# Patient Record
Sex: FEMALE | Race: WHITE | NOT HISPANIC OR LATINO | Employment: OTHER | URBAN - METROPOLITAN AREA
[De-identification: names, ages, dates, MRNs, and addresses within clinical notes are randomized per-mention and may not be internally consistent; named-entity substitution may affect disease eponyms.]

---

## 2017-08-10 ENCOUNTER — APPOINTMENT (OUTPATIENT)
Dept: OTHER | Facility: IMAGING CENTER | Age: 57
End: 2017-08-10

## 2017-08-12 ENCOUNTER — HOSPITAL ENCOUNTER (OUTPATIENT)
Dept: LAB | Facility: MEDICAL CENTER | Age: 57
End: 2017-08-12
Attending: FAMILY MEDICINE
Payer: COMMERCIAL

## 2017-08-12 LAB
ALBUMIN SERPL BCP-MCNC: 3.9 G/DL (ref 3.2–4.9)
ALBUMIN/GLOB SERPL: 1 G/DL
ALP SERPL-CCNC: 78 U/L (ref 30–99)
ALT SERPL-CCNC: 21 U/L (ref 2–50)
ANION GAP SERPL CALC-SCNC: 7 MMOL/L (ref 0–11.9)
APPEARANCE UR: CLEAR
AST SERPL-CCNC: 17 U/L (ref 12–45)
BACTERIA #/AREA URNS HPF: NEGATIVE /HPF
BASOPHILS # BLD AUTO: 1.6 % (ref 0–1.8)
BASOPHILS # BLD: 0.09 K/UL (ref 0–0.12)
BILIRUB SERPL-MCNC: 0.5 MG/DL (ref 0.1–1.5)
BILIRUB UR QL STRIP.AUTO: NEGATIVE
BUN SERPL-MCNC: 16 MG/DL (ref 8–22)
CALCIUM SERPL-MCNC: 9.5 MG/DL (ref 8.5–10.5)
CHLORIDE SERPL-SCNC: 102 MMOL/L (ref 96–112)
CHOLEST SERPL-MCNC: 170 MG/DL (ref 100–199)
CO2 SERPL-SCNC: 27 MMOL/L (ref 20–33)
COLOR UR: YELLOW
CREAT SERPL-MCNC: 0.67 MG/DL (ref 0.5–1.4)
EOSINOPHIL # BLD AUTO: 0.21 K/UL (ref 0–0.51)
EOSINOPHIL NFR BLD: 3.8 % (ref 0–6.9)
EPI CELLS #/AREA URNS HPF: ABNORMAL /HPF
ERYTHROCYTE [DISTWIDTH] IN BLOOD BY AUTOMATED COUNT: 45.6 FL (ref 35.9–50)
EST. AVERAGE GLUCOSE BLD GHB EST-MCNC: 117 MG/DL
GFR SERPL CREATININE-BSD FRML MDRD: >60 ML/MIN/1.73 M 2
GLOBULIN SER CALC-MCNC: 3.8 G/DL (ref 1.9–3.5)
GLUCOSE SERPL-MCNC: 88 MG/DL (ref 65–99)
GLUCOSE UR STRIP.AUTO-MCNC: NEGATIVE MG/DL
HBA1C MFR BLD: 5.7 % (ref 0–5.6)
HCT VFR BLD AUTO: 44.9 % (ref 37–47)
HDLC SERPL-MCNC: 49 MG/DL
HGB BLD-MCNC: 14.4 G/DL (ref 12–16)
HYALINE CASTS #/AREA URNS LPF: ABNORMAL /LPF
IMM GRANULOCYTES # BLD AUTO: 0.01 K/UL (ref 0–0.11)
IMM GRANULOCYTES NFR BLD AUTO: 0.2 % (ref 0–0.9)
KETONES UR STRIP.AUTO-MCNC: NEGATIVE MG/DL
LDLC SERPL CALC-MCNC: 103 MG/DL
LEUKOCYTE ESTERASE UR QL STRIP.AUTO: ABNORMAL
LYMPHOCYTES # BLD AUTO: 1.53 K/UL (ref 1–4.8)
LYMPHOCYTES NFR BLD: 27.9 % (ref 22–41)
MCH RBC QN AUTO: 28.7 PG (ref 27–33)
MCHC RBC AUTO-ENTMCNC: 32.1 G/DL (ref 33.6–35)
MCV RBC AUTO: 89.6 FL (ref 81.4–97.8)
MICRO URNS: ABNORMAL
MONOCYTES # BLD AUTO: 0.41 K/UL (ref 0–0.85)
MONOCYTES NFR BLD AUTO: 7.5 % (ref 0–13.4)
NEUTROPHILS # BLD AUTO: 3.24 K/UL (ref 2–7.15)
NEUTROPHILS NFR BLD: 59 % (ref 44–72)
NITRITE UR QL STRIP.AUTO: NEGATIVE
NRBC # BLD AUTO: 0 K/UL
NRBC BLD AUTO-RTO: 0 /100 WBC
PH UR STRIP.AUTO: 6 [PH]
PLATELET # BLD AUTO: 268 K/UL (ref 164–446)
PMV BLD AUTO: 9.4 FL (ref 9–12.9)
POTASSIUM SERPL-SCNC: 3.9 MMOL/L (ref 3.6–5.5)
PROT SERPL-MCNC: 7.7 G/DL (ref 6–8.2)
PROT UR QL STRIP: NEGATIVE MG/DL
RBC # BLD AUTO: 5.01 M/UL (ref 4.2–5.4)
RBC # URNS HPF: ABNORMAL /HPF
RBC UR QL AUTO: NEGATIVE
RENAL EPI CELLS #/AREA URNS HPF: ABNORMAL /HPF
SODIUM SERPL-SCNC: 136 MMOL/L (ref 135–145)
SP GR UR STRIP.AUTO: 1.03
TRANS CELLS #/AREA URNS HPF: ABNORMAL /HPF
TRIGL SERPL-MCNC: 89 MG/DL (ref 0–149)
TSH SERPL DL<=0.005 MIU/L-ACNC: 4.78 UIU/ML (ref 0.3–3.7)
UROBILINOGEN UR STRIP.AUTO-MCNC: 0.2 MG/DL
WBC # BLD AUTO: 5.5 K/UL (ref 4.8–10.8)
WBC #/AREA URNS HPF: ABNORMAL /HPF

## 2017-08-12 PROCEDURE — 80061 LIPID PANEL: CPT

## 2017-08-12 PROCEDURE — 84443 ASSAY THYROID STIM HORMONE: CPT

## 2017-08-12 PROCEDURE — 85025 COMPLETE CBC W/AUTO DIFF WBC: CPT

## 2017-08-12 PROCEDURE — 36415 COLL VENOUS BLD VENIPUNCTURE: CPT

## 2017-08-12 PROCEDURE — 83036 HEMOGLOBIN GLYCOSYLATED A1C: CPT

## 2017-08-12 PROCEDURE — 80053 COMPREHEN METABOLIC PANEL: CPT

## 2017-08-12 PROCEDURE — 81001 URINALYSIS AUTO W/SCOPE: CPT

## 2017-08-18 ENCOUNTER — HOSPITAL ENCOUNTER (OUTPATIENT)
Dept: RADIOLOGY | Facility: MEDICAL CENTER | Age: 57
End: 2017-08-18
Attending: FAMILY MEDICINE
Payer: COMMERCIAL

## 2017-08-18 DIAGNOSIS — R05.9 COUGH: ICD-10-CM

## 2017-08-18 PROCEDURE — 71020 DX-CHEST-2 VIEWS: CPT

## 2017-08-29 DIAGNOSIS — Z01.812 PRE-OPERATIVE LABORATORY EXAMINATION: ICD-10-CM

## 2017-08-29 LAB
SCCMEC + MECA PNL NOSE NAA+PROBE: NEGATIVE
SCCMEC + MECA PNL NOSE NAA+PROBE: POSITIVE

## 2017-08-29 PROCEDURE — 87641 MR-STAPH DNA AMP PROBE: CPT

## 2017-08-29 PROCEDURE — 87640 STAPH A DNA AMP PROBE: CPT

## 2017-08-29 NOTE — DISCHARGE PLANNING
DISCHARGE PLANNING NOTE - TOTAL JOINT     Procedure: Procedure(s):  KNEE ARTHROPLASTY TOTAL  Procedure Date: 9/12/2017  Insurance:  Payor: [facility] / Plan: [facility] HMO / Product Type: *No Product type* /   Equipment currently available at home? bedside commode, four-wheel walker, raised toilet seat and shower chair  Steps into the home? 3  Steps within the home? 0  Toilet height? Standard  Type of shower? walk-in shower  Who will be with you during your recovery? father  Is Outpatient Physical Therapy set up after surgery? No   Did you take the Total Joint Class and where? Yes, at [facility]     Plan:

## 2017-09-12 ENCOUNTER — HOSPITAL ENCOUNTER (INPATIENT)
Facility: MEDICAL CENTER | Age: 57
LOS: 1 days | DRG: 470 | End: 2017-09-13
Attending: ORTHOPAEDIC SURGERY | Admitting: ORTHOPAEDIC SURGERY
Payer: COMMERCIAL

## 2017-09-12 ENCOUNTER — APPOINTMENT (OUTPATIENT)
Dept: RADIOLOGY | Facility: MEDICAL CENTER | Age: 57
DRG: 470 | End: 2017-09-12
Attending: ORTHOPAEDIC SURGERY
Payer: COMMERCIAL

## 2017-09-12 DIAGNOSIS — M17.10 KNEE ARTHROPATHY: ICD-10-CM

## 2017-09-12 PROBLEM — M17.9: Status: ACTIVE | Noted: 2017-09-12

## 2017-09-12 PROCEDURE — 160029 HCHG SURGERY MINUTES - 1ST 30 MINS LEVEL 4: Performed by: ORTHOPAEDIC SURGERY

## 2017-09-12 PROCEDURE — A9270 NON-COVERED ITEM OR SERVICE: HCPCS | Performed by: ORTHOPAEDIC SURGERY

## 2017-09-12 PROCEDURE — 501487 HCHG STRYKER TIP: Performed by: ORTHOPAEDIC SURGERY

## 2017-09-12 PROCEDURE — 700101 HCHG RX REV CODE 250

## 2017-09-12 PROCEDURE — 700111 HCHG RX REV CODE 636 W/ 250 OVERRIDE (IP)

## 2017-09-12 PROCEDURE — 160036 HCHG PACU - EA ADDL 30 MINS PHASE I: Performed by: ORTHOPAEDIC SURGERY

## 2017-09-12 PROCEDURE — 501486 HCHG STRYKER IRRIG SET HC W/TUBING: Performed by: ORTHOPAEDIC SURGERY

## 2017-09-12 PROCEDURE — 700105 HCHG RX REV CODE 258: Performed by: ORTHOPAEDIC SURGERY

## 2017-09-12 PROCEDURE — 500088 HCHG BLADE, SAGITTAL: Performed by: ORTHOPAEDIC SURGERY

## 2017-09-12 PROCEDURE — A6223 GAUZE >16<=48 NO W/SAL W/O B: HCPCS | Performed by: ORTHOPAEDIC SURGERY

## 2017-09-12 PROCEDURE — 160022 HCHG BLOCK: Performed by: ORTHOPAEDIC SURGERY

## 2017-09-12 PROCEDURE — 501838 HCHG SUTURE GENERAL: Performed by: ORTHOPAEDIC SURGERY

## 2017-09-12 PROCEDURE — 500423 HCHG DRESSING, ABD COMBINE: Performed by: ORTHOPAEDIC SURGERY

## 2017-09-12 PROCEDURE — 500811 HCHG LENS/HOOD FOR SPACESUIT: Performed by: ORTHOPAEDIC SURGERY

## 2017-09-12 PROCEDURE — 500093 HCHG BONE CEMENT MIXER: Performed by: ORTHOPAEDIC SURGERY

## 2017-09-12 PROCEDURE — 502579 HCHG PACK, TOTAL KNEE: Performed by: ORTHOPAEDIC SURGERY

## 2017-09-12 PROCEDURE — 700112 HCHG RX REV CODE 229: Performed by: ORTHOPAEDIC SURGERY

## 2017-09-12 PROCEDURE — 160035 HCHG PACU - 1ST 60 MINS PHASE I: Performed by: ORTHOPAEDIC SURGERY

## 2017-09-12 PROCEDURE — 0SRD0J9 REPLACEMENT OF LEFT KNEE JOINT WITH SYNTHETIC SUBSTITUTE, CEMENTED, OPEN APPROACH: ICD-10-PCS | Performed by: ORTHOPAEDIC SURGERY

## 2017-09-12 PROCEDURE — A9270 NON-COVERED ITEM OR SERVICE: HCPCS

## 2017-09-12 PROCEDURE — 770006 HCHG ROOM/CARE - MED/SURG/GYN SEMI*

## 2017-09-12 PROCEDURE — 160002 HCHG RECOVERY MINUTES (STAT): Performed by: ORTHOPAEDIC SURGERY

## 2017-09-12 PROCEDURE — 700102 HCHG RX REV CODE 250 W/ 637 OVERRIDE(OP): Performed by: ORTHOPAEDIC SURGERY

## 2017-09-12 PROCEDURE — 502000 HCHG MISC OR IMPLANTS RC 0278: Performed by: ORTHOPAEDIC SURGERY

## 2017-09-12 PROCEDURE — 160041 HCHG SURGERY MINUTES - EA ADDL 1 MIN LEVEL 4: Performed by: ORTHOPAEDIC SURGERY

## 2017-09-12 PROCEDURE — 160048 HCHG OR STATISTICAL LEVEL 1-5: Performed by: ORTHOPAEDIC SURGERY

## 2017-09-12 PROCEDURE — 73560 X-RAY EXAM OF KNEE 1 OR 2: CPT | Mod: LT

## 2017-09-12 PROCEDURE — 160009 HCHG ANES TIME/MIN: Performed by: ORTHOPAEDIC SURGERY

## 2017-09-12 PROCEDURE — 94760 N-INVAS EAR/PLS OXIMETRY 1: CPT

## 2017-09-12 PROCEDURE — 700102 HCHG RX REV CODE 250 W/ 637 OVERRIDE(OP)

## 2017-09-12 PROCEDURE — 97535 SELF CARE MNGMENT TRAINING: CPT

## 2017-09-12 PROCEDURE — 500097 HCHG BONE CEMENT, SIMPLEX FULL DOSE: Performed by: ORTHOPAEDIC SURGERY

## 2017-09-12 PROCEDURE — 700111 HCHG RX REV CODE 636 W/ 250 OVERRIDE (IP): Performed by: ORTHOPAEDIC SURGERY

## 2017-09-12 PROCEDURE — 501480 HCHG STOCKINETTE: Performed by: ORTHOPAEDIC SURGERY

## 2017-09-12 DEVICE — CEMENT BONE SMARTSET HV40G - (20EA/CA): Type: IMPLANTABLE DEVICE | Status: FUNCTIONAL

## 2017-09-12 DEVICE — IMPLANTABLE DEVICE: Type: IMPLANTABLE DEVICE | Status: FUNCTIONAL

## 2017-09-12 RX ORDER — EPINEPHRINE 1 MG/ML(1)
AMPUL (ML) INJECTION
Status: DISCONTINUED | OUTPATIENT
Start: 2017-09-12 | End: 2017-09-12 | Stop reason: HOSPADM

## 2017-09-12 RX ORDER — AMOXICILLIN 250 MG
1 CAPSULE ORAL NIGHTLY
Status: DISCONTINUED | OUTPATIENT
Start: 2017-09-12 | End: 2017-09-13 | Stop reason: HOSPADM

## 2017-09-12 RX ORDER — DEXAMETHASONE SODIUM PHOSPHATE 4 MG/ML
4 INJECTION, SOLUTION INTRA-ARTICULAR; INTRALESIONAL; INTRAMUSCULAR; INTRAVENOUS; SOFT TISSUE
Status: COMPLETED | OUTPATIENT
Start: 2017-09-12 | End: 2017-09-12

## 2017-09-12 RX ORDER — OXYCODONE HYDROCHLORIDE 10 MG/1
10 TABLET ORAL
Status: DISCONTINUED | OUTPATIENT
Start: 2017-09-12 | End: 2017-09-13 | Stop reason: HOSPADM

## 2017-09-12 RX ORDER — SCOLOPAMINE TRANSDERMAL SYSTEM 1 MG/1
1 PATCH, EXTENDED RELEASE TRANSDERMAL
Status: DISCONTINUED | OUTPATIENT
Start: 2017-09-12 | End: 2017-09-13 | Stop reason: HOSPADM

## 2017-09-12 RX ORDER — MORPHINE SULFATE 0.5 MG/ML
INJECTION, SOLUTION EPIDURAL; INTRATHECAL; INTRAVENOUS
Status: DISCONTINUED | OUTPATIENT
Start: 2017-09-12 | End: 2017-09-12 | Stop reason: HOSPADM

## 2017-09-12 RX ORDER — OXYCODONE HYDROCHLORIDE 5 MG/1
5 TABLET ORAL
Status: DISCONTINUED | OUTPATIENT
Start: 2017-09-12 | End: 2017-09-13 | Stop reason: HOSPADM

## 2017-09-12 RX ORDER — ACETAMINOPHEN 500 MG
TABLET ORAL
Status: COMPLETED
Start: 2017-09-12 | End: 2017-09-12

## 2017-09-12 RX ORDER — LEVOTHYROXINE SODIUM 0.1 MG/1
300 TABLET ORAL
Status: DISCONTINUED | OUTPATIENT
Start: 2017-09-13 | End: 2017-09-13 | Stop reason: HOSPADM

## 2017-09-12 RX ORDER — SODIUM CHLORIDE, SODIUM LACTATE, POTASSIUM CHLORIDE, CALCIUM CHLORIDE 600; 310; 30; 20 MG/100ML; MG/100ML; MG/100ML; MG/100ML
INJECTION, SOLUTION INTRAVENOUS CONTINUOUS
Status: DISCONTINUED | OUTPATIENT
Start: 2017-09-12 | End: 2017-09-13 | Stop reason: HOSPADM

## 2017-09-12 RX ORDER — MIDAZOLAM HYDROCHLORIDE 1 MG/ML
INJECTION INTRAMUSCULAR; INTRAVENOUS
Status: COMPLETED
Start: 2017-09-12 | End: 2017-09-12

## 2017-09-12 RX ORDER — DOCUSATE SODIUM 100 MG/1
100 CAPSULE, LIQUID FILLED ORAL 2 TIMES DAILY
Status: DISCONTINUED | OUTPATIENT
Start: 2017-09-12 | End: 2017-09-13 | Stop reason: HOSPADM

## 2017-09-12 RX ORDER — POLYETHYLENE GLYCOL 3350 17 G/17G
1 POWDER, FOR SOLUTION ORAL 2 TIMES DAILY PRN
Status: DISCONTINUED | OUTPATIENT
Start: 2017-09-12 | End: 2017-09-13 | Stop reason: HOSPADM

## 2017-09-12 RX ORDER — TRAMADOL HYDROCHLORIDE 50 MG/1
50 TABLET ORAL EVERY 4 HOURS PRN
Status: DISCONTINUED | OUTPATIENT
Start: 2017-09-12 | End: 2017-09-13 | Stop reason: HOSPADM

## 2017-09-12 RX ORDER — ROPIVACAINE HYDROCHLORIDE 5 MG/ML
INJECTION, SOLUTION EPIDURAL; INFILTRATION; PERINEURAL
Status: COMPLETED
Start: 2017-09-12 | End: 2017-09-12

## 2017-09-12 RX ORDER — KETOROLAC TROMETHAMINE 30 MG/ML
INJECTION, SOLUTION INTRAMUSCULAR; INTRAVENOUS
Status: DISCONTINUED | OUTPATIENT
Start: 2017-09-12 | End: 2017-09-12 | Stop reason: HOSPADM

## 2017-09-12 RX ORDER — ENEMA 19; 7 G/133ML; G/133ML
1 ENEMA RECTAL
Status: DISCONTINUED | OUTPATIENT
Start: 2017-09-12 | End: 2017-09-13 | Stop reason: HOSPADM

## 2017-09-12 RX ORDER — DIPHENHYDRAMINE HCL 25 MG
25 TABLET ORAL NIGHTLY PRN
Status: DISCONTINUED | OUTPATIENT
Start: 2017-09-13 | End: 2017-09-13 | Stop reason: HOSPADM

## 2017-09-12 RX ORDER — HALOPERIDOL 5 MG/ML
1 INJECTION INTRAMUSCULAR EVERY 6 HOURS PRN
Status: DISCONTINUED | OUTPATIENT
Start: 2017-09-12 | End: 2017-09-13 | Stop reason: HOSPADM

## 2017-09-12 RX ORDER — HALOPERIDOL 5 MG/ML
INJECTION INTRAMUSCULAR
Status: COMPLETED
Start: 2017-09-12 | End: 2017-09-12

## 2017-09-12 RX ORDER — ACETAMINOPHEN 500 MG
1000 TABLET ORAL EVERY 6 HOURS
Status: DISCONTINUED | OUTPATIENT
Start: 2017-09-12 | End: 2017-09-13 | Stop reason: HOSPADM

## 2017-09-12 RX ORDER — TRANEXAMIC ACID 100 MG/ML
INJECTION, SOLUTION INTRAVENOUS
Status: DISPENSED
Start: 2017-09-12 | End: 2017-09-12

## 2017-09-12 RX ORDER — MORPHINE SULFATE 4 MG/ML
4 INJECTION, SOLUTION INTRAMUSCULAR; INTRAVENOUS
Status: DISCONTINUED | OUTPATIENT
Start: 2017-09-12 | End: 2017-09-13 | Stop reason: HOSPADM

## 2017-09-12 RX ORDER — LIDOCAINE HYDROCHLORIDE 10 MG/ML
INJECTION, SOLUTION INFILTRATION; PERINEURAL
Status: COMPLETED
Start: 2017-09-12 | End: 2017-09-12

## 2017-09-12 RX ORDER — ONDANSETRON 2 MG/ML
4 INJECTION INTRAMUSCULAR; INTRAVENOUS EVERY 4 HOURS PRN
Status: DISCONTINUED | OUTPATIENT
Start: 2017-09-12 | End: 2017-09-13 | Stop reason: HOSPADM

## 2017-09-12 RX ORDER — ROPIVACAINE HYDROCHLORIDE 5 MG/ML
INJECTION, SOLUTION EPIDURAL; INFILTRATION; PERINEURAL
Status: DISCONTINUED | OUTPATIENT
Start: 2017-09-12 | End: 2017-09-12 | Stop reason: HOSPADM

## 2017-09-12 RX ORDER — AMOXICILLIN 250 MG
1 CAPSULE ORAL
Status: DISCONTINUED | OUTPATIENT
Start: 2017-09-12 | End: 2017-09-13 | Stop reason: HOSPADM

## 2017-09-12 RX ORDER — DIPHENHYDRAMINE HYDROCHLORIDE 50 MG/ML
25 INJECTION INTRAMUSCULAR; INTRAVENOUS EVERY 6 HOURS PRN
Status: DISCONTINUED | OUTPATIENT
Start: 2017-09-12 | End: 2017-09-13 | Stop reason: HOSPADM

## 2017-09-12 RX ORDER — CELECOXIB 200 MG/1
200 CAPSULE ORAL 2 TIMES DAILY WITH MEALS
Status: DISCONTINUED | OUTPATIENT
Start: 2017-09-12 | End: 2017-09-13 | Stop reason: HOSPADM

## 2017-09-12 RX ORDER — GABAPENTIN 300 MG/1
CAPSULE ORAL
Status: COMPLETED
Start: 2017-09-12 | End: 2017-09-12

## 2017-09-12 RX ORDER — OXYCODONE HCL 10 MG/1
TABLET, FILM COATED, EXTENDED RELEASE ORAL
Status: COMPLETED
Start: 2017-09-12 | End: 2017-09-12

## 2017-09-12 RX ORDER — BISACODYL 10 MG
10 SUPPOSITORY, RECTAL RECTAL
Status: DISCONTINUED | OUTPATIENT
Start: 2017-09-12 | End: 2017-09-13 | Stop reason: HOSPADM

## 2017-09-12 RX ADMIN — SODIUM CHLORIDE, POTASSIUM CHLORIDE, SODIUM LACTATE AND CALCIUM CHLORIDE: 600; 310; 30; 20 INJECTION, SOLUTION INTRAVENOUS at 13:31

## 2017-09-12 RX ADMIN — HALOPERIDOL LACTATE 1 MG: 5 INJECTION, SOLUTION INTRAMUSCULAR at 09:59

## 2017-09-12 RX ADMIN — TRAMADOL HYDROCHLORIDE 50 MG: 50 TABLET, FILM COATED ORAL at 16:46

## 2017-09-12 RX ADMIN — ACETAMINOPHEN 1000 MG: 500 TABLET, COATED ORAL at 07:45

## 2017-09-12 RX ADMIN — ACETAMINOPHEN 1000 MG: 500 TABLET ORAL at 12:00

## 2017-09-12 RX ADMIN — OXYCODONE HYDROCHLORIDE 10 MG: 10 TABLET, FILM COATED, EXTENDED RELEASE ORAL at 07:45

## 2017-09-12 RX ADMIN — ONDANSETRON 4 MG: 2 INJECTION INTRAMUSCULAR; INTRAVENOUS at 19:37

## 2017-09-12 RX ADMIN — ACETAMINOPHEN 1000 MG: 500 TABLET ORAL at 23:54

## 2017-09-12 RX ADMIN — SODIUM CHLORIDE 2 G: 9 INJECTION, SOLUTION INTRAVENOUS at 23:54

## 2017-09-12 RX ADMIN — GABAPENTIN 300 MG: 300 CAPSULE ORAL at 07:45

## 2017-09-12 RX ADMIN — DOCUSATE SODIUM 100 MG: 100 CAPSULE, LIQUID FILLED ORAL at 12:00

## 2017-09-12 RX ADMIN — SODIUM CHLORIDE, POTASSIUM CHLORIDE, SODIUM LACTATE AND CALCIUM CHLORIDE 1000 ML: 600; 310; 30; 20 INJECTION, SOLUTION INTRAVENOUS at 07:45

## 2017-09-12 RX ADMIN — SODIUM CHLORIDE 2 G: 9 INJECTION, SOLUTION INTRAVENOUS at 16:43

## 2017-09-12 RX ADMIN — ACETAMINOPHEN 1000 MG: 500 TABLET ORAL at 16:46

## 2017-09-12 RX ADMIN — DEXAMETHASONE SODIUM PHOSPHATE 4 MG: 4 INJECTION, SOLUTION INTRAMUSCULAR; INTRAVENOUS at 17:47

## 2017-09-12 RX ADMIN — LIDOCAINE HYDROCHLORIDE 0.2 ML: 10 INJECTION, SOLUTION INFILTRATION; PERINEURAL at 07:45

## 2017-09-12 RX ADMIN — SODIUM CHLORIDE, POTASSIUM CHLORIDE, SODIUM LACTATE AND CALCIUM CHLORIDE: 600; 310; 30; 20 INJECTION, SOLUTION INTRAVENOUS at 22:36

## 2017-09-12 RX ADMIN — CELECOXIB 200 MG: 200 CAPSULE ORAL at 16:46

## 2017-09-12 ASSESSMENT — PAIN SCALES - GENERAL
PAINLEVEL_OUTOF10: ASSUMED PAIN PRESENT
PAINLEVEL_OUTOF10: 0
PAINLEVEL_OUTOF10: 0
PAINLEVEL_OUTOF10: ASSUMED PAIN PRESENT
PAINLEVEL_OUTOF10: 5
PAINLEVEL_OUTOF10: 0
PAINLEVEL_OUTOF10: 0
PAINLEVEL_OUTOF10: 5
PAINLEVEL_OUTOF10: 0
PAINLEVEL_OUTOF10: 7
PAINLEVEL_OUTOF10: 0
PAINLEVEL_OUTOF10: 7
PAINLEVEL_OUTOF10: 5

## 2017-09-12 ASSESSMENT — PATIENT HEALTH QUESTIONNAIRE - PHQ9
1. LITTLE INTEREST OR PLEASURE IN DOING THINGS: NOT AT ALL
2. FEELING DOWN, DEPRESSED, IRRITABLE, OR HOPELESS: NOT AT ALL
SUM OF ALL RESPONSES TO PHQ QUESTIONS 1-9: 0
SUM OF ALL RESPONSES TO PHQ9 QUESTIONS 1 AND 2: 0

## 2017-09-12 ASSESSMENT — LIFESTYLE VARIABLES
ALCOHOL_USE: NO
EVER_SMOKED: NEVER

## 2017-09-12 NOTE — DIETARY
NUTRITION SERVICES: BMI - Pt with BMI >40 (40.17). Weight loss counseling not appropriate in acute care setting. RECOMMEND - Referral to outpatient nutrition services for weight management after D/C.

## 2017-09-12 NOTE — OR NURSING
"0926: Patient to PACU from OR.  No airway in place, mask on at 6 lpm saturating at 95%.  Respirations even and unlabored.  Breath sounds clear bilaterally.  Patient does not respond to voice.  Left knee has dressing to it, clean, dry, and intact.  Pedal pulses strong, toes pink and warm.  SCDs placed.  Ice to affected knee, bed locked to prevent knee from bending.  0940: Patient arouses to voice, confused to place/situation at the moment.  Denies pain.  Reports she can feel \"tightening\" to her right arm and leg- these are the SCD machine and the blood pressure cuff.  0955: Patient reports she feels nausea, given an emisis bag.  Patient vomited 75 ml clear emesis.  Haldol given.    1010: Patient resting in bed, reports she is thirsty.  RN notified patient that we don't want to give her anything to drink until she is not feeling nauseous any more. Patient reports the nausea has improved but agrees to wait a little longer for the medicine to kick in before trying sips of water.  1025: Patient resting with eyes closed, aroused by voice.    1040:  Called and verbally confirmed that Dr. Lucero wants an x-ray on the knee, ordered x-ray.  1055: Patient is no longer complaining of nausea, tolerating PO, no complaints of pain, meets criteria for transfer to the floor.  Called report to GALI Gray.  1100: Report to GALI Monte  "

## 2017-09-12 NOTE — PROGRESS NOTES
Assisted up to bathroom voided adequate amounts,ambulates with walker tolerates activity well.Up in recliner chair after.

## 2017-09-12 NOTE — THERAPY
Physical Therapy evaluation attempted however pt already up to chair w/ nursing assist and requesting pain meds and to rest. PT completed education in post op safety, WB status, initial HEP to begin including ROM and PT POC. Good initial ROM in L knee observed as 0-90 degrees in sitting. PT will proceed with full evaluation tomorrow and patient will continue to mobilize with FWW and nursing assist this evening.

## 2017-09-12 NOTE — OP REPORT
DATE OF SERVICE:  09/12/2017    PREOPERATIVE DIAGNOSIS:  Left knee degenerative joint disease.    POSTOPERATIVE DIAGNOSIS:  Left knee degenerative joint disease.    PROCEDURE:  Left total knee arthroplasty.    SURGEON:  Bull James MD    ASSISTANT:  Jerrell Tang MD    ANESTHESIA:  LMA anesthesia with adductor canal block.    ANESTHESIOLOGIST:  Emily Dowling MD    ESTIMATED BLOOD LOSS:  250 mL    COMPLICATIONS:  None.    TOURNIQUET TIME:  39 minutes.    INDICATIONS FOR PROCEDURE:  This is a 57-year-old white female with severe   degenerative joint disease of her left knee.  She has failed conservative   management including home exercise program, over-the-counter   anti-inflammatories, multiple injections.  She presents for operative   treatment.  For further details of H and P, please see chart.    Risks, benefits, and alternatives of the procedure were discussed with patient   include but not limited to bleeding, infection, neurovascular injury, need   for further surgery, PE, DVT, blood transfusion with associated risks,   anesthesia with associated risks, and death.  All questions were answered.  No   warranties or guarantees were given or implied.  Consent is signed and is on   chart.    DESCRIPTION OF PROCEDURE:  Patient was taken to the operating room, placed   supine on the operating table.  Prior to this, an adductor canal block had   been placed in preoperative holding for postoperative pain control.  Patient's   left lower extremity was prepped and draped in normal sterile fashion.  The   leg was exsanguinated and tourniquet inflated to 250 mmHg.  A 20-cm incision   was made over the anterior aspect of the knee.  This was taken down sharply   through skin and subcutaneous tissue.  Bovie cautery was used to obtain   hemostasis.  Dissection was carried down to the level of retinaculum.  Flaps   were elevated in medial and lateral aspect of the patella.  A medial   parapatellar arthrotomy was  performed.  Medial release was performed.  Fat pad   was excised.  Patella was everted and knee flexed to 90 degrees.    Intramedullary canal was opened.  Distal femoral resection guide was placed in   5 degrees of valgus with a 9-mm resection.  This was pinned.  Distal   resection was performed.  The femur was then sized to a size 6 and a size 6   cutting block was placed.  Anterior condyle, posterior condyle, anterior   chamfer and posterior chamfer were cut.  These were removed.  A notch guide   was placed and the trochlear notch was cut.  Attention was placed on the   patella.  An extramedullary guide was placed.  A 10-mm resection was taken off   the lateral tibia.  ___ removed.  Meniscus was removed.  There were large   posterior osteophytes, which were removed at this point.  Spacer block was   placed, which showed good flexion and extension gaps.  The tibia was sized to   a size 6.  This was reamed and broached appropriately.  Trials were placed,   which showed excellent range of motion with a 7-mm poly.  It had excellent   stability.  Attention was placed on the patella.  A 9.5-mm resection was   performed.  This was trialed with a size 41, which was drilled.  A 41 trial   was placed, which tracked quite well.  At this point, all trials were removed.    The knee was thoroughly lavaged.  The posterior capsule was injected with a   combination of Marcaine, Toradol and Duramorph.  A DePuy size 6 tibia, size 6   posterior stabilized femur and a 41-mm 3-pegged medial dome patella were   impacted into position.  All cement was removed.  A 7-mm poly was placed.    This showed excellent range of motion and good stability.  The knee was   lavaged with Betadine, then it was lavaged with saline.  Tourniquet was   dropped, adequate hemostasis was achieved.  Parapatellar arthrotomy was closed   using #1 Vicryl, subcutaneous tissue closed using 2-0 Vicryl and skin closed   using staples.  Patient was placed in a soft  sterile dressing.  She was   awakened from anesthesia and returned to the recovery room in stable   condition.  There were no sara or intraoperative complications noted.       ____________________________________     MD CHASE Lainez / RICHIE    DD:  09/12/2017 09:26:04  DT:  09/12/2017 10:00:10    D#:  [id number]  Job#:  [id number]

## 2017-09-12 NOTE — CARE PLAN
Problem: Safety  Goal: Will remain free from injury    Intervention: Provide assistance with mobility  Pt mobility assessed at beginning of shift, pt requires 2 assist and front wheel walker with ambulation.  Fall precautions in place, non-slip socks on, bed in lowest, locked position and call light is within reach.  Pt educated to call for assistance and verbalizes understanding.       Problem: Infection  Goal: Will remain free from infection    Intervention: Assess signs and symptoms of infection  Pt monitored for signs and symptoms of infection. Vitals and labs assessed and monitored for signs of infection. Proper hand hygiene performed prior to entering and exiting pt's room.  Pt educated on proper hand hygiene.

## 2017-09-12 NOTE — OR SURGEON
Operative Report    PreOp Diagnosis: Lt knee djd    PostOp Diagnosis: lt knee djd    Procedure(s):  KNEE ARTHROPLASTY TOTAL - Wound Class: Clean    Surgeon(s):  ROM Covington M.D.    Anesthesiologist/Type of Anesthesia:  Anesthesiologist: Emily Dowling M.D.  Anesthesia Technician: Gustabo Olson/General    Surgical Staff:  Circulator: Wilman Christian R.N.  Limb Yarbrough: Vinnie Wagner  Scrub Person: Gurinder Baird    Specimens:  * No specimens in log *    Estimated Blood Loss: 250cc    Findings: severe varus djd lt knee    Complications: none        9/12/2017 9:22 AM Bull James

## 2017-09-12 NOTE — PROGRESS NOTES
Pt admitted to room 224-1 via transport in bed from PACU at 1130.  Pt A&Ox4, pain reported at 5 on a scale of 0-10, minor discomfort. Oriented to room call light and smoking policy.  Reviewed plan of care (equipment, incentive spirometer, sequential compression devices, medications, activity, diet, fall precautions, skin care, and pain) with patient.

## 2017-09-13 VITALS
TEMPERATURE: 98.1 F | OXYGEN SATURATION: 96 % | HEIGHT: 70 IN | BODY MASS INDEX: 40.08 KG/M2 | DIASTOLIC BLOOD PRESSURE: 66 MMHG | RESPIRATION RATE: 16 BRPM | HEART RATE: 58 BPM | SYSTOLIC BLOOD PRESSURE: 111 MMHG | WEIGHT: 279.98 LBS

## 2017-09-13 LAB
HCT VFR BLD AUTO: 35.7 % (ref 37–47)
HGB BLD-MCNC: 11.9 G/DL (ref 12–16)

## 2017-09-13 PROCEDURE — 97110 THERAPEUTIC EXERCISES: CPT

## 2017-09-13 PROCEDURE — 85018 HEMOGLOBIN: CPT

## 2017-09-13 PROCEDURE — 36415 COLL VENOUS BLD VENIPUNCTURE: CPT

## 2017-09-13 PROCEDURE — 97530 THERAPEUTIC ACTIVITIES: CPT

## 2017-09-13 PROCEDURE — G8979 MOBILITY GOAL STATUS: HCPCS | Mod: CI

## 2017-09-13 PROCEDURE — 700112 HCHG RX REV CODE 229: Performed by: ORTHOPAEDIC SURGERY

## 2017-09-13 PROCEDURE — G8987 SELF CARE CURRENT STATUS: HCPCS | Mod: CJ

## 2017-09-13 PROCEDURE — A9270 NON-COVERED ITEM OR SERVICE: HCPCS | Performed by: ORTHOPAEDIC SURGERY

## 2017-09-13 PROCEDURE — 85014 HEMATOCRIT: CPT

## 2017-09-13 PROCEDURE — G8978 MOBILITY CURRENT STATUS: HCPCS | Mod: CJ

## 2017-09-13 PROCEDURE — 97116 GAIT TRAINING THERAPY: CPT

## 2017-09-13 PROCEDURE — G8989 SELF CARE D/C STATUS: HCPCS | Mod: CJ

## 2017-09-13 PROCEDURE — 97165 OT EVAL LOW COMPLEX 30 MIN: CPT

## 2017-09-13 PROCEDURE — 97161 PT EVAL LOW COMPLEX 20 MIN: CPT

## 2017-09-13 PROCEDURE — 97535 SELF CARE MNGMENT TRAINING: CPT

## 2017-09-13 PROCEDURE — G8988 SELF CARE GOAL STATUS: HCPCS | Mod: CJ

## 2017-09-13 PROCEDURE — 700102 HCHG RX REV CODE 250 W/ 637 OVERRIDE(OP): Performed by: ORTHOPAEDIC SURGERY

## 2017-09-13 RX ORDER — ACETAMINOPHEN 500 MG
1000 TABLET ORAL EVERY 6 HOURS
Qty: 30 TAB | Refills: 0
Start: 2017-09-13 | End: 2023-01-01

## 2017-09-13 RX ORDER — OXYCODONE HYDROCHLORIDE 5 MG/1
5 TABLET ORAL
Qty: 30 TAB | Refills: 0
Start: 2017-09-13 | End: 2023-01-01

## 2017-09-13 RX ORDER — ASPIRIN 325 MG
325 TABLET, DELAYED RELEASE (ENTERIC COATED) ORAL DAILY
Qty: 30 TAB | Refills: 0
Start: 2017-09-13 | End: 2023-01-01

## 2017-09-13 RX ORDER — TRAMADOL HYDROCHLORIDE 50 MG/1
50 TABLET ORAL EVERY 4 HOURS PRN
Qty: 30 TAB | Refills: 0
Start: 2017-09-13 | End: 2023-01-01

## 2017-09-13 RX ORDER — ONDANSETRON 4 MG/1
4 TABLET, ORALLY DISINTEGRATING ORAL EVERY 8 HOURS PRN
Qty: 25 TAB | Refills: 0 | Status: SHIPPED | OUTPATIENT
Start: 2017-09-13 | End: 2023-01-01

## 2017-09-13 RX ADMIN — CELECOXIB 200 MG: 200 CAPSULE ORAL at 07:52

## 2017-09-13 RX ADMIN — TRAMADOL HYDROCHLORIDE 50 MG: 50 TABLET, FILM COATED ORAL at 09:26

## 2017-09-13 RX ADMIN — ASPIRIN 325 MG: 325 TABLET, DELAYED RELEASE ORAL at 06:50

## 2017-09-13 RX ADMIN — TRAMADOL HYDROCHLORIDE 50 MG: 50 TABLET, FILM COATED ORAL at 13:33

## 2017-09-13 RX ADMIN — LEVOTHYROXINE SODIUM 300 MCG: 100 TABLET ORAL at 06:50

## 2017-09-13 RX ADMIN — ACETAMINOPHEN 1000 MG: 500 TABLET ORAL at 05:06

## 2017-09-13 RX ADMIN — TRAMADOL HYDROCHLORIDE 50 MG: 50 TABLET, FILM COATED ORAL at 05:08

## 2017-09-13 RX ADMIN — ACETAMINOPHEN 1000 MG: 500 TABLET ORAL at 11:49

## 2017-09-13 RX ADMIN — DOCUSATE SODIUM 100 MG: 100 CAPSULE, LIQUID FILLED ORAL at 07:52

## 2017-09-13 ASSESSMENT — GAIT ASSESSMENTS
DISTANCE (FEET): 180
ASSISTIVE DEVICE: FRONT WHEEL WALKER
DEVIATION: INCREASED BASE OF SUPPORT
DISTANCE (FEET): 200
DEVIATION: INCREASED BASE OF SUPPORT
GAIT LEVEL OF ASSIST: SUPERVISED
GAIT LEVEL OF ASSIST: STAND BY ASSIST
ASSISTIVE DEVICE: FRONT WHEEL WALKER

## 2017-09-13 ASSESSMENT — ACTIVITIES OF DAILY LIVING (ADL): TOILETING: INDEPENDENT

## 2017-09-13 ASSESSMENT — PAIN SCALES - GENERAL
PAINLEVEL_OUTOF10: 2
PAINLEVEL_OUTOF10: 7
PAINLEVEL_OUTOF10: 8
PAINLEVEL_OUTOF10: 6
PAINLEVEL_OUTOF10: ASSUMED PAIN PRESENT

## 2017-09-13 NOTE — PROGRESS NOTES
Pt medicated for nausea. Sitting in chair at bedside eating dinner.  Safety precautions in place: chair locked, call light within reach, no-slip socks on, pt educated to call for assistance and agrees to do so before attempting to ambulate on on.

## 2017-09-13 NOTE — THERAPY
"Physical Therapy Evaluation completed.   Bed Mobility:  Supine to Sit: Supervised  Transfers: Sit to Stand: Stand by Assist  Gait: Level Of Assist: Stand by Assist with Front-Wheel Walker       Plan of Care: Plan to complete next treatment by 4 PM today.  Discharge Recommendations: Equipment: No Equipment Needed. Post-acute therapy Discharge to home with outpatient or home health for additional skilled therapy services.    58 yo female s/p L TKA (WBAT). Pt reports high levels of pain but does not exhibit pain behaviors. She is anxious to take oxycodone or narcotics due to fear of addiction. Pt appears most limited by anxiety at this time regarding mobility and ability to manage at home, despite appropriate current post op functional status. PT will proceed with 1 more session this afternoon to promote increased independence and mobility prior to return to home. OP PT to follow up beginning on Friday. RN updated and aware.    See \"Rehab Therapy-Acute\" Patient Summary Report for complete documentation.     "

## 2017-09-13 NOTE — PROGRESS NOTES
No significant pain  AVSS  Toes up and down, pink and warm  Sensation intact sp/dp/pt  dp 2+    S/p LT TKA  Doing well  D/c home after PT

## 2017-09-13 NOTE — THERAPY
"Occupational Therapy Evaluation completed.   Functional Status:  Pt observed walking Supervised in holly with FWW with PT.  Pt had already dressed herself yesterday, so she was educated on technique for future. Pt up Supervised with FWW to bathroom, toileting Dio with raised toilet seat, and supervised to stand at sink for hand hygiene.  Educated pt on role of OT and Total Knee Replacement Protocol.  Reviewed application of precautions and use of Adaptive Equipment for dressing, bathing, toileting, grooming, kitchen activities and general functional mobility in the home. Reviewed the use of reacher, sockaide, shower chair vs tub bench and raised toilet seat to assist with ADL's.  Reviewed tub/shower transfers.  Pt educated that the safest option for her at this time would be to use the tub transfer bench that she has for the first couple weeks until she is more stable to step over the side of the tub.  Provided pt with handout and suggestions on where to obtain needed items. Educated on covering incision/dressing with extra plastic wrap and waterproof tape to ensure that incision does not get wet.  Educated on around the clock pain management strategies so that pt does not wake up in a pain crisis.  Pt verbalized understanding of all education provided.    Plan of Care: Patient with no further skilled OT needs in the acute care setting at this time  Discharge Recommendations:  Equipment: reacher. Post-acute therapy Discharge to home with outpatient or home health for additional skilled therapy services.    See \"Rehab Therapy-Acute\" Patient Summary Report for complete documentation.    "

## 2017-09-13 NOTE — PROGRESS NOTES
Pt resting in chair at this time, even visible chest rise, no apparent signs of distress, call light in place, chair is in locked position.

## 2017-09-13 NOTE — CARE PLAN
Problem: Safety  Goal: Will remain free from injury    Intervention: Provide assistance with mobility  Pt mobility assessed at beginning of shift, pt is standby assist with ambulation.  Fall precautions in place, non-slip socks on, bed in lowest, locked position and call light is within reach.  Pt educated to call for assistance and verbalizes understanding.       Problem: Pain Management  Goal: Pain level will decrease to patient's comfort goal    Intervention: Follow pain managment plan developed in collaboration with patient and Interdisciplinary Team  Medicated per MAR, educated on pain scale, implemented non-pharmacological methods: distraction, one-on-one discussion, repositioned.

## 2017-09-13 NOTE — DISCHARGE INSTRUCTIONS
Keep Ace Wrap on for 2 more days and leave dressing on until next appointment.     Discharge Instructions    Discharged to home by car with relative. Discharged via wheelchair, hospital escort: Yes.  Special equipment needed: Not Applicable    Be sure to schedule a follow-up appointment with your primary care doctor or any specialists as instructed.     Discharge Plan:   Diet Plan: Discussed  Activity Level: Discussed  Confirmed Follow up Appointment: Patient to Call and Schedule Appointment  Confirmed Symptoms Management: Discussed  Medication Reconciliation Updated: Yes  Influenza Vaccine Indication: Patient Refuses    I understand that a diet low in cholesterol, fat, and sodium is recommended for good health. Unless I have been given specific instructions below for another diet, I accept this instruction as my diet prescription.   Other diet: Regular    Special Instructions: Discharge instructions for the Orthopedic Patient    Follow up with Primary Care Physician within 2 weeks of discharge to home, regarding:  Review of medications and diagnostic testing.  Surveillance for medical complications.  Workup and treatment of osteoporosis, if appropriate.     -Is this a Joint Replacement patient? Yes Total Joint Knee Replacement Discharge Instructions    Pain  - The goal is to slowly wean off the prescription pain medicine.  - Ice can be used for pain control.  20 minutes at a time is recommended, and never directly against your skin or incision.  - Most patients are off the pain pills by 3 weeks; others may require a low level of pain medications for many months.  If your pain continues to be severe, follow up with your physician.  Infection    Knee joint infections; occur in fewer than 2% of patients. The most common causes of infection following total knee replacement surgery are from bacteria that enter the bloodstream during dental procedures, urinary tract infections, or skin infections. These bacteria can  lodge around your knee replacement and cause an infection.  - Keep the incision as clean and dry as possible.  - Always wash your hands before touching your incision.  - Skin infections tend to develop around 7-10 days after surgery; most can be treated with oral antibiotics.  - Dental Care should be delayed for 3 months after surgery, your surgeon recommends taking a dose of antibiotics 1 hour prior to any dental procedure. After 2 years, most surgeons recommend antibiotics only before an extensive procedure.  Ask your surgeon what he recommends.  - Signs and symptoms of infection can include:  low grade fever, redness, pain, swelling and drainage from your incision.  Notify your surgeon immediately if you develop any of these symptoms.  Other instructions  - Bowel habits - constipation is extremely common and is caused by a combination of anesthesia, lack of mobility and pain medicine.  Use stool softeners or laxatives if necessary. It is important not to ignore this problem, as bowel obstructions can be a serious complication after joint replacement surgery.  - Mood/Energy Level - Many patients experience a lack of energy and endurance for up to 2-3 months after surgery.  Some may also feel down and can even become depressed.  This is likely due to the postoperative anemia, change in activity level, lack of sleep, pain medicine and just the emotional reaction to the surgery itself that is a big disruption in a person’s life.  This usually passes.  If symptoms persist, follow up with your primary physician.  - Returning to work - Your surgeon will give you more specific instructions. Depending on the type of activities you perform, it may be 6 to 8 weeks before you return to work.   Generally, if you work a sedentary job requiring little standing or walking, most patients may return within 2-6 weeks.  Manual labor jobs involving walking, lifting and standing may take longer. Your surgeon’s office can provide a  release to part-time or light duty work early on in your recovery and progress you to full duty as able.    - Driving - If your left knee was replaced and you have an automatic transmission, you may be able to begin driving in a week or so, provided you are no longer taking narcotic pain medication. If your right knee was replaced, avoid driving for 6 to 8 weeks. Remember that your reflexes may not be as sharp as before your surgery. Ask your surgeon for specific instructions.   - Avoiding falls - A fall during the first few weeks after surgery can damage your new knee and may result in a need for further surgery.   throw rugs and tack down loose carpeting.  Be aware of floor hazards such as pets, small objects or uneven surfaces.    - Airport Metal Detectors - The sensitivity of metal detectors varies and it is likely that your prosthesis will cause an alarm.  Inform the  of your artificial joint.  Diet  - Resume your normal diet as tolerated.  - It is important to achieve a healthy nutritional status by eating a well balanced diet on a regular basis.  - Your physician may recommend that you take iron and vitamin supplements.   - Continue to drink plenty of fluids.  Shower/Bathing  - You may shower as soon as you get home from the hospital unless otherwise instructed.  - Keep your incision out of water.  To keep the incision dry when showering, cover it with a plastic bag or plastic wrap.  - Pat incision dry if it gets wet.  Don’t rub.  - Do not submerge in a bath until staples are out and the incision is completely healed. (Approximately 6-8 weeks)  Dressing Change:  Procedure (if recommended by your physician)  - Wash hands.  - Open all new dressing change materials.  - Remove old dressing and discard.  - Inspect incision for redness, increase in clear drainage, yellow/green drainage, odor and surrounding skin hot to touch.  -  ABD (large gauze) pad or “island dressing” by one corner  and lay over the incision.  Be careful not to touch the inside of the dressing that will lay over the incision.  - Secure in place as instructed (Ace wrap or tape).    Swelling/Bruising    - Swelling can last from 3-6 months.  - Elevate your leg higher than your heart while reclining.   The first week you are home you should elevate your leg an equal amount of time, as you are active.    - Anti-inflammatory pills can be taken once you have stopped the blood thinners.  - The swelling is usually worse after you go home since you are upright for longer periods of time.  - Bruising is common and can involve the entire leg including the thigh, calf and even foot. Bruising often does not appear until after you arrive home and it can be quite dramatic- purple, black, and green.  The bruising you can see is not usually concerning and will subside without any treatment.      Blood Clot Prevention  Blood clots in the legs and the less common, but frightening, clots that travel to the lungs are a real focus of our preventative. Most patients are at standard risk for them, but those patients who are at higher risk include people who have had previous clots, a family history of clotting, smoking, diabetes, obesity, advanced age, use of estrogen and a sedentary lifestyle.    - Signs of blood clots in legs - Swelling in thigh, calf or ankle that does not go down with elevation.  Pain, heat and tenderness in calf, back of calf or groin area.  NOTE: blood clots can occur in either leg.  - You have been receiving anticoagulant therapy (blood thinners) in the hospital and you may be instructed to continue at home depending on your risk factors.  - Your risk for developing a clot continues for up to 2-3 months after surgery.  You should avoid prolonged sitting and dehydration during that time (long air trips and car trips).  If you do take a trip during this time, please get up and move around every 1- 1.5 hours.  - If you are  prescribed blood-thinning medication for home, follow instructions as directed. (Handouts provided if applicable).      Activity  Once home, you should continue to stay active. The key is to remember not to overdo it! While you can expect some good days and some bad days, you should notice a gradual improvement and a gradual increase in your endurance over the next 6 to 12 months. Exercise is a critical component of home care, particularly during the first few weeks after surgery.     - Normal activities of daily living You should be able to resume most within 3 to 6 weeks following surgery. Some pain with activity and at night is common for several weeks after surgery  -  Physical Therapy Exercises - Continue to do the exercises prescribed for at least two months after surgery. Riding a stationary bicycle can help maintain muscle tone and keep your knee flexible. Try to achieve the maximum degree of bending and extension possible. (handout provided by Therapist).  - Sexual Activity -. Your surgeon can tell you when it safe to resume sexual activity.    - Sleeping Positions - You can safely sleep on your back, on either side, or on your stomach.   - Other Activities - Walk as much as you like, but remember that walking is no substitute for the exercises your doctor and physical therapist will prescribe. Lower impact activities are preferred.  If you have specific questions, consult your Surgeon.    When to Call the Doctor   Call the physician if:   - Fever over 100.5? F  - Increased pain, drainage, redness, odor or heat around the incision area  - Shaking chills  - Increased knee pain with activity and rest  - Increased pain in calf, tenderness or redness above or below the knee  - Increased swelling of calf, ankle, foot  - Sudden increased shortness of breath, sudden onset of chest pain, localized chest pain with coughing  - Incision opening  Or, if there are any questions or concerns about medications or care.        -Is this patient being discharged with medication to prevent blood clots?  Yes, Aspirin Aspirin, ASA oral tablets  What is this medicine?  ASPIRIN (AS pir in) is a pain reliever. It is used to treat mild pain and fever. This medicine is also used as directed by a doctor to prevent and to treat heart attacks, to prevent strokes, and to treat arthritis or inflammation.  This medicine may be used for other purposes; ask your health care provider or pharmacist if you have questions.  COMMON BRAND NAME(S): Aspir-Low, Aspir-Opal , Aspirtab , Sarah Advanced Aspirin, Panopto Aspirin Extra Strength, Panopto Aspirin Plus, Sarah Aspirin, Panopto Genuine Aspirin, Sarah Womens Aspirin , Bufferin Extra Strength, Bufferin Low Dose, Bufferin  What should I tell my health care provider before I take this medicine?  They need to know if you have any of these conditions:  -anemia  -asthma  -bleeding problems  -child with chickenpox, the flu, or other viral infection  -diabetes  -gout  -if you frequently drink alcohol containing drinks  -kidney disease  -liver disease  -low level of vitamin K  -lupus  -smoke tobacco  -stomach ulcers or other problems  -an unusual or allergic reaction to aspirin, tartrazine dye, other medicines, dyes, or preservatives  -pregnant or trying to get pregnant  -breast-feeding  How should I use this medicine?  Take this medicine by mouth with a glass of water. Follow the directions on the package or prescription label. You can take this medicine with or without food. If it upsets your stomach, take it with food. Do not take your medicine more often than directed.  Talk to your pediatrician regarding the use of this medicine in children. While this drug may be prescribed for children as young as 12 years of age for selected conditions, precautions do apply. Children and teenagers should not use this medicine to treat chicken pox or flu symptoms unless directed by a doctor.  Patients over 65 years old may have  a stronger reaction and need a smaller dose.  Overdosage: If you think you have taken too much of this medicine contact a poison control center or emergency room at once.  NOTE: This medicine is only for you. Do not share this medicine with others.  What if I miss a dose?  If you are taking this medicine on a regular schedule and miss a dose, take it as soon as you can. If it is almost time for your next dose, take only that dose. Do not take double or extra doses.  What may interact with this medicine?  Do not take this medicine with any of the following medications:  -cidofovir  -ketorolac  -probenecid  This medicine may also interact with the following medications:  -alcohol  -alendronate  -bismuth subsalicylate  -flavocoxid  -herbal supplements like feverfew, garlic, jorge, ginkgo biloba, horse chestnut  -medicines for diabetes or glaucoma like acetazolamide, methazolamide  -medicines for gout  -medicines that treat or prevent blood clots like enoxaparin, heparin, ticlopidine, warfarin  -other aspirin and aspirin-like medicines  -NSAIDs, medicines for pain and inflammation, like ibuprofen or naproxen  -pemetrexed  -sulfinpyrazone  -varicella live vaccine  This list may not describe all possible interactions. Give your health care provider a list of all the medicines, herbs, non-prescription drugs, or dietary supplements you use. Also tell them if you smoke, drink alcohol, or use illegal drugs. Some items may interact with your medicine.  What should I watch for while using this medicine?  If you are treating yourself for pain, tell your doctor or health care professional if the pain lasts more than 10 days, if it gets worse, or if there is a new or different kind of pain. Tell your doctor if you see redness or swelling. Also, check with your doctor if you have a fever that lasts for more than 3 days. Only take this medicine to prevent heart attacks or blood clotting if prescribed by your doctor or health care  professional.  Do not take aspirin or aspirin-like medicines with this medicine. Too much aspirin can be dangerous. Always read the labels carefully.  This medicine can irritate your stomach or cause bleeding problems. Do not smoke cigarettes or drink alcohol while taking this medicine. Do not lie down for 30 minutes after taking this medicine to prevent irritation to your throat.  If you are scheduled for any medical or dental procedure, tell your healthcare provider that you are taking this medicine. You may need to stop taking this medicine before the procedure.  What side effects may I notice from receiving this medicine?  Side effects that you should report to your doctor or health care professional as soon as possible:  -allergic reactions like skin rash, itching or hives, swelling of the face, lips, or tongue  -black, tarry stools  -bloody, coffee ground-like vomit  -breathing problems  -changes in hearing, ringing in the ears  -confusion  -general ill feeling or flu-like symptoms  -pain on swallowing  -redness, blistering, peeling or loosening of the skin, including inside the mouth or nose  -trouble passing urine or change in the amount of urine  -unusual bleeding or bruising  -unusually weak or tired  -yellowing of the eyes or skin  Side effects that usually do not require medical attention (report to your doctor or health care professional if they continue or are bothersome):  -diarrhea or constipation  -nausea, vomiting  -stomach gas, heartburn  This list may not describe all possible side effects. Call your doctor for medical advice about side effects. You may report side effects to FDA at 8-330-FDA-0247.  Where should I keep my medicine?  Keep out of the reach of children.  Store at room temperature between 15 and 30 degrees C (59 and 86 degrees F). Protect from heat and moisture. Do not use this medicine if it has a strong vinegar smell. Throw away any unused medicine after the expiration date.  NOTE:  This sheet is a summary. It may not cover all possible information. If you have questions about this medicine, talk to your doctor, pharmacist, or health care provider.  © 2014, [employer]. (3/10/2009 10:44:17 AM)      · Is patient discharged on Warfarin / Coumadin?   No     · Is patient Post Blood Transfusion?  No    Depression / Suicide Risk    As you are discharged from this [facility] facility, it is important to learn how to keep safe from harming yourself.    Recognize the warning signs:  · Abrupt changes in personality, positive or negative- including increase in energy   · Giving away possessions  · Change in eating patterns- significant weight changes-  positive or negative  · Change in sleeping patterns- unable to sleep or sleeping all the time   · Unwillingness or inability to communicate  · Depression  · Unusual sadness, discouragement and loneliness  · Talk of wanting to die  · Neglect of personal appearance   · Rebelliousness- reckless behavior  · Withdrawal from people/activities they love  · Confusion- inability to concentrate     If you or a loved one observes any of these behaviors or has concerns about self-harm, here's what you can do:  · Talk about it- your feelings and reasons for harming yourself  · Remove any means that you might use to hurt yourself (examples: pills, rope, extension cords, firearm)  · Get professional help from the community (Mental Health, Substance Abuse, psychological counseling)  · Do not be alone:Call your Safe Contact- someone whom you trust who will be there for you.  · Call your local CRISIS HOTLINE 121-9636 or 735-217-1063  · Call your local Children's Mobile Crisis Response Team [place] (106) 111-6261 or www.Biz360  · Call the toll free National Suicide Prevention Hotlines   · National Suicide Prevention Lifeline 165-635-PJRN (0353)  · National Hope Line Network 800-SUICIDE (930-0957)      Ondansetron tablets  What is this  medicine?  ONDANSETRON (on DAN se galina) is used to treat nausea and vomiting caused by chemotherapy. It is also used to prevent or treat nausea and vomiting after surgery.  This medicine may be used for other purposes; ask your health care provider or pharmacist if you have questions.  COMMON BRAND NAME(S): Zofran  What should I tell my health care provider before I take this medicine?  They need to know if you have any of these conditions:  -heart disease  -history of irregular heartbeat  -liver disease  -low levels of magnesium or potassium in the blood  -an unusual or allergic reaction to ondansetron, granisetron, other medicines, foods, dyes, or preservatives  -pregnant or trying to get pregnant  -breast-feeding  How should I use this medicine?  Take this medicine by mouth with a glass of water. Follow the directions on your prescription label. Take your doses at regular intervals. Do not take your medicine more often than directed.  Talk to your pediatrician regarding the use of this medicine in children. Special care may be needed.  Overdosage: If you think you have taken too much of this medicine contact a poison control center or emergency room at once.  NOTE: This medicine is only for you. Do not share this medicine with others.  What if I miss a dose?  If you miss a dose, take it as soon as you can. If it is almost time for your next dose, take only that dose. Do not take double or extra doses.  What may interact with this medicine?  Do not take this medicine with any of the following medications:  -apomorphine  -cisapride  -dofetilide  -dronedarone  -pimozide  -thioridazine  -ziprasidone   This medicine may also interact with the following medications:  -carbamazepine  -phenytoin  -rifampicin  -tramadol  -other medicines that prolong the QT interval (cause an abnormal heart rhythm)  This list may not describe all possible interactions. Give your health care provider a list of all the medicines, herbs,  non-prescription drugs, or dietary supplements you use. Also tell them if you smoke, drink alcohol, or use illegal drugs. Some items may interact with your medicine.  What should I watch for while using this medicine?  Check with your doctor or health care professional right away if you have any sign of an allergic reaction.  What side effects may I notice from receiving this medicine?  Side effects that you should report to your doctor or health care professional as soon as possible:  -allergic reactions like skin rash, itching or hives, swelling of the face, lips or tongue  -breathing problems  -dizziness  -fast or irregular heartbeat  -feeling faint or lightheaded, falls  -fever and chills  -swelling of the hands or feet  -tightness in the chest  Side effects that usually do not require medical attention (report to your doctor or health care professional if they continue or are bothersome):  -constipation or diarrhea  -headache  This list may not describe all possible side effects. Call your doctor for medical advice about side effects. You may report side effects to FDA at 1-722-FDA-1374.  Where should I keep my medicine?  Keep out of the reach of children.  Store between 2 and 30 degrees C (36 and 86 degrees F). Throw away any unused medicine after the expiration date.  NOTE: This sheet is a summary. It may not cover all possible information. If you have questions about this medicine, talk to your doctor, pharmacist, or health care provider.  © 2014, [employer] Standard. (12/19/2013 4:05:54 PM)      Tramadol tablets  What is this medicine?  TRAMADOL (TRA ma dole) is a pain reliever. It is used to treat moderate to severe pain in adults.  This medicine may be used for other purposes; ask your health care provider or pharmacist if you have questions.  COMMON BRAND NAME(S): Ultram  What should I tell my health care provider before I take this medicine?  They need to know if you have any of these  conditions:  -brain tumor  -depression  -drug abuse or addiction  -head injury  -if you frequently drink alcohol containing drinks  -kidney disease or trouble passing urine  -liver disease  -lung disease, asthma, or breathing problems  -seizures or epilepsy  -suicidal thoughts, plans, or attempt; a previous suicide attempt by you or a family member  -an unusual or allergic reaction to tramadol, codeine, other medicines, foods, dyes, or preservatives  -pregnant or trying to get pregnant  -breast-feeding  How should I use this medicine?  Take this medicine by mouth with a full glass of water. Follow the directions on the prescription label. If the medicine upsets your stomach, take it with food or milk. Do not take more medicine than you are told to take.  Talk to your pediatrician regarding the use of this medicine in children. Special care may be needed.  Overdosage: If you think you have taken too much of this medicine contact a poison control center or emergency room at once.  NOTE: This medicine is only for you. Do not share this medicine with others.  What if I miss a dose?  If you miss a dose, take it as soon as you can. If it is almost time for your next dose, take only that dose. Do not take double or extra doses.  What may interact with this medicine?  Do not take this medicine with any of the following medications:  -MAOIs like Carbex, Eldepryl, Marplan, Nardil, and Parnate  This medicine may also interact with the following medications:  -alcohol or medicines that contain alcohol  -antihistamines  -benzodiazepines  -bupropion  -carbamazepine or oxcarbazepine  -clozapine  -cyclobenzaprine  -digoxin  -furazolidone  -linezolid  -medicines for depression, anxiety, or psychotic disturbances  -medicines for migraine headache like almotriptan, eletriptan, frovatriptan, naratriptan, rizatriptan, sumatriptan, zolmitriptan  -medicines for pain like pentazocine, buprenorphine, butorphanol, meperidine, nalbuphine, and  propoxyphene  -medicines for sleep  -muscle relaxants  -naltrexone  -phenobarbital  -phenothiazines like perphenazine, thioridazine, chlorpromazine, mesoridazine, fluphenazine, prochlorperazine, promazine, and trifluoperazine  -procarbazine  -warfarin  This list may not describe all possible interactions. Give your health care provider a list of all the medicines, herbs, non-prescription drugs, or dietary supplements you use. Also tell them if you smoke, drink alcohol, or use illegal drugs. Some items may interact with your medicine.  What should I watch for while using this medicine?  Tell your doctor or health care professional if your pain does not go away, if it gets worse, or if you have new or a different type of pain. You may develop tolerance to the medicine. Tolerance means that you will need a higher dose of the medicine for pain relief. Tolerance is normal and is expected if you take this medicine for a long time.  Do not suddenly stop taking your medicine because you may develop a severe reaction. Your body becomes used to the medicine. This does NOT mean you are addicted. Addiction is a behavior related to getting and using a drug for a non-medical reason. If you have pain, you have a medical reason to take pain medicine. Your doctor will tell you how much medicine to take. If your doctor wants you to stop the medicine, the dose will be slowly lowered over time to avoid any side effects.  You may get drowsy or dizzy. Do not drive, use machinery, or do anything that needs mental alertness until you know how this medicine affects you. Do not stand or sit up quickly, especially if you are an older patient. This reduces the risk of dizzy or fainting spells. Alcohol can increase or decrease the effects of this medicine. Avoid alcoholic drinks.  You may have constipation. Try to have a bowel movement at least every 2 to 3 days. If you do not have a bowel movement for 3 days, call your doctor or health care  professional.  Your mouth may get dry. Chewing sugarless gum or sucking hard candy, and drinking plenty of water may help. Contact your doctor if the problem does not go away or is severe.  What side effects may I notice from receiving this medicine?  Side effects that you should report to your doctor or health care professional as soon as possible:  -allergic reactions like skin rash, itching or hives, swelling of the face, lips, or tongue  -breathing difficulties, wheezing  -confusion  -itching  -light headedness or fainting spells  -redness, blistering, peeling or loosening of the skin, including inside the mouth  -seizures  Side effects that usually do not require medical attention (report to your doctor or health care professional if they continue or are bothersome):  -constipation  -dizziness  -drowsiness  -headache  -nausea, vomiting  This list may not describe all possible side effects. Call your doctor for medical advice about side effects. You may report side effects to FDA at 8-712-FDA-0883.  Where should I keep my medicine?  Keep out of the reach of children.  Store at room temperature between 15 and 30 degrees C (59 and 86 degrees F). Keep container tightly closed. Throw away any unused medicine after the expiration date.  NOTE: This sheet is a summary. It may not cover all possible information. If you have questions about this medicine, talk to your doctor, pharmacist, or health care provider.  © 2014, [employer]. (8/31/2011 11:55:44 AM)      Oxycodone tablets or capsules  What is this medicine?  OXYCODONE (ox i KOE done) is a pain reliever. It is used to treat moderate to severe pain.  This medicine may be used for other purposes; ask your health care provider or pharmacist if you have questions.  COMMON BRAND NAME(S): Dazidox , Endocodone , OXECTA, OxyIR, Percolone, Roxicodone  What should I tell my health care provider before I take this medicine?  They need to know if you have any of  these conditions:  -Holland's disease  -brain tumor  -drug abuse or addiction  -head injury  -heart disease  -if you frequently drink alcohol containing drinks  -kidney disease or problems going to the bathroom  -liver disease  -lung disease, asthma, or breathing problems  -mental problems  -an unusual or allergic reaction to oxycodone, codeine, hydrocodone, morphine, other medicines, foods, dyes, or preservatives  -pregnant or trying to get pregnant  -breast-feeding  How should I use this medicine?  Take this medicine by mouth with a glass of water. Follow the directions on the prescription label. You can take it with or without food. If it upsets your stomach, take it with food. Take your medicine at regular intervals. Do not take it more often than directed. Do not stop taking except on your doctor's advice.  Some brands of this medicine, like Oxecta, have special instructions. Ask your doctor or pharmacist if these directions are for you: Do not cut, crush or chew this medicine. Swallow only one tablet at a time. Do not wet, soak, or lick the tablet before you take it.  Talk to your pediatrician regarding the use of this medicine in children. Special care may be needed.  Overdosage: If you think you have taken too much of this medicine contact a poison control center or emergency room at once.  NOTE: This medicine is only for you. Do not share this medicine with others.  What if I miss a dose?  If you miss a dose, take it as soon as you can. If it is almost time for your next dose, take only that dose. Do not take double or extra doses.  What may interact with this medicine?  -alcohol  -antihistamines  -certain medicines used for nausea like chlorpromazine, droperidol  -erythromycin  -ketoconazole  -medicines for depression, anxiety, or psychotic disturbances  -medicines for sleep  -muscle relaxants  -naloxone  -naltrexone  -narcotic medicines (opiates) for  pain  -nilotinib  -phenobarbital  -phenytoin  -rifampin  -ritonavir  -voriconazole  This list may not describe all possible interactions. Give your health care provider a list of all the medicines, herbs, non-prescription drugs, or dietary supplements you use. Also tell them if you smoke, drink alcohol, or use illegal drugs. Some items may interact with your medicine.  What should I watch for while using this medicine?  Tell your doctor or health care professional if your pain does not go away, if it gets worse, or if you have new or a different type of pain. You may develop tolerance to the medicine. Tolerance means that you will need a higher dose of the medicine for pain relief. Tolerance is normal and is expected if you take this medicine for a long time.  Do not suddenly stop taking your medicine because you may develop a severe reaction. Your body becomes used to the medicine. This does NOT mean you are addicted. Addiction is a behavior related to getting and using a drug for a non-medical reason. If you have pain, you have a medical reason to take pain medicine. Your doctor will tell you how much medicine to take. If your doctor wants you to stop the medicine, the dose will be slowly lowered over time to avoid any side effects.  You may get drowsy or dizzy when you first start taking this medicine or change doses. Do not drive, use machinery, or do anything that may be dangerous until you know how the medicine affects you. Stand or sit up slowly.  There are different types of narcotic medicines (opiates) for pain. If you take more than one type at the same time, you may have more side effects. Give your health care provider a list of all medicines you use. Your doctor will tell you how much medicine to take. Do not take more medicine than directed. Call emergency for help if you have problems breathing.  This medicine will cause constipation. Try to have a bowel movement at least every 2 to 3 days. If you do  not have a bowel movement for 3 days, call your doctor or health care professional.  Your mouth may get dry. Drinking water, chewing sugarless gum, or sucking on hard candy may help. See your dentist every 6 months.  What side effects may I notice from receiving this medicine?  Side effects that you should report to your doctor or health care professional as soon as possible:  -allergic reactions like skin rash, itching or hives, swelling of the face, lips, or tongue  -breathing problems  -confusion  -feeling faint or lightheaded, falls  -trouble passing urine or change in the amount of urine  -unusually weak or tired  Side effects that usually do not require medical attention (report to your doctor or health care professional if they continue or are bothersome):  -constipation  -dry mouth  -itching  -nausea, vomiting  -upset stomach  This list may not describe all possible side effects. Call your doctor for medical advice about side effects. You may report side effects to FDA at 4-177-FDA-6787.  Where should I keep my medicine?  Keep out of the reach of children. This medicine can be abused. Keep your medicine in a safe place to protect it from theft. Do not share this medicine with anyone. Selling or giving away this medicine is dangerous and against the law.  Store at room temperature between 15 and 30 degrees C (59 and 86 degrees F). Protect from light. Keep container tightly closed.   Discard unused medicine and used packaging carefully. Pets and children can be harmed if they find used or lost packages. Flush any unused medicines down the toilet. Do not use the medicine after the expiration date.  NOTE: This sheet is a summary. It may not cover all possible information. If you have questions about this medicine, talk to your doctor, pharmacist, or health care provider.  © 2014, [employer]. (11/15/2012 8:33:37 AM)

## 2017-09-13 NOTE — PROGRESS NOTES
Bedside report completed. Assumed pt care. Pt A&O 4, resting in bed comfortably with no signs of labored breathing on 3L O2.  Pt call light within reach, bed in low position, upper bed rails up, non skid socks in place. Pt denies any pain or other distress at this time. Patient was updated on the plan of care for the day. All fall precautions in place. Will continue to monitor.

## 2017-09-13 NOTE — CARE PLAN
Problem: Safety  Goal: Will remain free from injury  Outcome: PROGRESSING AS EXPECTED  PT WILL BE FREE FROM INJURY, INSTRUCTION FOR USE OF CALL/BR CALL LIGHT GIVEN.    Problem: Knowledge Deficit  Goal: Knowledge of disease process/condition, treatment plan, diagnostic tests, and medications will improve  Outcome: PROGRESSING AS EXPECTED  PT AND FAMILY MEMBER  UPDATED ON PTS PLAN OF CARE,NURSES COMMUNICATIONS WITH DOCTORS.

## 2017-09-13 NOTE — THERAPY
"Physical Therapy Treatment completed.   Bed Mobility:  Supine to Sit: Modified Independent  Transfers: Sit to Stand: Supervised  Gait: Level Of Assist: Supervised with Front-Wheel Walker 180 ft. Stair training completed w/ FWW as per home set up.        Plan of Care: Patient with no further skilled PT needs in the acute care setting at this time  Discharge Recommendations: Equipment: No Equipment Needed. Post-acute therapy Discharge to home with outpatient or home health for additional skilled therapy services.    PT completed all post op education, HEP/ROM program training, and mobility training with patient. Pt is currently meeting PT goals and plans to DC to home w/ assist from neice and a friend. Her neice will be staying with her overnight. No home DME needs from PT. Pt to follow up with OT, then anticipate DC later today. Nursing updated and aware. Pt in agreement w/ POC.      See \"Rehab Therapy-Acute\" Patient Summary Report for complete documentation.       "

## 2017-09-13 NOTE — DISCHARGE PLANNING
Care Transition Team Assessment    Patient's discharge plan is to return home with family support.     Information Source  Orientation : Oriented x 4  Information Given By: Patient  Informant's Name: Ashley  Who is responsible for making decisions for patient? : Patient    Readmission Evaluation  Is this a readmission?: No    Elopement Risk  Legal Hold: No  Ambulatory or Self Mobile in Wheelchair: Yes  Disoriented: No  Psychiatric Symptoms: None  History of Wandering: No  Elopement this Admit: No  Vocalizing Wanting to Leave: No  Displays Behaviors, Body Language Wanting to Leave: No-Not at Risk for Elopement  Elopement Risk: Not at Risk for Elopement    Interdisciplinary Discharge Planning  Does Admitting Nurse Feel This Could be a Complex Discharge?: No  Primary Care Physician: Tracey  Lives with - Patient's Self Care Capacity: Alone and Able to Care For Self  Patient or legal guardian wants to designate a caregiver (see row info): No  Support Systems: Family Member(s)  Housing / Facility: [address]  Do You Take your Prescribed Medications Regularly: Yes  Able to Return to Previous ADL's: Yes  Mobility Issues: Yes  Prior Services: None, Home-Independent (did OP worker's comp for fall at work w/ L knee pain)  Patient Expects to be Discharged to:: home  Assistance Needed: Unknown at this Time  Durable Medical Equipment: Walker    Discharge Preparedness  What is your plan after discharge?: Home with help  What are your discharge supports?: Parent    Finances  Financial Barriers to Discharge: No  Prescription Coverage: Yes    Vision / Hearing Impairment  Vision Impairment : Yes  Right Eye Vision: Wears Glasses, Impaired  Left Eye Vision: Wears Glasses, Impaired  Hearing Impairment : No    Values / Beliefs / Concerns  Values / Beliefs Concerns : No  Spiritual Requests During Hospitalization: No  Special Hospitalization Concerns: none    Advance Directive  Advance Directive?: None    Domestic Abuse  Have you ever been  the victim of abuse or violence?: No  Physical Abuse or Sexual Abuse: No  Verbal Abuse or Emotional Abuse: No  Possible Abuse Reported to:: Not Applicable    Psychological Assessment  History of Substance Abuse: None    Discharge Risks or Barriers  Discharge risks or barriers?: No    Anticipated Discharge Information  Anticipated discharge disposition: Home

## 2017-09-13 NOTE — PROGRESS NOTES
2 RN skin check:     Skin intact.  Pt has surgical incision to her left knee, dressing in place CDI. No other evidence of skin breakdown or lesions.

## 2017-09-13 NOTE — DISCHARGE SUMMARY
DATE OF ADMISSION:  09/12/2017.    DATE OF DISCHARGE:  09/13/2017.    PROCEDURE:  Left total knee arthroplasty.    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  She has failed conservative   management and presents for operative treatment.  For further details of H and   P, please see chart.    HOSPITAL COURSE:  The patient was admitted and taken to the operating room for   the above-named procedure.  She tolerated the procedure well and was returned   to the floor.  She was advanced from a clear liquid diet to a general diet   without difficulty.  She was advanced from IV pain medications to oral pain   medications with good pain control.  On the day of discharge, she was   ambulating independently.  Her pain was well controlled.  She was felt to be   stable to be discharged to home.    DISCHARGE INSTRUCTIONS:  The patient will be discharged to home.    DISCHARGE DIAGNOSIS:  Status post left total knee arthroplasty.    DISCHARGE MEDICATIONS:  1.  Oxycodone 5-10 mg p.o. q. 6 hours p.r.n.  2.  Tramadol 50 mg p.o. q. 6 hours.  3.  Tylenol 1000 mg p.o. q. 6 hours.  4.  Aspirin 325 mg p.o. every day.  5.  All prehospital medications as discussed.    The patient will start outpatient physical therapy.  She has been instructed   to call my office for fever, redness, chills, drainage or any other problems.    She will follow up in my office in 10-14 days time.       ____________________________________     MD CHASE Lainez / RICHIE    DD:  09/13/2017 09:48:34  DT:  09/13/2017 10:12:12    D#:  [id number]  Job#:  [id number]

## 2021-04-07 ENCOUNTER — IMMUNIZATION (OUTPATIENT)
Dept: FAMILY PLANNING/WOMEN'S HEALTH CLINIC | Facility: IMMUNIZATION CENTER | Age: 61
End: 2021-04-07
Payer: COMMERCIAL

## 2021-04-07 DIAGNOSIS — Z23 ENCOUNTER FOR VACCINATION: Primary | ICD-10-CM

## 2021-04-09 PROCEDURE — 91300 PFIZER SARS-COV-2 VACCINE: CPT

## 2021-04-09 PROCEDURE — 0001A PFIZER SARS-COV-2 VACCINE: CPT

## 2021-04-30 ENCOUNTER — IMMUNIZATION (OUTPATIENT)
Dept: FAMILY PLANNING/WOMEN'S HEALTH CLINIC | Facility: IMMUNIZATION CENTER | Age: 61
End: 2021-04-30
Attending: INTERNAL MEDICINE
Payer: COMMERCIAL

## 2021-04-30 DIAGNOSIS — Z23 ENCOUNTER FOR VACCINATION: Primary | ICD-10-CM

## 2021-04-30 PROCEDURE — 91300 PFIZER SARS-COV-2 VACCINE: CPT | Performed by: INTERNAL MEDICINE

## 2021-04-30 PROCEDURE — 0002A PFIZER SARS-COV-2 VACCINE: CPT | Performed by: INTERNAL MEDICINE

## 2023-01-01 ENCOUNTER — APPOINTMENT (OUTPATIENT)
Dept: RADIOLOGY | Facility: MEDICAL CENTER | Age: 63
DRG: 853 | End: 2023-01-01
Attending: INTERNAL MEDICINE
Payer: OTHER MISCELLANEOUS

## 2023-01-01 ENCOUNTER — ANESTHESIA EVENT (OUTPATIENT)
Dept: SURGERY | Facility: MEDICAL CENTER | Age: 63
DRG: 853 | End: 2023-01-01
Payer: OTHER MISCELLANEOUS

## 2023-01-01 ENCOUNTER — APPOINTMENT (OUTPATIENT)
Dept: CARDIOLOGY | Facility: MEDICAL CENTER | Age: 63
DRG: 853 | End: 2023-01-01
Attending: ANESTHESIOLOGY
Payer: OTHER MISCELLANEOUS

## 2023-01-01 ENCOUNTER — APPOINTMENT (OUTPATIENT)
Dept: RADIOLOGY | Facility: MEDICAL CENTER | Age: 63
DRG: 853 | End: 2023-01-01
Attending: EMERGENCY MEDICINE
Payer: OTHER MISCELLANEOUS

## 2023-01-01 ENCOUNTER — APPOINTMENT (OUTPATIENT)
Dept: RADIOLOGY | Facility: MEDICAL CENTER | Age: 63
DRG: 853 | End: 2023-01-01
Payer: OTHER MISCELLANEOUS

## 2023-01-01 ENCOUNTER — HOSPITAL ENCOUNTER (OUTPATIENT)
Dept: RADIOLOGY | Facility: MEDICAL CENTER | Age: 63
End: 2023-07-12
Attending: FAMILY MEDICINE
Payer: OTHER MISCELLANEOUS

## 2023-01-01 ENCOUNTER — HOSPITAL ENCOUNTER (OUTPATIENT)
Dept: RADIOLOGY | Facility: MEDICAL CENTER | Age: 63
End: 2023-06-20
Attending: FAMILY MEDICINE

## 2023-01-01 ENCOUNTER — APPOINTMENT (OUTPATIENT)
Dept: RADIOLOGY | Facility: MEDICAL CENTER | Age: 63
DRG: 853 | End: 2023-01-01
Attending: SURGERY
Payer: OTHER MISCELLANEOUS

## 2023-01-01 ENCOUNTER — HOSPITAL ENCOUNTER (INPATIENT)
Facility: MEDICAL CENTER | Age: 63
LOS: 21 days | DRG: 853 | End: 2023-08-03
Attending: EMERGENCY MEDICINE | Admitting: INTERNAL MEDICINE
Payer: OTHER MISCELLANEOUS

## 2023-01-01 ENCOUNTER — OFFICE VISIT (OUTPATIENT)
Dept: URGENT CARE | Facility: CLINIC | Age: 63
End: 2023-01-01
Payer: OTHER MISCELLANEOUS

## 2023-01-01 ENCOUNTER — APPOINTMENT (OUTPATIENT)
Dept: CARDIOLOGY | Facility: MEDICAL CENTER | Age: 63
DRG: 853 | End: 2023-01-01
Attending: INTERNAL MEDICINE
Payer: OTHER MISCELLANEOUS

## 2023-01-01 ENCOUNTER — APPOINTMENT (OUTPATIENT)
Dept: CARDIOLOGY | Facility: MEDICAL CENTER | Age: 63
DRG: 853 | End: 2023-01-01
Attending: EMERGENCY MEDICINE
Payer: OTHER MISCELLANEOUS

## 2023-01-01 VITALS
WEIGHT: 283.29 LBS | DIASTOLIC BLOOD PRESSURE: 55 MMHG | RESPIRATION RATE: 27 BRPM | TEMPERATURE: 97.9 F | SYSTOLIC BLOOD PRESSURE: 105 MMHG | OXYGEN SATURATION: 93 % | BODY MASS INDEX: 40.56 KG/M2 | HEIGHT: 70 IN | HEART RATE: 101 BPM

## 2023-01-01 VITALS
BODY MASS INDEX: 41.95 KG/M2 | HEIGHT: 70 IN | SYSTOLIC BLOOD PRESSURE: 126 MMHG | WEIGHT: 293 LBS | RESPIRATION RATE: 16 BRPM | TEMPERATURE: 97.6 F | HEART RATE: 111 BPM | OXYGEN SATURATION: 94 % | DIASTOLIC BLOOD PRESSURE: 82 MMHG

## 2023-01-01 DIAGNOSIS — R57.8 HEMORRHAGIC SHOCK (HCC): ICD-10-CM

## 2023-01-01 DIAGNOSIS — E87.6 HYPOKALEMIA: ICD-10-CM

## 2023-01-01 DIAGNOSIS — R10.9 ABDOMINAL PAIN, UNSPECIFIED ABDOMINAL LOCATION: ICD-10-CM

## 2023-01-01 DIAGNOSIS — I82.409 ACUTE DEEP VENOUS THROMBOSIS (DVT) DETERMINED BY ULTRASOUND (HCC): ICD-10-CM

## 2023-01-01 DIAGNOSIS — I26.92 ACUTE SADDLE PULMONARY EMBOLISM, UNSPECIFIED WHETHER ACUTE COR PULMONALE PRESENT (HCC): ICD-10-CM

## 2023-01-01 DIAGNOSIS — I82.401 ACUTE DEEP VEIN THROMBOSIS (DVT) OF RIGHT LOWER EXTREMITY, UNSPECIFIED VEIN (HCC): ICD-10-CM

## 2023-01-01 DIAGNOSIS — J96.01 ACUTE HYPOXEMIC RESPIRATORY FAILURE (HCC): ICD-10-CM

## 2023-01-01 DIAGNOSIS — K66.8 PNEUMOPERITONEUM: ICD-10-CM

## 2023-01-01 DIAGNOSIS — N17.9 ACUTE KIDNEY INJURY (HCC): ICD-10-CM

## 2023-01-01 DIAGNOSIS — R19.8 PERFORATED VISCUS: ICD-10-CM

## 2023-01-01 DIAGNOSIS — R58 BLEEDING: ICD-10-CM

## 2023-01-01 DIAGNOSIS — M79.89 LEG SWELLING: ICD-10-CM

## 2023-01-01 DIAGNOSIS — R65.21 SEPTIC SHOCK (HCC): ICD-10-CM

## 2023-01-01 DIAGNOSIS — I26.02 ACUTE SADDLE PULMONARY EMBOLISM WITH ACUTE COR PULMONALE (HCC): ICD-10-CM

## 2023-01-01 DIAGNOSIS — E66.01 MORBID OBESITY WITH BMI OF 50.0-59.9, ADULT (HCC): ICD-10-CM

## 2023-01-01 DIAGNOSIS — I95.9 HYPOTENSION, UNSPECIFIED HYPOTENSION TYPE: ICD-10-CM

## 2023-01-01 DIAGNOSIS — R79.89 ELEVATED TROPONIN: ICD-10-CM

## 2023-01-01 DIAGNOSIS — D62 ACUTE BLOOD LOSS ANEMIA: ICD-10-CM

## 2023-01-01 DIAGNOSIS — A41.9 SEPTIC SHOCK (HCC): ICD-10-CM

## 2023-01-01 DIAGNOSIS — E87.1 HYPONATREMIA: ICD-10-CM

## 2023-01-01 DIAGNOSIS — E87.20 METABOLIC ACIDOSIS: ICD-10-CM

## 2023-01-01 DIAGNOSIS — K66.8 PNEUMOPERITONEUM OF UNKNOWN ETIOLOGY: ICD-10-CM

## 2023-01-01 LAB
ABO + RH BLD: NORMAL
ABO GROUP BLD: NORMAL
ACANTHOCYTES BLD QL SMEAR: NORMAL
ACANTHOCYTES BLD QL SMEAR: NORMAL
ACT BLD: 131 SEC (ref 74–137)
ACT BLD: 173 SEC (ref 74–137)
ALBUMIN SERPL BCP-MCNC: 1.1 G/DL (ref 3.2–4.9)
ALBUMIN SERPL BCP-MCNC: 1.3 G/DL (ref 3.2–4.9)
ALBUMIN SERPL BCP-MCNC: 1.5 G/DL (ref 3.2–4.9)
ALBUMIN SERPL BCP-MCNC: 1.6 G/DL (ref 3.2–4.9)
ALBUMIN SERPL BCP-MCNC: 1.7 G/DL (ref 3.2–4.9)
ALBUMIN SERPL BCP-MCNC: 1.8 G/DL (ref 3.2–4.9)
ALBUMIN SERPL BCP-MCNC: 1.9 G/DL (ref 3.2–4.9)
ALBUMIN SERPL BCP-MCNC: 1.9 G/DL (ref 3.2–4.9)
ALBUMIN SERPL BCP-MCNC: 2 G/DL (ref 3.2–4.9)
ALBUMIN SERPL BCP-MCNC: 2.1 G/DL (ref 3.2–4.9)
ALBUMIN SERPL BCP-MCNC: 2.3 G/DL (ref 3.2–4.9)
ALBUMIN SERPL BCP-MCNC: 2.4 G/DL (ref 3.2–4.9)
ALBUMIN SERPL BCP-MCNC: 3.2 G/DL (ref 3.2–4.9)
ALBUMIN/GLOB SERPL: 0.4 G/DL
ALBUMIN/GLOB SERPL: 0.5 G/DL
ALBUMIN/GLOB SERPL: 0.6 G/DL
ALBUMIN/GLOB SERPL: 0.7 G/DL
ALBUMIN/GLOB SERPL: 0.7 G/DL
ALBUMIN/GLOB SERPL: 0.8 G/DL
ALBUMIN/GLOB SERPL: 0.9 G/DL
ALBUMIN/GLOB SERPL: 1 G/DL
ALBUMIN/GLOB SERPL: ABNORMAL G/DL
ALBUMIN/GLOB SERPL: ABNORMAL G/DL
ALP SERPL-CCNC: 100 U/L (ref 30–99)
ALP SERPL-CCNC: 131 U/L (ref 30–99)
ALP SERPL-CCNC: 142 U/L (ref 30–99)
ALP SERPL-CCNC: 149 U/L (ref 30–99)
ALP SERPL-CCNC: 163 U/L (ref 30–99)
ALP SERPL-CCNC: 176 U/L (ref 30–99)
ALP SERPL-CCNC: 176 U/L (ref 30–99)
ALP SERPL-CCNC: 183 U/L (ref 30–99)
ALP SERPL-CCNC: 187 U/L (ref 30–99)
ALP SERPL-CCNC: 188 U/L (ref 30–99)
ALP SERPL-CCNC: 202 U/L (ref 30–99)
ALP SERPL-CCNC: 212 U/L (ref 30–99)
ALP SERPL-CCNC: 213 U/L (ref 30–99)
ALP SERPL-CCNC: 215 U/L (ref 30–99)
ALP SERPL-CCNC: 220 U/L (ref 30–99)
ALP SERPL-CCNC: 220 U/L (ref 30–99)
ALP SERPL-CCNC: 225 U/L (ref 30–99)
ALP SERPL-CCNC: 227 U/L (ref 30–99)
ALP SERPL-CCNC: 230 U/L (ref 30–99)
ALP SERPL-CCNC: 230 U/L (ref 30–99)
ALP SERPL-CCNC: 232 U/L (ref 30–99)
ALP SERPL-CCNC: 241 U/L (ref 30–99)
ALP SERPL-CCNC: 241 U/L (ref 30–99)
ALP SERPL-CCNC: 265 U/L (ref 30–99)
ALP SERPL-CCNC: 268 U/L (ref 30–99)
ALP SERPL-CCNC: 64 U/L (ref 30–99)
ALP SERPL-CCNC: 67 U/L (ref 30–99)
ALP SERPL-CCNC: 83 U/L (ref 30–99)
ALP SERPL-CCNC: 87 U/L (ref 30–99)
ALT SERPL-CCNC: 102 U/L (ref 2–50)
ALT SERPL-CCNC: 104 U/L (ref 2–50)
ALT SERPL-CCNC: 107 U/L (ref 2–50)
ALT SERPL-CCNC: 114 U/L (ref 2–50)
ALT SERPL-CCNC: 127 U/L (ref 2–50)
ALT SERPL-CCNC: 1384 U/L (ref 2–50)
ALT SERPL-CCNC: 156 U/L (ref 2–50)
ALT SERPL-CCNC: 1624 U/L (ref 2–50)
ALT SERPL-CCNC: 188 U/L (ref 2–50)
ALT SERPL-CCNC: 2158 U/L (ref 2–50)
ALT SERPL-CCNC: 23 U/L (ref 2–50)
ALT SERPL-CCNC: 25 U/L (ref 2–50)
ALT SERPL-CCNC: 25 U/L (ref 2–50)
ALT SERPL-CCNC: 251 U/L (ref 2–50)
ALT SERPL-CCNC: 2613 U/L (ref 2–50)
ALT SERPL-CCNC: 3109 U/L (ref 2–50)
ALT SERPL-CCNC: 3175 U/L (ref 2–50)
ALT SERPL-CCNC: 3389 U/L (ref 2–50)
ALT SERPL-CCNC: 387 U/L (ref 2–50)
ALT SERPL-CCNC: 413 U/L (ref 2–50)
ALT SERPL-CCNC: 46 U/L (ref 2–50)
ALT SERPL-CCNC: 51 U/L (ref 2–50)
ALT SERPL-CCNC: 51 U/L (ref 2–50)
ALT SERPL-CCNC: 53 U/L (ref 2–50)
ALT SERPL-CCNC: 55 U/L (ref 2–50)
ALT SERPL-CCNC: 583 U/L (ref 2–50)
ALT SERPL-CCNC: 69 U/L (ref 2–50)
ALT SERPL-CCNC: 808 U/L (ref 2–50)
ALT SERPL-CCNC: 97 U/L (ref 2–50)
AMMONIA PLAS-SCNC: 23 UMOL/L (ref 11–45)
AMMONIA PLAS-SCNC: 25 UMOL/L (ref 11–45)
AMMONIA PLAS-SCNC: 27 UMOL/L (ref 11–45)
AMMONIA PLAS-SCNC: 33 UMOL/L (ref 11–45)
AMMONIA PLAS-SCNC: 66 UMOL/L (ref 11–45)
AMMONIA PLAS-SCNC: 71 UMOL/L (ref 11–45)
ANION GAP SERPL CALC-SCNC: 10 MMOL/L (ref 7–16)
ANION GAP SERPL CALC-SCNC: 11 MMOL/L (ref 7–16)
ANION GAP SERPL CALC-SCNC: 12 MMOL/L (ref 7–16)
ANION GAP SERPL CALC-SCNC: 13 MMOL/L (ref 7–16)
ANION GAP SERPL CALC-SCNC: 14 MMOL/L (ref 7–16)
ANION GAP SERPL CALC-SCNC: 16 MMOL/L (ref 7–16)
ANION GAP SERPL CALC-SCNC: 17 MMOL/L (ref 7–16)
ANION GAP SERPL CALC-SCNC: 18 MMOL/L (ref 7–16)
ANION GAP SERPL CALC-SCNC: 18 MMOL/L (ref 7–16)
ANION GAP SERPL CALC-SCNC: 22 MMOL/L (ref 7–16)
ANION GAP SERPL CALC-SCNC: 24 MMOL/L (ref 7–16)
ANION GAP SERPL CALC-SCNC: 29 MMOL/L (ref 7–16)
ANION GAP SERPL CALC-SCNC: 33 MMOL/L (ref 7–16)
ANION GAP SERPL CALC-SCNC: 36 MMOL/L (ref 7–16)
ANION GAP SERPL CALC-SCNC: 7 MMOL/L (ref 7–16)
ANION GAP SERPL CALC-SCNC: 7 MMOL/L (ref 7–16)
ANION GAP SERPL CALC-SCNC: 9 MMOL/L (ref 7–16)
ANISOCYTOSIS BLD QL SMEAR: ABNORMAL
APTT PPP: 112.1 SEC (ref 24.7–36)
APTT PPP: 28 SEC (ref 24.7–36)
APTT PPP: 59.6 SEC (ref 24.7–36)
APTT PPP: 95.6 SEC (ref 24.7–36)
ARTERIAL PATENCY WRIST A: ABNORMAL
AST SERPL-CCNC: 104 U/L (ref 12–45)
AST SERPL-CCNC: 104 U/L (ref 12–45)
AST SERPL-CCNC: 105 U/L (ref 12–45)
AST SERPL-CCNC: 109 U/L (ref 12–45)
AST SERPL-CCNC: 1209 U/L (ref 12–45)
AST SERPL-CCNC: 125 U/L (ref 12–45)
AST SERPL-CCNC: 136 U/L (ref 12–45)
AST SERPL-CCNC: 151 U/L (ref 12–45)
AST SERPL-CCNC: 165 U/L (ref 12–45)
AST SERPL-CCNC: 200 U/L (ref 12–45)
AST SERPL-CCNC: 2196 U/L (ref 12–45)
AST SERPL-CCNC: 23 U/L (ref 12–45)
AST SERPL-CCNC: 336 U/L (ref 12–45)
AST SERPL-CCNC: 35 U/L (ref 12–45)
AST SERPL-CCNC: 38 U/L (ref 12–45)
AST SERPL-CCNC: 40 U/L (ref 12–45)
AST SERPL-CCNC: 4030 U/L (ref 12–45)
AST SERPL-CCNC: 4617 U/L (ref 12–45)
AST SERPL-CCNC: 49 U/L (ref 12–45)
AST SERPL-CCNC: 53 U/L (ref 12–45)
AST SERPL-CCNC: 62 U/L (ref 12–45)
AST SERPL-CCNC: 63 U/L (ref 12–45)
AST SERPL-CCNC: 656 U/L (ref 12–45)
AST SERPL-CCNC: 80 U/L (ref 12–45)
AST SERPL-CCNC: 96 U/L (ref 12–45)
AST SERPL-CCNC: 97 U/L (ref 12–45)
AST SERPL-CCNC: >7000 U/L (ref 12–45)
BACTERIA BLD CULT: NORMAL
BARCODED ABORH UBTYP: 2800
BARCODED ABORH UBTYP: 5100
BARCODED ABORH UBTYP: 600
BARCODED ABORH UBTYP: 6200
BARCODED ABORH UBTYP: 7300
BARCODED ABORH UBTYP: 9500
BARCODED PRD CODE UBPRD: NORMAL
BARCODED UNIT NUM UBUNT: NORMAL
BASE EXCESS BLDA CALC-SCNC: -12 MMOL/L (ref -4–3)
BASE EXCESS BLDA CALC-SCNC: -2 MMOL/L (ref -4–3)
BASE EXCESS BLDA CALC-SCNC: -21 MMOL/L (ref -4–3)
BASE EXCESS BLDA CALC-SCNC: -22 MMOL/L (ref -4–3)
BASE EXCESS BLDA CALC-SCNC: -23 MMOL/L (ref -4–3)
BASE EXCESS BLDA CALC-SCNC: -23 MMOL/L (ref -4–3)
BASE EXCESS BLDA CALC-SCNC: -25 MMOL/L (ref -4–3)
BASE EXCESS BLDA CALC-SCNC: -3 MMOL/L (ref -4–3)
BASE EXCESS BLDA CALC-SCNC: -5 MMOL/L (ref -4–3)
BASE EXCESS BLDA CALC-SCNC: -5 MMOL/L (ref -4–3)
BASE EXCESS BLDA CALC-SCNC: -6 MMOL/L (ref -4–3)
BASE EXCESS BLDA CALC-SCNC: 3 MMOL/L (ref -4–3)
BASOPHILS # BLD AUTO: 0 % (ref 0–1.8)
BASOPHILS # BLD AUTO: 0.8 % (ref 0–1.8)
BASOPHILS # BLD AUTO: 0.9 % (ref 0–1.8)
BASOPHILS # BLD: 0 K/UL (ref 0–0.12)
BASOPHILS # BLD: 0.08 K/UL (ref 0–0.12)
BASOPHILS # BLD: 0.08 K/UL (ref 0–0.12)
BASOPHILS # BLD: 0.1 K/UL (ref 0–0.12)
BASOPHILS # BLD: 0.16 K/UL (ref 0–0.12)
BASOPHILS # BLD: 0.21 K/UL (ref 0–0.12)
BILIRUB CONJ SERPL-MCNC: 10 MG/DL (ref 0.1–0.5)
BILIRUB CONJ SERPL-MCNC: 2 MG/DL (ref 0.1–0.5)
BILIRUB INDIRECT SERPL-MCNC: 0.5 MG/DL (ref 0–1)
BILIRUB INDIRECT SERPL-MCNC: 1.5 MG/DL (ref 0–1)
BILIRUB SERPL-MCNC: 0.5 MG/DL (ref 0.1–1.5)
BILIRUB SERPL-MCNC: 0.6 MG/DL (ref 0.1–1.5)
BILIRUB SERPL-MCNC: 0.7 MG/DL (ref 0.1–1.5)
BILIRUB SERPL-MCNC: 0.9 MG/DL (ref 0.1–1.5)
BILIRUB SERPL-MCNC: 10.1 MG/DL (ref 0.1–1.5)
BILIRUB SERPL-MCNC: 10.9 MG/DL (ref 0.1–1.5)
BILIRUB SERPL-MCNC: 11 MG/DL (ref 0.1–1.5)
BILIRUB SERPL-MCNC: 11.1 MG/DL (ref 0.1–1.5)
BILIRUB SERPL-MCNC: 11.5 MG/DL (ref 0.1–1.5)
BILIRUB SERPL-MCNC: 2.5 MG/DL (ref 0.1–1.5)
BILIRUB SERPL-MCNC: 4.5 MG/DL (ref 0.1–1.5)
BILIRUB SERPL-MCNC: 5 MG/DL (ref 0.1–1.5)
BILIRUB SERPL-MCNC: 5 MG/DL (ref 0.1–1.5)
BILIRUB SERPL-MCNC: 5.2 MG/DL (ref 0.1–1.5)
BILIRUB SERPL-MCNC: 5.8 MG/DL (ref 0.1–1.5)
BILIRUB SERPL-MCNC: 7.1 MG/DL (ref 0.1–1.5)
BILIRUB SERPL-MCNC: 7.7 MG/DL (ref 0.1–1.5)
BILIRUB SERPL-MCNC: 7.8 MG/DL (ref 0.1–1.5)
BILIRUB SERPL-MCNC: 8.9 MG/DL (ref 0.1–1.5)
BILIRUB SERPL-MCNC: 8.9 MG/DL (ref 0.1–1.5)
BLD GP AB SCN SERPL QL: NORMAL
BODY TEMPERATURE: 35 CENTIGRADE
BODY TEMPERATURE: 36.1 CENTIGRADE
BODY TEMPERATURE: ABNORMAL DEGREES
BREATHS SETTING VENT: 20
BREATHS SETTING VENT: 20
BREATHS SETTING VENT: 22
BREATHS SETTING VENT: 28
BUN SERPL-MCNC: 14 MG/DL (ref 8–22)
BUN SERPL-MCNC: 17 MG/DL (ref 8–22)
BUN SERPL-MCNC: 18 MG/DL (ref 8–22)
BUN SERPL-MCNC: 19 MG/DL (ref 8–22)
BUN SERPL-MCNC: 19 MG/DL (ref 8–22)
BUN SERPL-MCNC: 20 MG/DL (ref 8–22)
BUN SERPL-MCNC: 22 MG/DL (ref 8–22)
BUN SERPL-MCNC: 22 MG/DL (ref 8–22)
BUN SERPL-MCNC: 23 MG/DL (ref 8–22)
BUN SERPL-MCNC: 24 MG/DL (ref 8–22)
BUN SERPL-MCNC: 25 MG/DL (ref 8–22)
BUN SERPL-MCNC: 26 MG/DL (ref 8–22)
BUN SERPL-MCNC: 28 MG/DL (ref 8–22)
BUN SERPL-MCNC: 30 MG/DL (ref 8–22)
BUN SERPL-MCNC: 33 MG/DL (ref 8–22)
BUN SERPL-MCNC: 37 MG/DL (ref 8–22)
BUN SERPL-MCNC: 39 MG/DL (ref 8–22)
BUN SERPL-MCNC: 40 MG/DL (ref 8–22)
BUN SERPL-MCNC: 44 MG/DL (ref 8–22)
BUN SERPL-MCNC: 45 MG/DL (ref 8–22)
BUN SERPL-MCNC: 46 MG/DL (ref 8–22)
BUN SERPL-MCNC: 54 MG/DL (ref 8–22)
BUN SERPL-MCNC: 59 MG/DL (ref 8–22)
BUN SERPL-MCNC: 60 MG/DL (ref 8–22)
BUN SERPL-MCNC: 60 MG/DL (ref 8–22)
BUN SERPL-MCNC: 69 MG/DL (ref 8–22)
BURR CELLS BLD QL SMEAR: NORMAL
CA-I BLD ISE-SCNC: 0.71 MMOL/L (ref 1.1–1.3)
CA-I BLD ISE-SCNC: 1 MMOL/L (ref 1.1–1.3)
CA-I BLD ISE-SCNC: 1 MMOL/L (ref 1.1–1.3)
CA-I BLD ISE-SCNC: 1.01 MMOL/L (ref 1.1–1.3)
CA-I BLD ISE-SCNC: 1.04 MMOL/L (ref 1.1–1.3)
CA-I BLD ISE-SCNC: 1.65 MMOL/L (ref 1.1–1.3)
CA-I SERPL-SCNC: 0.8 MMOL/L (ref 1.1–1.3)
CA-I SERPL-SCNC: 0.9 MMOL/L (ref 1.1–1.3)
CA-I SERPL-SCNC: 1 MMOL/L (ref 1.1–1.3)
CA-I SERPL-SCNC: 1 MMOL/L (ref 1.1–1.3)
CA-I SERPL-SCNC: 1.1 MMOL/L (ref 1.1–1.3)
CALCIUM ALBUM COR SERPL-MCNC: 8.3 MG/DL (ref 8.5–10.5)
CALCIUM ALBUM COR SERPL-MCNC: 8.3 MG/DL (ref 8.5–10.5)
CALCIUM ALBUM COR SERPL-MCNC: 8.5 MG/DL (ref 8.5–10.5)
CALCIUM ALBUM COR SERPL-MCNC: 8.7 MG/DL (ref 8.5–10.5)
CALCIUM ALBUM COR SERPL-MCNC: 8.7 MG/DL (ref 8.5–10.5)
CALCIUM ALBUM COR SERPL-MCNC: 8.8 MG/DL (ref 8.5–10.5)
CALCIUM ALBUM COR SERPL-MCNC: 9.1 MG/DL (ref 8.5–10.5)
CALCIUM ALBUM COR SERPL-MCNC: 9.1 MG/DL (ref 8.5–10.5)
CALCIUM ALBUM COR SERPL-MCNC: 9.2 MG/DL (ref 8.5–10.5)
CALCIUM ALBUM COR SERPL-MCNC: 9.2 MG/DL (ref 8.5–10.5)
CALCIUM ALBUM COR SERPL-MCNC: 9.3 MG/DL (ref 8.5–10.5)
CALCIUM ALBUM COR SERPL-MCNC: 9.4 MG/DL (ref 8.5–10.5)
CALCIUM ALBUM COR SERPL-MCNC: 9.5 MG/DL (ref 8.5–10.5)
CALCIUM ALBUM COR SERPL-MCNC: 9.6 MG/DL (ref 8.5–10.5)
CALCIUM ALBUM COR SERPL-MCNC: 9.6 MG/DL (ref 8.5–10.5)
CALCIUM ALBUM COR SERPL-MCNC: 9.8 MG/DL (ref 8.5–10.5)
CALCIUM SERPL-MCNC: 6.4 MG/DL (ref 8.5–10.5)
CALCIUM SERPL-MCNC: 6.6 MG/DL (ref 8.5–10.5)
CALCIUM SERPL-MCNC: 6.7 MG/DL (ref 8.5–10.5)
CALCIUM SERPL-MCNC: 6.7 MG/DL (ref 8.5–10.5)
CALCIUM SERPL-MCNC: 6.8 MG/DL (ref 8.5–10.5)
CALCIUM SERPL-MCNC: 6.8 MG/DL (ref 8.5–10.5)
CALCIUM SERPL-MCNC: 6.9 MG/DL (ref 8.5–10.5)
CALCIUM SERPL-MCNC: 7 MG/DL (ref 8.5–10.5)
CALCIUM SERPL-MCNC: 7.1 MG/DL (ref 8.5–10.5)
CALCIUM SERPL-MCNC: 7.2 MG/DL (ref 8.5–10.5)
CALCIUM SERPL-MCNC: 7.3 MG/DL (ref 8.5–10.5)
CALCIUM SERPL-MCNC: 7.4 MG/DL (ref 8.5–10.5)
CALCIUM SERPL-MCNC: 7.5 MG/DL (ref 8.5–10.5)
CALCIUM SERPL-MCNC: 7.5 MG/DL (ref 8.5–10.5)
CALCIUM SERPL-MCNC: 7.6 MG/DL (ref 8.5–10.5)
CALCIUM SERPL-MCNC: 7.6 MG/DL (ref 8.5–10.5)
CALCIUM SERPL-MCNC: 7.7 MG/DL (ref 8.5–10.5)
CALCIUM SERPL-MCNC: 8 MG/DL (ref 8.5–10.5)
CALCIUM SERPL-MCNC: 8 MG/DL (ref 8.5–10.5)
CALCIUM SERPL-MCNC: 8.1 MG/DL (ref 8.5–10.5)
CALCIUM SERPL-MCNC: 8.1 MG/DL (ref 8.5–10.5)
CALCIUM SERPL-MCNC: 8.2 MG/DL (ref 8.5–10.5)
CFT BLD TEG: 10.2 MIN (ref 4.6–9.1)
CFT BLD TEG: 10.7 MIN (ref 4.6–9.1)
CFT BLD TEG: 10.9 MIN (ref 4.6–9.1)
CFT BLD TEG: 13.1 MIN (ref 4.6–9.1)
CFT BLD TEG: 8.6 MIN (ref 4.6–9.1)
CFT BLD TEG: 9.2 MIN (ref 4.6–9.1)
CFT P HPASE BLD TEG: 10.4 MIN (ref 4.3–8.3)
CFT P HPASE BLD TEG: 5.7 MIN (ref 4.3–8.3)
CFT P HPASE BLD TEG: 6.3 MIN (ref 4.3–8.3)
CFT P HPASE BLD TEG: 7 MIN (ref 4.3–8.3)
CFT P HPASE BLD TEG: 9.2 MIN (ref 4.3–8.3)
CFT P HPASE BLD TEG: 9.7 MIN (ref 4.3–8.3)
CHLORIDE SERPL-SCNC: 100 MMOL/L (ref 96–112)
CHLORIDE SERPL-SCNC: 101 MMOL/L (ref 96–112)
CHLORIDE SERPL-SCNC: 102 MMOL/L (ref 96–112)
CHLORIDE SERPL-SCNC: 103 MMOL/L (ref 96–112)
CHLORIDE SERPL-SCNC: 104 MMOL/L (ref 96–112)
CHLORIDE SERPL-SCNC: 104 MMOL/L (ref 96–112)
CHLORIDE SERPL-SCNC: 105 MMOL/L (ref 96–112)
CHLORIDE SERPL-SCNC: 90 MMOL/L (ref 96–112)
CHLORIDE SERPL-SCNC: 96 MMOL/L (ref 96–112)
CHLORIDE SERPL-SCNC: 97 MMOL/L (ref 96–112)
CHLORIDE SERPL-SCNC: 97 MMOL/L (ref 96–112)
CHLORIDE SERPL-SCNC: 99 MMOL/L (ref 96–112)
CLOT ANGLE BLD TEG: 57.2 DEGREES (ref 63–78)
CLOT ANGLE BLD TEG: 62.2 DEGREES (ref 63–78)
CLOT ANGLE BLD TEG: 69.3 DEGREES (ref 63–78)
CLOT ANGLE BLD TEG: 69.9 DEGREES (ref 63–78)
CLOT ANGLE BLD TEG: 70.3 DEGREES (ref 63–78)
CLOT ANGLE BLD TEG: 77.4 DEGREES (ref 63–78)
CLOT LYSIS 30M P MA LENFR BLD TEG: 0 % (ref 0–2.6)
CO2 BLDA-SCNC: 10 MMOL/L (ref 20–33)
CO2 BLDA-SCNC: 11 MMOL/L (ref 20–33)
CO2 BLDA-SCNC: 11 MMOL/L (ref 20–33)
CO2 BLDA-SCNC: 17 MMOL/L (ref 20–33)
CO2 BLDA-SCNC: 19 MMOL/L (ref 20–33)
CO2 BLDA-SCNC: 21 MMOL/L (ref 20–33)
CO2 BLDA-SCNC: 22 MMOL/L (ref 20–33)
CO2 BLDA-SCNC: 22 MMOL/L (ref 20–33)
CO2 BLDA-SCNC: 23 MMOL/L (ref 20–33)
CO2 BLDA-SCNC: 23 MMOL/L (ref 20–33)
CO2 BLDA-SCNC: 24 MMOL/L (ref 20–33)
CO2 BLDA-SCNC: 27 MMOL/L (ref 20–33)
CO2 BLDA-SCNC: 27 MMOL/L (ref 20–33)
CO2 BLDA-SCNC: <10 MMOL/L (ref 20–33)
CO2 SERPL-SCNC: 11 MMOL/L (ref 20–33)
CO2 SERPL-SCNC: 12 MMOL/L (ref 20–33)
CO2 SERPL-SCNC: 18 MMOL/L (ref 20–33)
CO2 SERPL-SCNC: 19 MMOL/L (ref 20–33)
CO2 SERPL-SCNC: 20 MMOL/L (ref 20–33)
CO2 SERPL-SCNC: 21 MMOL/L (ref 20–33)
CO2 SERPL-SCNC: 22 MMOL/L (ref 20–33)
CO2 SERPL-SCNC: 23 MMOL/L (ref 20–33)
CO2 SERPL-SCNC: 24 MMOL/L (ref 20–33)
CO2 SERPL-SCNC: 25 MMOL/L (ref 20–33)
CO2 SERPL-SCNC: 25 MMOL/L (ref 20–33)
CO2 SERPL-SCNC: 7 MMOL/L (ref 20–33)
CO2 SERPL-SCNC: 7 MMOL/L (ref 20–33)
COMMENT NL1176: NORMAL
COMPONENT F 8504F: NORMAL
COMPONENT P 8504P: NORMAL
COMPONENT R 8504R: NORMAL
CORTIS SERPL-MCNC: 30.9 UG/DL (ref 0–23)
CORTIS SERPL-MCNC: 48.5 UG/DL (ref 0–23)
CORTIS SERPL-MCNC: 82.7 UG/DL (ref 0–23)
CREAT SERPL-MCNC: 0.73 MG/DL (ref 0.5–1.4)
CREAT SERPL-MCNC: 0.79 MG/DL (ref 0.5–1.4)
CREAT SERPL-MCNC: 0.99 MG/DL (ref 0.5–1.4)
CREAT SERPL-MCNC: 1 MG/DL (ref 0.5–1.4)
CREAT SERPL-MCNC: 1.08 MG/DL (ref 0.5–1.4)
CREAT SERPL-MCNC: 1.18 MG/DL (ref 0.5–1.4)
CREAT SERPL-MCNC: 1.21 MG/DL (ref 0.5–1.4)
CREAT SERPL-MCNC: 1.21 MG/DL (ref 0.5–1.4)
CREAT SERPL-MCNC: 1.24 MG/DL (ref 0.5–1.4)
CREAT SERPL-MCNC: 1.45 MG/DL (ref 0.5–1.4)
CREAT SERPL-MCNC: 1.48 MG/DL (ref 0.5–1.4)
CREAT SERPL-MCNC: 1.56 MG/DL (ref 0.5–1.4)
CREAT SERPL-MCNC: 1.57 MG/DL (ref 0.5–1.4)
CREAT SERPL-MCNC: 1.61 MG/DL (ref 0.5–1.4)
CREAT SERPL-MCNC: 1.67 MG/DL (ref 0.5–1.4)
CREAT SERPL-MCNC: 1.7 MG/DL (ref 0.5–1.4)
CREAT SERPL-MCNC: 1.71 MG/DL (ref 0.5–1.4)
CREAT SERPL-MCNC: 1.76 MG/DL (ref 0.5–1.4)
CREAT SERPL-MCNC: 1.8 MG/DL (ref 0.5–1.4)
CREAT SERPL-MCNC: 1.9 MG/DL (ref 0.5–1.4)
CREAT SERPL-MCNC: 2.26 MG/DL (ref 0.5–1.4)
CREAT SERPL-MCNC: 2.34 MG/DL (ref 0.5–1.4)
CREAT SERPL-MCNC: 2.58 MG/DL (ref 0.5–1.4)
CREAT SERPL-MCNC: 2.74 MG/DL (ref 0.5–1.4)
CREAT SERPL-MCNC: 2.75 MG/DL (ref 0.5–1.4)
CREAT SERPL-MCNC: 2.8 MG/DL (ref 0.5–1.4)
CREAT SERPL-MCNC: 2.95 MG/DL (ref 0.5–1.4)
CREAT SERPL-MCNC: 3.25 MG/DL (ref 0.5–1.4)
CREAT SERPL-MCNC: 3.35 MG/DL (ref 0.5–1.4)
CREAT SERPL-MCNC: 3.43 MG/DL (ref 0.5–1.4)
CREAT SERPL-MCNC: 3.51 MG/DL (ref 0.5–1.4)
CREAT SERPL-MCNC: 3.82 MG/DL (ref 0.5–1.4)
CREAT SERPL-MCNC: 3.96 MG/DL (ref 0.5–1.4)
CREAT SERPL-MCNC: 4.89 MG/DL (ref 0.5–1.4)
CRP SERPL HS-MCNC: 12.29 MG/DL (ref 0–0.75)
CRP SERPL HS-MCNC: 12.98 MG/DL (ref 0–0.75)
CRP SERPL HS-MCNC: 13.59 MG/DL (ref 0–0.75)
CT.EXTRINSIC BLD ROTEM: 0.9 MIN (ref 0.8–2.1)
CT.EXTRINSIC BLD ROTEM: 1.5 MIN (ref 0.8–2.1)
CT.EXTRINSIC BLD ROTEM: 1.7 MIN (ref 0.8–2.1)
CT.EXTRINSIC BLD ROTEM: 2 MIN (ref 0.8–2.1)
CT.EXTRINSIC BLD ROTEM: 2.3 MIN (ref 0.8–2.1)
CT.EXTRINSIC BLD ROTEM: 3.1 MIN (ref 0.8–2.1)
DELSYS IDSYS: ABNORMAL
DOHLE BOD BLD QL SMEAR: NORMAL
EKG IMPRESSION: NORMAL
END TIDAL CARBON DIOXIDE IECO2: 27 MMHG
END TIDAL CARBON DIOXIDE IECO2: 29 MMHG
END TIDAL CARBON DIOXIDE IECO2: 30 MMHG
END TIDAL CARBON DIOXIDE IECO2: 30 MMHG
END TIDAL CARBON DIOXIDE IECO2: 31 MMHG
END TIDAL CARBON DIOXIDE IECO2: 32 MMHG
END TIDAL CARBON DIOXIDE IECO2: 34 MMHG
END TIDAL CARBON DIOXIDE IECO2: 35 MMHG
END TIDAL CARBON DIOXIDE IECO2: 35 MMHG
END TIDAL CARBON DIOXIDE IECO2: 41 MMHG
EOSINOPHIL # BLD AUTO: 0 K/UL (ref 0–0.51)
EOSINOPHIL # BLD AUTO: 0.03 K/UL (ref 0–0.51)
EOSINOPHIL # BLD AUTO: 0.08 K/UL (ref 0–0.51)
EOSINOPHIL # BLD AUTO: 0.08 K/UL (ref 0–0.51)
EOSINOPHIL # BLD AUTO: 0.09 K/UL (ref 0–0.51)
EOSINOPHIL # BLD AUTO: 0.09 K/UL (ref 0–0.51)
EOSINOPHIL # BLD AUTO: 0.19 K/UL (ref 0–0.51)
EOSINOPHIL # BLD AUTO: 0.21 K/UL (ref 0–0.51)
EOSINOPHIL # BLD AUTO: 0.29 K/UL (ref 0–0.51)
EOSINOPHIL # BLD AUTO: 0.29 K/UL (ref 0–0.51)
EOSINOPHIL # BLD AUTO: 0.33 K/UL (ref 0–0.51)
EOSINOPHIL NFR BLD: 0 % (ref 0–6.9)
EOSINOPHIL NFR BLD: 0.4 % (ref 0–6.9)
EOSINOPHIL NFR BLD: 0.8 % (ref 0–6.9)
EOSINOPHIL NFR BLD: 0.9 % (ref 0–6.9)
EOSINOPHIL NFR BLD: 0.9 % (ref 0–6.9)
EOSINOPHIL NFR BLD: 1 % (ref 0–6.9)
EOSINOPHIL NFR BLD: 1.7 % (ref 0–6.9)
EOSINOPHIL NFR BLD: 2.6 % (ref 0–6.9)
EOSINOPHIL NFR BLD: 2.6 % (ref 0–6.9)
ERYTHROCYTE [DISTWIDTH] IN BLOOD BY AUTOMATED COUNT: 44.8 FL (ref 35.9–50)
ERYTHROCYTE [DISTWIDTH] IN BLOOD BY AUTOMATED COUNT: 45.8 FL (ref 35.9–50)
ERYTHROCYTE [DISTWIDTH] IN BLOOD BY AUTOMATED COUNT: 47.7 FL (ref 35.9–50)
ERYTHROCYTE [DISTWIDTH] IN BLOOD BY AUTOMATED COUNT: 48.6 FL (ref 35.9–50)
ERYTHROCYTE [DISTWIDTH] IN BLOOD BY AUTOMATED COUNT: 48.8 FL (ref 35.9–50)
ERYTHROCYTE [DISTWIDTH] IN BLOOD BY AUTOMATED COUNT: 49 FL (ref 35.9–50)
ERYTHROCYTE [DISTWIDTH] IN BLOOD BY AUTOMATED COUNT: 49.1 FL (ref 35.9–50)
ERYTHROCYTE [DISTWIDTH] IN BLOOD BY AUTOMATED COUNT: 49.1 FL (ref 35.9–50)
ERYTHROCYTE [DISTWIDTH] IN BLOOD BY AUTOMATED COUNT: 49.2 FL (ref 35.9–50)
ERYTHROCYTE [DISTWIDTH] IN BLOOD BY AUTOMATED COUNT: 49.8 FL (ref 35.9–50)
ERYTHROCYTE [DISTWIDTH] IN BLOOD BY AUTOMATED COUNT: 50.2 FL (ref 35.9–50)
ERYTHROCYTE [DISTWIDTH] IN BLOOD BY AUTOMATED COUNT: 51.4 FL (ref 35.9–50)
ERYTHROCYTE [DISTWIDTH] IN BLOOD BY AUTOMATED COUNT: 51.5 FL (ref 35.9–50)
ERYTHROCYTE [DISTWIDTH] IN BLOOD BY AUTOMATED COUNT: 51.7 FL (ref 35.9–50)
ERYTHROCYTE [DISTWIDTH] IN BLOOD BY AUTOMATED COUNT: 51.8 FL (ref 35.9–50)
ERYTHROCYTE [DISTWIDTH] IN BLOOD BY AUTOMATED COUNT: 51.9 FL (ref 35.9–50)
ERYTHROCYTE [DISTWIDTH] IN BLOOD BY AUTOMATED COUNT: 53.9 FL (ref 35.9–50)
ERYTHROCYTE [DISTWIDTH] IN BLOOD BY AUTOMATED COUNT: 54.4 FL (ref 35.9–50)
ERYTHROCYTE [DISTWIDTH] IN BLOOD BY AUTOMATED COUNT: 54.5 FL (ref 35.9–50)
ERYTHROCYTE [DISTWIDTH] IN BLOOD BY AUTOMATED COUNT: 55 FL (ref 35.9–50)
ERYTHROCYTE [DISTWIDTH] IN BLOOD BY AUTOMATED COUNT: 56.6 FL (ref 35.9–50)
ERYTHROCYTE [DISTWIDTH] IN BLOOD BY AUTOMATED COUNT: 58.9 FL (ref 35.9–50)
ERYTHROCYTE [DISTWIDTH] IN BLOOD BY AUTOMATED COUNT: 59 FL (ref 35.9–50)
ERYTHROCYTE [DISTWIDTH] IN BLOOD BY AUTOMATED COUNT: 59.1 FL (ref 35.9–50)
ERYTHROCYTE [DISTWIDTH] IN BLOOD BY AUTOMATED COUNT: 59.9 FL (ref 35.9–50)
ERYTHROCYTE [DISTWIDTH] IN BLOOD BY AUTOMATED COUNT: 62.1 FL (ref 35.9–50)
ERYTHROCYTE [DISTWIDTH] IN BLOOD BY AUTOMATED COUNT: 73.4 FL (ref 35.9–50)
ERYTHROCYTE [DISTWIDTH] IN BLOOD BY AUTOMATED COUNT: 82 FL (ref 35.9–50)
ERYTHROCYTE [DISTWIDTH] IN BLOOD BY AUTOMATED COUNT: 83.6 FL (ref 35.9–50)
ERYTHROCYTE [DISTWIDTH] IN BLOOD BY AUTOMATED COUNT: 87.6 FL (ref 35.9–50)
ERYTHROCYTE [DISTWIDTH] IN BLOOD BY AUTOMATED COUNT: 91.3 FL (ref 35.9–50)
FIBRINOGEN PPP-MCNC: 218 MG/DL (ref 215–460)
FIBRINOGEN PPP-MCNC: 271 MG/DL (ref 215–460)
GFR SERPLBLD CREATININE-BSD FMLA CKD-EPI: 12 ML/MIN/1.73 M 2
GFR SERPLBLD CREATININE-BSD FMLA CKD-EPI: 13 ML/MIN/1.73 M 2
GFR SERPLBLD CREATININE-BSD FMLA CKD-EPI: 14 ML/MIN/1.73 M 2
GFR SERPLBLD CREATININE-BSD FMLA CKD-EPI: 14 ML/MIN/1.73 M 2
GFR SERPLBLD CREATININE-BSD FMLA CKD-EPI: 15 ML/MIN/1.73 M 2
GFR SERPLBLD CREATININE-BSD FMLA CKD-EPI: 15 ML/MIN/1.73 M 2
GFR SERPLBLD CREATININE-BSD FMLA CKD-EPI: 17 ML/MIN/1.73 M 2
GFR SERPLBLD CREATININE-BSD FMLA CKD-EPI: 18 ML/MIN/1.73 M 2
GFR SERPLBLD CREATININE-BSD FMLA CKD-EPI: 19 ML/MIN/1.73 M 2
GFR SERPLBLD CREATININE-BSD FMLA CKD-EPI: 19 ML/MIN/1.73 M 2
GFR SERPLBLD CREATININE-BSD FMLA CKD-EPI: 20 ML/MIN/1.73 M 2
GFR SERPLBLD CREATININE-BSD FMLA CKD-EPI: 23 ML/MIN/1.73 M 2
GFR SERPLBLD CREATININE-BSD FMLA CKD-EPI: 24 ML/MIN/1.73 M 2
GFR SERPLBLD CREATININE-BSD FMLA CKD-EPI: 29 ML/MIN/1.73 M 2
GFR SERPLBLD CREATININE-BSD FMLA CKD-EPI: 31 ML/MIN/1.73 M 2
GFR SERPLBLD CREATININE-BSD FMLA CKD-EPI: 32 ML/MIN/1.73 M 2
GFR SERPLBLD CREATININE-BSD FMLA CKD-EPI: 33 ML/MIN/1.73 M 2
GFR SERPLBLD CREATININE-BSD FMLA CKD-EPI: 34 ML/MIN/1.73 M 2
GFR SERPLBLD CREATININE-BSD FMLA CKD-EPI: 34 ML/MIN/1.73 M 2
GFR SERPLBLD CREATININE-BSD FMLA CKD-EPI: 36 ML/MIN/1.73 M 2
GFR SERPLBLD CREATININE-BSD FMLA CKD-EPI: 37 ML/MIN/1.73 M 2
GFR SERPLBLD CREATININE-BSD FMLA CKD-EPI: 37 ML/MIN/1.73 M 2
GFR SERPLBLD CREATININE-BSD FMLA CKD-EPI: 40 ML/MIN/1.73 M 2
GFR SERPLBLD CREATININE-BSD FMLA CKD-EPI: 41 ML/MIN/1.73 M 2
GFR SERPLBLD CREATININE-BSD FMLA CKD-EPI: 49 ML/MIN/1.73 M 2
GFR SERPLBLD CREATININE-BSD FMLA CKD-EPI: 51 ML/MIN/1.73 M 2
GFR SERPLBLD CREATININE-BSD FMLA CKD-EPI: 51 ML/MIN/1.73 M 2
GFR SERPLBLD CREATININE-BSD FMLA CKD-EPI: 52 ML/MIN/1.73 M 2
GFR SERPLBLD CREATININE-BSD FMLA CKD-EPI: 58 ML/MIN/1.73 M 2
GFR SERPLBLD CREATININE-BSD FMLA CKD-EPI: 64 ML/MIN/1.73 M 2
GFR SERPLBLD CREATININE-BSD FMLA CKD-EPI: 64 ML/MIN/1.73 M 2
GFR SERPLBLD CREATININE-BSD FMLA CKD-EPI: 84 ML/MIN/1.73 M 2
GFR SERPLBLD CREATININE-BSD FMLA CKD-EPI: 9 ML/MIN/1.73 M 2
GFR SERPLBLD CREATININE-BSD FMLA CKD-EPI: 93 ML/MIN/1.73 M 2
GLOBULIN SER CALC-MCNC: 1.9 G/DL (ref 1.9–3.5)
GLOBULIN SER CALC-MCNC: 2.1 G/DL (ref 1.9–3.5)
GLOBULIN SER CALC-MCNC: 2.1 G/DL (ref 1.9–3.5)
GLOBULIN SER CALC-MCNC: 2.2 G/DL (ref 1.9–3.5)
GLOBULIN SER CALC-MCNC: 2.2 G/DL (ref 1.9–3.5)
GLOBULIN SER CALC-MCNC: 2.3 G/DL (ref 1.9–3.5)
GLOBULIN SER CALC-MCNC: 2.3 G/DL (ref 1.9–3.5)
GLOBULIN SER CALC-MCNC: 2.4 G/DL (ref 1.9–3.5)
GLOBULIN SER CALC-MCNC: 2.5 G/DL (ref 1.9–3.5)
GLOBULIN SER CALC-MCNC: 2.8 G/DL (ref 1.9–3.5)
GLOBULIN SER CALC-MCNC: 2.9 G/DL (ref 1.9–3.5)
GLOBULIN SER CALC-MCNC: 3.1 G/DL (ref 1.9–3.5)
GLOBULIN SER CALC-MCNC: 3.1 G/DL (ref 1.9–3.5)
GLOBULIN SER CALC-MCNC: 3.3 G/DL (ref 1.9–3.5)
GLOBULIN SER CALC-MCNC: 3.3 G/DL (ref 1.9–3.5)
GLOBULIN SER CALC-MCNC: 3.4 G/DL (ref 1.9–3.5)
GLOBULIN SER CALC-MCNC: 3.8 G/DL (ref 1.9–3.5)
GLOBULIN SER CALC-MCNC: 3.9 G/DL (ref 1.9–3.5)
GLOBULIN SER CALC-MCNC: 4.1 G/DL (ref 1.9–3.5)
GLOBULIN SER CALC-MCNC: 4.4 G/DL (ref 1.9–3.5)
GLOBULIN SER CALC-MCNC: 4.5 G/DL (ref 1.9–3.5)
GLOBULIN SER CALC-MCNC: ABNORMAL G/DL (ref 1.9–3.5)
GLOBULIN SER CALC-MCNC: ABNORMAL G/DL (ref 1.9–3.5)
GLUCOSE BLD STRIP.AUTO-MCNC: 101 MG/DL (ref 65–99)
GLUCOSE BLD STRIP.AUTO-MCNC: 102 MG/DL (ref 65–99)
GLUCOSE BLD STRIP.AUTO-MCNC: 103 MG/DL (ref 65–99)
GLUCOSE BLD STRIP.AUTO-MCNC: 105 MG/DL (ref 65–99)
GLUCOSE BLD STRIP.AUTO-MCNC: 105 MG/DL (ref 65–99)
GLUCOSE BLD STRIP.AUTO-MCNC: 106 MG/DL (ref 65–99)
GLUCOSE BLD STRIP.AUTO-MCNC: 107 MG/DL (ref 65–99)
GLUCOSE BLD STRIP.AUTO-MCNC: 110 MG/DL (ref 65–99)
GLUCOSE BLD STRIP.AUTO-MCNC: 111 MG/DL (ref 65–99)
GLUCOSE BLD STRIP.AUTO-MCNC: 112 MG/DL (ref 65–99)
GLUCOSE BLD STRIP.AUTO-MCNC: 116 MG/DL (ref 65–99)
GLUCOSE BLD STRIP.AUTO-MCNC: 117 MG/DL (ref 65–99)
GLUCOSE BLD STRIP.AUTO-MCNC: 118 MG/DL (ref 65–99)
GLUCOSE BLD STRIP.AUTO-MCNC: 119 MG/DL (ref 65–99)
GLUCOSE BLD STRIP.AUTO-MCNC: 120 MG/DL (ref 65–99)
GLUCOSE BLD STRIP.AUTO-MCNC: 121 MG/DL (ref 65–99)
GLUCOSE BLD STRIP.AUTO-MCNC: 121 MG/DL (ref 65–99)
GLUCOSE BLD STRIP.AUTO-MCNC: 123 MG/DL (ref 65–99)
GLUCOSE BLD STRIP.AUTO-MCNC: 124 MG/DL (ref 65–99)
GLUCOSE BLD STRIP.AUTO-MCNC: 125 MG/DL (ref 65–99)
GLUCOSE BLD STRIP.AUTO-MCNC: 127 MG/DL (ref 65–99)
GLUCOSE BLD STRIP.AUTO-MCNC: 130 MG/DL (ref 65–99)
GLUCOSE BLD STRIP.AUTO-MCNC: 130 MG/DL (ref 65–99)
GLUCOSE BLD STRIP.AUTO-MCNC: 132 MG/DL (ref 65–99)
GLUCOSE BLD STRIP.AUTO-MCNC: 132 MG/DL (ref 65–99)
GLUCOSE BLD STRIP.AUTO-MCNC: 133 MG/DL (ref 65–99)
GLUCOSE BLD STRIP.AUTO-MCNC: 135 MG/DL (ref 65–99)
GLUCOSE BLD STRIP.AUTO-MCNC: 141 MG/DL (ref 65–99)
GLUCOSE BLD STRIP.AUTO-MCNC: 142 MG/DL (ref 65–99)
GLUCOSE BLD STRIP.AUTO-MCNC: 144 MG/DL (ref 65–99)
GLUCOSE BLD STRIP.AUTO-MCNC: 145 MG/DL (ref 65–99)
GLUCOSE BLD STRIP.AUTO-MCNC: 145 MG/DL (ref 65–99)
GLUCOSE BLD STRIP.AUTO-MCNC: 147 MG/DL (ref 65–99)
GLUCOSE BLD STRIP.AUTO-MCNC: 148 MG/DL (ref 65–99)
GLUCOSE BLD STRIP.AUTO-MCNC: 149 MG/DL (ref 65–99)
GLUCOSE BLD STRIP.AUTO-MCNC: 150 MG/DL (ref 65–99)
GLUCOSE BLD STRIP.AUTO-MCNC: 150 MG/DL (ref 65–99)
GLUCOSE BLD STRIP.AUTO-MCNC: 152 MG/DL (ref 65–99)
GLUCOSE BLD STRIP.AUTO-MCNC: 152 MG/DL (ref 65–99)
GLUCOSE BLD STRIP.AUTO-MCNC: 153 MG/DL (ref 65–99)
GLUCOSE BLD STRIP.AUTO-MCNC: 155 MG/DL (ref 65–99)
GLUCOSE BLD STRIP.AUTO-MCNC: 156 MG/DL (ref 65–99)
GLUCOSE BLD STRIP.AUTO-MCNC: 157 MG/DL (ref 65–99)
GLUCOSE BLD STRIP.AUTO-MCNC: 157 MG/DL (ref 65–99)
GLUCOSE BLD STRIP.AUTO-MCNC: 158 MG/DL (ref 65–99)
GLUCOSE BLD STRIP.AUTO-MCNC: 159 MG/DL (ref 65–99)
GLUCOSE BLD STRIP.AUTO-MCNC: 160 MG/DL (ref 65–99)
GLUCOSE BLD STRIP.AUTO-MCNC: 162 MG/DL (ref 65–99)
GLUCOSE BLD STRIP.AUTO-MCNC: 173 MG/DL (ref 65–99)
GLUCOSE BLD STRIP.AUTO-MCNC: 183 MG/DL (ref 65–99)
GLUCOSE BLD STRIP.AUTO-MCNC: 194 MG/DL (ref 65–99)
GLUCOSE BLD STRIP.AUTO-MCNC: 49 MG/DL (ref 65–99)
GLUCOSE BLD STRIP.AUTO-MCNC: 55 MG/DL (ref 65–99)
GLUCOSE BLD STRIP.AUTO-MCNC: 66 MG/DL (ref 65–99)
GLUCOSE BLD STRIP.AUTO-MCNC: 73 MG/DL (ref 65–99)
GLUCOSE BLD STRIP.AUTO-MCNC: 74 MG/DL (ref 65–99)
GLUCOSE BLD STRIP.AUTO-MCNC: 78 MG/DL (ref 65–99)
GLUCOSE BLD STRIP.AUTO-MCNC: 79 MG/DL (ref 65–99)
GLUCOSE BLD STRIP.AUTO-MCNC: 85 MG/DL (ref 65–99)
GLUCOSE BLD STRIP.AUTO-MCNC: 88 MG/DL (ref 65–99)
GLUCOSE BLD STRIP.AUTO-MCNC: 93 MG/DL (ref 65–99)
GLUCOSE BLD STRIP.AUTO-MCNC: 95 MG/DL (ref 65–99)
GLUCOSE BLD STRIP.AUTO-MCNC: 96 MG/DL (ref 65–99)
GLUCOSE BLD STRIP.AUTO-MCNC: 99 MG/DL (ref 65–99)
GLUCOSE BLD STRIP.AUTO-MCNC: 99 MG/DL (ref 65–99)
GLUCOSE SERPL-MCNC: 106 MG/DL (ref 65–99)
GLUCOSE SERPL-MCNC: 107 MG/DL (ref 65–99)
GLUCOSE SERPL-MCNC: 115 MG/DL (ref 65–99)
GLUCOSE SERPL-MCNC: 117 MG/DL (ref 65–99)
GLUCOSE SERPL-MCNC: 118 MG/DL (ref 65–99)
GLUCOSE SERPL-MCNC: 119 MG/DL (ref 65–99)
GLUCOSE SERPL-MCNC: 120 MG/DL (ref 65–99)
GLUCOSE SERPL-MCNC: 122 MG/DL (ref 65–99)
GLUCOSE SERPL-MCNC: 123 MG/DL (ref 65–99)
GLUCOSE SERPL-MCNC: 127 MG/DL (ref 65–99)
GLUCOSE SERPL-MCNC: 130 MG/DL (ref 65–99)
GLUCOSE SERPL-MCNC: 132 MG/DL (ref 65–99)
GLUCOSE SERPL-MCNC: 134 MG/DL (ref 65–99)
GLUCOSE SERPL-MCNC: 135 MG/DL (ref 65–99)
GLUCOSE SERPL-MCNC: 141 MG/DL (ref 65–99)
GLUCOSE SERPL-MCNC: 144 MG/DL (ref 65–99)
GLUCOSE SERPL-MCNC: 150 MG/DL (ref 65–99)
GLUCOSE SERPL-MCNC: 155 MG/DL (ref 65–99)
GLUCOSE SERPL-MCNC: 157 MG/DL (ref 65–99)
GLUCOSE SERPL-MCNC: 158 MG/DL (ref 65–99)
GLUCOSE SERPL-MCNC: 158 MG/DL (ref 65–99)
GLUCOSE SERPL-MCNC: 160 MG/DL (ref 65–99)
GLUCOSE SERPL-MCNC: 169 MG/DL (ref 65–99)
GLUCOSE SERPL-MCNC: 178 MG/DL (ref 65–99)
GLUCOSE SERPL-MCNC: 186 MG/DL (ref 65–99)
GLUCOSE SERPL-MCNC: 189 MG/DL (ref 65–99)
GLUCOSE SERPL-MCNC: 193 MG/DL (ref 65–99)
GLUCOSE SERPL-MCNC: 72 MG/DL (ref 65–99)
GLUCOSE SERPL-MCNC: 79 MG/DL (ref 65–99)
GLUCOSE SERPL-MCNC: 83 MG/DL (ref 65–99)
GLUCOSE SERPL-MCNC: 87 MG/DL (ref 65–99)
GLUCOSE SERPL-MCNC: 89 MG/DL (ref 65–99)
GLUCOSE SERPL-MCNC: 93 MG/DL (ref 65–99)
GLUCOSE SERPL-MCNC: 98 MG/DL (ref 65–99)
HAV IGM SERPL QL IA: NORMAL
HBV CORE IGM SER QL: NORMAL
HBV SURFACE AB SERPL IA-ACNC: 24.4 MIU/ML (ref 0–10)
HBV SURFACE AG SER QL: NORMAL
HCO3 BLDA-SCNC: 10.1 MMOL/L (ref 17–25)
HCO3 BLDA-SCNC: 15.5 MMOL/L (ref 17–25)
HCO3 BLDA-SCNC: 18 MMOL/L (ref 17–25)
HCO3 BLDA-SCNC: 18.5 MMOL/L (ref 17–25)
HCO3 BLDA-SCNC: 20.3 MMOL/L (ref 17–25)
HCO3 BLDA-SCNC: 20.9 MMOL/L (ref 17–25)
HCO3 BLDA-SCNC: 21.1 MMOL/L (ref 17–25)
HCO3 BLDA-SCNC: 21.7 MMOL/L (ref 17–25)
HCO3 BLDA-SCNC: 22.3 MMOL/L (ref 17–25)
HCO3 BLDA-SCNC: 23 MMOL/L (ref 17–25)
HCO3 BLDA-SCNC: 25 MMOL/L (ref 17–25)
HCO3 BLDA-SCNC: 25.9 MMOL/L (ref 17–25)
HCO3 BLDA-SCNC: 8 MMOL/L (ref 17–25)
HCO3 BLDA-SCNC: 8.2 MMOL/L (ref 17–25)
HCO3 BLDA-SCNC: 8.4 MMOL/L (ref 17–25)
HCO3 BLDA-SCNC: 8.6 MMOL/L (ref 17–25)
HCO3 BLDA-SCNC: 9.1 MMOL/L (ref 17–25)
HCO3 BLDA-SCNC: 9.3 MMOL/L (ref 17–25)
HCT VFR BLD AUTO: 18.7 % (ref 37–47)
HCT VFR BLD AUTO: 24.1 % (ref 37–47)
HCT VFR BLD AUTO: 24.2 % (ref 37–47)
HCT VFR BLD AUTO: 24.4 % (ref 37–47)
HCT VFR BLD AUTO: 25.2 % (ref 37–47)
HCT VFR BLD AUTO: 25.5 % (ref 37–47)
HCT VFR BLD AUTO: 25.6 % (ref 37–47)
HCT VFR BLD AUTO: 25.6 % (ref 37–47)
HCT VFR BLD AUTO: 26.5 % (ref 37–47)
HCT VFR BLD AUTO: 26.6 % (ref 37–47)
HCT VFR BLD AUTO: 26.6 % (ref 37–47)
HCT VFR BLD AUTO: 26.8 % (ref 37–47)
HCT VFR BLD AUTO: 26.8 % (ref 37–47)
HCT VFR BLD AUTO: 26.9 % (ref 37–47)
HCT VFR BLD AUTO: 27 % (ref 37–47)
HCT VFR BLD AUTO: 27.4 % (ref 37–47)
HCT VFR BLD AUTO: 27.5 % (ref 37–47)
HCT VFR BLD AUTO: 28 % (ref 37–47)
HCT VFR BLD AUTO: 28 % (ref 37–47)
HCT VFR BLD AUTO: 28.6 % (ref 37–47)
HCT VFR BLD AUTO: 28.7 % (ref 37–47)
HCT VFR BLD AUTO: 29 % (ref 37–47)
HCT VFR BLD AUTO: 29.2 % (ref 37–47)
HCT VFR BLD AUTO: 29.3 % (ref 37–47)
HCT VFR BLD AUTO: 29.5 % (ref 37–47)
HCT VFR BLD AUTO: 29.9 % (ref 37–47)
HCT VFR BLD AUTO: 30.8 % (ref 37–47)
HCT VFR BLD AUTO: 31.1 % (ref 37–47)
HCT VFR BLD AUTO: 31.3 % (ref 37–47)
HCT VFR BLD AUTO: 31.4 % (ref 37–47)
HCT VFR BLD AUTO: 34.4 % (ref 37–47)
HCT VFR BLD AUTO: 35.1 % (ref 37–47)
HCT VFR BLD AUTO: 36.1 % (ref 37–47)
HCT VFR BLD AUTO: 37.2 % (ref 37–47)
HCT VFR BLD AUTO: 42.1 % (ref 37–47)
HCT VFR BLD AUTO: 43.4 % (ref 37–47)
HCT VFR BLD AUTO: 43.6 % (ref 37–47)
HCT VFR BLD AUTO: 43.6 % (ref 37–47)
HCT VFR BLD AUTO: 8.5 % (ref 37–47)
HCT VFR BLD AUTO: 9.7 % (ref 37–47)
HCT VFR BLD CALC: 25 % (ref 37–47)
HCT VFR BLD CALC: 39 % (ref 37–47)
HCV AB SER QL: NORMAL
HGB BLD-MCNC: 10.1 G/DL (ref 12–16)
HGB BLD-MCNC: 10.5 G/DL (ref 12–16)
HGB BLD-MCNC: 10.8 G/DL (ref 12–16)
HGB BLD-MCNC: 11 G/DL (ref 12–16)
HGB BLD-MCNC: 11.1 G/DL (ref 12–16)
HGB BLD-MCNC: 11.3 G/DL (ref 12–16)
HGB BLD-MCNC: 11.4 G/DL (ref 12–16)
HGB BLD-MCNC: 12 G/DL (ref 12–16)
HGB BLD-MCNC: 12.1 G/DL (ref 12–16)
HGB BLD-MCNC: 12.8 G/DL (ref 12–16)
HGB BLD-MCNC: 13.3 G/DL (ref 12–16)
HGB BLD-MCNC: 13.7 G/DL (ref 12–16)
HGB BLD-MCNC: 14.2 G/DL (ref 12–16)
HGB BLD-MCNC: 14.4 G/DL (ref 12–16)
HGB BLD-MCNC: 14.5 G/DL (ref 12–16)
HGB BLD-MCNC: 2.7 G/DL (ref 12–16)
HGB BLD-MCNC: 3.6 G/DL (ref 12–16)
HGB BLD-MCNC: 6.3 G/DL (ref 12–16)
HGB BLD-MCNC: 8 G/DL (ref 12–16)
HGB BLD-MCNC: 8 G/DL (ref 12–16)
HGB BLD-MCNC: 8.5 G/DL (ref 12–16)
HGB BLD-MCNC: 8.6 G/DL (ref 12–16)
HGB BLD-MCNC: 8.8 G/DL (ref 12–16)
HGB BLD-MCNC: 8.8 G/DL (ref 12–16)
HGB BLD-MCNC: 8.9 G/DL (ref 12–16)
HGB BLD-MCNC: 9.1 G/DL (ref 12–16)
HGB BLD-MCNC: 9.1 G/DL (ref 12–16)
HGB BLD-MCNC: 9.2 G/DL (ref 12–16)
HGB BLD-MCNC: 9.2 G/DL (ref 12–16)
HGB BLD-MCNC: 9.3 G/DL (ref 12–16)
HGB BLD-MCNC: 9.3 G/DL (ref 12–16)
HGB BLD-MCNC: 9.4 G/DL (ref 12–16)
HGB BLD-MCNC: 9.5 G/DL (ref 12–16)
HGB BLD-MCNC: 9.6 G/DL (ref 12–16)
HGB BLD-MCNC: 9.6 G/DL (ref 12–16)
HGB BLD-MCNC: 9.7 G/DL (ref 12–16)
HGB BLD-MCNC: 9.8 G/DL (ref 12–16)
HOROWITZ INDEX BLDA+IHG-RTO: 107 MM[HG]
HOROWITZ INDEX BLDA+IHG-RTO: 111 MM[HG]
HOROWITZ INDEX BLDA+IHG-RTO: 119 MM[HG]
HOROWITZ INDEX BLDA+IHG-RTO: 140 MM[HG]
HOROWITZ INDEX BLDA+IHG-RTO: 146 MM[HG]
HOROWITZ INDEX BLDA+IHG-RTO: 160 MM[HG]
HOROWITZ INDEX BLDA+IHG-RTO: 168 MM[HG]
HOROWITZ INDEX BLDA+IHG-RTO: 182 MM[HG]
HOROWITZ INDEX BLDA+IHG-RTO: 205 MM[HG]
HOROWITZ INDEX BLDA+IHG-RTO: 210 MM[HG]
HOROWITZ INDEX BLDA+IHG-RTO: 84 MM[HG]
HYPOCHROMIA BLD QL SMEAR: ABNORMAL
INHALED O2 FLOW RATE: 100 L/MIN
INHALED O2 FLOW RATE: 5 L/MIN
INR PPP: 1.57 (ref 0.87–1.13)
INR PPP: 2.03 (ref 0.87–1.13)
INR PPP: 2.47 (ref 0.87–1.13)
INR PPP: 2.5 (ref 0.87–1.13)
INR PPP: 3.03 (ref 0.87–1.13)
INR PPP: 3.11 (ref 0.87–1.13)
INR PPP: 3.8 (ref 0.87–1.13)
LACTATE BLD-SCNC: 1.4 MMOL/L (ref 0.5–2)
LACTATE BLD-SCNC: 15.5 MMOL/L (ref 0.5–2)
LACTATE BLD-SCNC: 16.9 MMOL/L (ref 0.5–2)
LACTATE BLD-SCNC: 2.2 MMOL/L (ref 0.5–2)
LACTATE BLD-SCNC: 2.6 MMOL/L (ref 0.5–2)
LACTATE BLD-SCNC: 6.2 MMOL/L (ref 0.5–2)
LACTATE BLD-SCNC: 7.2 MMOL/L (ref 0.5–2)
LACTATE BLD-SCNC: 7.8 MMOL/L (ref 0.5–2)
LACTATE SERPL-SCNC: 1.4 MMOL/L (ref 0.5–2)
LACTATE SERPL-SCNC: 1.6 MMOL/L (ref 0.5–2)
LACTATE SERPL-SCNC: 1.7 MMOL/L (ref 0.5–2)
LACTATE SERPL-SCNC: 11.5 MMOL/L (ref 0.5–2)
LACTATE SERPL-SCNC: 13.7 MMOL/L (ref 0.5–2)
LACTATE SERPL-SCNC: 14.2 MMOL/L (ref 0.5–2)
LACTATE SERPL-SCNC: 18 MMOL/L (ref 0.5–2)
LACTATE SERPL-SCNC: 2 MMOL/L (ref 0.5–2)
LACTATE SERPL-SCNC: 2.2 MMOL/L (ref 0.5–2)
LACTATE SERPL-SCNC: 2.3 MMOL/L (ref 0.5–2)
LACTATE SERPL-SCNC: 2.3 MMOL/L (ref 0.5–2)
LACTATE SERPL-SCNC: 2.5 MMOL/L (ref 0.5–2)
LACTATE SERPL-SCNC: 2.7 MMOL/L (ref 0.5–2)
LACTATE SERPL-SCNC: 2.8 MMOL/L (ref 0.5–2)
LACTATE SERPL-SCNC: 2.9 MMOL/L (ref 0.5–2)
LACTATE SERPL-SCNC: 22.8 MMOL/L (ref 0.5–2)
LACTATE SERPL-SCNC: 3.1 MMOL/L (ref 0.5–2)
LACTATE SERPL-SCNC: 3.1 MMOL/L (ref 0.5–2)
LACTATE SERPL-SCNC: 3.3 MMOL/L (ref 0.5–2)
LACTATE SERPL-SCNC: 3.4 MMOL/L (ref 0.5–2)
LACTATE SERPL-SCNC: 3.5 MMOL/L (ref 0.5–2)
LACTATE SERPL-SCNC: 4.4 MMOL/L (ref 0.5–2)
LACTATE SERPL-SCNC: 4.5 MMOL/L (ref 0.5–2)
LACTATE SERPL-SCNC: 5.5 MMOL/L (ref 0.5–2)
LACTATE SERPL-SCNC: 5.7 MMOL/L (ref 0.5–2)
LACTATE SERPL-SCNC: 5.8 MMOL/L (ref 0.5–2)
LACTATE SERPL-SCNC: 6.3 MMOL/L (ref 0.5–2)
LACTATE SERPL-SCNC: 6.5 MMOL/L (ref 0.5–2)
LACTATE SERPL-SCNC: 8.2 MMOL/L (ref 0.5–2)
LACTATE SERPL-SCNC: 9.9 MMOL/L (ref 0.5–2)
LDH SERPL L TO P-CCNC: 363 U/L (ref 107–266)
LG PLATELETS BLD QL SMEAR: NORMAL
LIPASE SERPL-CCNC: 11 U/L (ref 11–82)
LIPASE SERPL-CCNC: 33 U/L (ref 11–82)
LPM ILPM: 5 LPM
LV EJECT FRACT  99904: 55
LV EJECT FRACT  99904: 55
LV EJECT FRACT  99904: 65
LV EJECT FRACT MOD 2C 99903: 49.77
LV EJECT FRACT MOD 4C 99902: 55.4
LV EJECT FRACT MOD BP 99901: 54.71
LYMPHOCYTES # BLD AUTO: 0 K/UL (ref 1–4.8)
LYMPHOCYTES # BLD AUTO: 0.21 K/UL (ref 1–4.8)
LYMPHOCYTES # BLD AUTO: 0.28 K/UL (ref 1–4.8)
LYMPHOCYTES # BLD AUTO: 0.43 K/UL (ref 1–4.8)
LYMPHOCYTES # BLD AUTO: 0.43 K/UL (ref 1–4.8)
LYMPHOCYTES # BLD AUTO: 0.46 K/UL (ref 1–4.8)
LYMPHOCYTES # BLD AUTO: 0.59 K/UL (ref 1–4.8)
LYMPHOCYTES # BLD AUTO: 0.67 K/UL (ref 1–4.8)
LYMPHOCYTES # BLD AUTO: 0.84 K/UL (ref 1–4.8)
LYMPHOCYTES # BLD AUTO: 0.87 K/UL (ref 1–4.8)
LYMPHOCYTES # BLD AUTO: 0.87 K/UL (ref 1–4.8)
LYMPHOCYTES # BLD AUTO: 0.97 K/UL (ref 1–4.8)
LYMPHOCYTES # BLD AUTO: 0.98 K/UL (ref 1–4.8)
LYMPHOCYTES # BLD AUTO: 0.99 K/UL (ref 1–4.8)
LYMPHOCYTES # BLD AUTO: 1.14 K/UL (ref 1–4.8)
LYMPHOCYTES # BLD AUTO: 1.33 K/UL (ref 1–4.8)
LYMPHOCYTES # BLD AUTO: 1.4 K/UL (ref 1–4.8)
LYMPHOCYTES # BLD AUTO: 1.53 K/UL (ref 1–4.8)
LYMPHOCYTES # BLD AUTO: 1.57 K/UL (ref 1–4.8)
LYMPHOCYTES # BLD AUTO: 1.93 K/UL (ref 1–4.8)
LYMPHOCYTES # BLD AUTO: 1.99 K/UL (ref 1–4.8)
LYMPHOCYTES # BLD AUTO: 3.6 K/UL (ref 1–4.8)
LYMPHOCYTES # BLD AUTO: 3.61 K/UL (ref 1–4.8)
LYMPHOCYTES # BLD AUTO: 3.61 K/UL (ref 1–4.8)
LYMPHOCYTES NFR BLD: 0 % (ref 22–41)
LYMPHOCYTES NFR BLD: 1.7 % (ref 22–41)
LYMPHOCYTES NFR BLD: 11.1 % (ref 22–41)
LYMPHOCYTES NFR BLD: 11.4 % (ref 22–41)
LYMPHOCYTES NFR BLD: 11.5 % (ref 22–41)
LYMPHOCYTES NFR BLD: 14.3 % (ref 22–41)
LYMPHOCYTES NFR BLD: 16.8 % (ref 22–41)
LYMPHOCYTES NFR BLD: 18.3 % (ref 22–41)
LYMPHOCYTES NFR BLD: 2 % (ref 22–41)
LYMPHOCYTES NFR BLD: 2.4 % (ref 22–41)
LYMPHOCYTES NFR BLD: 4.1 % (ref 22–41)
LYMPHOCYTES NFR BLD: 4.2 % (ref 22–41)
LYMPHOCYTES NFR BLD: 4.4 % (ref 22–41)
LYMPHOCYTES NFR BLD: 4.7 % (ref 22–41)
LYMPHOCYTES NFR BLD: 5.3 % (ref 22–41)
LYMPHOCYTES NFR BLD: 6.8 % (ref 22–41)
LYMPHOCYTES NFR BLD: 7.7 % (ref 22–41)
LYMPHOCYTES NFR BLD: 7.7 % (ref 22–41)
LYMPHOCYTES NFR BLD: 8.7 % (ref 22–41)
LYMPHOCYTES NFR BLD: 8.7 % (ref 22–41)
LYMPHOCYTES NFR BLD: 8.8 % (ref 22–41)
LYMPHOCYTES NFR BLD: 8.8 % (ref 22–41)
LYMPHOCYTES NFR BLD: 9.9 % (ref 22–41)
LYMPHOCYTES NFR BLD: 9.9 % (ref 22–41)
MACROCYTES BLD QL SMEAR: ABNORMAL
MAGNESIUM SERPL-MCNC: 1.5 MG/DL (ref 1.5–2.5)
MAGNESIUM SERPL-MCNC: 1.6 MG/DL (ref 1.5–2.5)
MAGNESIUM SERPL-MCNC: 1.8 MG/DL (ref 1.5–2.5)
MAGNESIUM SERPL-MCNC: 1.9 MG/DL (ref 1.5–2.5)
MAGNESIUM SERPL-MCNC: 2.1 MG/DL (ref 1.5–2.5)
MAGNESIUM SERPL-MCNC: 2.2 MG/DL (ref 1.5–2.5)
MAGNESIUM SERPL-MCNC: 2.4 MG/DL (ref 1.5–2.5)
MAGNESIUM SERPL-MCNC: 2.5 MG/DL (ref 1.5–2.5)
MANUAL DIFF BLD: ABNORMAL
MANUAL DIFF BLD: NORMAL
MCF BLD TEG: 51.7 MM (ref 52–69)
MCF BLD TEG: 56.9 MM (ref 52–69)
MCF BLD TEG: 57 MM (ref 52–69)
MCF BLD TEG: 57.6 MM (ref 52–69)
MCF BLD TEG: 64.7 MM (ref 52–69)
MCF BLD TEG: 65.1 MM (ref 52–69)
MCF.PLATELET INHIB BLD ROTEM: 17.1 MM (ref 15–32)
MCF.PLATELET INHIB BLD ROTEM: 19.4 MM (ref 15–32)
MCF.PLATELET INHIB BLD ROTEM: 20.6 MM (ref 15–32)
MCF.PLATELET INHIB BLD ROTEM: 21.5 MM (ref 15–32)
MCF.PLATELET INHIB BLD ROTEM: 37.4 MM (ref 15–32)
MCF.PLATELET INHIB BLD ROTEM: 44.2 MM (ref 15–32)
MCH RBC QN AUTO: 29.1 PG (ref 27–33)
MCH RBC QN AUTO: 29.2 PG (ref 27–33)
MCH RBC QN AUTO: 29.3 PG (ref 27–33)
MCH RBC QN AUTO: 29.7 PG (ref 27–33)
MCH RBC QN AUTO: 30 PG (ref 27–33)
MCH RBC QN AUTO: 30 PG (ref 27–33)
MCH RBC QN AUTO: 30.2 PG (ref 27–33)
MCH RBC QN AUTO: 30.4 PG (ref 27–33)
MCH RBC QN AUTO: 30.5 PG (ref 27–33)
MCH RBC QN AUTO: 30.8 PG (ref 27–33)
MCH RBC QN AUTO: 30.8 PG (ref 27–33)
MCH RBC QN AUTO: 30.9 PG (ref 27–33)
MCH RBC QN AUTO: 31.1 PG (ref 27–33)
MCH RBC QN AUTO: 31.2 PG (ref 27–33)
MCH RBC QN AUTO: 31.3 PG (ref 27–33)
MCH RBC QN AUTO: 31.4 PG (ref 27–33)
MCH RBC QN AUTO: 31.4 PG (ref 27–33)
MCH RBC QN AUTO: 31.5 PG (ref 27–33)
MCH RBC QN AUTO: 31.5 PG (ref 27–33)
MCH RBC QN AUTO: 31.7 PG (ref 27–33)
MCH RBC QN AUTO: 31.7 PG (ref 27–33)
MCH RBC QN AUTO: 32.2 PG (ref 27–33)
MCH RBC QN AUTO: 32.5 PG (ref 27–33)
MCH RBC QN AUTO: 33 PG (ref 27–33)
MCH RBC QN AUTO: 33.4 PG (ref 27–33)
MCH RBC QN AUTO: 34.2 PG (ref 27–33)
MCH RBC QN AUTO: 35.8 PG (ref 27–33)
MCH RBC QN AUTO: 35.8 PG (ref 27–33)
MCH RBC QN AUTO: 41.9 PG (ref 27–33)
MCHC RBC AUTO-ENTMCNC: 30.8 G/DL (ref 32.2–35.5)
MCHC RBC AUTO-ENTMCNC: 31.4 G/DL (ref 32.2–35.5)
MCHC RBC AUTO-ENTMCNC: 31.7 G/DL (ref 32.2–35.5)
MCHC RBC AUTO-ENTMCNC: 31.8 G/DL (ref 32.2–35.5)
MCHC RBC AUTO-ENTMCNC: 32.1 G/DL (ref 32.2–35.5)
MCHC RBC AUTO-ENTMCNC: 32.2 G/DL (ref 32.2–35.5)
MCHC RBC AUTO-ENTMCNC: 32.2 G/DL (ref 32.2–35.5)
MCHC RBC AUTO-ENTMCNC: 32.5 G/DL (ref 32.2–35.5)
MCHC RBC AUTO-ENTMCNC: 32.5 G/DL (ref 32.2–35.5)
MCHC RBC AUTO-ENTMCNC: 32.6 G/DL (ref 32.2–35.5)
MCHC RBC AUTO-ENTMCNC: 32.8 G/DL (ref 32.2–35.5)
MCHC RBC AUTO-ENTMCNC: 32.8 G/DL (ref 32.2–35.5)
MCHC RBC AUTO-ENTMCNC: 32.9 G/DL (ref 32.2–35.5)
MCHC RBC AUTO-ENTMCNC: 33 G/DL (ref 32.2–35.5)
MCHC RBC AUTO-ENTMCNC: 33.1 G/DL (ref 32.2–35.5)
MCHC RBC AUTO-ENTMCNC: 33.1 G/DL (ref 32.2–35.5)
MCHC RBC AUTO-ENTMCNC: 33.4 G/DL (ref 32.2–35.5)
MCHC RBC AUTO-ENTMCNC: 33.5 G/DL (ref 32.2–35.5)
MCHC RBC AUTO-ENTMCNC: 33.7 G/DL (ref 32.2–35.5)
MCHC RBC AUTO-ENTMCNC: 33.8 G/DL (ref 32.2–35.5)
MCHC RBC AUTO-ENTMCNC: 34.1 G/DL (ref 32.2–35.5)
MCHC RBC AUTO-ENTMCNC: 34.4 G/DL (ref 32.2–35.5)
MCHC RBC AUTO-ENTMCNC: 34.8 G/DL (ref 32.2–35.5)
MCHC RBC AUTO-ENTMCNC: 34.9 G/DL (ref 32.2–35.5)
MCHC RBC AUTO-ENTMCNC: 35.1 G/DL (ref 32.2–35.5)
MCHC RBC AUTO-ENTMCNC: 35.5 G/DL (ref 32.2–35.5)
MCHC RBC AUTO-ENTMCNC: 36 G/DL (ref 32.2–35.5)
MCHC RBC AUTO-ENTMCNC: 36.1 G/DL (ref 32.2–35.5)
MCHC RBC AUTO-ENTMCNC: 36.1 G/DL (ref 32.2–35.5)
MCHC RBC AUTO-ENTMCNC: 37.1 G/DL (ref 32.2–35.5)
MCHC RBC AUTO-ENTMCNC: 37.3 G/DL (ref 32.2–35.5)
MCV RBC AUTO: 100 FL (ref 81.4–97.8)
MCV RBC AUTO: 100 FL (ref 81.4–97.8)
MCV RBC AUTO: 100.6 FL (ref 81.4–97.8)
MCV RBC AUTO: 106.3 FL (ref 81.4–97.8)
MCV RBC AUTO: 112.8 FL (ref 81.4–97.8)
MCV RBC AUTO: 84.6 FL (ref 81.4–97.8)
MCV RBC AUTO: 86.9 FL (ref 81.4–97.8)
MCV RBC AUTO: 87.3 FL (ref 81.4–97.8)
MCV RBC AUTO: 87.5 FL (ref 81.4–97.8)
MCV RBC AUTO: 88.2 FL (ref 81.4–97.8)
MCV RBC AUTO: 89.4 FL (ref 81.4–97.8)
MCV RBC AUTO: 89.9 FL (ref 81.4–97.8)
MCV RBC AUTO: 90.3 FL (ref 81.4–97.8)
MCV RBC AUTO: 90.7 FL (ref 81.4–97.8)
MCV RBC AUTO: 91.3 FL (ref 81.4–97.8)
MCV RBC AUTO: 92.7 FL (ref 81.4–97.8)
MCV RBC AUTO: 93.1 FL (ref 81.4–97.8)
MCV RBC AUTO: 93.4 FL (ref 81.4–97.8)
MCV RBC AUTO: 93.9 FL (ref 81.4–97.8)
MCV RBC AUTO: 94.5 FL (ref 81.4–97.8)
MCV RBC AUTO: 94.7 FL (ref 81.4–97.8)
MCV RBC AUTO: 94.8 FL (ref 81.4–97.8)
MCV RBC AUTO: 95 FL (ref 81.4–97.8)
MCV RBC AUTO: 95.1 FL (ref 81.4–97.8)
MCV RBC AUTO: 95.3 FL (ref 81.4–97.8)
MCV RBC AUTO: 95.8 FL (ref 81.4–97.8)
MCV RBC AUTO: 96 FL (ref 81.4–97.8)
MCV RBC AUTO: 96.1 FL (ref 81.4–97.8)
MCV RBC AUTO: 97.6 FL (ref 81.4–97.8)
METAMYELOCYTES NFR BLD MANUAL: 0.8 %
METAMYELOCYTES NFR BLD MANUAL: 0.9 %
METAMYELOCYTES NFR BLD MANUAL: 1.8 %
METAMYELOCYTES NFR BLD MANUAL: 10.7 %
METAMYELOCYTES NFR BLD MANUAL: 11.5 %
METAMYELOCYTES NFR BLD MANUAL: 2.4 %
METAMYELOCYTES NFR BLD MANUAL: 4.7 %
METAMYELOCYTES NFR BLD MANUAL: 7 %
METAMYELOCYTES NFR BLD MANUAL: 9.1 %
METAMYELOCYTES NFR BLD MANUAL: 9.8 %
MICROCYTES BLD QL SMEAR: ABNORMAL
MODE IMODE: ABNORMAL
MONOCYTES # BLD AUTO: 0 K/UL (ref 0–0.85)
MONOCYTES # BLD AUTO: 0.05 K/UL (ref 0–0.85)
MONOCYTES # BLD AUTO: 0.08 K/UL (ref 0–0.85)
MONOCYTES # BLD AUTO: 0.1 K/UL (ref 0–0.85)
MONOCYTES # BLD AUTO: 0.1 K/UL (ref 0–0.85)
MONOCYTES # BLD AUTO: 0.18 K/UL (ref 0–0.85)
MONOCYTES # BLD AUTO: 0.21 K/UL (ref 0–0.85)
MONOCYTES # BLD AUTO: 0.25 K/UL (ref 0–0.85)
MONOCYTES # BLD AUTO: 0.26 K/UL (ref 0–0.85)
MONOCYTES # BLD AUTO: 0.27 K/UL (ref 0–0.85)
MONOCYTES # BLD AUTO: 0.29 K/UL (ref 0–0.85)
MONOCYTES # BLD AUTO: 0.29 K/UL (ref 0–0.85)
MONOCYTES # BLD AUTO: 0.31 K/UL (ref 0–0.85)
MONOCYTES # BLD AUTO: 0.33 K/UL (ref 0–0.85)
MONOCYTES # BLD AUTO: 0.56 K/UL (ref 0–0.85)
MONOCYTES # BLD AUTO: 0.58 K/UL (ref 0–0.85)
MONOCYTES # BLD AUTO: 0.61 K/UL (ref 0–0.85)
MONOCYTES # BLD AUTO: 0.65 K/UL (ref 0–0.85)
MONOCYTES # BLD AUTO: 0.73 K/UL (ref 0–0.85)
MONOCYTES # BLD AUTO: 0.73 K/UL (ref 0–0.85)
MONOCYTES # BLD AUTO: 0.97 K/UL (ref 0–0.85)
MONOCYTES # BLD AUTO: 1.22 K/UL (ref 0–0.85)
MONOCYTES NFR BLD AUTO: 0 % (ref 0–13.4)
MONOCYTES NFR BLD AUTO: 0.8 % (ref 0–13.4)
MONOCYTES NFR BLD AUTO: 0.9 % (ref 0–13.4)
MONOCYTES NFR BLD AUTO: 1 % (ref 0–13.4)
MONOCYTES NFR BLD AUTO: 2.3 % (ref 0–13.4)
MONOCYTES NFR BLD AUTO: 2.5 % (ref 0–13.4)
MONOCYTES NFR BLD AUTO: 2.6 % (ref 0–13.4)
MONOCYTES NFR BLD AUTO: 3.2 % (ref 0–13.4)
MONOCYTES NFR BLD AUTO: 3.5 % (ref 0–13.4)
MONOCYTES NFR BLD AUTO: 3.9 % (ref 0–13.4)
MONOCYTES NFR BLD AUTO: 4.2 % (ref 0–13.4)
MONOCYTES NFR BLD AUTO: 5.1 % (ref 0–13.4)
MONOCYTES NFR BLD AUTO: 6.6 % (ref 0–13.4)
MONOCYTES NFR BLD AUTO: 8.2 % (ref 0–13.4)
MORPHOLOGY BLD-IMP: NORMAL
MYELOCYTES NFR BLD MANUAL: 0.8 %
MYELOCYTES NFR BLD MANUAL: 0.9 %
MYELOCYTES NFR BLD MANUAL: 1.5 %
MYELOCYTES NFR BLD MANUAL: 1.7 %
MYELOCYTES NFR BLD MANUAL: 10.7 %
MYELOCYTES NFR BLD MANUAL: 10.8 %
MYELOCYTES NFR BLD MANUAL: 3 %
MYELOCYTES NFR BLD MANUAL: 6.1 %
MYELOCYTES NFR BLD MANUAL: 7 %
NEUTROPHILS # BLD AUTO: 11.05 K/UL (ref 1.82–7.42)
NEUTROPHILS # BLD AUTO: 11.86 K/UL (ref 1.82–7.42)
NEUTROPHILS # BLD AUTO: 14.14 K/UL (ref 1.82–7.42)
NEUTROPHILS # BLD AUTO: 15.26 K/UL (ref 1.82–7.42)
NEUTROPHILS # BLD AUTO: 16.33 K/UL (ref 1.82–7.42)
NEUTROPHILS # BLD AUTO: 16.56 K/UL (ref 1.82–7.42)
NEUTROPHILS # BLD AUTO: 17.47 K/UL (ref 1.82–7.42)
NEUTROPHILS # BLD AUTO: 18.32 K/UL (ref 1.82–7.42)
NEUTROPHILS # BLD AUTO: 19.31 K/UL (ref 1.82–7.42)
NEUTROPHILS # BLD AUTO: 20.35 K/UL (ref 1.82–7.42)
NEUTROPHILS # BLD AUTO: 23.45 K/UL (ref 1.82–7.42)
NEUTROPHILS # BLD AUTO: 24.82 K/UL (ref 1.82–7.42)
NEUTROPHILS # BLD AUTO: 5.3 K/UL (ref 1.82–7.42)
NEUTROPHILS # BLD AUTO: 5.89 K/UL (ref 1.82–7.42)
NEUTROPHILS # BLD AUTO: 8.25 K/UL (ref 1.82–7.42)
NEUTROPHILS # BLD AUTO: 8.32 K/UL (ref 1.82–7.42)
NEUTROPHILS # BLD AUTO: 8.46 K/UL (ref 1.82–7.42)
NEUTROPHILS # BLD AUTO: 8.52 K/UL (ref 1.82–7.42)
NEUTROPHILS # BLD AUTO: 8.71 K/UL (ref 1.82–7.42)
NEUTROPHILS # BLD AUTO: 8.97 K/UL (ref 1.82–7.42)
NEUTROPHILS # BLD AUTO: 9.73 K/UL (ref 1.82–7.42)
NEUTROPHILS # BLD AUTO: 9.83 K/UL (ref 1.82–7.42)
NEUTROPHILS # BLD AUTO: 9.91 K/UL (ref 1.82–7.42)
NEUTROPHILS # BLD AUTO: 9.92 K/UL (ref 1.82–7.42)
NEUTROPHILS NFR BLD: 57.7 % (ref 44–72)
NEUTROPHILS NFR BLD: 58.2 % (ref 44–72)
NEUTROPHILS NFR BLD: 65.9 % (ref 44–72)
NEUTROPHILS NFR BLD: 69.5 % (ref 44–72)
NEUTROPHILS NFR BLD: 74.6 % (ref 44–72)
NEUTROPHILS NFR BLD: 75.6 % (ref 44–72)
NEUTROPHILS NFR BLD: 76 % (ref 44–72)
NEUTROPHILS NFR BLD: 78 % (ref 44–72)
NEUTROPHILS NFR BLD: 78.2 % (ref 44–72)
NEUTROPHILS NFR BLD: 78.9 % (ref 44–72)
NEUTROPHILS NFR BLD: 81.2 % (ref 44–72)
NEUTROPHILS NFR BLD: 82.9 % (ref 44–72)
NEUTROPHILS NFR BLD: 83.3 % (ref 44–72)
NEUTROPHILS NFR BLD: 84.2 % (ref 44–72)
NEUTROPHILS NFR BLD: 86 % (ref 44–72)
NEUTROPHILS NFR BLD: 86.9 % (ref 44–72)
NEUTROPHILS NFR BLD: 88 % (ref 44–72)
NEUTROPHILS NFR BLD: 88.1 % (ref 44–72)
NEUTROPHILS NFR BLD: 88.1 % (ref 44–72)
NEUTROPHILS NFR BLD: 89.3 % (ref 44–72)
NEUTROPHILS NFR BLD: 89.7 % (ref 44–72)
NEUTROPHILS NFR BLD: 93 % (ref 44–72)
NEUTROPHILS NFR BLD: 93.2 % (ref 44–72)
NEUTROPHILS NFR BLD: 98.3 % (ref 44–72)
NEUTS BAND NFR BLD MANUAL: 0.9 % (ref 0–10)
NEUTS BAND NFR BLD MANUAL: 0.9 % (ref 0–10)
NEUTS BAND NFR BLD MANUAL: 1.6 % (ref 0–10)
NEUTS BAND NFR BLD MANUAL: 1.7 % (ref 0–10)
NEUTS BAND NFR BLD MANUAL: 1.7 % (ref 0–10)
NEUTS BAND NFR BLD MANUAL: 16 % (ref 0–10)
NEUTS BAND NFR BLD MANUAL: 2.6 % (ref 0–10)
NEUTS BAND NFR BLD MANUAL: 3 % (ref 0–10)
NEUTS BAND NFR BLD MANUAL: 3.4 % (ref 0–10)
NEUTS BAND NFR BLD MANUAL: 3.5 % (ref 0–10)
NEUTS BAND NFR BLD MANUAL: 3.8 % (ref 0–10)
NEUTS BAND NFR BLD MANUAL: 4 % (ref 0–10)
NEUTS BAND NFR BLD MANUAL: 8.3 % (ref 0–10)
NEUTS BAND NFR BLD MANUAL: 9.4 % (ref 0–10)
NEUTS BAND NFR BLD MANUAL: 9.8 % (ref 0–10)
NRBC # BLD AUTO: 0 K/UL
NRBC # BLD AUTO: 0.02 K/UL
NRBC # BLD AUTO: 0.03 K/UL
NRBC # BLD AUTO: 0.04 K/UL
NRBC # BLD AUTO: 0.06 K/UL
NRBC # BLD AUTO: 0.07 K/UL
NRBC # BLD AUTO: 0.08 K/UL
NRBC # BLD AUTO: 0.16 K/UL
NRBC # BLD AUTO: 0.18 K/UL
NRBC # BLD AUTO: 0.24 K/UL
NRBC # BLD AUTO: 0.25 K/UL
NRBC # BLD AUTO: 0.31 K/UL
NRBC # BLD AUTO: 0.42 K/UL
NRBC # BLD AUTO: 0.48 K/UL
NRBC # BLD AUTO: 0.53 K/UL
NRBC # BLD AUTO: 0.57 K/UL
NRBC # BLD AUTO: 0.76 K/UL
NRBC BLD-RTO: 0 /100 WBC (ref 0–0.2)
NRBC BLD-RTO: 0.1 /100 WBC (ref 0–0.2)
NRBC BLD-RTO: 0.3 /100 WBC (ref 0–0.2)
NRBC BLD-RTO: 0.4 /100 WBC (ref 0–0.2)
NRBC BLD-RTO: 0.6 /100 WBC (ref 0–0.2)
NRBC BLD-RTO: 0.6 /100 WBC (ref 0–0.2)
NRBC BLD-RTO: 0.7 /100 WBC (ref 0–0.2)
NRBC BLD-RTO: 0.8 /100 WBC (ref 0–0.2)
NRBC BLD-RTO: 1.3 /100 WBC (ref 0–0.2)
NRBC BLD-RTO: 1.3 /100 WBC (ref 0–0.2)
NRBC BLD-RTO: 1.5 /100 WBC (ref 0–0.2)
NRBC BLD-RTO: 1.8 /100 WBC (ref 0–0.2)
NRBC BLD-RTO: 2 /100 WBC (ref 0–0.2)
NRBC BLD-RTO: 2.2 /100 WBC (ref 0–0.2)
NRBC BLD-RTO: 2.5 /100 WBC (ref 0–0.2)
NRBC BLD-RTO: 3.1 /100 WBC (ref 0–0.2)
NRBC BLD-RTO: 3.3 /100 WBC (ref 0–0.2)
NT-PROBNP SERPL IA-MCNC: 1285 PG/ML (ref 0–125)
NT-PROBNP SERPL IA-MCNC: 7391 PG/ML (ref 0–125)
O2/TOTAL GAS SETTING VFR VENT: 100 %
O2/TOTAL GAS SETTING VFR VENT: 40 %
O2/TOTAL GAS SETTING VFR VENT: 40 %
O2/TOTAL GAS SETTING VFR VENT: 50 %
O2/TOTAL GAS SETTING VFR VENT: 60 %
O2/TOTAL GAS SETTING VFR VENT: 70 %
PA AA BLD-ACNC: 30.1 % (ref 0–11)
PA AA BLD-ACNC: 34.3 % (ref 0–11)
PA AA BLD-ACNC: 44 % (ref 0–11)
PA AA BLD-ACNC: 71.4 % (ref 0–11)
PA AA BLD-ACNC: ABNORMAL % (ref 0–11)
PA AA BLD-ACNC: ABNORMAL % (ref 0–11)
PA ADP BLD-ACNC: 54.7 % (ref 0–17)
PA ADP BLD-ACNC: 58.6 % (ref 0–17)
PA ADP BLD-ACNC: 91.7 % (ref 0–17)
PA ADP BLD-ACNC: 99.7 % (ref 0–17)
PA ADP BLD-ACNC: ABNORMAL % (ref 0–17)
PA ADP BLD-ACNC: ABNORMAL % (ref 0–17)
PATH REV: NORMAL
PATH REV: NORMAL
PATHOLOGY CONSULT NOTE: NORMAL
PCO2 BLDA: 27.7 MMHG (ref 26–37)
PCO2 BLDA: 28 MMHG (ref 26–37)
PCO2 BLDA: 32.4 MMHG (ref 26–37)
PCO2 BLDA: 32.8 MMHG (ref 26–37)
PCO2 BLDA: 32.9 MMHG (ref 26–37)
PCO2 BLDA: 33.2 MMHG (ref 26–37)
PCO2 BLDA: 35 MMHG (ref 26–37)
PCO2 BLDA: 37.4 MMHG (ref 26–37)
PCO2 BLDA: 38 MMHG (ref 26–37)
PCO2 BLDA: 38.3 MMHG (ref 26–37)
PCO2 BLDA: 42.6 MMHG (ref 26–37)
PCO2 BLDA: 43.2 MMHG (ref 26–37)
PCO2 BLDA: 43.2 MMHG (ref 26–37)
PCO2 BLDA: 44.6 MMHG (ref 26–37)
PCO2 BLDA: 46.7 MMHG (ref 26–37)
PCO2 BLDA: 46.7 MMHG (ref 26–37)
PCO2 BLDA: 53.5 MMHG (ref 26–37)
PCO2 BLDA: 53.7 MMHG (ref 26–37)
PCO2 TEMP ADJ BLDA: 26.6 MMHG (ref 26–37)
PCO2 TEMP ADJ BLDA: 31.5 MMHG (ref 26–37)
PCO2 TEMP ADJ BLDA: 33.5 MMHG (ref 26–37)
PCO2 TEMP ADJ BLDA: 34 MMHG (ref 26–37)
PCO2 TEMP ADJ BLDA: 34.9 MMHG (ref 26–37)
PCO2 TEMP ADJ BLDA: 36.4 MMHG (ref 26–37)
PCO2 TEMP ADJ BLDA: 37.9 MMHG (ref 26–37)
PCO2 TEMP ADJ BLDA: 38.8 MMHG (ref 26–37)
PCO2 TEMP ADJ BLDA: 39 MMHG (ref 26–37)
PCO2 TEMP ADJ BLDA: 40.9 MMHG (ref 26–37)
PCO2 TEMP ADJ BLDA: 41.5 MMHG (ref 26–37)
PCO2 TEMP ADJ BLDA: 41.9 MMHG (ref 26–37)
PCO2 TEMP ADJ BLDA: 42.4 MMHG (ref 26–37)
PCO2 TEMP ADJ BLDA: 43.2 MMHG (ref 26–37)
PCO2 TEMP ADJ BLDA: 45.7 MMHG (ref 26–37)
PCO2 TEMP ADJ BLDA: 51.2 MMHG (ref 26–37)
PCO2 TEMP ADJ BLDA: 53.5 MMHG (ref 26–37)
PEEP END EXPIRATORY PRESSURE IPEEP: 10 CMH20
PEEP END EXPIRATORY PRESSURE IPEEP: 10 CMH20
PEEP END EXPIRATORY PRESSURE IPEEP: 12 CMH20
PEEP END EXPIRATORY PRESSURE IPEEP: 14 CMH20
PEEP END EXPIRATORY PRESSURE IPEEP: 8 CMH20
PH BLDA: 6.84 [PH] (ref 7.4–7.5)
PH BLDA: 6.87 [PH] (ref 7.4–7.5)
PH BLDA: 6.88 [PH] (ref 7.4–7.5)
PH BLDA: 6.89 [PH] (ref 7.4–7.5)
PH BLDA: 6.92 [PH] (ref 7.4–7.5)
PH BLDA: 6.94 [PH] (ref 7.4–7.5)
PH BLDA: 7 [PH] (ref 7.4–7.5)
PH BLDA: 7.21 [PH] (ref 7.4–7.5)
PH BLDA: 7.28 [PH] (ref 7.4–7.5)
PH BLDA: 7.33 [PH] (ref 7.4–7.5)
PH BLDA: 7.33 [PH] (ref 7.4–7.5)
PH BLDA: 7.38 [PH] (ref 7.4–7.5)
PH BLDA: 7.4 [PH] (ref 7.4–7.5)
PH BLDA: 7.41 [PH] (ref 7.4–7.5)
PH BLDA: 7.42 [PH] (ref 7.4–7.5)
PH BLDA: 7.42 [PH] (ref 7.4–7.5)
PH BLDA: 7.43 [PH] (ref 7.4–7.5)
PH BLDA: 7.5 [PH] (ref 7.4–7.5)
PH TEMP ADJ BLDA: 6.86 [PH] (ref 7.4–7.5)
PH TEMP ADJ BLDA: 6.88 [PH] (ref 7.4–7.5)
PH TEMP ADJ BLDA: 6.89 [PH] (ref 7.4–7.5)
PH TEMP ADJ BLDA: 6.9 [PH] (ref 7.4–7.5)
PH TEMP ADJ BLDA: 6.94 [PH] (ref 7.4–7.5)
PH TEMP ADJ BLDA: 6.97 [PH] (ref 7.4–7.5)
PH TEMP ADJ BLDA: 7 [PH] (ref 7.4–7.5)
PH TEMP ADJ BLDA: 7.22 [PH] (ref 7.4–7.5)
PH TEMP ADJ BLDA: 7.28 [PH] (ref 7.4–7.5)
PH TEMP ADJ BLDA: 7.31 [PH] (ref 7.4–7.5)
PH TEMP ADJ BLDA: 7.34 [PH] (ref 7.4–7.5)
PH TEMP ADJ BLDA: 7.37 [PH] (ref 7.4–7.5)
PH TEMP ADJ BLDA: 7.39 [PH] (ref 7.4–7.5)
PH TEMP ADJ BLDA: 7.39 [PH] (ref 7.4–7.5)
PH TEMP ADJ BLDA: 7.4 [PH] (ref 7.4–7.5)
PH TEMP ADJ BLDA: 7.44 [PH] (ref 7.4–7.5)
PH TEMP ADJ BLDA: 7.52 [PH] (ref 7.4–7.5)
PHOSPHATE SERPL-MCNC: 1.6 MG/DL (ref 2.5–4.5)
PHOSPHATE SERPL-MCNC: 1.7 MG/DL (ref 2.5–4.5)
PHOSPHATE SERPL-MCNC: 1.9 MG/DL (ref 2.5–4.5)
PHOSPHATE SERPL-MCNC: 10.5 MG/DL (ref 2.5–4.5)
PHOSPHATE SERPL-MCNC: 2.2 MG/DL (ref 2.5–4.5)
PHOSPHATE SERPL-MCNC: 2.2 MG/DL (ref 2.5–4.5)
PHOSPHATE SERPL-MCNC: 2.4 MG/DL (ref 2.5–4.5)
PHOSPHATE SERPL-MCNC: 2.8 MG/DL (ref 2.5–4.5)
PHOSPHATE SERPL-MCNC: 3 MG/DL (ref 2.5–4.5)
PHOSPHATE SERPL-MCNC: 3.1 MG/DL (ref 2.5–4.5)
PHOSPHATE SERPL-MCNC: 3.2 MG/DL (ref 2.5–4.5)
PHOSPHATE SERPL-MCNC: 3.2 MG/DL (ref 2.5–4.5)
PHOSPHATE SERPL-MCNC: 3.4 MG/DL (ref 2.5–4.5)
PHOSPHATE SERPL-MCNC: 3.6 MG/DL (ref 2.5–4.5)
PHOSPHATE SERPL-MCNC: 3.7 MG/DL (ref 2.5–4.5)
PHOSPHATE SERPL-MCNC: 3.8 MG/DL (ref 2.5–4.5)
PHOSPHATE SERPL-MCNC: 3.9 MG/DL (ref 2.5–4.5)
PHOSPHATE SERPL-MCNC: 4 MG/DL (ref 2.5–4.5)
PHOSPHATE SERPL-MCNC: 4.7 MG/DL (ref 2.5–4.5)
PHOSPHATE SERPL-MCNC: 5.3 MG/DL (ref 2.5–4.5)
PHOSPHATE SERPL-MCNC: 5.7 MG/DL (ref 2.5–4.5)
PHOSPHATE SERPL-MCNC: 6 MG/DL (ref 2.5–4.5)
PHOSPHATE SERPL-MCNC: 6.4 MG/DL (ref 2.5–4.5)
PLATELET # BLD AUTO: 113 K/UL (ref 164–446)
PLATELET # BLD AUTO: 119 K/UL (ref 164–446)
PLATELET # BLD AUTO: 125 K/UL (ref 164–446)
PLATELET # BLD AUTO: 128 K/UL (ref 164–446)
PLATELET # BLD AUTO: 134 K/UL (ref 164–446)
PLATELET # BLD AUTO: 137 K/UL (ref 164–446)
PLATELET # BLD AUTO: 150 K/UL (ref 164–446)
PLATELET # BLD AUTO: 186 K/UL (ref 164–446)
PLATELET # BLD AUTO: 221 K/UL (ref 164–446)
PLATELET # BLD AUTO: 227 K/UL (ref 164–446)
PLATELET # BLD AUTO: 245 K/UL (ref 164–446)
PLATELET # BLD AUTO: 38 K/UL (ref 164–446)
PLATELET # BLD AUTO: 40 K/UL (ref 164–446)
PLATELET # BLD AUTO: 40 K/UL (ref 164–446)
PLATELET # BLD AUTO: 41 K/UL (ref 164–446)
PLATELET # BLD AUTO: 47 K/UL (ref 164–446)
PLATELET # BLD AUTO: 49 K/UL (ref 164–446)
PLATELET # BLD AUTO: 51 K/UL (ref 164–446)
PLATELET # BLD AUTO: 51 K/UL (ref 164–446)
PLATELET # BLD AUTO: 59 K/UL (ref 164–446)
PLATELET # BLD AUTO: 60 K/UL (ref 164–446)
PLATELET # BLD AUTO: 60 K/UL (ref 164–446)
PLATELET # BLD AUTO: 64 K/UL (ref 164–446)
PLATELET # BLD AUTO: 65 K/UL (ref 164–446)
PLATELET # BLD AUTO: 68 K/UL (ref 164–446)
PLATELET # BLD AUTO: 68 K/UL (ref 164–446)
PLATELET # BLD AUTO: 70 K/UL (ref 164–446)
PLATELET # BLD AUTO: 77 K/UL (ref 164–446)
PLATELET # BLD AUTO: 84 K/UL (ref 164–446)
PLATELET # BLD AUTO: 86 K/UL (ref 164–446)
PLATELET # BLD AUTO: 93 K/UL (ref 164–446)
PLATELET BLD QL SMEAR: NORMAL
PLATELETS.RETICULATED NFR BLD AUTO: 10.9 % (ref 0.6–13.1)
PLATELETS.RETICULATED NFR BLD AUTO: 12 % (ref 0.6–13.1)
PLATELETS.RETICULATED NFR BLD AUTO: 12.8 % (ref 0.6–13.1)
PLATELETS.RETICULATED NFR BLD AUTO: 15.1 % (ref 0.6–13.1)
PLATELETS.RETICULATED NFR BLD AUTO: 16.2 % (ref 0.6–13.1)
PLATELETS.RETICULATED NFR BLD AUTO: 16.5 % (ref 0.6–13.1)
PLATELETS.RETICULATED NFR BLD AUTO: 17.1 % (ref 0.6–13.1)
PLATELETS.RETICULATED NFR BLD AUTO: 17.8 % (ref 0.6–13.1)
PLATELETS.RETICULATED NFR BLD AUTO: 19.5 % (ref 0.6–13.1)
PLATELETS.RETICULATED NFR BLD AUTO: 19.8 % (ref 0.6–13.1)
PLATELETS.RETICULATED NFR BLD AUTO: 19.8 % (ref 0.6–13.1)
PLATELETS.RETICULATED NFR BLD AUTO: 24.9 % (ref 0.6–13.1)
PLATELETS.RETICULATED NFR BLD AUTO: 6 % (ref 0.6–13.1)
PLATELETS.RETICULATED NFR BLD AUTO: 7.9 % (ref 0.6–13.1)
PLATELETS.RETICULATED NFR BLD AUTO: 8 % (ref 0.6–13.1)
PLATELETS.RETICULATED NFR BLD AUTO: 8.6 % (ref 0.6–13.1)
PLATELETS.RETICULATED NFR BLD AUTO: 9.4 % (ref 0.6–13.1)
PMV BLD AUTO: 10.7 FL (ref 9–12.9)
PMV BLD AUTO: 10.7 FL (ref 9–12.9)
PMV BLD AUTO: 10.9 FL (ref 9–12.9)
PMV BLD AUTO: 11 FL (ref 9–12.9)
PMV BLD AUTO: 11.1 FL (ref 9–12.9)
PMV BLD AUTO: 11.5 FL (ref 9–12.9)
PMV BLD AUTO: 11.5 FL (ref 9–12.9)
PMV BLD AUTO: 11.6 FL (ref 9–12.9)
PMV BLD AUTO: 11.6 FL (ref 9–12.9)
PMV BLD AUTO: 11.7 FL (ref 9–12.9)
PMV BLD AUTO: 11.8 FL (ref 9–12.9)
PMV BLD AUTO: 11.8 FL (ref 9–12.9)
PMV BLD AUTO: 12.2 FL (ref 9–12.9)
PMV BLD AUTO: 12.3 FL (ref 9–12.9)
PMV BLD AUTO: 12.5 FL (ref 9–12.9)
PMV BLD AUTO: 12.6 FL (ref 9–12.9)
PMV BLD AUTO: 12.9 FL (ref 9–12.9)
PMV BLD AUTO: 12.9 FL (ref 9–12.9)
PMV BLD AUTO: 13.1 FL (ref 9–12.9)
PMV BLD AUTO: 13.3 FL (ref 9–12.9)
PMV BLD AUTO: 13.4 FL (ref 9–12.9)
PMV BLD AUTO: 8.6 FL (ref 9–12.9)
PMV BLD AUTO: 9 FL (ref 9–12.9)
PMV BLD AUTO: 9.2 FL (ref 9–12.9)
PMV BLD AUTO: 9.2 FL (ref 9–12.9)
PMV BLD AUTO: 9.3 FL (ref 9–12.9)
PMV BLD AUTO: 9.4 FL (ref 9–12.9)
PMV BLD AUTO: 9.6 FL (ref 9–12.9)
PO2 BLDA: 101 MMHG (ref 64–87)
PO2 BLDA: 102 MMHG (ref 64–87)
PO2 BLDA: 105 MMHG (ref 64–87)
PO2 BLDA: 107 MMHG (ref 64–87)
PO2 BLDA: 107 MMHG (ref 64–87)
PO2 BLDA: 107.7 MMHG (ref 64–87)
PO2 BLDA: 110 MMHG (ref 64–87)
PO2 BLDA: 111 MMHG (ref 64–87)
PO2 BLDA: 113 MMHG (ref 64–87)
PO2 BLDA: 119 MMHG (ref 64–87)
PO2 BLDA: 67 MMHG (ref 64–87)
PO2 BLDA: 70 MMHG (ref 64–87)
PO2 BLDA: 80 MMHG (ref 64–87)
PO2 BLDA: 82 MMHG (ref 64–87)
PO2 BLDA: 84 MMHG (ref 64–87)
PO2 BLDA: 84 MMHG (ref 64–87)
PO2 BLDA: 88.9 MMHG (ref 64–87)
PO2 BLDA: 91 MMHG (ref 64–87)
PO2 TEMP ADJ BLDA: 100 MMHG (ref 64–87)
PO2 TEMP ADJ BLDA: 102.3 MMHG (ref 64–87)
PO2 TEMP ADJ BLDA: 103 MMHG (ref 64–87)
PO2 TEMP ADJ BLDA: 104 MMHG (ref 64–87)
PO2 TEMP ADJ BLDA: 105 MMHG (ref 64–87)
PO2 TEMP ADJ BLDA: 108 MMHG (ref 64–87)
PO2 TEMP ADJ BLDA: 110 MMHG (ref 64–87)
PO2 TEMP ADJ BLDA: 116 MMHG (ref 64–87)
PO2 TEMP ADJ BLDA: 119 MMHG (ref 64–87)
PO2 TEMP ADJ BLDA: 64 MMHG (ref 64–87)
PO2 TEMP ADJ BLDA: 73 MMHG (ref 64–87)
PO2 TEMP ADJ BLDA: 74 MMHG (ref 64–87)
PO2 TEMP ADJ BLDA: 78.3 MMHG (ref 64–87)
PO2 TEMP ADJ BLDA: 80 MMHG (ref 64–87)
PO2 TEMP ADJ BLDA: 84 MMHG (ref 64–87)
PO2 TEMP ADJ BLDA: 88 MMHG (ref 64–87)
PO2 TEMP ADJ BLDA: 93 MMHG (ref 64–87)
POIKILOCYTOSIS BLD QL SMEAR: NORMAL
POLYCHROMASIA BLD QL SMEAR: NORMAL
POTASSIUM BLD-SCNC: 3.6 MMOL/L (ref 3.6–5.5)
POTASSIUM BLD-SCNC: 4.2 MMOL/L (ref 3.6–5.5)
POTASSIUM BLD-SCNC: 4.3 MMOL/L (ref 3.6–5.5)
POTASSIUM BLD-SCNC: 4.8 MMOL/L (ref 3.6–5.5)
POTASSIUM BLD-SCNC: 5.3 MMOL/L (ref 3.6–5.5)
POTASSIUM BLD-SCNC: 5.6 MMOL/L (ref 3.6–5.5)
POTASSIUM SERPL-SCNC: 3.3 MMOL/L (ref 3.6–5.5)
POTASSIUM SERPL-SCNC: 3.5 MMOL/L (ref 3.6–5.5)
POTASSIUM SERPL-SCNC: 3.5 MMOL/L (ref 3.6–5.5)
POTASSIUM SERPL-SCNC: 3.6 MMOL/L (ref 3.6–5.5)
POTASSIUM SERPL-SCNC: 3.7 MMOL/L (ref 3.6–5.5)
POTASSIUM SERPL-SCNC: 3.8 MMOL/L (ref 3.6–5.5)
POTASSIUM SERPL-SCNC: 3.8 MMOL/L (ref 3.6–5.5)
POTASSIUM SERPL-SCNC: 3.9 MMOL/L (ref 3.6–5.5)
POTASSIUM SERPL-SCNC: 4 MMOL/L (ref 3.6–5.5)
POTASSIUM SERPL-SCNC: 4.1 MMOL/L (ref 3.6–5.5)
POTASSIUM SERPL-SCNC: 4.2 MMOL/L (ref 3.6–5.5)
POTASSIUM SERPL-SCNC: 4.3 MMOL/L (ref 3.6–5.5)
POTASSIUM SERPL-SCNC: 4.3 MMOL/L (ref 3.6–5.5)
POTASSIUM SERPL-SCNC: 4.4 MMOL/L (ref 3.6–5.5)
POTASSIUM SERPL-SCNC: 4.7 MMOL/L (ref 3.6–5.5)
POTASSIUM SERPL-SCNC: 4.7 MMOL/L (ref 3.6–5.5)
POTASSIUM SERPL-SCNC: 4.9 MMOL/L (ref 3.6–5.5)
POTASSIUM SERPL-SCNC: 5.1 MMOL/L (ref 3.6–5.5)
POTASSIUM SERPL-SCNC: 5.9 MMOL/L (ref 3.6–5.5)
PRESSURE SUPPORT SETTING VENT: 8 CM[H2O]
PROCALCITONIN SERPL-MCNC: 7.13 NG/ML
PROCALCITONIN SERPL-MCNC: 7.77 NG/ML
PRODUCT TYPE UPROD: NORMAL
PROMYELOCYTES NFR BLD MANUAL: 0.8 %
PROMYELOCYTES NFR BLD MANUAL: 0.8 %
PROT SERPL-MCNC: 3.1 G/DL (ref 6–8.2)
PROT SERPL-MCNC: 3.4 G/DL (ref 6–8.2)
PROT SERPL-MCNC: 3.7 G/DL (ref 6–8.2)
PROT SERPL-MCNC: 3.8 G/DL (ref 6–8.2)
PROT SERPL-MCNC: 3.9 G/DL (ref 6–8.2)
PROT SERPL-MCNC: 4 G/DL (ref 6–8.2)
PROT SERPL-MCNC: 4.1 G/DL (ref 6–8.2)
PROT SERPL-MCNC: 4.1 G/DL (ref 6–8.2)
PROT SERPL-MCNC: 4.2 G/DL (ref 6–8.2)
PROT SERPL-MCNC: 4.2 G/DL (ref 6–8.2)
PROT SERPL-MCNC: 4.4 G/DL (ref 6–8.2)
PROT SERPL-MCNC: 4.4 G/DL (ref 6–8.2)
PROT SERPL-MCNC: 4.5 G/DL (ref 6–8.2)
PROT SERPL-MCNC: 4.6 G/DL (ref 6–8.2)
PROT SERPL-MCNC: 4.7 G/DL (ref 6–8.2)
PROT SERPL-MCNC: 4.8 G/DL (ref 6–8.2)
PROT SERPL-MCNC: 4.9 G/DL (ref 6–8.2)
PROT SERPL-MCNC: 5 G/DL (ref 6–8.2)
PROT SERPL-MCNC: 5.1 G/DL (ref 6–8.2)
PROT SERPL-MCNC: 5.2 G/DL (ref 6–8.2)
PROT SERPL-MCNC: 5.2 G/DL (ref 6–8.2)
PROT SERPL-MCNC: 5.5 G/DL (ref 6–8.2)
PROT SERPL-MCNC: 5.9 G/DL (ref 6–8.2)
PROT SERPL-MCNC: 6.3 G/DL (ref 6–8.2)
PROT SERPL-MCNC: 6.6 G/DL (ref 6–8.2)
PROT SERPL-MCNC: 7.1 G/DL (ref 6–8.2)
PROTHROMBIN TIME: 18.4 SEC (ref 12–14.6)
PROTHROMBIN TIME: 22.4 SEC (ref 12–14.6)
PROTHROMBIN TIME: 26 SEC (ref 12–14.6)
PROTHROMBIN TIME: 26.2 SEC (ref 12–14.6)
PROTHROMBIN TIME: 30.3 SEC (ref 12–14.6)
PROTHROMBIN TIME: 31 SEC (ref 12–14.6)
PROTHROMBIN TIME: 36 SEC (ref 12–14.6)
RBC # BLD AUTO: 0.86 M/UL (ref 4.2–5.4)
RBC # BLD AUTO: 0.91 M/UL (ref 4.2–5.4)
RBC # BLD AUTO: 1.76 M/UL (ref 4.2–5.4)
RBC # BLD AUTO: 2.54 M/UL (ref 4.2–5.4)
RBC # BLD AUTO: 2.55 M/UL (ref 4.2–5.4)
RBC # BLD AUTO: 2.65 M/UL (ref 4.2–5.4)
RBC # BLD AUTO: 2.65 M/UL (ref 4.2–5.4)
RBC # BLD AUTO: 2.7 M/UL (ref 4.2–5.4)
RBC # BLD AUTO: 2.79 M/UL (ref 4.2–5.4)
RBC # BLD AUTO: 2.81 M/UL (ref 4.2–5.4)
RBC # BLD AUTO: 2.81 M/UL (ref 4.2–5.4)
RBC # BLD AUTO: 2.82 M/UL (ref 4.2–5.4)
RBC # BLD AUTO: 2.86 M/UL (ref 4.2–5.4)
RBC # BLD AUTO: 2.86 M/UL (ref 4.2–5.4)
RBC # BLD AUTO: 2.89 M/UL (ref 4.2–5.4)
RBC # BLD AUTO: 2.97 M/UL (ref 4.2–5.4)
RBC # BLD AUTO: 2.99 M/UL (ref 4.2–5.4)
RBC # BLD AUTO: 3.02 M/UL (ref 4.2–5.4)
RBC # BLD AUTO: 3.04 M/UL (ref 4.2–5.4)
RBC # BLD AUTO: 3.1 M/UL (ref 4.2–5.4)
RBC # BLD AUTO: 3.14 M/UL (ref 4.2–5.4)
RBC # BLD AUTO: 3.31 M/UL (ref 4.2–5.4)
RBC # BLD AUTO: 3.49 M/UL (ref 4.2–5.4)
RBC # BLD AUTO: 3.6 M/UL (ref 4.2–5.4)
RBC # BLD AUTO: 3.71 M/UL (ref 4.2–5.4)
RBC # BLD AUTO: 3.9 M/UL (ref 4.2–5.4)
RBC # BLD AUTO: 3.98 M/UL (ref 4.2–5.4)
RBC # BLD AUTO: 4.26 M/UL (ref 4.2–5.4)
RBC # BLD AUTO: 4.71 M/UL (ref 4.2–5.4)
RBC # BLD AUTO: 4.85 M/UL (ref 4.2–5.4)
RBC # BLD AUTO: 4.96 M/UL (ref 4.2–5.4)
RBC BLD AUTO: PRESENT
RH BLD: NORMAL
SAO2 % BLDA: 87 % (ref 93–99)
SAO2 % BLDA: 92 % (ref 93–99)
SAO2 % BLDA: 93.5 % (ref 93–99)
SAO2 % BLDA: 94 % (ref 93–99)
SAO2 % BLDA: 94 % (ref 93–99)
SAO2 % BLDA: 95 % (ref 93–99)
SAO2 % BLDA: 96 % (ref 93–99)
SAO2 % BLDA: 97 % (ref 93–99)
SAO2 % BLDA: 97 % (ref 93–99)
SAO2 % BLDA: 97.2 % (ref 93–99)
SAO2 % BLDA: 98 % (ref 93–99)
SAO2 % BLDA: 98 % (ref 93–99)
SCCMEC + MECA PNL NOSE NAA+PROBE: NEGATIVE
SIGNIFICANT IND 70042: NORMAL
SITE SITE: NORMAL
SMUDGE CELLS BLD QL SMEAR: NORMAL
SODIUM BLD-SCNC: 133 MMOL/L (ref 135–145)
SODIUM BLD-SCNC: 134 MMOL/L (ref 135–145)
SODIUM BLD-SCNC: 136 MMOL/L (ref 135–145)
SODIUM BLD-SCNC: 137 MMOL/L (ref 135–145)
SODIUM SERPL-SCNC: 126 MMOL/L (ref 135–145)
SODIUM SERPL-SCNC: 131 MMOL/L (ref 135–145)
SODIUM SERPL-SCNC: 132 MMOL/L (ref 135–145)
SODIUM SERPL-SCNC: 132 MMOL/L (ref 135–145)
SODIUM SERPL-SCNC: 133 MMOL/L (ref 135–145)
SODIUM SERPL-SCNC: 134 MMOL/L (ref 135–145)
SODIUM SERPL-SCNC: 134 MMOL/L (ref 135–145)
SODIUM SERPL-SCNC: 135 MMOL/L (ref 135–145)
SODIUM SERPL-SCNC: 136 MMOL/L (ref 135–145)
SODIUM SERPL-SCNC: 137 MMOL/L (ref 135–145)
SODIUM SERPL-SCNC: 138 MMOL/L (ref 135–145)
SODIUM SERPL-SCNC: 139 MMOL/L (ref 135–145)
SODIUM SERPL-SCNC: 140 MMOL/L (ref 135–145)
SODIUM SERPL-SCNC: 141 MMOL/L (ref 135–145)
SOURCE SOURCE: NORMAL
SPECIMEN DRAWN FROM PATIENT: ABNORMAL
T4 FREE SERPL-MCNC: 0.45 NG/DL (ref 0.93–1.7)
TARGETS BLD QL SMEAR: NORMAL
TEG ALGORITHM TGALG: ABNORMAL
TIDAL VOLUME IVT: 410 ML
TIDAL VOLUME IVT: 420 ML
TOXIC GRANULES BLD QL SMEAR: NORMAL
TROPONIN T SERPL-MCNC: 39 NG/L (ref 6–19)
TROPONIN T SERPL-MCNC: 39 NG/L (ref 6–19)
TROPONIN T SERPL-MCNC: 62 NG/L (ref 6–19)
TROPONIN T SERPL-MCNC: 77 NG/L (ref 6–19)
TSH SERPL DL<=0.005 MIU/L-ACNC: 3.33 UIU/ML (ref 0.38–5.33)
UFH PPP CHRO-ACNC: 0.14 IU/ML
UFH PPP CHRO-ACNC: 0.17 IU/ML
UFH PPP CHRO-ACNC: 0.2 IU/ML
UFH PPP CHRO-ACNC: 0.3 IU/ML
UFH PPP CHRO-ACNC: 0.32 IU/ML
UFH PPP CHRO-ACNC: 0.34 IU/ML
UFH PPP CHRO-ACNC: 0.36 IU/ML
UFH PPP CHRO-ACNC: 0.36 IU/ML
UFH PPP CHRO-ACNC: 0.4 IU/ML
UFH PPP CHRO-ACNC: 0.43 IU/ML
UFH PPP CHRO-ACNC: <0.1 IU/ML
UNIT STATUS USTAT: NORMAL
WBC # BLD AUTO: 10 K/UL (ref 4.8–10.8)
WBC # BLD AUTO: 10 K/UL (ref 4.8–10.8)
WBC # BLD AUTO: 10.3 K/UL (ref 4.8–10.8)
WBC # BLD AUTO: 10.9 K/UL (ref 4.8–10.8)
WBC # BLD AUTO: 11 K/UL (ref 4.8–10.8)
WBC # BLD AUTO: 11.1 K/UL (ref 4.8–10.8)
WBC # BLD AUTO: 11.1 K/UL (ref 4.8–10.8)
WBC # BLD AUTO: 11.3 K/UL (ref 4.8–10.8)
WBC # BLD AUTO: 11.5 K/UL (ref 4.8–10.8)
WBC # BLD AUTO: 12.5 K/UL (ref 4.8–10.8)
WBC # BLD AUTO: 12.8 K/UL (ref 4.8–10.8)
WBC # BLD AUTO: 17.4 K/UL (ref 4.8–10.8)
WBC # BLD AUTO: 18 K/UL (ref 4.8–10.8)
WBC # BLD AUTO: 18.2 K/UL (ref 4.8–10.8)
WBC # BLD AUTO: 18.5 K/UL (ref 4.8–10.8)
WBC # BLD AUTO: 19.5 K/UL (ref 4.8–10.8)
WBC # BLD AUTO: 19.7 K/UL (ref 4.8–10.8)
WBC # BLD AUTO: 20.6 K/UL (ref 4.8–10.8)
WBC # BLD AUTO: 21.3 K/UL (ref 4.8–10.8)
WBC # BLD AUTO: 22.4 K/UL (ref 4.8–10.8)
WBC # BLD AUTO: 23.1 K/UL (ref 4.8–10.8)
WBC # BLD AUTO: 23.6 K/UL (ref 4.8–10.8)
WBC # BLD AUTO: 28.8 K/UL (ref 4.8–10.8)
WBC # BLD AUTO: 30.3 K/UL (ref 4.8–10.8)
WBC # BLD AUTO: 31.4 K/UL (ref 4.8–10.8)
WBC # BLD AUTO: 31.6 K/UL (ref 4.8–10.8)
WBC # BLD AUTO: 36.5 K/UL (ref 4.8–10.8)
WBC # BLD AUTO: 6 K/UL (ref 4.8–10.8)
WBC # BLD AUTO: 7.9 K/UL (ref 4.8–10.8)
WBC # BLD AUTO: 9.6 K/UL (ref 4.8–10.8)
WBC # BLD AUTO: 9.9 K/UL (ref 4.8–10.8)
WBC TOXIC VACUOLES BLD QL SMEAR: NORMAL

## 2023-01-01 PROCEDURE — 84484 ASSAY OF TROPONIN QUANT: CPT

## 2023-01-01 PROCEDURE — 36620 INSERTION CATHETER ARTERY: CPT | Performed by: ANESTHESIOLOGY

## 2023-01-01 PROCEDURE — 85055 RETICULATED PLATELET ASSAY: CPT

## 2023-01-01 PROCEDURE — 80053 COMPREHEN METABOLIC PANEL: CPT

## 2023-01-01 PROCEDURE — 700102 HCHG RX REV CODE 250 W/ 637 OVERRIDE(OP): Performed by: EMERGENCY MEDICINE

## 2023-01-01 PROCEDURE — 700111 HCHG RX REV CODE 636 W/ 250 OVERRIDE (IP): Mod: JZ

## 2023-01-01 PROCEDURE — 83690 ASSAY OF LIPASE: CPT

## 2023-01-01 PROCEDURE — 37799 UNLISTED PX VASCULAR SURGERY: CPT

## 2023-01-01 PROCEDURE — 99231 SBSQ HOSP IP/OBS SF/LOW 25: CPT | Mod: 25 | Performed by: NURSE PRACTITIONER

## 2023-01-01 PROCEDURE — 99024 POSTOP FOLLOW-UP VISIT: CPT | Performed by: SURGERY

## 2023-01-01 PROCEDURE — 88307 TISSUE EXAM BY PATHOLOGIST: CPT

## 2023-01-01 PROCEDURE — 99292 CRITICAL CARE ADDL 30 MIN: CPT | Mod: 25 | Performed by: INTERNAL MEDICINE

## 2023-01-01 PROCEDURE — 700105 HCHG RX REV CODE 258: Mod: JZ | Performed by: INTERNAL MEDICINE

## 2023-01-01 PROCEDURE — 94150 VITAL CAPACITY TEST: CPT

## 2023-01-01 PROCEDURE — 700111 HCHG RX REV CODE 636 W/ 250 OVERRIDE (IP): Mod: JZ | Performed by: INTERNAL MEDICINE

## 2023-01-01 PROCEDURE — 94799 UNLISTED PULMONARY SVC/PX: CPT

## 2023-01-01 PROCEDURE — A9270 NON-COVERED ITEM OR SERVICE: HCPCS | Performed by: INTERNAL MEDICINE

## 2023-01-01 PROCEDURE — 85007 BL SMEAR W/DIFF WBC COUNT: CPT

## 2023-01-01 PROCEDURE — 84100 ASSAY OF PHOSPHORUS: CPT

## 2023-01-01 PROCEDURE — 90935 HEMODIALYSIS ONE EVALUATION: CPT | Performed by: INTERNAL MEDICINE

## 2023-01-01 PROCEDURE — 302098 PASTE RING (FLAT): Performed by: INTERNAL MEDICINE

## 2023-01-01 PROCEDURE — 99291 CRITICAL CARE FIRST HOUR: CPT | Performed by: INTERNAL MEDICINE

## 2023-01-01 PROCEDURE — 700102 HCHG RX REV CODE 250 W/ 637 OVERRIDE(OP)

## 2023-01-01 PROCEDURE — 700101 HCHG RX REV CODE 250: Performed by: ANESTHESIOLOGY

## 2023-01-01 PROCEDURE — 82962 GLUCOSE BLOOD TEST: CPT

## 2023-01-01 PROCEDURE — 90935 HEMODIALYSIS ONE EVALUATION: CPT

## 2023-01-01 PROCEDURE — 302098 PASTE RING (FLAT): Performed by: EMERGENCY MEDICINE

## 2023-01-01 PROCEDURE — 700105 HCHG RX REV CODE 258: Performed by: INTERNAL MEDICINE

## 2023-01-01 PROCEDURE — 5A1945Z RESPIRATORY VENTILATION, 24-96 CONSECUTIVE HOURS: ICD-10-PCS | Performed by: ANESTHESIOLOGY

## 2023-01-01 PROCEDURE — A9270 NON-COVERED ITEM OR SERVICE: HCPCS

## 2023-01-01 PROCEDURE — 700111 HCHG RX REV CODE 636 W/ 250 OVERRIDE (IP): Performed by: INTERNAL MEDICINE

## 2023-01-01 PROCEDURE — 85027 COMPLETE CBC AUTOMATED: CPT

## 2023-01-01 PROCEDURE — 94640 AIRWAY INHALATION TREATMENT: CPT

## 2023-01-01 PROCEDURE — 83735 ASSAY OF MAGNESIUM: CPT

## 2023-01-01 PROCEDURE — 93306 TTE W/DOPPLER COMPLETE: CPT | Mod: 26 | Performed by: INTERNAL MEDICINE

## 2023-01-01 PROCEDURE — 770022 HCHG ROOM/CARE - ICU (200)

## 2023-01-01 PROCEDURE — 700105 HCHG RX REV CODE 258

## 2023-01-01 PROCEDURE — 700101 HCHG RX REV CODE 250: Performed by: INTERNAL MEDICINE

## 2023-01-01 PROCEDURE — A9270 NON-COVERED ITEM OR SERVICE: HCPCS | Performed by: NURSE PRACTITIONER

## 2023-01-01 PROCEDURE — 99153 MOD SED SAME PHYS/QHP EA: CPT

## 2023-01-01 PROCEDURE — P9047 ALBUMIN (HUMAN), 25%, 50ML: HCPCS | Mod: JZ | Performed by: EMERGENCY MEDICINE

## 2023-01-01 PROCEDURE — 700117 HCHG RX CONTRAST REV CODE 255

## 2023-01-01 PROCEDURE — 700111 HCHG RX REV CODE 636 W/ 250 OVERRIDE (IP): Performed by: NURSE PRACTITIONER

## 2023-01-01 PROCEDURE — 700102 HCHG RX REV CODE 250 W/ 637 OVERRIDE(OP): Performed by: INTERNAL MEDICINE

## 2023-01-01 PROCEDURE — A9270 NON-COVERED ITEM OR SERVICE: HCPCS | Performed by: EMERGENCY MEDICINE

## 2023-01-01 PROCEDURE — 94003 VENT MGMT INPAT SUBQ DAY: CPT

## 2023-01-01 PROCEDURE — 90945 DIALYSIS ONE EVALUATION: CPT

## 2023-01-01 PROCEDURE — 85025 COMPLETE CBC W/AUTO DIFF WBC: CPT

## 2023-01-01 PROCEDURE — 80074 ACUTE HEPATITIS PANEL: CPT

## 2023-01-01 PROCEDURE — 82962 GLUCOSE BLOOD TEST: CPT | Mod: 91

## 2023-01-01 PROCEDURE — 700105 HCHG RX REV CODE 258: Performed by: NURSE PRACTITIONER

## 2023-01-01 PROCEDURE — 36556 INSERT NON-TUNNEL CV CATH: CPT | Mod: RT | Performed by: INTERNAL MEDICINE

## 2023-01-01 PROCEDURE — 700102 HCHG RX REV CODE 250 W/ 637 OVERRIDE(OP): Performed by: NURSE PRACTITIONER

## 2023-01-01 PROCEDURE — 47362 REPAIR LIVER WOUND: CPT | Mod: 58 | Performed by: SURGERY

## 2023-01-01 PROCEDURE — 700111 HCHG RX REV CODE 636 W/ 250 OVERRIDE (IP): Performed by: ANESTHESIOLOGY

## 2023-01-01 PROCEDURE — 82803 BLOOD GASES ANY COMBINATION: CPT

## 2023-01-01 PROCEDURE — C1880 VENA CAVA FILTER: HCPCS

## 2023-01-01 PROCEDURE — 700102 HCHG RX REV CODE 250 W/ 637 OVERRIDE(OP): Mod: JZ | Performed by: EMERGENCY MEDICINE

## 2023-01-01 PROCEDURE — 85018 HEMOGLOBIN: CPT | Mod: 91

## 2023-01-01 PROCEDURE — 71045 X-RAY EXAM CHEST 1 VIEW: CPT

## 2023-01-01 PROCEDURE — 700111 HCHG RX REV CODE 636 W/ 250 OVERRIDE (IP): Performed by: EMERGENCY MEDICINE

## 2023-01-01 PROCEDURE — 85520 HEPARIN ASSAY: CPT

## 2023-01-01 PROCEDURE — 85007 BL SMEAR W/DIFF WBC COUNT: CPT | Mod: 91

## 2023-01-01 PROCEDURE — 44139 MOBILIZATION OF COLON: CPT | Performed by: SURGERY

## 2023-01-01 PROCEDURE — 85014 HEMATOCRIT: CPT

## 2023-01-01 PROCEDURE — 87040 BLOOD CULTURE FOR BACTERIA: CPT | Mod: 91

## 2023-01-01 PROCEDURE — 99291 CRITICAL CARE FIRST HOUR: CPT | Performed by: EMERGENCY MEDICINE

## 2023-01-01 PROCEDURE — 87040 BLOOD CULTURE FOR BACTERIA: CPT

## 2023-01-01 PROCEDURE — 3074F SYST BP LT 130 MM HG: CPT | Performed by: FAMILY MEDICINE

## 2023-01-01 PROCEDURE — 97606 NEG PRS WND THER DME>50 SQCM: CPT | Performed by: SURGERY

## 2023-01-01 PROCEDURE — 93005 ELECTROCARDIOGRAM TRACING: CPT | Performed by: EMERGENCY MEDICINE

## 2023-01-01 PROCEDURE — 86923 COMPATIBILITY TEST ELECTRIC: CPT | Mod: 91

## 2023-01-01 PROCEDURE — 110371 HCHG SHELL REV 272: Performed by: SURGERY

## 2023-01-01 PROCEDURE — 44345 REVISION OF COLOSTOMY: CPT | Mod: 80,58 | Performed by: SURGERY

## 2023-01-01 PROCEDURE — 86850 RBC ANTIBODY SCREEN: CPT

## 2023-01-01 PROCEDURE — 99233 SBSQ HOSP IP/OBS HIGH 50: CPT | Performed by: INTERNAL MEDICINE

## 2023-01-01 PROCEDURE — 84439 ASSAY OF FREE THYROXINE: CPT

## 2023-01-01 PROCEDURE — 49002 REOPENING OF ABDOMEN: CPT | Mod: 58,59 | Performed by: SURGERY

## 2023-01-01 PROCEDURE — 36620 INSERTION CATHETER ARTERY: CPT

## 2023-01-01 PROCEDURE — 80048 BASIC METABOLIC PNL TOTAL CA: CPT

## 2023-01-01 PROCEDURE — 160042 HCHG SURGERY MINUTES - EA ADDL 1 MIN LEVEL 5: Performed by: SURGERY

## 2023-01-01 PROCEDURE — 99223 1ST HOSP IP/OBS HIGH 75: CPT | Mod: 25 | Performed by: SURGERY

## 2023-01-01 PROCEDURE — 0D1E0Z4 BYPASS LARGE INTESTINE TO CUTANEOUS, OPEN APPROACH: ICD-10-PCS | Performed by: SURGERY

## 2023-01-01 PROCEDURE — 83605 ASSAY OF LACTIC ACID: CPT

## 2023-01-01 PROCEDURE — 85018 HEMOGLOBIN: CPT

## 2023-01-01 PROCEDURE — 51798 US URINE CAPACITY MEASURE: CPT

## 2023-01-01 PROCEDURE — 99292 CRITICAL CARE ADDL 30 MIN: CPT | Mod: 25 | Performed by: EMERGENCY MEDICINE

## 2023-01-01 PROCEDURE — C1751 CATH, INF, PER/CENT/MIDLINE: HCPCS

## 2023-01-01 PROCEDURE — 84132 ASSAY OF SERUM POTASSIUM: CPT

## 2023-01-01 PROCEDURE — 700117 HCHG RX CONTRAST REV CODE 255: Performed by: INTERNAL MEDICINE

## 2023-01-01 PROCEDURE — 00790 ANES IPER UPR ABD NOS: CPT | Performed by: ANESTHESIOLOGY

## 2023-01-01 PROCEDURE — 700101 HCHG RX REV CODE 250: Performed by: STUDENT IN AN ORGANIZED HEALTH CARE EDUCATION/TRAINING PROGRAM

## 2023-01-01 PROCEDURE — 700111 HCHG RX REV CODE 636 W/ 250 OVERRIDE (IP): Mod: JZ | Performed by: EMERGENCY MEDICINE

## 2023-01-01 PROCEDURE — 97530 THERAPEUTIC ACTIVITIES: CPT

## 2023-01-01 PROCEDURE — 99497 ADVNCD CARE PLAN 30 MIN: CPT | Performed by: NURSE PRACTITIONER

## 2023-01-01 PROCEDURE — 83880 ASSAY OF NATRIURETIC PEPTIDE: CPT

## 2023-01-01 PROCEDURE — 47350 REPAIR LIVER WOUND: CPT | Mod: AS,58 | Performed by: NURSE PRACTITIONER

## 2023-01-01 PROCEDURE — 06H03DZ INSERTION OF INTRALUMINAL DEVICE INTO INFERIOR VENA CAVA, PERCUTANEOUS APPROACH: ICD-10-PCS | Performed by: RADIOLOGY

## 2023-01-01 PROCEDURE — 97112 NEUROMUSCULAR REEDUCATION: CPT

## 2023-01-01 PROCEDURE — 160031 HCHG SURGERY MINUTES - 1ST 30 MINS LEVEL 5: Performed by: SURGERY

## 2023-01-01 PROCEDURE — 700117 HCHG RX CONTRAST REV CODE 255: Performed by: EMERGENCY MEDICINE

## 2023-01-01 PROCEDURE — 47362 REPAIR LIVER WOUND: CPT | Mod: 80,58 | Performed by: SURGERY

## 2023-01-01 PROCEDURE — 0DTG0ZZ RESECTION OF LEFT LARGE INTESTINE, OPEN APPROACH: ICD-10-PCS | Performed by: SURGERY

## 2023-01-01 PROCEDURE — 700105 HCHG RX REV CODE 258: Performed by: ANESTHESIOLOGY

## 2023-01-01 PROCEDURE — 47600 CHOLECYSTECTOMY: CPT | Mod: 80 | Performed by: SURGERY

## 2023-01-01 PROCEDURE — 700102 HCHG RX REV CODE 250 W/ 637 OVERRIDE(OP): Mod: JZ | Performed by: INTERNAL MEDICINE

## 2023-01-01 PROCEDURE — 4410108 IR-THROMBO MECHANICAL ARTERY,INIT

## 2023-01-01 PROCEDURE — 36556 INSERT NON-TUNNEL CV CATH: CPT

## 2023-01-01 PROCEDURE — 97163 PT EVAL HIGH COMPLEX 45 MIN: CPT

## 2023-01-01 PROCEDURE — 82330 ASSAY OF CALCIUM: CPT | Mod: 91

## 2023-01-01 PROCEDURE — 83735 ASSAY OF MAGNESIUM: CPT | Mod: 91

## 2023-01-01 PROCEDURE — 83605 ASSAY OF LACTIC ACID: CPT | Mod: 91

## 2023-01-01 PROCEDURE — 85347 COAGULATION TIME ACTIVATED: CPT | Mod: 91

## 2023-01-01 PROCEDURE — 94669 MECHANICAL CHEST WALL OSCILL: CPT

## 2023-01-01 PROCEDURE — 302132 K THERMIA MOTOR: Performed by: NURSE PRACTITIONER

## 2023-01-01 PROCEDURE — 86140 C-REACTIVE PROTEIN: CPT

## 2023-01-01 PROCEDURE — 85610 PROTHROMBIN TIME: CPT

## 2023-01-01 PROCEDURE — 93010 ELECTROCARDIOGRAM REPORT: CPT | Performed by: INTERNAL MEDICINE

## 2023-01-01 PROCEDURE — 700105 HCHG RX REV CODE 258: Performed by: STUDENT IN AN ORGANIZED HEALTH CARE EDUCATION/TRAINING PROGRAM

## 2023-01-01 PROCEDURE — 160048 HCHG OR STATISTICAL LEVEL 1-5: Performed by: SURGERY

## 2023-01-01 PROCEDURE — 84100 ASSAY OF PHOSPHORUS: CPT | Mod: 91

## 2023-01-01 PROCEDURE — 99152 MOD SED SAME PHYS/QHP 5/>YRS: CPT

## 2023-01-01 PROCEDURE — 700111 HCHG RX REV CODE 636 W/ 250 OVERRIDE (IP)

## 2023-01-01 PROCEDURE — 47600 CHOLECYSTECTOMY: CPT | Performed by: SURGERY

## 2023-01-01 PROCEDURE — 0FT40ZZ RESECTION OF GALLBLADDER, OPEN APPROACH: ICD-10-PCS | Performed by: SURGERY

## 2023-01-01 PROCEDURE — 0BH17EZ INSERTION OF ENDOTRACHEAL AIRWAY INTO TRACHEA, VIA NATURAL OR ARTIFICIAL OPENING: ICD-10-PCS | Performed by: ANESTHESIOLOGY

## 2023-01-01 PROCEDURE — P9034 PLATELETS, PHERESIS: HCPCS | Mod: 91

## 2023-01-01 PROCEDURE — 44143 PARTIAL REMOVAL OF COLON: CPT | Performed by: SURGERY

## 2023-01-01 PROCEDURE — C9113 INJ PANTOPRAZOLE SODIUM, VIA: HCPCS | Performed by: SURGERY

## 2023-01-01 PROCEDURE — 82533 TOTAL CORTISOL: CPT | Mod: 91

## 2023-01-01 PROCEDURE — 99292 CRITICAL CARE ADDL 30 MIN: CPT | Performed by: INTERNAL MEDICINE

## 2023-01-01 PROCEDURE — 99254 IP/OBS CNSLTJ NEW/EST MOD 60: CPT | Performed by: STUDENT IN AN ORGANIZED HEALTH CARE EDUCATION/TRAINING PROGRAM

## 2023-01-01 PROCEDURE — 31500 INSERT EMERGENCY AIRWAY: CPT | Performed by: INTERNAL MEDICINE

## 2023-01-01 PROCEDURE — 85520 HEPARIN ASSAY: CPT | Mod: 91

## 2023-01-01 PROCEDURE — 80503 PATH CLIN CONSLTJ SF 5-20: CPT

## 2023-01-01 PROCEDURE — 82330 ASSAY OF CALCIUM: CPT

## 2023-01-01 PROCEDURE — 82140 ASSAY OF AMMONIA: CPT

## 2023-01-01 PROCEDURE — 94002 VENT MGMT INPAT INIT DAY: CPT

## 2023-01-01 PROCEDURE — 85055 RETICULATED PLATELET ASSAY: CPT | Mod: 91

## 2023-01-01 PROCEDURE — 0FQ00ZZ REPAIR LIVER, OPEN APPROACH: ICD-10-PCS | Performed by: SURGERY

## 2023-01-01 PROCEDURE — 92950 HEART/LUNG RESUSCITATION CPR: CPT

## 2023-01-01 PROCEDURE — A9270 NON-COVERED ITEM OR SERVICE: HCPCS | Mod: JZ | Performed by: EMERGENCY MEDICINE

## 2023-01-01 PROCEDURE — C1752 CATH,HEMODIALYSIS,SHORT-TERM: HCPCS

## 2023-01-01 PROCEDURE — 97167 OT EVAL HIGH COMPLEX 60 MIN: CPT

## 2023-01-01 PROCEDURE — 84443 ASSAY THYROID STIM HORMONE: CPT

## 2023-01-01 PROCEDURE — 99140 ANES COMP EMERGENCY COND: CPT | Performed by: ANESTHESIOLOGY

## 2023-01-01 PROCEDURE — 99292 CRITICAL CARE ADDL 30 MIN: CPT | Performed by: EMERGENCY MEDICINE

## 2023-01-01 PROCEDURE — 99291 CRITICAL CARE FIRST HOUR: CPT | Mod: 25 | Performed by: EMERGENCY MEDICINE

## 2023-01-01 PROCEDURE — 82533 TOTAL CORTISOL: CPT

## 2023-01-01 PROCEDURE — 85384 FIBRINOGEN ACTIVITY: CPT

## 2023-01-01 PROCEDURE — 93010 ELECTROCARDIOGRAM REPORT: CPT | Performed by: STUDENT IN AN ORGANIZED HEALTH CARE EDUCATION/TRAINING PROGRAM

## 2023-01-01 PROCEDURE — 02CQ3ZZ EXTIRPATION OF MATTER FROM RIGHT PULMONARY ARTERY, PERCUTANEOUS APPROACH: ICD-10-PCS | Performed by: RADIOLOGY

## 2023-01-01 PROCEDURE — 02HV33Z INSERTION OF INFUSION DEVICE INTO SUPERIOR VENA CAVA, PERCUTANEOUS APPROACH: ICD-10-PCS | Performed by: INTERNAL MEDICINE

## 2023-01-01 PROCEDURE — 82248 BILIRUBIN DIRECT: CPT

## 2023-01-01 PROCEDURE — 74177 CT ABD & PELVIS W/CONTRAST: CPT

## 2023-01-01 PROCEDURE — 93306 TTE W/DOPPLER COMPLETE: CPT

## 2023-01-01 PROCEDURE — 700111 HCHG RX REV CODE 636 W/ 250 OVERRIDE (IP): Mod: JZ | Performed by: ANESTHESIOLOGY

## 2023-01-01 PROCEDURE — 93005 ELECTROCARDIOGRAM TRACING: CPT

## 2023-01-01 PROCEDURE — 700111 HCHG RX REV CODE 636 W/ 250 OVERRIDE (IP): Performed by: SURGERY

## 2023-01-01 PROCEDURE — 770020 HCHG ROOM/CARE - TELE (206)

## 2023-01-01 PROCEDURE — 97606 NEG PRS WND THER DME>50 SQCM: CPT

## 2023-01-01 PROCEDURE — 47350 REPAIR LIVER WOUND: CPT | Mod: 58 | Performed by: SURGERY

## 2023-01-01 PROCEDURE — 5A1D90Z PERFORMANCE OF URINARY FILTRATION, CONTINUOUS, GREATER THAN 18 HOURS PER DAY: ICD-10-PCS | Performed by: INTERNAL MEDICINE

## 2023-01-01 PROCEDURE — 99254 IP/OBS CNSLTJ NEW/EST MOD 60: CPT | Mod: 25 | Performed by: NURSE PRACTITIONER

## 2023-01-01 PROCEDURE — 85730 THROMBOPLASTIN TIME PARTIAL: CPT

## 2023-01-01 PROCEDURE — 99233 SBSQ HOSP IP/OBS HIGH 50: CPT | Mod: 25 | Performed by: NURSE PRACTITIONER

## 2023-01-01 PROCEDURE — 76700 US EXAM ABDOM COMPLETE: CPT

## 2023-01-01 PROCEDURE — 97602 WOUND(S) CARE NON-SELECTIVE: CPT

## 2023-01-01 PROCEDURE — 86900 BLOOD TYPING SEROLOGIC ABO: CPT

## 2023-01-01 PROCEDURE — 83615 LACTATE (LD) (LDH) ENZYME: CPT

## 2023-01-01 PROCEDURE — 84145 PROCALCITONIN (PCT): CPT

## 2023-01-01 PROCEDURE — 31500 INSERT EMERGENCY AIRWAY: CPT

## 2023-01-01 PROCEDURE — 85014 HEMATOCRIT: CPT | Mod: 91

## 2023-01-01 PROCEDURE — 03HY32Z INSERTION OF MONITORING DEVICE INTO UPPER ARTERY, PERCUTANEOUS APPROACH: ICD-10-PCS | Performed by: SURGERY

## 2023-01-01 PROCEDURE — 88304 TISSUE EXAM BY PATHOLOGIST: CPT

## 2023-01-01 PROCEDURE — 36415 COLL VENOUS BLD VENIPUNCTURE: CPT

## 2023-01-01 PROCEDURE — 700117 HCHG RX CONTRAST REV CODE 255: Performed by: RADIOLOGY

## 2023-01-01 PROCEDURE — 74018 RADEX ABDOMEN 1 VIEW: CPT

## 2023-01-01 PROCEDURE — 05HY33Z INSERTION OF INFUSION DEVICE INTO UPPER VEIN, PERCUTANEOUS APPROACH: ICD-10-PCS | Performed by: EMERGENCY MEDICINE

## 2023-01-01 PROCEDURE — 160009 HCHG ANES TIME/MIN: Performed by: SURGERY

## 2023-01-01 PROCEDURE — 99255 IP/OBS CONSLTJ NEW/EST HI 80: CPT | Performed by: INTERNAL MEDICINE

## 2023-01-01 PROCEDURE — 85347 COAGULATION TIME ACTIVATED: CPT

## 2023-01-01 PROCEDURE — 700101 HCHG RX REV CODE 250: Performed by: NURSE PRACTITIONER

## 2023-01-01 PROCEDURE — 02CR3ZZ EXTIRPATION OF MATTER FROM LEFT PULMONARY ARTERY, PERCUTANEOUS APPROACH: ICD-10-PCS | Performed by: RADIOLOGY

## 2023-01-01 PROCEDURE — 99291 CRITICAL CARE FIRST HOUR: CPT

## 2023-01-01 PROCEDURE — 44143 PARTIAL REMOVAL OF COLON: CPT | Mod: 80 | Performed by: SURGERY

## 2023-01-01 PROCEDURE — 96368 THER/DIAG CONCURRENT INF: CPT

## 2023-01-01 PROCEDURE — 84295 ASSAY OF SERUM SODIUM: CPT

## 2023-01-01 PROCEDURE — 93312 ECHO TRANSESOPHAGEAL: CPT | Mod: 26,59 | Performed by: ANESTHESIOLOGY

## 2023-01-01 PROCEDURE — 84295 ASSAY OF SERUM SODIUM: CPT | Mod: 91

## 2023-01-01 PROCEDURE — 76705 ECHO EXAM OF ABDOMEN: CPT

## 2023-01-01 PROCEDURE — 96375 TX/PRO/DX INJ NEW DRUG ADDON: CPT

## 2023-01-01 PROCEDURE — 5A1955Z RESPIRATORY VENTILATION, GREATER THAN 96 CONSECUTIVE HOURS: ICD-10-PCS | Performed by: INTERNAL MEDICINE

## 2023-01-01 PROCEDURE — 86901 BLOOD TYPING SEROLOGIC RH(D): CPT

## 2023-01-01 PROCEDURE — 36620 INSERTION CATHETER ARTERY: CPT | Performed by: EMERGENCY MEDICINE

## 2023-01-01 PROCEDURE — 30233R1 TRANSFUSION OF NONAUTOLOGOUS PLATELETS INTO PERIPHERAL VEIN, PERCUTANEOUS APPROACH: ICD-10-PCS | Performed by: EMERGENCY MEDICINE

## 2023-01-01 PROCEDURE — 700111 HCHG RX REV CODE 636 W/ 250 OVERRIDE (IP): Performed by: RADIOLOGY

## 2023-01-01 PROCEDURE — 87641 MR-STAPH DNA AMP PROBE: CPT

## 2023-01-01 PROCEDURE — P9017 PLASMA 1 DONOR FRZ W/IN 8 HR: HCPCS | Mod: 91

## 2023-01-01 PROCEDURE — C1751 CATH, INF, PER/CENT/MIDLINE: HCPCS | Performed by: SURGERY

## 2023-01-01 PROCEDURE — 92610 EVALUATE SWALLOWING FUNCTION: CPT

## 2023-01-01 PROCEDURE — 96365 THER/PROPH/DIAG IV INF INIT: CPT

## 2023-01-01 PROCEDURE — 84132 ASSAY OF SERUM POTASSIUM: CPT | Mod: 91

## 2023-01-01 PROCEDURE — 85384 FIBRINOGEN ACTIVITY: CPT | Mod: 91

## 2023-01-01 PROCEDURE — 93005 ELECTROCARDIOGRAM TRACING: CPT | Performed by: INTERNAL MEDICINE

## 2023-01-01 PROCEDURE — 97605 NEG PRS WND THER DME<=50SQCM: CPT

## 2023-01-01 PROCEDURE — 85576 BLOOD PLATELET AGGREGATION: CPT | Mod: 91

## 2023-01-01 PROCEDURE — 36430 TRANSFUSION BLD/BLD COMPNT: CPT

## 2023-01-01 PROCEDURE — 99204 OFFICE O/P NEW MOD 45 MIN: CPT | Performed by: FAMILY MEDICINE

## 2023-01-01 PROCEDURE — 03HY32Z INSERTION OF MONITORING DEVICE INTO UPPER ARTERY, PERCUTANEOUS APPROACH: ICD-10-PCS | Performed by: EMERGENCY MEDICINE

## 2023-01-01 PROCEDURE — 302136 NUTRITION PUMP: Performed by: EMERGENCY MEDICINE

## 2023-01-01 PROCEDURE — A9270 NON-COVERED ITEM OR SERVICE: HCPCS | Mod: JZ | Performed by: INTERNAL MEDICINE

## 2023-01-01 PROCEDURE — 76937 US GUIDE VASCULAR ACCESS: CPT

## 2023-01-01 PROCEDURE — 82803 BLOOD GASES ANY COMBINATION: CPT | Mod: 91

## 2023-01-01 PROCEDURE — 3079F DIAST BP 80-89 MM HG: CPT | Performed by: FAMILY MEDICINE

## 2023-01-01 PROCEDURE — 30233N1 TRANSFUSION OF NONAUTOLOGOUS RED BLOOD CELLS INTO PERIPHERAL VEIN, PERCUTANEOUS APPROACH: ICD-10-PCS | Performed by: EMERGENCY MEDICINE

## 2023-01-01 PROCEDURE — 93325 DOPPLER ECHO COLOR FLOW MAPG: CPT

## 2023-01-01 PROCEDURE — P9016 RBC LEUKOCYTES REDUCED: HCPCS | Mod: 91

## 2023-01-01 PROCEDURE — 93325 DOPPLER ECHO COLOR FLOW MAPG: CPT | Mod: 26 | Performed by: ANESTHESIOLOGY

## 2023-01-01 PROCEDURE — 36556 INSERT NON-TUNNEL CV CATH: CPT | Mod: LT | Performed by: EMERGENCY MEDICINE

## 2023-01-01 PROCEDURE — 99232 SBSQ HOSP IP/OBS MODERATE 35: CPT | Performed by: HOSPITALIST

## 2023-01-01 PROCEDURE — 97608 NEG PRS WND THER NDME>50SQCM: CPT

## 2023-01-01 PROCEDURE — 96366 THER/PROPH/DIAG IV INF ADDON: CPT

## 2023-01-01 PROCEDURE — 97606 NEG PRS WND THER DME>50 SQCM: CPT | Mod: AS | Performed by: NURSE PRACTITIONER

## 2023-01-01 PROCEDURE — 71275 CT ANGIOGRAPHY CHEST: CPT

## 2023-01-01 PROCEDURE — 93971 EXTREMITY STUDY: CPT | Mod: RT

## 2023-01-01 PROCEDURE — 700101 HCHG RX REV CODE 250

## 2023-01-01 PROCEDURE — 36556 INSERT NON-TUNNEL CV CATH: CPT | Performed by: ANESTHESIOLOGY

## 2023-01-01 PROCEDURE — 49002 REOPENING OF ABDOMEN: CPT | Mod: AS,59,58 | Performed by: NURSE PRACTITIONER

## 2023-01-01 PROCEDURE — 86706 HEP B SURFACE ANTIBODY: CPT

## 2023-01-01 PROCEDURE — 0BH17EZ INSERTION OF ENDOTRACHEAL AIRWAY INTO TRACHEA, VIA NATURAL OR ARTIFICIAL OPENING: ICD-10-PCS | Performed by: INTERNAL MEDICINE

## 2023-01-01 PROCEDURE — 700105 HCHG RX REV CODE 258: Performed by: EMERGENCY MEDICINE

## 2023-01-01 PROCEDURE — 306591 TRAY SUTURE REMOVAL DISP: Performed by: EMERGENCY MEDICINE

## 2023-01-01 PROCEDURE — 96367 TX/PROPH/DG ADDL SEQ IV INF: CPT

## 2023-01-01 PROCEDURE — 80076 HEPATIC FUNCTION PANEL: CPT

## 2023-01-01 PROCEDURE — 99232 SBSQ HOSP IP/OBS MODERATE 35: CPT | Performed by: INTERNAL MEDICINE

## 2023-01-01 PROCEDURE — 36620 INSERTION CATHETER ARTERY: CPT | Performed by: SURGERY

## 2023-01-01 PROCEDURE — 44345 REVISION OF COLOSTOMY: CPT | Mod: 58 | Performed by: SURGERY

## 2023-01-01 PROCEDURE — 99291 CRITICAL CARE FIRST HOUR: CPT | Mod: 25 | Performed by: INTERNAL MEDICINE

## 2023-01-01 RX ORDER — PHENYLEPHRINE HCL IN 0.9% NACL 1 MG/10 ML
SYRINGE (ML) INTRAVENOUS
Status: COMPLETED
Start: 2023-01-01 | End: 2023-01-01

## 2023-01-01 RX ORDER — FUROSEMIDE 10 MG/ML
80 INJECTION INTRAMUSCULAR; INTRAVENOUS ONCE
Status: COMPLETED | OUTPATIENT
Start: 2023-01-01 | End: 2023-01-01

## 2023-01-01 RX ORDER — ROCURONIUM BROMIDE 10 MG/ML
150 INJECTION, SOLUTION INTRAVENOUS ONCE
Status: COMPLETED | OUTPATIENT
Start: 2023-01-01 | End: 2023-01-01

## 2023-01-01 RX ORDER — AMOXICILLIN 250 MG
2 CAPSULE ORAL 2 TIMES DAILY
Status: DISCONTINUED | OUTPATIENT
Start: 2023-01-01 | End: 2023-01-01

## 2023-01-01 RX ORDER — ATROPINE SULFATE 10 MG/ML
2 SOLUTION/ DROPS OPHTHALMIC EVERY 4 HOURS PRN
Status: DISCONTINUED | OUTPATIENT
Start: 2023-01-01 | End: 2023-01-01 | Stop reason: HOSPADM

## 2023-01-01 RX ORDER — SODIUM BICARBONATE IN D5W 150/1000ML
PLASTIC BAG, INJECTION (ML) INTRAVENOUS CONTINUOUS
Status: DISCONTINUED | OUTPATIENT
Start: 2023-01-01 | End: 2023-01-01

## 2023-01-01 RX ORDER — CALCIUM CHLORIDE 100 MG/ML
1 INJECTION INTRAVENOUS; INTRAVENTRICULAR ONCE
Status: COMPLETED | OUTPATIENT
Start: 2023-01-01 | End: 2023-01-01

## 2023-01-01 RX ORDER — PROMETHAZINE HYDROCHLORIDE 25 MG/1
12.5-25 TABLET ORAL EVERY 4 HOURS PRN
Status: DISCONTINUED | OUTPATIENT
Start: 2023-01-01 | End: 2023-01-01 | Stop reason: HOSPADM

## 2023-01-01 RX ORDER — PHENYLEPHRINE HCL IN 0.9% NACL 1 MG/10 ML
200 SYRINGE (ML) INTRAVENOUS
Status: ACTIVE | OUTPATIENT
Start: 2023-01-01 | End: 2023-01-01

## 2023-01-01 RX ORDER — SODIUM CHLORIDE, SODIUM LACTATE, POTASSIUM CHLORIDE, AND CALCIUM CHLORIDE .6; .31; .03; .02 G/100ML; G/100ML; G/100ML; G/100ML
1000 INJECTION, SOLUTION INTRAVENOUS ONCE
Status: COMPLETED | OUTPATIENT
Start: 2023-01-01 | End: 2023-01-01

## 2023-01-01 RX ORDER — PHENYLEPHRINE HYDROCHLORIDE 10 MG/ML
INJECTION, SOLUTION INTRAMUSCULAR; INTRAVENOUS; SUBCUTANEOUS
Status: ACTIVE
Start: 2023-01-01 | End: 2023-01-01

## 2023-01-01 RX ORDER — PHYTONADIONE 5 MG/1
10 TABLET ORAL DAILY
Status: COMPLETED | OUTPATIENT
Start: 2023-01-01 | End: 2023-01-01

## 2023-01-01 RX ORDER — ACETAMINOPHEN 325 MG/1
650 TABLET ORAL EVERY 6 HOURS PRN
Status: DISCONTINUED | OUTPATIENT
Start: 2023-01-01 | End: 2023-01-01

## 2023-01-01 RX ORDER — MIDODRINE HYDROCHLORIDE 5 MG/1
10 TABLET ORAL EVERY 8 HOURS
Status: DISCONTINUED | OUTPATIENT
Start: 2023-01-01 | End: 2023-01-01

## 2023-01-01 RX ORDER — POLYETHYLENE GLYCOL 3350 17 G/17G
1 POWDER, FOR SOLUTION ORAL
Status: DISCONTINUED | OUTPATIENT
Start: 2023-01-01 | End: 2023-01-01

## 2023-01-01 RX ORDER — HYDROMORPHONE HYDROCHLORIDE 2 MG/ML
INJECTION, SOLUTION INTRAMUSCULAR; INTRAVENOUS; SUBCUTANEOUS PRN
Status: DISCONTINUED | OUTPATIENT
Start: 2023-01-01 | End: 2023-01-01 | Stop reason: SURG

## 2023-01-01 RX ORDER — PROMETHAZINE HYDROCHLORIDE 25 MG/1
12.5-25 TABLET ORAL EVERY 4 HOURS PRN
Status: DISCONTINUED | OUTPATIENT
Start: 2023-01-01 | End: 2023-01-01

## 2023-01-01 RX ORDER — HEPARIN SODIUM 1000 [USP'U]/ML
80 INJECTION, SOLUTION INTRAVENOUS; SUBCUTANEOUS ONCE
Status: COMPLETED | OUTPATIENT
Start: 2023-01-01 | End: 2023-01-01

## 2023-01-01 RX ORDER — FAMOTIDINE 20 MG/1
20 TABLET, FILM COATED ORAL EVERY 12 HOURS
Status: DISCONTINUED | OUTPATIENT
Start: 2023-01-01 | End: 2023-01-01

## 2023-01-01 RX ORDER — MIDAZOLAM HYDROCHLORIDE 1 MG/ML
2 INJECTION INTRAMUSCULAR; INTRAVENOUS EVERY 4 HOURS PRN
Status: DISCONTINUED | OUTPATIENT
Start: 2023-01-01 | End: 2023-01-01

## 2023-01-01 RX ORDER — FLUCONAZOLE 100 MG/1
200 TABLET ORAL ONCE
Status: DISCONTINUED | OUTPATIENT
Start: 2023-01-01 | End: 2023-01-01

## 2023-01-01 RX ORDER — MAGNESIUM SULFATE HEPTAHYDRATE 40 MG/ML
2 INJECTION, SOLUTION INTRAVENOUS ONCE
Status: COMPLETED | OUTPATIENT
Start: 2023-01-01 | End: 2023-01-01

## 2023-01-01 RX ORDER — MIDAZOLAM HYDROCHLORIDE 1 MG/ML
INJECTION INTRAMUSCULAR; INTRAVENOUS PRN
Status: DISCONTINUED | OUTPATIENT
Start: 2023-01-01 | End: 2023-01-01 | Stop reason: SURG

## 2023-01-01 RX ORDER — FUROSEMIDE 10 MG/ML
40 INJECTION INTRAMUSCULAR; INTRAVENOUS ONCE
Status: COMPLETED | OUTPATIENT
Start: 2023-01-01 | End: 2023-01-01

## 2023-01-01 RX ORDER — GLYCOPYRROLATE 1 MG/1
1 TABLET ORAL 3 TIMES DAILY PRN
Status: DISCONTINUED | OUTPATIENT
Start: 2023-01-01 | End: 2023-01-01 | Stop reason: HOSPADM

## 2023-01-01 RX ORDER — BISACODYL 10 MG
10 SUPPOSITORY, RECTAL RECTAL
Status: DISCONTINUED | OUTPATIENT
Start: 2023-01-01 | End: 2023-01-01

## 2023-01-01 RX ORDER — HEPARIN SODIUM 5000 [USP'U]/ML
5000 INJECTION, SOLUTION INTRAVENOUS; SUBCUTANEOUS EVERY 8 HOURS
Status: DISCONTINUED | OUTPATIENT
Start: 2023-01-01 | End: 2023-01-01

## 2023-01-01 RX ORDER — LIDOCAINE HYDROCHLORIDE 10 MG/ML
INJECTION, SOLUTION INFILTRATION; PERINEURAL
Status: DISPENSED
Start: 2023-01-01 | End: 2023-01-01

## 2023-01-01 RX ORDER — DEXMEDETOMIDINE HYDROCHLORIDE 4 UG/ML
0-1.5 INJECTION, SOLUTION INTRAVENOUS CONTINUOUS
Status: DISCONTINUED | OUTPATIENT
Start: 2023-01-01 | End: 2023-01-01

## 2023-01-01 RX ORDER — SODIUM CHLORIDE FOR INHALATION 7 %
4 VIAL, NEBULIZER (ML) INHALATION EVERY 8 HOURS
Status: DISPENSED | OUTPATIENT
Start: 2023-01-01 | End: 2023-01-01

## 2023-01-01 RX ORDER — CALCIUM GLUCONATE 20 MG/ML
2 INJECTION, SOLUTION INTRAVENOUS ONCE
Status: COMPLETED | OUTPATIENT
Start: 2023-01-01 | End: 2023-01-01

## 2023-01-01 RX ORDER — CEFOTETAN DISODIUM 2 G/20ML
INJECTION, POWDER, FOR SOLUTION INTRAMUSCULAR; INTRAVENOUS PRN
Status: DISCONTINUED | OUTPATIENT
Start: 2023-01-01 | End: 2023-01-01 | Stop reason: SURG

## 2023-01-01 RX ORDER — POTASSIUM CHLORIDE 20 MEQ/1
60 TABLET, EXTENDED RELEASE ORAL ONCE
Status: COMPLETED | OUTPATIENT
Start: 2023-01-01 | End: 2023-01-01

## 2023-01-01 RX ORDER — ETOMIDATE 2 MG/ML
INJECTION INTRAVENOUS PRN
Status: DISCONTINUED | OUTPATIENT
Start: 2023-01-01 | End: 2023-01-01 | Stop reason: SURG

## 2023-01-01 RX ORDER — MIDODRINE HYDROCHLORIDE 5 MG/1
5 TABLET ORAL EVERY 8 HOURS
Status: DISCONTINUED | OUTPATIENT
Start: 2023-01-01 | End: 2023-01-01

## 2023-01-01 RX ORDER — EPINEPHRINE HCL IN 0.9 % NACL 4MG/250ML
0-.5 PLASTIC BAG, INJECTION (ML) INTRAVENOUS CONTINUOUS
Status: DISCONTINUED | OUTPATIENT
Start: 2023-01-01 | End: 2023-01-01

## 2023-01-01 RX ORDER — OXYCODONE HYDROCHLORIDE 5 MG/1
5 TABLET ORAL EVERY 4 HOURS PRN
Status: DISCONTINUED | OUTPATIENT
Start: 2023-01-01 | End: 2023-01-01 | Stop reason: HOSPADM

## 2023-01-01 RX ORDER — ETOMIDATE 2 MG/ML
30 INJECTION INTRAVENOUS ONCE
Status: COMPLETED | OUTPATIENT
Start: 2023-01-01 | End: 2023-01-01

## 2023-01-01 RX ORDER — LORAZEPAM 2 MG/ML
1 CONCENTRATE ORAL
Status: DISCONTINUED | OUTPATIENT
Start: 2023-01-01 | End: 2023-01-01 | Stop reason: HOSPADM

## 2023-01-01 RX ORDER — HEPARIN SODIUM 1000 [USP'U]/ML
3000 INJECTION, SOLUTION INTRAVENOUS; SUBCUTANEOUS ONCE
Status: COMPLETED | OUTPATIENT
Start: 2023-01-01 | End: 2023-01-01

## 2023-01-01 RX ORDER — LORAZEPAM 2 MG/ML
2 INJECTION INTRAMUSCULAR
Status: DISCONTINUED | OUTPATIENT
Start: 2023-01-01 | End: 2023-01-01 | Stop reason: HOSPADM

## 2023-01-01 RX ORDER — ONDANSETRON 2 MG/ML
4 INJECTION INTRAMUSCULAR; INTRAVENOUS EVERY 6 HOURS PRN
Status: ACTIVE | OUTPATIENT
Start: 2023-01-01 | End: 2023-01-01

## 2023-01-01 RX ORDER — DEXTROSE MONOHYDRATE 25 G/50ML
25 INJECTION, SOLUTION INTRAVENOUS
Status: DISCONTINUED | OUTPATIENT
Start: 2023-01-01 | End: 2023-01-01

## 2023-01-01 RX ORDER — POTASSIUM CHLORIDE 7.45 MG/ML
10 INJECTION INTRAVENOUS
Status: ACTIVE | OUTPATIENT
Start: 2023-01-01 | End: 2023-01-01

## 2023-01-01 RX ORDER — ACETAMINOPHEN 325 MG/1
650 TABLET ORAL EVERY 4 HOURS PRN
Status: DISCONTINUED | OUTPATIENT
Start: 2023-01-01 | End: 2023-01-01 | Stop reason: HOSPADM

## 2023-01-01 RX ORDER — ONDANSETRON 2 MG/ML
4 INJECTION INTRAMUSCULAR; INTRAVENOUS EVERY 4 HOURS PRN
Status: DISCONTINUED | OUTPATIENT
Start: 2023-01-01 | End: 2023-01-01 | Stop reason: HOSPADM

## 2023-01-01 RX ORDER — PHENYLEPHRINE HCL IN 0.9% NACL 1 MG/10 ML
400 SYRINGE (ML) INTRAVENOUS
Status: ACTIVE | OUTPATIENT
Start: 2023-01-01 | End: 2023-01-01

## 2023-01-01 RX ORDER — MIDAZOLAM HYDROCHLORIDE 1 MG/ML
.5-2 INJECTION INTRAMUSCULAR; INTRAVENOUS PRN
Status: DISCONTINUED | OUTPATIENT
Start: 2023-01-01 | End: 2023-01-01

## 2023-01-01 RX ORDER — PHENYLEPHRINE HCL IN 0.9% NACL 1 MG/10 ML
SYRINGE (ML) INTRAVENOUS
Status: ACTIVE
Start: 2023-01-01 | End: 2023-01-01

## 2023-01-01 RX ORDER — MIDODRINE HYDROCHLORIDE 5 MG/1
10 TABLET ORAL ONCE
Status: COMPLETED | OUTPATIENT
Start: 2023-01-01 | End: 2023-01-01

## 2023-01-01 RX ORDER — EPINEPHRINE HCL IN 0.9 % NACL 4MG/250ML
PLASTIC BAG, INJECTION (ML) INTRAVENOUS
Status: COMPLETED
Start: 2023-01-01 | End: 2023-01-01

## 2023-01-01 RX ORDER — DEXTROSE MONOHYDRATE 25 G/50ML
50 INJECTION, SOLUTION INTRAVENOUS ONCE
Status: COMPLETED | OUTPATIENT
Start: 2023-01-01 | End: 2023-01-01

## 2023-01-01 RX ORDER — POTASSIUM CHLORIDE 20 MEQ/1
40 TABLET, EXTENDED RELEASE ORAL EVERY 6 HOURS
Status: COMPLETED | OUTPATIENT
Start: 2023-01-01 | End: 2023-01-01

## 2023-01-01 RX ORDER — SODIUM CHLORIDE 9 MG/ML
500 INJECTION, SOLUTION INTRAVENOUS ONCE
Status: COMPLETED | OUTPATIENT
Start: 2023-01-01 | End: 2023-01-01

## 2023-01-01 RX ORDER — DEXTROSE MONOHYDRATE 25 G/50ML
25 INJECTION, SOLUTION INTRAVENOUS ONCE
Status: COMPLETED | OUTPATIENT
Start: 2023-01-01 | End: 2023-01-01

## 2023-01-01 RX ORDER — HYDROMORPHONE HYDROCHLORIDE 2 MG/ML
2 INJECTION, SOLUTION INTRAMUSCULAR; INTRAVENOUS; SUBCUTANEOUS
Status: DISCONTINUED | OUTPATIENT
Start: 2023-01-01 | End: 2023-01-01 | Stop reason: HOSPADM

## 2023-01-01 RX ORDER — HYDROMORPHONE HYDROCHLORIDE 1 MG/ML
.5-1 INJECTION, SOLUTION INTRAMUSCULAR; INTRAVENOUS; SUBCUTANEOUS
Status: DISCONTINUED | OUTPATIENT
Start: 2023-01-01 | End: 2023-01-01

## 2023-01-01 RX ORDER — HEPARIN SODIUM 1000 [USP'U]/ML
2000 INJECTION, SOLUTION INTRAVENOUS; SUBCUTANEOUS ONCE
Status: COMPLETED | OUTPATIENT
Start: 2023-01-01 | End: 2023-01-01

## 2023-01-01 RX ORDER — ALBUMIN (HUMAN) 12.5 G/50ML
12.5 SOLUTION INTRAVENOUS ONCE
Status: COMPLETED | OUTPATIENT
Start: 2023-01-01 | End: 2023-01-01

## 2023-01-01 RX ORDER — ACETAMINOPHEN 650 MG/1
650 SUPPOSITORY RECTAL EVERY 6 HOURS PRN
Status: DISCONTINUED | OUTPATIENT
Start: 2023-01-01 | End: 2023-01-01

## 2023-01-01 RX ORDER — HYDROMORPHONE HYDROCHLORIDE 2 MG/ML
4 INJECTION, SOLUTION INTRAMUSCULAR; INTRAVENOUS; SUBCUTANEOUS
Status: DISCONTINUED | OUTPATIENT
Start: 2023-01-01 | End: 2023-01-01 | Stop reason: HOSPADM

## 2023-01-01 RX ORDER — IPRATROPIUM BROMIDE AND ALBUTEROL SULFATE 2.5; .5 MG/3ML; MG/3ML
3 SOLUTION RESPIRATORY (INHALATION) EVERY 8 HOURS
Status: DISPENSED | OUTPATIENT
Start: 2023-01-01 | End: 2023-01-01

## 2023-01-01 RX ORDER — OXYCODONE HYDROCHLORIDE 5 MG/1
5 TABLET ORAL EVERY 4 HOURS PRN
Status: DISCONTINUED | OUTPATIENT
Start: 2023-01-01 | End: 2023-01-01

## 2023-01-01 RX ORDER — GLYCOPYRROLATE 0.2 MG/ML
0.2 INJECTION INTRAMUSCULAR; INTRAVENOUS 3 TIMES DAILY PRN
Status: DISCONTINUED | OUTPATIENT
Start: 2023-01-01 | End: 2023-01-01 | Stop reason: HOSPADM

## 2023-01-01 RX ORDER — NOREPINEPHRINE BITARTRATE 0.03 MG/ML
0-1 INJECTION, SOLUTION INTRAVENOUS CONTINUOUS
Status: DISCONTINUED | OUTPATIENT
Start: 2023-01-01 | End: 2023-01-01

## 2023-01-01 RX ORDER — HEPARIN SODIUM 1000 [USP'U]/ML
2800 INJECTION, SOLUTION INTRAVENOUS; SUBCUTANEOUS PRN
Status: DISCONTINUED | OUTPATIENT
Start: 2023-01-01 | End: 2023-01-01

## 2023-01-01 RX ORDER — LINEZOLID 2 MG/ML
600 INJECTION, SOLUTION INTRAVENOUS EVERY 12 HOURS
Status: DISCONTINUED | OUTPATIENT
Start: 2023-01-01 | End: 2023-01-01

## 2023-01-01 RX ORDER — SODIUM CHLORIDE 9 MG/ML
500 INJECTION, SOLUTION INTRAVENOUS
Status: ACTIVE | OUTPATIENT
Start: 2023-01-01 | End: 2023-01-01

## 2023-01-01 RX ORDER — SODIUM CHLORIDE 9 MG/ML
250 INJECTION, SOLUTION INTRAVENOUS ONCE
Status: COMPLETED | OUTPATIENT
Start: 2023-01-01 | End: 2023-01-01

## 2023-01-01 RX ORDER — SODIUM CHLORIDE, SODIUM GLUCONATE, SODIUM ACETATE, POTASSIUM CHLORIDE AND MAGNESIUM CHLORIDE 526; 502; 368; 37; 30 MG/100ML; MG/100ML; MG/100ML; MG/100ML; MG/100ML
INJECTION, SOLUTION INTRAVENOUS
Status: DISCONTINUED | OUTPATIENT
Start: 2023-01-01 | End: 2023-01-01 | Stop reason: SURG

## 2023-01-01 RX ORDER — SODIUM CHLORIDE 9 MG/ML
INJECTION, SOLUTION INTRAVENOUS
Status: DISCONTINUED | OUTPATIENT
Start: 2023-01-01 | End: 2023-01-01 | Stop reason: SURG

## 2023-01-01 RX ORDER — PROCHLORPERAZINE EDISYLATE 5 MG/ML
5-10 INJECTION INTRAMUSCULAR; INTRAVENOUS EVERY 4 HOURS PRN
Status: DISCONTINUED | OUTPATIENT
Start: 2023-01-01 | End: 2023-01-01 | Stop reason: HOSPADM

## 2023-01-01 RX ORDER — SODIUM CHLORIDE, SODIUM LACTATE, POTASSIUM CHLORIDE, CALCIUM CHLORIDE 600; 310; 30; 20 MG/100ML; MG/100ML; MG/100ML; MG/100ML
INJECTION, SOLUTION INTRAVENOUS CONTINUOUS
Status: DISCONTINUED | OUTPATIENT
Start: 2023-01-01 | End: 2023-01-01

## 2023-01-01 RX ORDER — VASOPRESSIN 20 U/ML
INJECTION PARENTERAL PRN
Status: DISCONTINUED | OUTPATIENT
Start: 2023-01-01 | End: 2023-01-01 | Stop reason: SURG

## 2023-01-01 RX ORDER — FAMOTIDINE 20 MG/1
20 TABLET, FILM COATED ORAL DAILY
Status: DISCONTINUED | OUTPATIENT
Start: 2023-01-01 | End: 2023-01-01

## 2023-01-01 RX ORDER — FUROSEMIDE 10 MG/ML
120 INJECTION INTRAMUSCULAR; INTRAVENOUS ONCE
Status: COMPLETED | OUTPATIENT
Start: 2023-01-01 | End: 2023-01-01

## 2023-01-01 RX ORDER — PHENYLEPHRINE HCL IN 0.9% NACL 0.5 MG/5ML
SYRINGE (ML) INTRAVENOUS PRN
Status: DISCONTINUED | OUTPATIENT
Start: 2023-01-01 | End: 2023-01-01 | Stop reason: SURG

## 2023-01-01 RX ORDER — MAGNESIUM SULFATE HEPTAHYDRATE 40 MG/ML
4 INJECTION, SOLUTION INTRAVENOUS ONCE
Status: COMPLETED | OUTPATIENT
Start: 2023-01-01 | End: 2023-01-01

## 2023-01-01 RX ORDER — PROMETHAZINE HYDROCHLORIDE 25 MG/1
12.5-25 SUPPOSITORY RECTAL EVERY 4 HOURS PRN
Status: DISCONTINUED | OUTPATIENT
Start: 2023-01-01 | End: 2023-01-01 | Stop reason: HOSPADM

## 2023-01-01 RX ORDER — OMEPRAZOLE 20 MG/1
20 CAPSULE, DELAYED RELEASE ORAL 2 TIMES DAILY
Status: DISCONTINUED | OUTPATIENT
Start: 2023-01-01 | End: 2023-01-01

## 2023-01-01 RX ORDER — DEXTROSE MONOHYDRATE 25 G/50ML
50 INJECTION, SOLUTION INTRAVENOUS ONCE
Status: DISCONTINUED | OUTPATIENT
Start: 2023-01-01 | End: 2023-01-01 | Stop reason: CLARIF

## 2023-01-01 RX ORDER — MIDAZOLAM HYDROCHLORIDE 1 MG/ML
INJECTION INTRAMUSCULAR; INTRAVENOUS
Status: DISPENSED
Start: 2023-01-01 | End: 2023-01-01

## 2023-01-01 RX ORDER — HEPARIN SODIUM 1000 [USP'U]/ML
40 INJECTION, SOLUTION INTRAVENOUS; SUBCUTANEOUS PRN
Status: DISCONTINUED | OUTPATIENT
Start: 2023-01-01 | End: 2023-01-01

## 2023-01-01 RX ORDER — HEPARIN SODIUM 5000 [USP'U]/100ML
0-30 INJECTION, SOLUTION INTRAVENOUS CONTINUOUS
Status: DISCONTINUED | OUTPATIENT
Start: 2023-01-01 | End: 2023-01-01

## 2023-01-01 RX ORDER — ONDANSETRON 4 MG/1
4 TABLET, ORALLY DISINTEGRATING ORAL EVERY 4 HOURS PRN
Status: DISCONTINUED | OUTPATIENT
Start: 2023-01-01 | End: 2023-01-01

## 2023-01-01 RX ORDER — HEPARIN SODIUM 1000 [USP'U]/ML
INJECTION, SOLUTION INTRAVENOUS; SUBCUTANEOUS
Status: DISPENSED
Start: 2023-01-01 | End: 2023-01-01

## 2023-01-01 RX ORDER — CEFDINIR 300 MG/1
300 CAPSULE ORAL ONCE
Status: DISCONTINUED | OUTPATIENT
Start: 2023-01-01 | End: 2023-01-01

## 2023-01-01 RX ORDER — DEXMEDETOMIDINE HYDROCHLORIDE 4 UG/ML
.1-1.5 INJECTION, SOLUTION INTRAVENOUS CONTINUOUS
Status: DISCONTINUED | OUTPATIENT
Start: 2023-01-01 | End: 2023-01-01

## 2023-01-01 RX ORDER — METRONIDAZOLE 500 MG/1
500 TABLET ORAL ONCE
Status: DISCONTINUED | OUTPATIENT
Start: 2023-01-01 | End: 2023-01-01

## 2023-01-01 RX ORDER — ROCURONIUM BROMIDE 10 MG/ML
INJECTION, SOLUTION INTRAVENOUS PRN
Status: DISCONTINUED | OUTPATIENT
Start: 2023-01-01 | End: 2023-01-01 | Stop reason: SURG

## 2023-01-01 RX ORDER — HEPARIN SODIUM 1000 [USP'U]/ML
INJECTION, SOLUTION INTRAVENOUS; SUBCUTANEOUS
Status: COMPLETED
Start: 2023-01-01 | End: 2023-01-01

## 2023-01-01 RX ORDER — LEVOTHYROXINE SODIUM 0.15 MG/1
225 TABLET ORAL
COMMUNITY
Start: 2023-01-01

## 2023-01-01 RX ORDER — FLUCONAZOLE 2 MG/ML
400 INJECTION, SOLUTION INTRAVENOUS EVERY 24 HOURS
Status: DISCONTINUED | OUTPATIENT
Start: 2023-01-01 | End: 2023-01-01

## 2023-01-01 RX ORDER — PANTOPRAZOLE SODIUM 40 MG/10ML
40 INJECTION, POWDER, LYOPHILIZED, FOR SOLUTION INTRAVENOUS 2 TIMES DAILY
Status: DISCONTINUED | OUTPATIENT
Start: 2023-01-01 | End: 2023-01-01

## 2023-01-01 RX ADMIN — LEVOTHYROXINE SODIUM 225 MCG: 0.2 TABLET ORAL at 05:54

## 2023-01-01 RX ADMIN — SENNOSIDES AND DOCUSATE SODIUM 2 TABLET: 50; 8.6 TABLET ORAL at 17:40

## 2023-01-01 RX ADMIN — Medication 300 MCG/HR: at 01:18

## 2023-01-01 RX ADMIN — SENNOSIDES AND DOCUSATE SODIUM 2 TABLET: 50; 8.6 TABLET ORAL at 05:46

## 2023-01-01 RX ADMIN — VASOPRESSIN 0.03 UNITS/MIN: 20 INJECTION INTRAVENOUS at 15:06

## 2023-01-01 RX ADMIN — Medication 1 APPLICATOR: at 18:04

## 2023-01-01 RX ADMIN — Medication 1 APPLICATOR: at 17:56

## 2023-01-01 RX ADMIN — HYDROMORPHONE HYDROCHLORIDE 4 MG: 2 INJECTION, SOLUTION INTRAMUSCULAR; INTRAVENOUS; SUBCUTANEOUS at 10:14

## 2023-01-01 RX ADMIN — INSULIN HUMAN 2 UNITS: 100 INJECTION, SOLUTION PARENTERAL at 06:07

## 2023-01-01 RX ADMIN — Medication 1 APPLICATOR: at 17:39

## 2023-01-01 RX ADMIN — PIPERACILLIN AND TAZOBACTAM 4.5 G: 4; .5 INJECTION, POWDER, FOR SOLUTION INTRAVENOUS at 21:46

## 2023-01-01 RX ADMIN — NOREPINEPHRINE BITARTRATE 0.6 MCG/KG/MIN: 1 INJECTION, SOLUTION, CONCENTRATE INTRAVENOUS at 02:54

## 2023-01-01 RX ADMIN — Medication 1 APPLICATOR: at 04:12

## 2023-01-01 RX ADMIN — FAMOTIDINE 20 MG: 20 TABLET, FILM COATED ORAL at 05:28

## 2023-01-01 RX ADMIN — IPRATROPIUM BROMIDE AND ALBUTEROL SULFATE 3 ML: 2.5; .5 SOLUTION RESPIRATORY (INHALATION) at 13:43

## 2023-01-01 RX ADMIN — IPRATROPIUM BROMIDE AND ALBUTEROL SULFATE 3 ML: 2.5; .5 SOLUTION RESPIRATORY (INHALATION) at 23:21

## 2023-01-01 RX ADMIN — IPRATROPIUM BROMIDE AND ALBUTEROL SULFATE 3 ML: 2.5; .5 SOLUTION RESPIRATORY (INHALATION) at 07:07

## 2023-01-01 RX ADMIN — IOHEXOL 25 ML: 300 INJECTION, SOLUTION INTRAVENOUS at 08:00

## 2023-01-01 RX ADMIN — IOHEXOL 70 ML: 300 INJECTION, SOLUTION INTRAVENOUS at 07:45

## 2023-01-01 RX ADMIN — Medication 1 APPLICATOR: at 05:27

## 2023-01-01 RX ADMIN — Medication 1 APPLICATOR: at 06:00

## 2023-01-01 RX ADMIN — HEPARIN SODIUM 5000 UNITS: 5000 INJECTION, SOLUTION INTRAVENOUS; SUBCUTANEOUS at 05:15

## 2023-01-01 RX ADMIN — HEPARIN SODIUM 5000 UNITS: 5000 INJECTION, SOLUTION INTRAVENOUS; SUBCUTANEOUS at 08:32

## 2023-01-01 RX ADMIN — SENNOSIDES AND DOCUSATE SODIUM 2 TABLET: 50; 8.6 TABLET ORAL at 17:39

## 2023-01-01 RX ADMIN — PANTOPRAZOLE SODIUM 40 MG: 40 INJECTION, POWDER, FOR SOLUTION INTRAVENOUS at 04:12

## 2023-01-01 RX ADMIN — HYDROCORTISONE SODIUM SUCCINATE 50 MG: 100 INJECTION, POWDER, FOR SOLUTION INTRAMUSCULAR; INTRAVENOUS at 11:54

## 2023-01-01 RX ADMIN — PIPERACILLIN AND TAZOBACTAM 4.5 G: 4; .5 INJECTION, POWDER, FOR SOLUTION INTRAVENOUS at 23:45

## 2023-01-01 RX ADMIN — FAMOTIDINE 20 MG: 20 TABLET, FILM COATED ORAL at 05:58

## 2023-01-01 RX ADMIN — SODIUM CHLORIDE, POTASSIUM CHLORIDE, SODIUM LACTATE AND CALCIUM CHLORIDE: 600; 310; 30; 20 INJECTION, SOLUTION INTRAVENOUS at 12:03

## 2023-01-01 RX ADMIN — NOREPINEPHRINE BITARTRATE 0.55 MCG/KG/MIN: 1 INJECTION, SOLUTION, CONCENTRATE INTRAVENOUS at 06:17

## 2023-01-01 RX ADMIN — PIPERACILLIN AND TAZOBACTAM 4.5 G: 4; .5 INJECTION, POWDER, FOR SOLUTION INTRAVENOUS at 06:23

## 2023-01-01 RX ADMIN — HEPARIN SODIUM 4200 UNITS: 1000 INJECTION, SOLUTION INTRAVENOUS; SUBCUTANEOUS at 00:07

## 2023-01-01 RX ADMIN — NOREPINEPHRINE BITARTRATE 0.2 MCG/KG/MIN: 1 INJECTION, SOLUTION, CONCENTRATE INTRAVENOUS at 15:01

## 2023-01-01 RX ADMIN — HEPARIN SODIUM 5000 UNITS: 5000 INJECTION, SOLUTION INTRAVENOUS; SUBCUTANEOUS at 21:19

## 2023-01-01 RX ADMIN — MAGNESIUM SULFATE HEPTAHYDRATE 2 G: 2 INJECTION, SOLUTION INTRAVENOUS at 08:27

## 2023-01-01 RX ADMIN — ACETAMINOPHEN 650 MG: 325 TABLET, FILM COATED ORAL at 05:45

## 2023-01-01 RX ADMIN — NOREPINEPHRINE BITARTRATE 0.3 MCG/KG/MIN: 1 INJECTION, SOLUTION, CONCENTRATE INTRAVENOUS at 08:01

## 2023-01-01 RX ADMIN — OXYCODONE HYDROCHLORIDE 5 MG: 5 TABLET ORAL at 08:38

## 2023-01-01 RX ADMIN — HEPARIN SODIUM 2800 UNITS: 1000 INJECTION, SOLUTION INTRAVENOUS; SUBCUTANEOUS at 12:40

## 2023-01-01 RX ADMIN — HYDROCORTISONE SODIUM SUCCINATE 100 MG: 100 INJECTION, POWDER, FOR SOLUTION INTRAMUSCULAR; INTRAVENOUS at 21:28

## 2023-01-01 RX ADMIN — LEVOTHYROXINE SODIUM 225 MCG: 0.2 TABLET ORAL at 05:44

## 2023-01-01 RX ADMIN — NOREPINEPHRINE BITARTRATE 0.28 MCG/KG/MIN: 1 INJECTION, SOLUTION, CONCENTRATE INTRAVENOUS at 23:24

## 2023-01-01 RX ADMIN — PANTOPRAZOLE SODIUM 40 MG: 40 INJECTION, POWDER, FOR SOLUTION INTRAVENOUS at 05:33

## 2023-01-01 RX ADMIN — INSULIN HUMAN 2 UNITS: 100 INJECTION, SOLUTION PARENTERAL at 17:41

## 2023-01-01 RX ADMIN — HEPARIN SODIUM 2000 UNITS: 1000 INJECTION, SOLUTION INTRAVENOUS; SUBCUTANEOUS at 06:54

## 2023-01-01 RX ADMIN — PIPERACILLIN AND TAZOBACTAM 4.5 G: 4; .5 INJECTION, POWDER, FOR SOLUTION INTRAVENOUS at 17:29

## 2023-01-01 RX ADMIN — HEPARIN SODIUM 5000 UNITS: 5000 INJECTION, SOLUTION INTRAVENOUS; SUBCUTANEOUS at 05:07

## 2023-01-01 RX ADMIN — FENTANYL CITRATE 50 MCG: 50 INJECTION, SOLUTION INTRAMUSCULAR; INTRAVENOUS at 11:42

## 2023-01-01 RX ADMIN — LEVOTHYROXINE SODIUM 225 MCG: 0.2 TABLET ORAL at 09:26

## 2023-01-01 RX ADMIN — NOREPINEPHRINE BITARTRATE 0.05 MCG/KG/MIN: 1 INJECTION, SOLUTION, CONCENTRATE INTRAVENOUS at 21:11

## 2023-01-01 RX ADMIN — MIDODRINE HYDROCHLORIDE 10 MG: 5 TABLET ORAL at 05:35

## 2023-01-01 RX ADMIN — IPRATROPIUM BROMIDE AND ALBUTEROL SULFATE 3 ML: 2.5; .5 SOLUTION RESPIRATORY (INHALATION) at 00:48

## 2023-01-01 RX ADMIN — PIPERACILLIN AND TAZOBACTAM 4.5 G: 4; .5 INJECTION, POWDER, FOR SOLUTION INTRAVENOUS at 10:40

## 2023-01-01 RX ADMIN — MIDODRINE HYDROCHLORIDE 10 MG: 5 TABLET ORAL at 13:21

## 2023-01-01 RX ADMIN — FENTANYL CITRATE 50 MCG: 50 INJECTION, SOLUTION INTRAMUSCULAR; INTRAVENOUS at 09:16

## 2023-01-01 RX ADMIN — LEVOTHYROXINE SODIUM 225 MCG: 0.2 TABLET ORAL at 05:35

## 2023-01-01 RX ADMIN — ROCURONIUM BROMIDE 40 MG: 50 INJECTION, SOLUTION INTRAVENOUS at 12:35

## 2023-01-01 RX ADMIN — SENNOSIDES AND DOCUSATE SODIUM 2 TABLET: 50; 8.6 TABLET ORAL at 05:11

## 2023-01-01 RX ADMIN — SODIUM PHOSPHATE, MONOBASIC, MONOHYDRATE AND SODIUM PHOSPHATE, DIBASIC, ANHYDROUS 30 MMOL: 276; 142 INJECTION, SOLUTION INTRAVENOUS at 06:00

## 2023-01-01 RX ADMIN — DEXTROSE MONOHYDRATE 50 ML: 25 INJECTION, SOLUTION INTRAVENOUS at 05:13

## 2023-01-01 RX ADMIN — SODIUM CHLORIDE SOLN NEBU 7% 4 ML: 7 NEBU SOLN at 22:04

## 2023-01-01 RX ADMIN — SODIUM CHLORIDE SOLN NEBU 7% 4 ML: 7 NEBU SOLN at 16:37

## 2023-01-01 RX ADMIN — Medication 200 MCG: at 06:33

## 2023-01-01 RX ADMIN — Medication 200 MCG: at 17:26

## 2023-01-01 RX ADMIN — SENNOSIDES AND DOCUSATE SODIUM 2 TABLET: 50; 8.6 TABLET ORAL at 05:51

## 2023-01-01 RX ADMIN — FENTANYL CITRATE 50 MCG: 50 INJECTION, SOLUTION INTRAMUSCULAR; INTRAVENOUS at 11:49

## 2023-01-01 RX ADMIN — SENNOSIDES AND DOCUSATE SODIUM 2 TABLET: 50; 8.6 TABLET ORAL at 17:11

## 2023-01-01 RX ADMIN — HYDROCORTISONE SODIUM SUCCINATE 50 MG: 100 INJECTION, POWDER, FOR SOLUTION INTRAMUSCULAR; INTRAVENOUS at 05:45

## 2023-01-01 RX ADMIN — Medication 1 APPLICATOR: at 17:40

## 2023-01-01 RX ADMIN — DEXTROSE MONOHYDRATE 25 G: 25 INJECTION, SOLUTION INTRAVENOUS at 05:11

## 2023-01-01 RX ADMIN — MAGNESIUM SULFATE HEPTAHYDRATE 2 G: 2 INJECTION, SOLUTION INTRAVENOUS at 05:22

## 2023-01-01 RX ADMIN — CALCIUM GLUCONATE 2 G: 20 INJECTION, SOLUTION INTRAVENOUS at 12:59

## 2023-01-01 RX ADMIN — HEPARIN SODIUM 5000 UNITS: 5000 INJECTION, SOLUTION INTRAVENOUS; SUBCUTANEOUS at 05:45

## 2023-01-01 RX ADMIN — Medication 200 MCG: at 17:15

## 2023-01-01 RX ADMIN — MIDODRINE HYDROCHLORIDE 10 MG: 5 TABLET ORAL at 00:46

## 2023-01-01 RX ADMIN — IPRATROPIUM BROMIDE AND ALBUTEROL SULFATE 3 ML: 2.5; .5 SOLUTION RESPIRATORY (INHALATION) at 13:15

## 2023-01-01 RX ADMIN — CALCIUM CHLORIDE 1000 MG: 100 INJECTION, SOLUTION INTRAVENOUS at 23:13

## 2023-01-01 RX ADMIN — NOREPINEPHRINE BITARTRATE 0.1 MCG/KG/MIN: 1 INJECTION, SOLUTION, CONCENTRATE INTRAVENOUS at 17:38

## 2023-01-01 RX ADMIN — SODIUM BICARBONATE 50 MEQ: 84 INJECTION INTRAVENOUS at 05:23

## 2023-01-01 RX ADMIN — VASOPRESSIN 0.03 UNITS/MIN: 20 INJECTION INTRAVENOUS at 07:27

## 2023-01-01 RX ADMIN — PIPERACILLIN AND TAZOBACTAM 4.5 G: 4; .5 INJECTION, POWDER, FOR SOLUTION INTRAVENOUS at 12:47

## 2023-01-01 RX ADMIN — HEPARIN SODIUM 26 UNITS/KG/HR: 5000 INJECTION, SOLUTION INTRAVENOUS at 06:04

## 2023-01-01 RX ADMIN — ACETAMINOPHEN 650 MG: 325 TABLET, FILM COATED ORAL at 16:42

## 2023-01-01 RX ADMIN — HEPARIN SODIUM 5000 UNITS: 5000 INJECTION, SOLUTION INTRAVENOUS; SUBCUTANEOUS at 21:33

## 2023-01-01 RX ADMIN — SODIUM CHLORIDE, POTASSIUM CHLORIDE, SODIUM LACTATE AND CALCIUM CHLORIDE 1000 ML: 600; 310; 30; 20 INJECTION, SOLUTION INTRAVENOUS at 10:45

## 2023-01-01 RX ADMIN — SENNOSIDES AND DOCUSATE SODIUM 2 TABLET: 50; 8.6 TABLET ORAL at 05:44

## 2023-01-01 RX ADMIN — HEPARIN SODIUM 5000 UNITS: 5000 INJECTION, SOLUTION INTRAVENOUS; SUBCUTANEOUS at 13:34

## 2023-01-01 RX ADMIN — HYDROCORTISONE SODIUM SUCCINATE 50 MG: 100 INJECTION, POWDER, FOR SOLUTION INTRAMUSCULAR; INTRAVENOUS at 23:25

## 2023-01-01 RX ADMIN — PANTOPRAZOLE SODIUM 40 MG: 40 INJECTION, POWDER, FOR SOLUTION INTRAVENOUS at 17:39

## 2023-01-01 RX ADMIN — HEPARIN SODIUM 26 UNITS/KG/HR: 5000 INJECTION, SOLUTION INTRAVENOUS at 15:26

## 2023-01-01 RX ADMIN — CALCIUM CHLORIDE 1 G: 100 INJECTION INTRAVENOUS; INTRAVENTRICULAR at 11:53

## 2023-01-01 RX ADMIN — Medication 1 APPLICATOR: at 08:26

## 2023-01-01 RX ADMIN — NOREPINEPHRINE BITARTRATE 0.75 MCG/KG/MIN: 1 INJECTION, SOLUTION, CONCENTRATE INTRAVENOUS at 21:25

## 2023-01-01 RX ADMIN — SODIUM BICARBONATE 50 MEQ: 84 INJECTION, SOLUTION INTRAVENOUS at 06:36

## 2023-01-01 RX ADMIN — LEVOTHYROXINE SODIUM 225 MCG: 0.2 TABLET ORAL at 05:10

## 2023-01-01 RX ADMIN — Medication 200 MCG: at 17:23

## 2023-01-01 RX ADMIN — Medication 1 APPLICATOR: at 05:07

## 2023-01-01 RX ADMIN — MIDAZOLAM 1 MG: 1 INJECTION, SOLUTION INTRAMUSCULAR; INTRAVENOUS at 11:50

## 2023-01-01 RX ADMIN — INSULIN HUMAN 2 UNITS: 100 INJECTION, SOLUTION PARENTERAL at 17:17

## 2023-01-01 RX ADMIN — DEXTROSE MONOHYDRATE 25 G: 25 INJECTION, SOLUTION INTRAVENOUS at 05:55

## 2023-01-01 RX ADMIN — SODIUM CHLORIDE SOLN NEBU 7% 4 ML: 7 NEBU SOLN at 00:49

## 2023-01-01 RX ADMIN — HEPARIN SODIUM 2800 UNITS: 1000 INJECTION, SOLUTION INTRAVENOUS; SUBCUTANEOUS at 11:55

## 2023-01-01 RX ADMIN — Medication 1 APPLICATOR: at 05:00

## 2023-01-01 RX ADMIN — OMEPRAZOLE 20 MG: 20 CAPSULE, DELAYED RELEASE ORAL at 17:40

## 2023-01-01 RX ADMIN — MIDAZOLAM 1 MG: 1 INJECTION, SOLUTION INTRAMUSCULAR; INTRAVENOUS at 11:42

## 2023-01-01 RX ADMIN — SENNOSIDES AND DOCUSATE SODIUM 2 TABLET: 50; 8.6 TABLET ORAL at 05:35

## 2023-01-01 RX ADMIN — HEPARIN SODIUM 5000 UNITS: 5000 INJECTION, SOLUTION INTRAVENOUS; SUBCUTANEOUS at 05:50

## 2023-01-01 RX ADMIN — CALCIUM CHLORIDE 1 G: 100 INJECTION INTRAVENOUS; INTRAVENTRICULAR at 07:41

## 2023-01-01 RX ADMIN — POTASSIUM CHLORIDE 40 MEQ: 20 TABLET, EXTENDED RELEASE ORAL at 07:52

## 2023-01-01 RX ADMIN — HEPARIN SODIUM 4200 UNITS: 1000 INJECTION, SOLUTION INTRAVENOUS; SUBCUTANEOUS at 23:58

## 2023-01-01 RX ADMIN — OXYCODONE HYDROCHLORIDE 5 MG: 5 TABLET ORAL at 05:52

## 2023-01-01 RX ADMIN — PIPERACILLIN AND TAZOBACTAM 4.5 G: 4; .5 INJECTION, POWDER, FOR SOLUTION INTRAVENOUS at 11:16

## 2023-01-01 RX ADMIN — PANTOPRAZOLE SODIUM 40 MG: 40 INJECTION, POWDER, FOR SOLUTION INTRAVENOUS at 07:54

## 2023-01-01 RX ADMIN — VASOPRESSIN 0.03 UNITS/MIN: 20 INJECTION INTRAVENOUS at 19:07

## 2023-01-01 RX ADMIN — INSULIN HUMAN 2 UNITS: 100 INJECTION, SOLUTION PARENTERAL at 05:28

## 2023-01-01 RX ADMIN — Medication 300 MCG/HR: at 08:44

## 2023-01-01 RX ADMIN — HEPARIN SODIUM 26 UNITS/KG/HR: 5000 INJECTION, SOLUTION INTRAVENOUS at 13:29

## 2023-01-01 RX ADMIN — SODIUM CHLORIDE, POTASSIUM CHLORIDE, SODIUM LACTATE AND CALCIUM CHLORIDE: 600; 310; 30; 20 INJECTION, SOLUTION INTRAVENOUS at 08:34

## 2023-01-01 RX ADMIN — OMEPRAZOLE 20 MG: 20 CAPSULE, DELAYED RELEASE ORAL at 05:44

## 2023-01-01 RX ADMIN — HEPARIN SODIUM 26 UNITS/KG/HR: 5000 INJECTION, SOLUTION INTRAVENOUS at 00:59

## 2023-01-01 RX ADMIN — MIDODRINE HYDROCHLORIDE 5 MG: 5 TABLET ORAL at 13:36

## 2023-01-01 RX ADMIN — PIPERACILLIN AND TAZOBACTAM 4.5 G: 4; .5 INJECTION, POWDER, FOR SOLUTION INTRAVENOUS at 05:30

## 2023-01-01 RX ADMIN — Medication 1 APPLICATOR: at 17:11

## 2023-01-01 RX ADMIN — HYDROCORTISONE SODIUM SUCCINATE 100 MG: 100 INJECTION, POWDER, FOR SOLUTION INTRAMUSCULAR; INTRAVENOUS at 14:31

## 2023-01-01 RX ADMIN — HYDROCORTISONE SODIUM SUCCINATE 100 MG: 100 INJECTION, POWDER, FOR SOLUTION INTRAMUSCULAR; INTRAVENOUS at 05:16

## 2023-01-01 RX ADMIN — VASOPRESSIN 0.03 UNITS/MIN: 20 INJECTION INTRAVENOUS at 23:11

## 2023-01-01 RX ADMIN — MIDODRINE HYDROCHLORIDE 10 MG: 5 TABLET ORAL at 13:08

## 2023-01-01 RX ADMIN — PIPERACILLIN AND TAZOBACTAM 4.5 G: 4; .5 INJECTION, POWDER, FOR SOLUTION INTRAVENOUS at 20:38

## 2023-01-01 RX ADMIN — SODIUM CHLORIDE, POTASSIUM CHLORIDE, SODIUM LACTATE AND CALCIUM CHLORIDE 1000 ML: 600; 310; 30; 20 INJECTION, SOLUTION INTRAVENOUS at 14:00

## 2023-01-01 RX ADMIN — Medication 100 MCG/HR: at 15:38

## 2023-01-01 RX ADMIN — VASOPRESSIN 1 UNITS: 20 INJECTION INTRAVENOUS at 17:28

## 2023-01-01 RX ADMIN — HEPARIN SODIUM 5000 UNITS: 5000 INJECTION, SOLUTION INTRAVENOUS; SUBCUTANEOUS at 21:20

## 2023-01-01 RX ADMIN — FENTANYL CITRATE 100 MCG: 50 INJECTION, SOLUTION INTRAMUSCULAR; INTRAVENOUS at 12:36

## 2023-01-01 RX ADMIN — PIPERACILLIN AND TAZOBACTAM 4.5 G: 4; .5 INJECTION, POWDER, FOR SOLUTION INTRAVENOUS at 08:31

## 2023-01-01 RX ADMIN — PIPERACILLIN AND TAZOBACTAM 4.5 G: 4; .5 INJECTION, POWDER, FOR SOLUTION INTRAVENOUS at 10:04

## 2023-01-01 RX ADMIN — HEPARIN SODIUM 18 UNITS/KG/HR: 5000 INJECTION, SOLUTION INTRAVENOUS at 08:22

## 2023-01-01 RX ADMIN — HYDROMORPHONE HYDROCHLORIDE 1 MG: 1 INJECTION, SOLUTION INTRAMUSCULAR; INTRAVENOUS; SUBCUTANEOUS at 04:41

## 2023-01-01 RX ADMIN — SODIUM CHLORIDE, POTASSIUM CHLORIDE, SODIUM LACTATE AND CALCIUM CHLORIDE: 600; 310; 30; 20 INJECTION, SOLUTION INTRAVENOUS at 22:18

## 2023-01-01 RX ADMIN — SODIUM PHOSPHATE, MONOBASIC, MONOHYDRATE AND SODIUM PHOSPHATE, DIBASIC, ANHYDROUS 30 MMOL: 276; 142 INJECTION, SOLUTION INTRAVENOUS at 20:20

## 2023-01-01 RX ADMIN — MIDODRINE HYDROCHLORIDE 10 MG: 5 TABLET ORAL at 21:20

## 2023-01-01 RX ADMIN — HEPARIN SODIUM 5000 UNITS: 5000 INJECTION, SOLUTION INTRAVENOUS; SUBCUTANEOUS at 13:36

## 2023-01-01 RX ADMIN — FAMOTIDINE 20 MG: 10 INJECTION INTRAVENOUS at 05:16

## 2023-01-01 RX ADMIN — OMEPRAZOLE 20 MG: 20 CAPSULE, DELAYED RELEASE ORAL at 17:11

## 2023-01-01 RX ADMIN — SODIUM PHOSPHATE, MONOBASIC, MONOHYDRATE AND SODIUM PHOSPHATE, DIBASIC, ANHYDROUS 15 MMOL: 276; 142 INJECTION, SOLUTION INTRAVENOUS at 09:17

## 2023-01-01 RX ADMIN — NOREPINEPHRINE BITARTRATE 0.55 MCG/KG/MIN: 1 INJECTION, SOLUTION, CONCENTRATE INTRAVENOUS at 00:14

## 2023-01-01 RX ADMIN — HEPARIN SODIUM 3000 UNITS: 1000 INJECTION, SOLUTION INTRAVENOUS; SUBCUTANEOUS at 06:16

## 2023-01-01 RX ADMIN — SODIUM BICARBONATE 50 MEQ: 84 INJECTION, SOLUTION INTRAVENOUS at 07:23

## 2023-01-01 RX ADMIN — NOREPINEPHRINE BITARTRATE 0.23 MCG/KG/MIN: 1 INJECTION, SOLUTION, CONCENTRATE INTRAVENOUS at 07:40

## 2023-01-01 RX ADMIN — HEPARIN SODIUM 5000 UNITS: 5000 INJECTION, SOLUTION INTRAVENOUS; SUBCUTANEOUS at 13:21

## 2023-01-01 RX ADMIN — MIDAZOLAM 2 MG: 1 INJECTION, SOLUTION INTRAMUSCULAR; INTRAVENOUS at 08:21

## 2023-01-01 RX ADMIN — SODIUM CHLORIDE 250 ML: 9 INJECTION, SOLUTION INTRAVENOUS at 01:53

## 2023-01-01 RX ADMIN — PIPERACILLIN AND TAZOBACTAM 4.5 G: 4; .5 INJECTION, POWDER, FOR SOLUTION INTRAVENOUS at 13:34

## 2023-01-01 RX ADMIN — MIDODRINE HYDROCHLORIDE 10 MG: 5 TABLET ORAL at 21:35

## 2023-01-01 RX ADMIN — NOREPINEPHRINE BITARTRATE 0.1 MCG/KG/MIN: 1 INJECTION, SOLUTION, CONCENTRATE INTRAVENOUS at 03:22

## 2023-01-01 RX ADMIN — HEPARIN SODIUM 5000 UNITS: 5000 INJECTION, SOLUTION INTRAVENOUS; SUBCUTANEOUS at 15:15

## 2023-01-01 RX ADMIN — ACETAMINOPHEN 650 MG: 325 TABLET, FILM COATED ORAL at 21:49

## 2023-01-01 RX ADMIN — INSULIN HUMAN 2 UNITS: 100 INJECTION, SOLUTION PARENTERAL at 05:30

## 2023-01-01 RX ADMIN — VASOPRESSIN 0.03 UNITS/MIN: 20 INJECTION INTRAVENOUS at 23:21

## 2023-01-01 RX ADMIN — Medication 200 MCG: at 17:19

## 2023-01-01 RX ADMIN — CEFOTETAN DISODIUM 2 G: 2 INJECTION, POWDER, FOR SOLUTION INTRAMUSCULAR; INTRAVENOUS at 12:30

## 2023-01-01 RX ADMIN — INSULIN HUMAN 2 UNITS: 100 INJECTION, SOLUTION PARENTERAL at 05:52

## 2023-01-01 RX ADMIN — PIPERACILLIN AND TAZOBACTAM 4.5 G: 4; .5 INJECTION, POWDER, FOR SOLUTION INTRAVENOUS at 01:20

## 2023-01-01 RX ADMIN — SENNOSIDES AND DOCUSATE SODIUM 2 TABLET: 50; 8.6 TABLET ORAL at 05:08

## 2023-01-01 RX ADMIN — HEPARIN SODIUM 8400 UNITS: 1000 INJECTION, SOLUTION INTRAVENOUS; SUBCUTANEOUS at 23:41

## 2023-01-01 RX ADMIN — HYDROMORPHONE HYDROCHLORIDE 2 MG: 2 INJECTION, SOLUTION INTRAMUSCULAR; INTRAVENOUS; SUBCUTANEOUS at 09:37

## 2023-01-01 RX ADMIN — FAMOTIDINE 20 MG: 20 TABLET, FILM COATED ORAL at 05:11

## 2023-01-01 RX ADMIN — SODIUM CHLORIDE SOLN NEBU 7% 4 ML: 7 NEBU SOLN at 23:21

## 2023-01-01 RX ADMIN — ETOMIDATE 18 MG: 2 INJECTION, SOLUTION INTRAVENOUS at 11:51

## 2023-01-01 RX ADMIN — PIPERACILLIN AND TAZOBACTAM 4.5 G: 4; .5 INJECTION, POWDER, FOR SOLUTION INTRAVENOUS at 21:36

## 2023-01-01 RX ADMIN — LEVOTHYROXINE SODIUM 225 MCG: 0.2 TABLET ORAL at 05:46

## 2023-01-01 RX ADMIN — NOREPINEPHRINE BITARTRATE 0.42 MCG/KG/MIN: 1 INJECTION, SOLUTION, CONCENTRATE INTRAVENOUS at 00:38

## 2023-01-01 RX ADMIN — MIDODRINE HYDROCHLORIDE 10 MG: 5 TABLET ORAL at 21:19

## 2023-01-01 RX ADMIN — FAMOTIDINE 20 MG: 20 TABLET, FILM COATED ORAL at 05:15

## 2023-01-01 RX ADMIN — Medication 50 MEQ: at 05:47

## 2023-01-01 RX ADMIN — HEPARIN SODIUM 26 UNITS/KG/HR: 5000 INJECTION, SOLUTION INTRAVENOUS at 08:31

## 2023-01-01 RX ADMIN — MIDODRINE HYDROCHLORIDE 10 MG: 5 TABLET ORAL at 05:50

## 2023-01-01 RX ADMIN — FUROSEMIDE 40 MG: 10 INJECTION INTRAMUSCULAR; INTRAVENOUS at 11:25

## 2023-01-01 RX ADMIN — IOHEXOL 100 ML: 350 INJECTION, SOLUTION INTRAVENOUS at 20:59

## 2023-01-01 RX ADMIN — PIPERACILLIN AND TAZOBACTAM 4.5 G: 4; .5 INJECTION, POWDER, FOR SOLUTION INTRAVENOUS at 21:26

## 2023-01-01 RX ADMIN — ETOMIDATE INJECTION 30 MG: 2 SOLUTION INTRAVENOUS at 05:37

## 2023-01-01 RX ADMIN — ACETAMINOPHEN 650 MG: 325 TABLET, FILM COATED ORAL at 04:12

## 2023-01-01 RX ADMIN — NOREPINEPHRINE BITARTRATE 0.4 MCG/KG/MIN: 1 INJECTION, SOLUTION, CONCENTRATE INTRAVENOUS at 15:20

## 2023-01-01 RX ADMIN — Medication 200 MCG/HR: at 23:14

## 2023-01-01 RX ADMIN — FENTANYL CITRATE 25 MCG: 50 INJECTION, SOLUTION INTRAMUSCULAR; INTRAVENOUS at 05:56

## 2023-01-01 RX ADMIN — INSULIN HUMAN 2 UNITS: 100 INJECTION, SOLUTION PARENTERAL at 11:25

## 2023-01-01 RX ADMIN — HYDROMORPHONE HYDROCHLORIDE 1 MG: 1 INJECTION, SOLUTION INTRAMUSCULAR; INTRAVENOUS; SUBCUTANEOUS at 11:25

## 2023-01-01 RX ADMIN — Medication 200 MCG/HR: at 21:20

## 2023-01-01 RX ADMIN — NOREPINEPHRINE BITARTRATE 0.1 MCG/KG/MIN: 1 INJECTION, SOLUTION, CONCENTRATE INTRAVENOUS at 02:26

## 2023-01-01 RX ADMIN — NOREPINEPHRINE BITARTRATE 0.3 MCG/KG/MIN: 1 INJECTION, SOLUTION, CONCENTRATE INTRAVENOUS at 14:14

## 2023-01-01 RX ADMIN — Medication 200 MCG: at 05:37

## 2023-01-01 RX ADMIN — PIPERACILLIN AND TAZOBACTAM 4.5 G: 4; .5 INJECTION, POWDER, FOR SOLUTION INTRAVENOUS at 09:27

## 2023-01-01 RX ADMIN — NOREPINEPHRINE BITARTRATE 0.2 MCG/KG/MIN: 1 INJECTION, SOLUTION, CONCENTRATE INTRAVENOUS at 12:01

## 2023-01-01 RX ADMIN — CHLOROTHIAZIDE SODIUM 500 MG: 500 INJECTION, POWDER, LYOPHILIZED, FOR SOLUTION INTRAVENOUS at 09:33

## 2023-01-01 RX ADMIN — NOREPINEPHRINE BITARTRATE 0.75 MCG/KG/MIN: 1 INJECTION, SOLUTION, CONCENTRATE INTRAVENOUS at 18:42

## 2023-01-01 RX ADMIN — CALCIUM CHLORIDE 1000 MG: 100 INJECTION, SOLUTION INTRAVENOUS at 20:30

## 2023-01-01 RX ADMIN — LINEZOLID 600 MG: 600 INJECTION, SOLUTION INTRAVENOUS at 14:16

## 2023-01-01 RX ADMIN — FENTANYL CITRATE 50 MCG: 50 INJECTION, SOLUTION INTRAMUSCULAR; INTRAVENOUS at 08:23

## 2023-01-01 RX ADMIN — VASOPRESSIN 0.03 UNITS/MIN: 20 INJECTION INTRAVENOUS at 01:25

## 2023-01-01 RX ADMIN — SODIUM PHOSPHATE, MONOBASIC, MONOHYDRATE AND SODIUM PHOSPHATE, DIBASIC, ANHYDROUS 15 MMOL: 276; 142 INJECTION, SOLUTION INTRAVENOUS at 12:40

## 2023-01-01 RX ADMIN — HYDROMORPHONE HYDROCHLORIDE 0.5 MG: 1 INJECTION, SOLUTION INTRAMUSCULAR; INTRAVENOUS; SUBCUTANEOUS at 19:36

## 2023-01-01 RX ADMIN — FENTANYL CITRATE 50 MCG: 50 INJECTION, SOLUTION INTRAMUSCULAR; INTRAVENOUS at 13:50

## 2023-01-01 RX ADMIN — SENNOSIDES AND DOCUSATE SODIUM 2 TABLET: 50; 8.6 TABLET ORAL at 17:28

## 2023-01-01 RX ADMIN — NOREPINEPHRINE BITARTRATE 0.06 MCG/KG/MIN: 1 INJECTION, SOLUTION, CONCENTRATE INTRAVENOUS at 05:33

## 2023-01-01 RX ADMIN — SODIUM BICARBONATE 50 MEQ: 84 INJECTION, SOLUTION INTRAVENOUS at 05:34

## 2023-01-01 RX ADMIN — FENTANYL CITRATE 100 MCG/HR: 50 INJECTION, SOLUTION INTRAMUSCULAR; INTRAVENOUS at 17:30

## 2023-01-01 RX ADMIN — LEVOTHYROXINE SODIUM 225 MCG: 0.2 TABLET ORAL at 05:27

## 2023-01-01 RX ADMIN — HEPARIN SODIUM 26 UNITS/KG/HR: 5000 INJECTION, SOLUTION INTRAVENOUS at 19:06

## 2023-01-01 RX ADMIN — PIPERACILLIN AND TAZOBACTAM 4.5 G: 4; .5 INJECTION, POWDER, FOR SOLUTION INTRAVENOUS at 18:06

## 2023-01-01 RX ADMIN — PIPERACILLIN AND TAZOBACTAM 4.5 G: 4; .5 INJECTION, POWDER, FOR SOLUTION INTRAVENOUS at 21:34

## 2023-01-01 RX ADMIN — FAMOTIDINE 20 MG: 10 INJECTION INTRAVENOUS at 20:52

## 2023-01-01 RX ADMIN — SODIUM BICARBONATE: 84 INJECTION, SOLUTION INTRAVENOUS at 21:18

## 2023-01-01 RX ADMIN — Medication 1 APPLICATOR: at 05:33

## 2023-01-01 RX ADMIN — PIPERACILLIN AND TAZOBACTAM 4.5 G: 4; .5 INJECTION, POWDER, FOR SOLUTION INTRAVENOUS at 13:33

## 2023-01-01 RX ADMIN — PROPOFOL 15 MCG/KG/MIN: 10 INJECTION, EMULSION INTRAVENOUS at 15:41

## 2023-01-01 RX ADMIN — PIPERACILLIN AND TAZOBACTAM 4.5 G: 4; .5 INJECTION, POWDER, FOR SOLUTION INTRAVENOUS at 21:09

## 2023-01-01 RX ADMIN — FAMOTIDINE 20 MG: 10 INJECTION INTRAVENOUS at 05:23

## 2023-01-01 RX ADMIN — OXYCODONE HYDROCHLORIDE 5 MG: 5 TABLET ORAL at 17:49

## 2023-01-01 RX ADMIN — Medication 200 MCG: at 05:14

## 2023-01-01 RX ADMIN — SODIUM BICARBONATE 50 MEQ: 84 INJECTION, SOLUTION INTRAVENOUS at 05:47

## 2023-01-01 RX ADMIN — PHENYLEPHRINE HYDROCHLORIDE 1 MCG/KG/MIN: 100 INJECTION INTRAVENOUS at 06:37

## 2023-01-01 RX ADMIN — MIDODRINE HYDROCHLORIDE 5 MG: 5 TABLET ORAL at 05:45

## 2023-01-01 RX ADMIN — PIPERACILLIN AND TAZOBACTAM 4.5 G: 4; .5 INJECTION, POWDER, FOR SOLUTION INTRAVENOUS at 04:57

## 2023-01-01 RX ADMIN — Medication 1 APPLICATOR: at 06:07

## 2023-01-01 RX ADMIN — DEXTROSE MONOHYDRATE 25 G: 25 INJECTION, SOLUTION INTRAVENOUS at 01:57

## 2023-01-01 RX ADMIN — PROPOFOL 15 MCG/KG/MIN: 10 INJECTION, EMULSION INTRAVENOUS at 15:43

## 2023-01-01 RX ADMIN — SODIUM CHLORIDE, POTASSIUM CHLORIDE, SODIUM LACTATE AND CALCIUM CHLORIDE: 600; 310; 30; 20 INJECTION, SOLUTION INTRAVENOUS at 03:45

## 2023-01-01 RX ADMIN — HYDROCORTISONE SODIUM SUCCINATE 50 MG: 100 INJECTION, POWDER, FOR SOLUTION INTRAMUSCULAR; INTRAVENOUS at 11:50

## 2023-01-01 RX ADMIN — PIPERACILLIN AND TAZOBACTAM 4.5 G: 4; .5 INJECTION, POWDER, FOR SOLUTION INTRAVENOUS at 12:49

## 2023-01-01 RX ADMIN — NOREPINEPHRINE BITARTRATE 0.05 MCG/KG/MIN: 1 INJECTION, SOLUTION, CONCENTRATE INTRAVENOUS at 13:40

## 2023-01-01 RX ADMIN — Medication 0.08 MCG/KG/MIN: at 05:22

## 2023-01-01 RX ADMIN — FAMOTIDINE 20 MG: 10 INJECTION INTRAVENOUS at 11:56

## 2023-01-01 RX ADMIN — Medication 1 APPLICATOR: at 17:36

## 2023-01-01 RX ADMIN — Medication 200 MCG: at 06:30

## 2023-01-01 RX ADMIN — POTASSIUM CHLORIDE 60 MEQ: 1500 TABLET, EXTENDED RELEASE ORAL at 07:50

## 2023-01-01 RX ADMIN — PIPERACILLIN AND TAZOBACTAM 4.5 G: 4; .5 INJECTION, POWDER, FOR SOLUTION INTRAVENOUS at 05:23

## 2023-01-01 RX ADMIN — PIPERACILLIN AND TAZOBACTAM 4.5 G: 4; .5 INJECTION, POWDER, FOR SOLUTION INTRAVENOUS at 02:32

## 2023-01-01 RX ADMIN — NOREPINEPHRINE BITARTRATE 0.4 MCG/KG/MIN: 1 INJECTION, SOLUTION, CONCENTRATE INTRAVENOUS at 22:03

## 2023-01-01 RX ADMIN — ALBUMIN (HUMAN) 12.5 G: 12.5 SOLUTION INTRAVENOUS at 08:26

## 2023-01-01 RX ADMIN — PROPOFOL 30 MCG/KG/MIN: 10 INJECTION, EMULSION INTRAVENOUS at 04:13

## 2023-01-01 RX ADMIN — PANTOPRAZOLE SODIUM 40 MG: 40 INJECTION, POWDER, FOR SOLUTION INTRAVENOUS at 17:31

## 2023-01-01 RX ADMIN — LEVOTHYROXINE SODIUM 225 MCG: 0.2 TABLET ORAL at 05:11

## 2023-01-01 RX ADMIN — FAMOTIDINE 20 MG: 20 TABLET, FILM COATED ORAL at 05:29

## 2023-01-01 RX ADMIN — HYDROCORTISONE SODIUM SUCCINATE 50 MG: 100 INJECTION, POWDER, FOR SOLUTION INTRAMUSCULAR; INTRAVENOUS at 17:39

## 2023-01-01 RX ADMIN — SODIUM CHLORIDE 250 ML: 9 INJECTION, SOLUTION INTRAVENOUS at 01:35

## 2023-01-01 RX ADMIN — PIPERACILLIN AND TAZOBACTAM 4.5 G: 4; .5 INJECTION, POWDER, FOR SOLUTION INTRAVENOUS at 21:12

## 2023-01-01 RX ADMIN — DEXTROSE MONOHYDRATE 25 G: 25 INJECTION, SOLUTION INTRAVENOUS at 02:25

## 2023-01-01 RX ADMIN — INSULIN HUMAN 2 UNITS: 100 INJECTION, SOLUTION PARENTERAL at 05:44

## 2023-01-01 RX ADMIN — PIPERACILLIN AND TAZOBACTAM 4.5 G: 4; .5 INJECTION, POWDER, FOR SOLUTION INTRAVENOUS at 10:32

## 2023-01-01 RX ADMIN — HYDROMORPHONE HYDROCHLORIDE 0.5 MG: 1 INJECTION, SOLUTION INTRAMUSCULAR; INTRAVENOUS; SUBCUTANEOUS at 20:56

## 2023-01-01 RX ADMIN — DEXTROSE MONOHYDRATE 25 G: 25 INJECTION, SOLUTION INTRAVENOUS at 05:58

## 2023-01-01 RX ADMIN — Medication 1 APPLICATOR: at 04:59

## 2023-01-01 RX ADMIN — HEPARIN SODIUM 2800 UNITS: 1000 INJECTION, SOLUTION INTRAVENOUS; SUBCUTANEOUS at 08:30

## 2023-01-01 RX ADMIN — SODIUM BICARBONATE: 84 INJECTION, SOLUTION INTRAVENOUS at 06:09

## 2023-01-01 RX ADMIN — SENNOSIDES AND DOCUSATE SODIUM 2 TABLET: 50; 8.6 TABLET ORAL at 11:56

## 2023-01-01 RX ADMIN — NOREPINEPHRINE BITARTRATE 1.2 MCG/KG/MIN: 1 INJECTION, SOLUTION, CONCENTRATE INTRAVENOUS at 06:07

## 2023-01-01 RX ADMIN — SODIUM CHLORIDE, SODIUM GLUCONATE, SODIUM ACETATE, POTASSIUM CHLORIDE AND MAGNESIUM CHLORIDE: 526; 502; 368; 37; 30 INJECTION, SOLUTION INTRAVENOUS at 13:24

## 2023-01-01 RX ADMIN — PIPERACILLIN AND TAZOBACTAM 4.5 G: 4; .5 INJECTION, POWDER, FOR SOLUTION INTRAVENOUS at 13:01

## 2023-01-01 RX ADMIN — HEPARIN SODIUM 5000 UNITS: 5000 INJECTION, SOLUTION INTRAVENOUS; SUBCUTANEOUS at 22:12

## 2023-01-01 RX ADMIN — POTASSIUM CHLORIDE 40 MEQ: 20 TABLET, EXTENDED RELEASE ORAL at 14:00

## 2023-01-01 RX ADMIN — LEVOTHYROXINE SODIUM 225 MCG: 0.2 TABLET ORAL at 04:59

## 2023-01-01 RX ADMIN — Medication 1 APPLICATOR: at 17:28

## 2023-01-01 RX ADMIN — PIPERACILLIN AND TAZOBACTAM 4.5 G: 4; .5 INJECTION, POWDER, FOR SOLUTION INTRAVENOUS at 12:43

## 2023-01-01 RX ADMIN — PANTOPRAZOLE SODIUM 40 MG: 40 INJECTION, POWDER, FOR SOLUTION INTRAVENOUS at 05:45

## 2023-01-01 RX ADMIN — FAMOTIDINE 20 MG: 10 INJECTION INTRAVENOUS at 05:00

## 2023-01-01 RX ADMIN — FAMOTIDINE 20 MG: 20 TABLET, FILM COATED ORAL at 04:59

## 2023-01-01 RX ADMIN — INSULIN HUMAN 2 UNITS: 100 INJECTION, SOLUTION PARENTERAL at 12:19

## 2023-01-01 RX ADMIN — IPRATROPIUM BROMIDE AND ALBUTEROL SULFATE 3 ML: 2.5; .5 SOLUTION RESPIRATORY (INHALATION) at 22:04

## 2023-01-01 RX ADMIN — HEPARIN SODIUM 2800 UNITS: 1000 INJECTION, SOLUTION INTRAVENOUS; SUBCUTANEOUS at 10:39

## 2023-01-01 RX ADMIN — HEPARIN SODIUM 5000 UNITS: 5000 INJECTION, SOLUTION INTRAVENOUS; SUBCUTANEOUS at 05:35

## 2023-01-01 RX ADMIN — MIDODRINE HYDROCHLORIDE 10 MG: 5 TABLET ORAL at 05:07

## 2023-01-01 RX ADMIN — ROCURONIUM BROMIDE 60 MG: 50 INJECTION, SOLUTION INTRAVENOUS at 11:51

## 2023-01-01 RX ADMIN — SENNOSIDES AND DOCUSATE SODIUM 2 TABLET: 50; 8.6 TABLET ORAL at 05:06

## 2023-01-01 RX ADMIN — SODIUM CHLORIDE, POTASSIUM CHLORIDE, SODIUM LACTATE AND CALCIUM CHLORIDE 1000 ML: 600; 310; 30; 20 INJECTION, SOLUTION INTRAVENOUS at 23:00

## 2023-01-01 RX ADMIN — SENNOSIDES AND DOCUSATE SODIUM 2 TABLET: 50; 8.6 TABLET ORAL at 17:13

## 2023-01-01 RX ADMIN — PIPERACILLIN AND TAZOBACTAM 4.5 G: 4; .5 INJECTION, POWDER, FOR SOLUTION INTRAVENOUS at 21:55

## 2023-01-01 RX ADMIN — LEVOTHYROXINE SODIUM 225 MCG: 0.2 TABLET ORAL at 05:58

## 2023-01-01 RX ADMIN — PROPOFOL 5 MCG/KG/MIN: 10 INJECTION, EMULSION INTRAVENOUS at 13:53

## 2023-01-01 RX ADMIN — Medication 1 APPLICATOR: at 17:13

## 2023-01-01 RX ADMIN — PHYTONADIONE 10 MG: 5 TABLET ORAL at 07:29

## 2023-01-01 RX ADMIN — SENNOSIDES AND DOCUSATE SODIUM 2 TABLET: 50; 8.6 TABLET ORAL at 17:12

## 2023-01-01 RX ADMIN — PIPERACILLIN AND TAZOBACTAM 4.5 G: 4; .5 INJECTION, POWDER, FOR SOLUTION INTRAVENOUS at 02:39

## 2023-01-01 RX ADMIN — LINEZOLID 600 MG: 600 INJECTION, SOLUTION INTRAVENOUS at 22:20

## 2023-01-01 RX ADMIN — FENTANYL CITRATE 200 MCG: 50 INJECTION, SOLUTION INTRAMUSCULAR; INTRAVENOUS at 21:59

## 2023-01-01 RX ADMIN — OMEPRAZOLE 20 MG: 20 CAPSULE, DELAYED RELEASE ORAL at 05:09

## 2023-01-01 RX ADMIN — Medication 1 APPLICATOR: at 18:00

## 2023-01-01 RX ADMIN — SODIUM BICARBONATE: 84 INJECTION, SOLUTION INTRAVENOUS at 14:13

## 2023-01-01 RX ADMIN — MAGNESIUM SULFATE IN WATER 4 G: 40 INJECTION, SOLUTION INTRAVENOUS at 09:16

## 2023-01-01 RX ADMIN — PIPERACILLIN AND TAZOBACTAM 4.5 G: 4; .5 INJECTION, POWDER, FOR SOLUTION INTRAVENOUS at 05:16

## 2023-01-01 RX ADMIN — HYDROMORPHONE HYDROCHLORIDE 2 MG: 2 INJECTION, SOLUTION INTRAMUSCULAR; INTRAVENOUS; SUBCUTANEOUS at 11:27

## 2023-01-01 RX ADMIN — PHYTONADIONE 10 MG: 5 TABLET ORAL at 05:35

## 2023-01-01 RX ADMIN — LEVOTHYROXINE SODIUM 225 MCG: 0.2 TABLET ORAL at 05:28

## 2023-01-01 RX ADMIN — ROCURONIUM BROMIDE 100 MG: 10 INJECTION, SOLUTION INTRAVENOUS at 08:23

## 2023-01-01 RX ADMIN — HEPARIN SODIUM 5000 UNITS: 5000 INJECTION, SOLUTION INTRAVENOUS; SUBCUTANEOUS at 21:35

## 2023-01-01 RX ADMIN — FUROSEMIDE 120 MG: 10 INJECTION INTRAMUSCULAR; INTRAVENOUS at 08:36

## 2023-01-01 RX ADMIN — Medication 50 MEQ: at 07:23

## 2023-01-01 RX ADMIN — PIPERACILLIN AND TAZOBACTAM 4.5 G: 4; .5 INJECTION, POWDER, FOR SOLUTION INTRAVENOUS at 20:29

## 2023-01-01 RX ADMIN — HUMAN ALBUMIN MICROSPHERES AND PERFLUTREN 3 ML: 10; .22 INJECTION, SOLUTION INTRAVENOUS at 16:00

## 2023-01-01 RX ADMIN — SODIUM CHLORIDE 500 ML: 9 INJECTION, SOLUTION INTRAVENOUS at 12:22

## 2023-01-01 RX ADMIN — VASOPRESSIN 0.03 UNITS/MIN: 20 INJECTION INTRAVENOUS at 17:50

## 2023-01-01 RX ADMIN — Medication 1 APPLICATOR: at 17:27

## 2023-01-01 RX ADMIN — PIPERACILLIN AND TAZOBACTAM 4.5 G: 4; .5 INJECTION, POWDER, FOR SOLUTION INTRAVENOUS at 09:43

## 2023-01-01 RX ADMIN — ROCURONIUM BROMIDE 100 MG: 50 INJECTION, SOLUTION INTRAVENOUS at 05:39

## 2023-01-01 RX ADMIN — CALCIUM CHLORIDE 1 G: 100 INJECTION INTRAVENOUS; INTRAVENTRICULAR at 07:07

## 2023-01-01 RX ADMIN — LORAZEPAM 1 MG: 2 LIQUID ORAL at 09:37

## 2023-01-01 RX ADMIN — FAMOTIDINE 20 MG: 10 INJECTION INTRAVENOUS at 18:31

## 2023-01-01 RX ADMIN — MIDODRINE HYDROCHLORIDE 5 MG: 5 TABLET ORAL at 08:14

## 2023-01-01 RX ADMIN — LINEZOLID 600 MG: 600 INJECTION, SOLUTION INTRAVENOUS at 05:46

## 2023-01-01 RX ADMIN — OXYCODONE HYDROCHLORIDE 5 MG: 5 TABLET ORAL at 13:29

## 2023-01-01 RX ADMIN — HEPARIN SODIUM 24 UNITS/KG/HR: 5000 INJECTION, SOLUTION INTRAVENOUS at 23:07

## 2023-01-01 RX ADMIN — DEXMEDETOMIDINE HYDROCHLORIDE 0.2 MCG/KG/HR: 100 INJECTION, SOLUTION INTRAVENOUS at 20:06

## 2023-01-01 RX ADMIN — SODIUM CHLORIDE: 9 INJECTION, SOLUTION INTRAVENOUS at 16:56

## 2023-01-01 RX ADMIN — LORAZEPAM 2 MG: 2 INJECTION INTRAMUSCULAR; INTRAVENOUS at 11:27

## 2023-01-01 RX ADMIN — PIPERACILLIN AND TAZOBACTAM 4.5 G: 4; .5 INJECTION, POWDER, FOR SOLUTION INTRAVENOUS at 18:36

## 2023-01-01 RX ADMIN — HEPARIN SODIUM 26 UNITS/KG/HR: 5000 INJECTION, SOLUTION INTRAVENOUS at 04:34

## 2023-01-01 RX ADMIN — PIPERACILLIN AND TAZOBACTAM 4.5 G: 4; .5 INJECTION, POWDER, FOR SOLUTION INTRAVENOUS at 04:30

## 2023-01-01 RX ADMIN — HEPARIN SODIUM 2800 UNITS: 1000 INJECTION, SOLUTION INTRAVENOUS; SUBCUTANEOUS at 14:08

## 2023-01-01 RX ADMIN — Medication 200 MCG/HR: at 12:25

## 2023-01-01 RX ADMIN — SENNOSIDES AND DOCUSATE SODIUM 2 TABLET: 50; 8.6 TABLET ORAL at 17:36

## 2023-01-01 RX ADMIN — Medication 1 APPLICATOR: at 06:28

## 2023-01-01 RX ADMIN — PIPERACILLIN AND TAZOBACTAM 4.5 G: 4; .5 INJECTION, POWDER, FOR SOLUTION INTRAVENOUS at 17:21

## 2023-01-01 RX ADMIN — HEPARIN SODIUM 2800 UNITS: 1000 INJECTION, SOLUTION INTRAVENOUS; SUBCUTANEOUS at 12:33

## 2023-01-01 RX ADMIN — ROCURONIUM BROMIDE 100 MG: 50 INJECTION, SOLUTION INTRAVENOUS at 17:21

## 2023-01-01 RX ADMIN — PIPERACILLIN AND TAZOBACTAM 4.5 G: 4; .5 INJECTION, POWDER, FOR SOLUTION INTRAVENOUS at 14:30

## 2023-01-01 RX ADMIN — Medication 1 APPLICATOR: at 05:36

## 2023-01-01 RX ADMIN — HYDROMORPHONE HYDROCHLORIDE 0.5 MG: 2 INJECTION INTRAMUSCULAR; INTRAVENOUS; SUBCUTANEOUS at 14:36

## 2023-01-01 RX ADMIN — HUMAN ALBUMIN MICROSPHERES AND PERFLUTREN 3 ML: 10; .22 INJECTION, SOLUTION INTRAVENOUS at 01:05

## 2023-01-01 RX ADMIN — HEPARIN SODIUM 2800 UNITS: 1000 INJECTION, SOLUTION INTRAVENOUS; SUBCUTANEOUS at 12:25

## 2023-01-01 RX ADMIN — HEPARIN SODIUM 5000 UNITS: 5000 INJECTION, SOLUTION INTRAVENOUS; SUBCUTANEOUS at 13:15

## 2023-01-01 RX ADMIN — NOREPINEPHRINE BITARTRATE 0.16 MCG/KG/MIN: 1 INJECTION, SOLUTION, CONCENTRATE INTRAVENOUS at 20:44

## 2023-01-01 RX ADMIN — Medication 1 APPLICATOR: at 05:23

## 2023-01-01 RX ADMIN — INSULIN HUMAN 2 UNITS: 100 INJECTION, SOLUTION PARENTERAL at 05:45

## 2023-01-01 RX ADMIN — Medication 1 APPLICATOR: at 05:30

## 2023-01-01 RX ADMIN — PIPERACILLIN AND TAZOBACTAM 4.5 G: 4; .5 INJECTION, POWDER, FOR SOLUTION INTRAVENOUS at 12:28

## 2023-01-01 RX ADMIN — LEVOTHYROXINE SODIUM 225 MCG: 0.2 TABLET ORAL at 05:08

## 2023-01-01 RX ADMIN — HEPARIN SODIUM 26 UNITS/KG/HR: 5000 INJECTION, SOLUTION INTRAVENOUS at 22:19

## 2023-01-01 RX ADMIN — Medication 1 APPLICATOR: at 05:05

## 2023-01-01 RX ADMIN — HYDROMORPHONE HYDROCHLORIDE 1 MG: 1 INJECTION, SOLUTION INTRAMUSCULAR; INTRAVENOUS; SUBCUTANEOUS at 19:26

## 2023-01-01 RX ADMIN — NOREPINEPHRINE BITARTRATE 0.03 MCG/KG/MIN: 1 INJECTION, SOLUTION, CONCENTRATE INTRAVENOUS at 21:27

## 2023-01-01 RX ADMIN — SODIUM CHLORIDE, SODIUM GLUCONATE, SODIUM ACETATE, POTASSIUM CHLORIDE AND MAGNESIUM CHLORIDE: 526; 502; 368; 37; 30 INJECTION, SOLUTION INTRAVENOUS at 12:15

## 2023-01-01 RX ADMIN — PIPERACILLIN AND TAZOBACTAM 4.5 G: 4; .5 INJECTION, POWDER, FOR SOLUTION INTRAVENOUS at 01:42

## 2023-01-01 RX ADMIN — Medication 200 MCG/HR: at 15:38

## 2023-01-01 RX ADMIN — HYDROMORPHONE HYDROCHLORIDE 0.5 MG: 2 INJECTION INTRAMUSCULAR; INTRAVENOUS; SUBCUTANEOUS at 14:08

## 2023-01-01 RX ADMIN — CALCIUM CHLORIDE 2000 MG: 100 INJECTION, SOLUTION INTRAVENOUS at 23:28

## 2023-01-01 RX ADMIN — SODIUM CHLORIDE 500 ML: 9 INJECTION, SOLUTION INTRAVENOUS at 02:26

## 2023-01-01 RX ADMIN — PIPERACILLIN AND TAZOBACTAM 4.5 G: 4; .5 INJECTION, POWDER, FOR SOLUTION INTRAVENOUS at 21:38

## 2023-01-01 RX ADMIN — HYDROMORPHONE HYDROCHLORIDE 1 MG: 1 INJECTION, SOLUTION INTRAMUSCULAR; INTRAVENOUS; SUBCUTANEOUS at 05:33

## 2023-01-01 RX ADMIN — Medication 1 APPLICATOR: at 17:08

## 2023-01-01 RX ADMIN — Medication 1 APPLICATOR: at 17:31

## 2023-01-01 RX ADMIN — HEPARIN SODIUM 4200 UNITS: 1000 INJECTION, SOLUTION INTRAVENOUS; SUBCUTANEOUS at 12:20

## 2023-01-01 RX ADMIN — HYDROCORTISONE SODIUM SUCCINATE 50 MG: 100 INJECTION, POWDER, FOR SOLUTION INTRAMUSCULAR; INTRAVENOUS at 17:28

## 2023-01-01 RX ADMIN — CALCIUM CHLORIDE 1 G: 100 INJECTION INTRAVENOUS; INTRAVENTRICULAR at 08:31

## 2023-01-01 RX ADMIN — LEVOTHYROXINE SODIUM 225 MCG: 0.2 TABLET ORAL at 05:15

## 2023-01-01 RX ADMIN — OMEPRAZOLE 20 MG: 20 CAPSULE, DELAYED RELEASE ORAL at 17:28

## 2023-01-01 RX ADMIN — PIPERACILLIN AND TAZOBACTAM 4.5 G: 4; .5 INJECTION, POWDER, FOR SOLUTION INTRAVENOUS at 05:03

## 2023-01-01 RX ADMIN — HEPARIN SODIUM 2800 UNITS: 1000 INJECTION, SOLUTION INTRAVENOUS; SUBCUTANEOUS at 12:00

## 2023-01-01 RX ADMIN — HEPARIN SODIUM 2800 UNITS: 1000 INJECTION, SOLUTION INTRAVENOUS; SUBCUTANEOUS at 16:54

## 2023-01-01 RX ADMIN — HYDROMORPHONE HYDROCHLORIDE 1 MG: 1 INJECTION, SOLUTION INTRAMUSCULAR; INTRAVENOUS; SUBCUTANEOUS at 19:28

## 2023-01-01 RX ADMIN — DEXTROSE AND SODIUM CHLORIDE: 10; .45 INJECTION, SOLUTION INTRAVENOUS at 03:56

## 2023-01-01 RX ADMIN — MIDAZOLAM HYDROCHLORIDE 2 MG: 1 INJECTION, SOLUTION INTRAMUSCULAR; INTRAVENOUS at 06:07

## 2023-01-01 RX ADMIN — SENNOSIDES AND DOCUSATE SODIUM 2 TABLET: 50; 8.6 TABLET ORAL at 17:26

## 2023-01-01 RX ADMIN — IPRATROPIUM BROMIDE AND ALBUTEROL SULFATE 3 ML: 2.5; .5 SOLUTION RESPIRATORY (INHALATION) at 16:36

## 2023-01-01 RX ADMIN — HEPARIN SODIUM 18 UNITS/KG/HR: 5000 INJECTION, SOLUTION INTRAVENOUS at 23:44

## 2023-01-01 RX ADMIN — PIPERACILLIN AND TAZOBACTAM 4.5 G: 4; .5 INJECTION, POWDER, FOR SOLUTION INTRAVENOUS at 03:39

## 2023-01-01 RX ADMIN — FLUCONAZOLE 800 MG: 2 INJECTION, SOLUTION INTRAVENOUS at 01:27

## 2023-01-01 RX ADMIN — Medication 200 MCG: at 05:45

## 2023-01-01 RX ADMIN — PIPERACILLIN AND TAZOBACTAM 4.5 G: 4; .5 INJECTION, POWDER, FOR SOLUTION INTRAVENOUS at 20:55

## 2023-01-01 RX ADMIN — INSULIN HUMAN 2 UNITS: 100 INJECTION, SOLUTION PARENTERAL at 17:32

## 2023-01-01 RX ADMIN — HEPARIN SODIUM 2800 UNITS: 1000 INJECTION, SOLUTION INTRAVENOUS; SUBCUTANEOUS at 13:40

## 2023-01-01 RX ADMIN — Medication 1 APPLICATOR: at 17:18

## 2023-01-01 RX ADMIN — Medication 1 APPLICATOR: at 17:14

## 2023-01-01 RX ADMIN — HYDROCORTISONE SODIUM SUCCINATE 50 MG: 100 INJECTION, POWDER, FOR SOLUTION INTRAMUSCULAR; INTRAVENOUS at 23:27

## 2023-01-01 RX ADMIN — HEPARIN SODIUM 22 UNITS/KG/HR: 5000 INJECTION, SOLUTION INTRAVENOUS at 01:39

## 2023-01-01 RX ADMIN — PANTOPRAZOLE SODIUM 40 MG: 40 INJECTION, POWDER, FOR SOLUTION INTRAVENOUS at 17:28

## 2023-01-01 RX ADMIN — SODIUM BICARBONATE: 84 INJECTION, SOLUTION INTRAVENOUS at 04:13

## 2023-01-01 RX ADMIN — PHYTONADIONE 10 MG: 5 TABLET ORAL at 08:14

## 2023-01-01 RX ADMIN — IOHEXOL 100 ML: 350 INJECTION, SOLUTION INTRAVENOUS at 22:30

## 2023-01-01 RX ADMIN — EPINEPHRINE 0.08 MCG/KG/MIN: 1 INJECTION INTRAMUSCULAR; INTRAVENOUS; SUBCUTANEOUS at 05:22

## 2023-01-01 RX ADMIN — VASOPRESSIN 0.03 UNITS/MIN: 20 INJECTION INTRAVENOUS at 05:00

## 2023-01-01 RX ADMIN — HYDROMORPHONE HYDROCHLORIDE 0.5 MG: 1 INJECTION, SOLUTION INTRAMUSCULAR; INTRAVENOUS; SUBCUTANEOUS at 10:35

## 2023-01-01 RX ADMIN — IPRATROPIUM BROMIDE AND ALBUTEROL SULFATE 3 ML: 2.5; .5 SOLUTION RESPIRATORY (INHALATION) at 06:24

## 2023-01-01 RX ADMIN — CALCIUM CHLORIDE 1000 MG: 100 INJECTION, SOLUTION INTRAVENOUS at 22:17

## 2023-01-01 RX ADMIN — HYDROCORTISONE SODIUM SUCCINATE 50 MG: 100 INJECTION, POWDER, FOR SOLUTION INTRAMUSCULAR; INTRAVENOUS at 05:33

## 2023-01-01 RX ADMIN — HEPARIN SODIUM 26 UNITS/KG/HR: 5000 INJECTION, SOLUTION INTRAVENOUS at 20:50

## 2023-01-01 RX ADMIN — VASOPRESSIN 0.03 UNITS/MIN: 20 INJECTION INTRAVENOUS at 03:19

## 2023-01-01 RX ADMIN — PIPERACILLIN AND TAZOBACTAM 4.5 G: 4; .5 INJECTION, POWDER, FOR SOLUTION INTRAVENOUS at 08:17

## 2023-01-01 RX ADMIN — MIDODRINE HYDROCHLORIDE 10 MG: 5 TABLET ORAL at 13:34

## 2023-01-01 RX ADMIN — MIDODRINE HYDROCHLORIDE 5 MG: 5 TABLET ORAL at 22:12

## 2023-01-01 RX ADMIN — SODIUM CHLORIDE, POTASSIUM CHLORIDE, SODIUM LACTATE AND CALCIUM CHLORIDE: 600; 310; 30; 20 INJECTION, SOLUTION INTRAVENOUS at 17:37

## 2023-01-01 RX ADMIN — NOREPINEPHRINE BITARTRATE 0.07 MCG/KG/MIN: 1 INJECTION, SOLUTION, CONCENTRATE INTRAVENOUS at 21:21

## 2023-01-01 RX ADMIN — HYDROCORTISONE SODIUM SUCCINATE 50 MG: 100 INJECTION, POWDER, FOR SOLUTION INTRAMUSCULAR; INTRAVENOUS at 08:02

## 2023-01-01 RX ADMIN — Medication 1 APPLICATOR: at 18:31

## 2023-01-01 RX ADMIN — HEPARIN SODIUM 24 UNITS/KG/HR: 5000 INJECTION, SOLUTION INTRAVENOUS at 12:21

## 2023-01-01 RX ADMIN — SODIUM BICARBONATE: 84 INJECTION, SOLUTION INTRAVENOUS at 08:01

## 2023-01-01 RX ADMIN — NOREPINEPHRINE BITARTRATE 0.06 MCG/KG/MIN: 1 INJECTION, SOLUTION, CONCENTRATE INTRAVENOUS at 04:40

## 2023-01-01 RX ADMIN — HYDROMORPHONE HYDROCHLORIDE 1 MG: 1 INJECTION, SOLUTION INTRAMUSCULAR; INTRAVENOUS; SUBCUTANEOUS at 03:24

## 2023-01-01 RX ADMIN — Medication 1 APPLICATOR: at 17:41

## 2023-01-01 RX ADMIN — FUROSEMIDE 80 MG: 10 INJECTION INTRAMUSCULAR; INTRAVENOUS at 06:19

## 2023-01-01 RX ADMIN — IOHEXOL 100 ML: 350 INJECTION, SOLUTION INTRAVENOUS at 02:42

## 2023-01-01 RX ADMIN — HYDROCORTISONE SODIUM SUCCINATE 100 MG: 100 INJECTION, POWDER, FOR SOLUTION INTRAMUSCULAR; INTRAVENOUS at 06:46

## 2023-01-01 RX ADMIN — INSULIN HUMAN 2 UNITS: 100 INJECTION, SOLUTION PARENTERAL at 05:18

## 2023-01-01 RX ADMIN — Medication 1 APPLICATOR: at 05:11

## 2023-01-01 RX ADMIN — Medication 200 MCG: at 17:21

## 2023-01-01 RX ADMIN — NOREPINEPHRINE BITARTRATE 0.52 MCG/KG/MIN: 1 INJECTION, SOLUTION, CONCENTRATE INTRAVENOUS at 10:00

## 2023-01-01 RX ADMIN — CALCIUM CHLORIDE 1 G: 100 INJECTION INTRAVENOUS; INTRAVENTRICULAR at 10:22

## 2023-01-01 RX ADMIN — SODIUM CHLORIDE, POTASSIUM CHLORIDE, SODIUM LACTATE AND CALCIUM CHLORIDE 1000 ML: 600; 310; 30; 20 INJECTION, SOLUTION INTRAVENOUS at 07:45

## 2023-01-01 RX ADMIN — HEPARIN SODIUM 18 UNITS/KG/HR: 5000 INJECTION, SOLUTION INTRAVENOUS at 18:02

## 2023-01-01 RX ADMIN — INSULIN HUMAN 2 UNITS: 100 INJECTION, SOLUTION PARENTERAL at 05:35

## 2023-01-01 RX ADMIN — SENNOSIDES AND DOCUSATE SODIUM 2 TABLET: 50; 8.6 TABLET ORAL at 05:15

## 2023-01-01 RX ADMIN — SENNOSIDES AND DOCUSATE SODIUM 2 TABLET: 50; 8.6 TABLET ORAL at 17:15

## 2023-01-01 RX ADMIN — DEXTROSE AND SODIUM CHLORIDE: 10; .45 INJECTION, SOLUTION INTRAVENOUS at 02:44

## 2023-01-01 RX ADMIN — HEPARIN SODIUM 26 UNITS/KG/HR: 5000 INJECTION, SOLUTION INTRAVENOUS at 10:17

## 2023-01-01 ASSESSMENT — LIFESTYLE VARIABLES
HAVE PEOPLE ANNOYED YOU BY CRITICIZING YOUR DRINKING: NO
HAVE YOU EVER FELT YOU SHOULD CUT DOWN ON YOUR DRINKING: NO
EVER FELT BAD OR GUILTY ABOUT YOUR DRINKING: NO
HOW MANY TIMES IN THE PAST YEAR HAVE YOU HAD 5 OR MORE DRINKS IN A DAY: 0
CONSUMPTION TOTAL: NEGATIVE
ON A TYPICAL DAY WHEN YOU DRINK ALCOHOL HOW MANY DRINKS DO YOU HAVE: 0
AVERAGE NUMBER OF DAYS PER WEEK YOU HAVE A DRINK CONTAINING ALCOHOL: 0
TOTAL SCORE: 0
TOTAL SCORE: 0
EVER HAD A DRINK FIRST THING IN THE MORNING TO STEADY YOUR NERVES TO GET RID OF A HANGOVER: NO
ALCOHOL_USE: NO
TOTAL SCORE: 0

## 2023-01-01 ASSESSMENT — ENCOUNTER SYMPTOMS
SHORTNESS OF BREATH: 0
ABDOMINAL PAIN: 1
SORE THROAT: 0
FLANK PAIN: 0
VOMITING: 0
FEVER: 0
NERVOUS/ANXIOUS: 0
ABDOMINAL PAIN: 1
ABDOMINAL PAIN: 1
MYALGIAS: 0
COUGH: 0
BLURRED VISION: 0
SHORTNESS OF BREATH: 0
SENSORY CHANGE: 0
FEVER: 0
WEIGHT LOSS: 0
MEMORY LOSS: 0
WEIGHT LOSS: 0
DEPRESSION: 0
SORE THROAT: 0
BLOOD IN STOOL: 1
CHILLS: 0
NAUSEA: 0
CHILLS: 0
SHORTNESS OF BREATH: 1
WHEEZING: 0
FOCAL WEAKNESS: 0
NAUSEA: 0
DEPRESSION: 0
NAUSEA: 0
BACK PAIN: 0
FEVER: 0
FEVER: 0
NAUSEA: 0
COUGH: 1
VOMITING: 0
SPEECH CHANGE: 0
FEVER: 0
HEADACHES: 0
DIZZINESS: 0
DOUBLE VISION: 0
CLAUDICATION: 0
DIARRHEA: 0
HALLUCINATIONS: 0
EYE DISCHARGE: 0
NECK PAIN: 0
WEIGHT LOSS: 0
ABDOMINAL PAIN: 1
EYE REDNESS: 0
NAUSEA: 0
NAUSEA: 0
PALPITATIONS: 0
CHILLS: 0
FEVER: 0
VOMITING: 0
NAUSEA: 0
BRUISES/BLEEDS EASILY: 0
SPEECH CHANGE: 0
SPUTUM PRODUCTION: 0
VOMITING: 0
ABDOMINAL PAIN: 0
FOCAL WEAKNESS: 0
WEAKNESS: 0
MYALGIAS: 0
BLOOD IN STOOL: 0
CONSTIPATION: 0
EYE REDNESS: 0
DIZZINESS: 0
VOMITING: 0
SHORTNESS OF BREATH: 1
SHORTNESS OF BREATH: 0
SPUTUM PRODUCTION: 1
COUGH: 0
VOMITING: 0
SHORTNESS OF BREATH: 1
BACK PAIN: 0
BRUISES/BLEEDS EASILY: 0
EYE DISCHARGE: 0
WEAKNESS: 1
ABDOMINAL PAIN: 1
VOMITING: 0
FEVER: 0

## 2023-01-01 ASSESSMENT — PAIN DESCRIPTION - PAIN TYPE
TYPE: ACUTE PAIN;SURGICAL PAIN
TYPE: ACUTE PAIN
TYPE: ACUTE PAIN
TYPE: ACUTE PAIN;SURGICAL PAIN
TYPE: ACUTE PAIN
TYPE: ACUTE PAIN
TYPE: ACUTE PAIN;SURGICAL PAIN
TYPE: ACUTE PAIN
TYPE: ACUTE PAIN;SURGICAL PAIN
TYPE: ACUTE PAIN
TYPE: ACUTE PAIN;SURGICAL PAIN
TYPE: ACUTE PAIN
TYPE: ACUTE PAIN
TYPE: ACUTE PAIN;SURGICAL PAIN
TYPE: ACUTE PAIN
TYPE: ACUTE PAIN;SURGICAL PAIN
TYPE: ACUTE PAIN
TYPE: ACUTE PAIN;SURGICAL PAIN
TYPE: ACUTE PAIN
TYPE: ACUTE PAIN
TYPE: ACUTE PAIN;SURGICAL PAIN
TYPE: ACUTE PAIN
TYPE: ACUTE PAIN;SURGICAL PAIN
TYPE: ACUTE PAIN
TYPE: ACUTE PAIN;SURGICAL PAIN
TYPE: ACUTE PAIN;SURGICAL PAIN
TYPE: ACUTE PAIN
TYPE: SURGICAL PAIN;ACUTE PAIN
TYPE: ACUTE PAIN

## 2023-01-01 ASSESSMENT — FIBROSIS 4 INDEX
FIB4 SCORE: 87.5
FIB4 SCORE: 6.09
FIB4 SCORE: 45.37
FIB4 SCORE: 131.72
FIB4 SCORE: 3.68
FIB4 SCORE: 2.42
FIB4 SCORE: 1.21
FIB4 SCORE: 15.59
FIB4 SCORE: 10.93
FIB4 SCORE: 87.5
FIB4 SCORE: 1.96
FIB4 SCORE: 12.52

## 2023-01-01 ASSESSMENT — COGNITIVE AND FUNCTIONAL STATUS - GENERAL
SUGGESTED CMS G CODE MODIFIER MOBILITY: CN
HELP NEEDED FOR BATHING: TOTAL
STANDING UP FROM CHAIR USING ARMS: TOTAL
MOBILITY SCORE: 6
MOVING TO AND FROM BED TO CHAIR: UNABLE
DRESSING REGULAR LOWER BODY CLOTHING: TOTAL
MOVING TO AND FROM BED TO CHAIR: UNABLE
HELP NEEDED FOR BATHING: TOTAL
PERSONAL GROOMING: TOTAL
DRESSING REGULAR LOWER BODY CLOTHING: TOTAL
TURNING FROM BACK TO SIDE WHILE IN FLAT BAD: UNABLE
TOILETING: TOTAL
EATING MEALS: TOTAL
DAILY ACTIVITIY SCORE: 6
MOVING FROM LYING ON BACK TO SITTING ON SIDE OF FLAT BED: UNABLE
DRESSING REGULAR UPPER BODY CLOTHING: TOTAL
MOVING FROM LYING ON BACK TO SITTING ON SIDE OF FLAT BED: UNABLE
MOBILITY SCORE: 6
SUGGESTED CMS G CODE MODIFIER DAILY ACTIVITY: CN
MOVING FROM LYING ON BACK TO SITTING ON SIDE OF FLAT BED: UNABLE
DRESSING REGULAR LOWER BODY CLOTHING: TOTAL
TOILETING: TOTAL
EATING MEALS: A LITTLE
WALKING IN HOSPITAL ROOM: TOTAL
TOILETING: TOTAL
SUGGESTED CMS G CODE MODIFIER MOBILITY: CN
WALKING IN HOSPITAL ROOM: TOTAL
PERSONAL GROOMING: A LITTLE
DAILY ACTIVITIY SCORE: 12
CLIMB 3 TO 5 STEPS WITH RAILING: TOTAL
MOBILITY SCORE: 6
SUGGESTED CMS G CODE MODIFIER MOBILITY: CN
TURNING FROM BACK TO SIDE WHILE IN FLAT BAD: UNABLE
CLIMB 3 TO 5 STEPS WITH RAILING: TOTAL
TURNING FROM BACK TO SIDE WHILE IN FLAT BAD: UNABLE
STANDING UP FROM CHAIR USING ARMS: TOTAL
SUGGESTED CMS G CODE MODIFIER DAILY ACTIVITY: CL
DRESSING REGULAR UPPER BODY CLOTHING: TOTAL
PERSONAL GROOMING: TOTAL
CLIMB 3 TO 5 STEPS WITH RAILING: TOTAL
DRESSING REGULAR UPPER BODY CLOTHING: A LOT
EATING MEALS: TOTAL
WALKING IN HOSPITAL ROOM: TOTAL
STANDING UP FROM CHAIR USING ARMS: TOTAL
SUGGESTED CMS G CODE MODIFIER DAILY ACTIVITY: CN
HELP NEEDED FOR BATHING: A LOT
MOVING TO AND FROM BED TO CHAIR: UNABLE
DAILY ACTIVITIY SCORE: 6

## 2023-01-01 ASSESSMENT — PATIENT HEALTH QUESTIONNAIRE - PHQ9
2. FEELING DOWN, DEPRESSED, IRRITABLE, OR HOPELESS: NOT AT ALL
1. LITTLE INTEREST OR PLEASURE IN DOING THINGS: NOT AT ALL
2. FEELING DOWN, DEPRESSED, IRRITABLE, OR HOPELESS: NOT AT ALL
1. LITTLE INTEREST OR PLEASURE IN DOING THINGS: NOT AT ALL
1. LITTLE INTEREST OR PLEASURE IN DOING THINGS: NOT AT ALL
2. FEELING DOWN, DEPRESSED, IRRITABLE, OR HOPELESS: NOT AT ALL
SUM OF ALL RESPONSES TO PHQ9 QUESTIONS 1 AND 2: 0
SUM OF ALL RESPONSES TO PHQ9 QUESTIONS 1 AND 2: 0
1. LITTLE INTEREST OR PLEASURE IN DOING THINGS: NOT AT ALL
SUM OF ALL RESPONSES TO PHQ9 QUESTIONS 1 AND 2: 0
2. FEELING DOWN, DEPRESSED, IRRITABLE, OR HOPELESS: NOT AT ALL
SUM OF ALL RESPONSES TO PHQ9 QUESTIONS 1 AND 2: 0
2. FEELING DOWN, DEPRESSED, IRRITABLE, OR HOPELESS: NOT AT ALL
1. LITTLE INTEREST OR PLEASURE IN DOING THINGS: NOT AT ALL
SUM OF ALL RESPONSES TO PHQ9 QUESTIONS 1 AND 2: 0

## 2023-01-01 ASSESSMENT — COPD QUESTIONNAIRES
HAVE YOU SMOKED AT LEAST 100 CIGARETTES IN YOUR ENTIRE LIFE: NO/DON'T KNOW
DURING THE PAST 4 WEEKS HOW MUCH DID YOU FEEL SHORT OF BREATH: NONE/LITTLE OF THE TIME
DO YOU EVER COUGH UP ANY MUCUS OR PHLEGM?: NO/ONLY WITH OCCASIONAL COLDS OR INFECTIONS
COPD SCREENING SCORE: 2

## 2023-01-01 ASSESSMENT — GAIT ASSESSMENTS
GAIT LEVEL OF ASSIST: UNABLE TO PARTICIPATE
GAIT LEVEL OF ASSIST: UNABLE TO PARTICIPATE

## 2023-01-01 ASSESSMENT — PULMONARY FUNCTION TESTS
FVC: 0.4
FVC: 0.4
FVC: 0.64
FVC: 0.7

## 2023-01-01 ASSESSMENT — ACTIVITIES OF DAILY LIVING (ADL): TOILETING: INDEPENDENT

## 2023-01-01 ASSESSMENT — PAIN SCALES - GENERAL
PAIN_LEVEL: 0
PAIN_LEVEL: 0

## 2023-07-11 NOTE — PROGRESS NOTES
"Sumaya English is a 63 y.o. female who presents with Leg Pain (R leg )            2 days right leg swelling. No new SOB. No hemoptysis.  No chest pain.  She notes decreased activity level over the last month due to right upper quadrant pain.  She is scheduled for cholecystectomy in the near future.  No fever.  No trauma to her leg.  No redness or rash. No other aggravating or alleviating factors.          Review of Systems   Constitutional:  Negative for malaise/fatigue and weight loss.   Eyes:  Negative for discharge and redness.   Gastrointestinal:  Negative for nausea and vomiting.   Musculoskeletal:  Negative for joint pain and myalgias.   Skin:  Negative for itching and rash.              Objective     /82 (BP Location: Left arm, Patient Position: Sitting, BP Cuff Size: Adult)   Pulse (!) 111   Temp 36.4 °C (97.6 °F) (Temporal)   Resp 16   Ht 1.778 m (5' 10\")   Wt (!) 159 kg (350 lb)   SpO2 94%   BMI 50.22 kg/m²      Physical Exam  Constitutional:       General: She is not in acute distress.     Appearance: She is well-developed.   HENT:      Head: Normocephalic and atraumatic.   Eyes:      Conjunctiva/sclera: Conjunctivae normal.   Cardiovascular:      Rate and Rhythm: Regular rhythm. Tachycardia present.      Heart sounds: Normal heart sounds. No murmur heard.     Comments: No JVD  Pulmonary:      Effort: Pulmonary effort is normal.      Breath sounds: Normal breath sounds. No wheezing.   Musculoskeletal:      Comments: Right lower extremity swollen compared to the left.  No obvious cords.  Distal neurovascular intact   Skin:     General: Skin is warm and dry.      Findings: No rash.   Neurological:      Mental Status: She is alert.                             Assessment & Plan   US per radiology:  1. There is acute noncompressible DVT in the entire visualized right lower extremity venous system.     2.  The patient was sent directly to the emergency room.     A secure " VOALTE message was sent and confirmed received to LAI BELLA ELSY on 7/12/2023 at 6:37 PM..      1. Leg swelling  US-EXTREMITY VENOUS LOWER UNILAT RIGHT        Differential diagnosis, natural history, supportive care, and indications for immediate follow-up were discussed.     US scheduled on day of visit was cancelled and rescheduled for the evening of 6/12/2023. This was communicated to me through Teams. I called Ashley on 6/11 and recommended that she go the ED at that time. She declined preferring to wait for the next scheduled ultrasound understanding the risk.     I called Ashley on 6/12 at the time of ultrasound report and she noted that she was significantly SOB and waiting to be seen in the ED.  I recommended that she stay there for further evaluation.

## 2023-07-13 PROBLEM — I82.409 ACUTE DEEP VENOUS THROMBOSIS (DVT) DETERMINED BY ULTRASOUND (HCC): Status: ACTIVE | Noted: 2023-01-01

## 2023-07-13 PROBLEM — K66.8 PNEUMOPERITONEUM OF UNKNOWN ETIOLOGY: Status: ACTIVE | Noted: 2023-01-01

## 2023-07-13 PROBLEM — E87.6 HYPOKALEMIA: Status: ACTIVE | Noted: 2023-01-01

## 2023-07-13 PROBLEM — K66.8 PNEUMOPERITONEUM: Status: ACTIVE | Noted: 2023-01-01

## 2023-07-13 PROBLEM — I26.92 ACUTE SADDLE PULMONARY EMBOLISM (HCC): Status: ACTIVE | Noted: 2023-01-01

## 2023-07-13 PROBLEM — E66.01 MORBID OBESITY WITH BMI OF 50.0-59.9, ADULT (HCC): Status: ACTIVE | Noted: 2023-01-01

## 2023-07-13 NOTE — OR NURSING
Plan of care has changed to treating clot in lung first before exlap attempted per Dr. English and  Dr. Moctezuma.  Dr. Moctezuma to talk with pt first before departing for IR or ICU.

## 2023-07-13 NOTE — CONSULTS
Surgical History and Physical    Date of Service: 7/13/2023    Requesting Physician: Surinder Ibanez MD - ER    Reason for Consultation: Pneumoperitoneum    HPI: This is a 63 y.o. female who is presenting with the main complaint of acute right leg pain.  She was evaluated in the ER with a venous duplex ultrasound and found to have and acute DVT from the right common femoral vein extending down the entire leg.  A CTA of the chest was perfored and showed the right and left pulmonary arteries and their segmental and subsegmental branches containing embolus.      However, the CTA also showed pneumoperitoneum either from diverticulitis at the splenic flexure or from an upper gastrointestinal perforation.  Interestingly, the patient's main complaint is not abdominal pain.  It is worth noting that she is supposed to undergo an outpatient cholecystectomy with Dr. Lee on 7/19.  The patient states she was in her usual state of health up until she developed gallbladder issues about 2 weeks ago.      PAST MEDICAL HISTORY:   Past Medical History:   Diagnosis Date    Disorder of thyroid     Morbid obesity with BMI of 50.0-59.9, adult ([facility]) 7/13/2023      PAST SURGICAL HISTORY:   Past Surgical History:   Procedure Laterality Date    KNEE ARTHROPLASTY TOTAL Left 9/12/2017    Procedure: KNEE ARTHROPLASTY TOTAL;  Surgeon: Bull James M.D.;  Location: SURGERY [facility];  Service: Orthopedics       ALLERGIES: Nkda [no known drug allergy]       CURRENT MEDICATIONS:   Levothyroxine    FAMILY HISTORY:   Reviewed and found to be non-contributory in regards to the above presentation    SOCIAL HISTORY:  reports that she has never smoked. She has never used smokeless tobacco. She reports that she does not drink alcohol and does not use drugs.      Review of Systems:  Constitutional: Negative for fever, chills, weight loss, malaise/fatigue and diaphoresis.   HENT: Negative for hearing loss, ear pain, nosebleeds, congestion,  sore throat, neck pain, tinnitus and ear discharge.    Eyes: Negative for blurred vision, double vision, photophobia, pain, discharge and redness.   Respiratory: Negative for cough, hemoptysis, sputum production, shortness of breath, wheezing and stridor.    Cardiovascular: Negative for chest pain, palpitations, orthopnea, claudication, leg swelling and PND.   Gastrointestinal: Negative for heartburn, nausea, vomiting, abdominal pain, diarrhea, constipation, blood in stool and melena.   Genitourinary: Negative for dysuria, urgency, frequency, hematuria and flank pain.   Musculoskeletal: Negative for myalgias, back pain, joint pain and falls.   Skin: Negative for itching and rash.  Neurological: Negative for dizziness, tingling, tremors, sensory change, speech change, focal weakness, seizures, loss of consciousness, weakness and headaches.   Endo/Heme/Allergies: Negative for environmental allergies and polydipsia. Does not bruise/bleed easily.   Psychiatric/Behavioral: Negative for depression, suicidal ideas, hallucinations, memory loss and substance abuse. The patient is not nervous/anxious and does not have insomnia.    Physical Exam:  /78   Pulse (!) 104   Temp 36.1 °C (97 °F) (Temporal)   Resp (!) 32   SpO2 93%   Vitals:    07/13/23 0000   BP: 124/78   Pulse: (!) 104   Resp: (!) 32   Temp:    SpO2: 93%     GENERAL:  Morbidly obese appearing and in mild respiratory distress  HEENT:  Atraumatic, normocephalic.  Normal pinna bilaterally.  External auditory canals are without discharge.  Conjunctivae and sclerae are clear. Extraocular movements are full. Pupils are equal, round, and reactive to light.  Oral mucosa is moist.  CHEST:  Lungs are clear to auscultation bilaterally.  No masses, lesions, or signs of trauma were noted.     CARDIOVASCULAR:  Regular rate and rhythm.  No murmurs appreciated.  No JVD.  Palpable pulses present in all four extremities.    ABDOMEN:  Soft, protuberant, generalized  tenderness with peritonitis, tympanitic.    MUSCULOSKELETAL: Normal range of motion x4 extremities.    SKIN:  Warm and well perfused. No rashes.  NEUROLOGIC:  Alert and oriented. Cranial nerves II through XII are grossly intact. Motor and sensory exams are normal in all four extremities. Motor and sensory reflexes are 2+ and symmetric with bilateral plantar responses.  PSYCHIATRIC: Affect and mood is appropriate for age and condition.    Labs:  Recent Labs     07/12/23 1853   WBC 7.9   RBC 4.96   HEMOGLOBIN 14.5   HEMATOCRIT 43.4   MCV 87.5   MCH 29.2   MCHC 33.4   RDW 44.8   PLATELETCT 245   MPV 8.6*     Recent Labs     07/12/23 1853   SODIUM 126*   POTASSIUM 3.5*   CHLORIDE 90*   CO2 22   GLUCOSE 119*   BUN 17   CREATININE 0.73   CALCIUM 8.2*     Recent Labs     07/12/23 2337   APTT 28.0   INR 1.57*     Recent Labs     07/12/23 1853 07/12/23 2337   ASTSGOT 23  --    ALTSGPT 23  --    TBILIRUBIN 0.7  --    ALKPHOSPHAT 87  --    GLOBULIN 3.9*  --    INR  --  1.57*       Radiology:  CT-ABDOMEN-PELVIS WITH   Final Result      1.  Pneumoperitoneum secondary to perforated viscus. Splenic flexure colonic diverticulum may be the perforation site. Gastroduodenal ulcer ulcer is considered less likely.   2.  Hepatic steatosis.   3.  Moderate gallbladder distention and pericholecystic fat stranding.   4.  Small volume hyperdense ascites.   5.  Partially visualized right lower extremity DVT.      Findings were communicated to SALINA STOVER via [employer] system on 7/12/2023 11:03 PM.      CT-CTA CHEST PULMONARY ARTERY W/ RECONS   Final Result      1.  Saddle pulmonary embolus with associated right heart strain.   2.  Posterior pneumomediastinum secondary to pneumoperitoneum. Abdomen will be dictated separately.      Findings were communicated to SALINA STOVER via [employer] messaging system on 7/12/2023 10:57 PM.      DX-CHEST-PORTABLE (1 VIEW)   Final Result      Mild atelectasis in the right lower lung. No focal  consolidation. No effusions.         EC-ECHOCARDIOGRAM COMPLETE W/O CONT    (Results Pending)       Assessment/Plan:   Acute saddle pulmonary embolism (HCC)- (present on admission)  Assessment & Plan  Extensive clot burden within the right and left pulmonary arteries and their segmental and subsegmental branches seen on the admit CTA with radiographic suggestion of right heart strain  STAT ECHO pending  Heparin infusion commenced at admit    Acute deep venous thrombosis (DVT) determined by ultrasound (HCC)- (present on admission)  Assessment & Plan  Clot present from the right common femoral vein down the entire leg  Heparin infusion commenced on admit    Pneumoperitoneum of unknown etiology- (present on admission)  Assessment & Plan  Likely due to perforated upper GI ulcer or perforate diverticulitis  Holding on emergency surgery until STAT ECHO can be performed to rule out right heart strain  [facility]      I independently reviewed pertinent clinical lab tests since admission and ordered additional follow up clinical lab tests.  I independently reviewed pertinent radiographic images and the radiologist's reports since admission and ordered additional follow up radiographic studies.  The details of the available patient records in [facility] (including laboratory tests, culture data, medications, imaging, and other pertinent diagnostic tests) and that information was utilized as warranted in today's medical decision making for this patient.  I personally evaluated the patient condition at bedside.    Care interventions include:   Review of interval medical and surgical history, current medications and outpatient medication reconciliation, interval imaging studies and radiologist interpretation, and interval laboratory values.    Aggregated care time spent evaluating, reassessing, reviewing documentation, providing care, and managing this patient exclusive of procedures: 65  minutes  ____________________________________   AZAR Smith MD / NTS

## 2023-07-13 NOTE — ED NOTES
Pt ambulated to room with steady gait. Placed on monitor. Call light in reach. Up for ERP to see.

## 2023-07-13 NOTE — CONSULTS
Critical Care Consultation    Date of consult: 7/13/2023    Referring Physician  Surinder Ibanez D.O.    Reason for Consultation  Critical care management for acute saddle pulmonary embolus    History of Presenting Illness  Ms. English is a 63-year-old female with a past medical history significant for hypothyroidism on Synthroid who presented to the ER on 7/12/2023 with complaints of right leg pain and tightness for the last 3 days.  The patient was seen at urgent care on 7/11 and had an ultrasound of her right lower extremity ordered.  That study was done on 7/12 and was positive for DVT and was told to come to the emergency department.  The patient also reports having shortness of breath over the last 3 days that she attributed to her gallbladder.  The patient is scheduled for gallbladder surgery on 7/19 with Dr. Toro.  The patient denies a history of blood clots in the past and has had no injuries to her right leg.  The patient has also had mild abdominal pain over the last 3 couple of weeks that has intensified in the last few days.  The patient denies any recent fever, cough, nausea, or vomiting.    In the ER the patient had a CTA chest that was positive for in acute saddle embolus but also noted to to have pneumoperitoneum.  The patient had a CT abdomen and pelvis with IV contrast that confirmed pneumoperitoneum from a perforated viscus.  Dr. Moctezuma from surgery has been consulted for the CT abdomen and pelvis findings and the critical care team was consulted for management of the saddle pulmonary embolus.    Code Status  FULL CODE    Review of Systems  Review of Systems   Constitutional:  Positive for malaise/fatigue. Negative for chills and fever.   HENT:  Negative for congestion and sore throat.    Eyes:  Negative for blurred vision and double vision.   Respiratory:  Positive for shortness of breath.    Cardiovascular:  Positive for leg swelling. Negative for chest pain.        Right leg pain/tightness    Gastrointestinal:  Positive for abdominal pain. Negative for blood in stool, nausea and vomiting.   Genitourinary:  Negative for flank pain and hematuria.   Musculoskeletal:  Negative for back pain and neck pain.   Skin:  Negative for rash.   Neurological:  Negative for dizziness, speech change, focal weakness and weakness.   Endo/Heme/Allergies:  Does not bruise/bleed easily.   Psychiatric/Behavioral:  Negative for depression. The patient is not nervous/anxious.        Past Medical History   has a past medical history of Disorder of thyroid.    Surgical History   has a past surgical history that includes knee arthroplasty total (Left, 9/12/2017).    Family History  family history is not on file.    Social History   reports that she has never smoked. She has never used smokeless tobacco. She reports that she does not drink alcohol and does not use drugs.    Medications  Home Medications       Reviewed by Kasie Burt R.N. (Registered Nurse) on 07/12/23 at 1849  Med List Status: Not Addressed     Medication Last Dose Status   levothyroxine (SYNTHROID) 300 MCG TABS  Active                  Current Facility-Administered Medications   Medication Dose Route Frequency Provider Last Rate Last Admin    heparin infusion 25,000 units in 500 mL 0.45% NACL  0-30 Units/kg/hr (Adjusted) Intravenous Continuous Ana Lilia Joy M.D. 37.8 mL/hr at 07/12/23 2344 18 Units/kg/hr at 07/12/23 2344    heparin injection 4,200 Units  40 Units/kg (Adjusted) Intravenous PRN Ana Lilia Joy M.D.         Current Outpatient Medications   Medication Sig Dispense Refill    levothyroxine (SYNTHROID) 300 MCG TABS Take 300 mcg by mouth every day.         Allergies  Allergies   Allergen Reactions    Nkda [No Known Drug Allergy]        Vital Signs last 24 hours  Temp:  [36.1 °C (97 °F)] 36.1 °C (97 °F)  Pulse:  [] 104  Resp:  [16-32] 32  BP: (120-147)/(69-82) 124/78  SpO2:  [89 %-98 %] 93 %    Physical Exam  Physical Exam  Vitals and  nursing note reviewed.   Constitutional:       General: She is not in acute distress.     Appearance: She is obese. She is ill-appearing. She is not toxic-appearing.   HENT:      Head: Normocephalic and atraumatic.      Right Ear: External ear normal.      Left Ear: External ear normal.      Nose: Nose normal. No rhinorrhea.      Mouth/Throat:      Mouth: Mucous membranes are dry.      Pharynx: Oropharynx is clear.   Eyes:      General: No scleral icterus.     Conjunctiva/sclera: Conjunctivae normal.      Pupils: Pupils are equal, round, and reactive to light.   Cardiovascular:      Rate and Rhythm: Regular rhythm. Tachycardia present.      Pulses: Normal pulses.      Heart sounds: Normal heart sounds. No murmur heard.  Pulmonary:      Effort: Respiratory distress present.   Chest:      Chest wall: No tenderness.   Abdominal:      General: Bowel sounds are normal.      Tenderness: There is abdominal tenderness. There is guarding and rebound.   Musculoskeletal:         General: Normal range of motion.      Cervical back: Normal range of motion and neck supple.      Right lower leg: No edema.      Left lower leg: No edema.   Lymphadenopathy:      Cervical: No cervical adenopathy.   Skin:     General: Skin is warm and dry.      Capillary Refill: Capillary refill takes less than 2 seconds.      Findings: No rash.   Neurological:      Mental Status: She is alert and oriented to person, place, and time.      Cranial Nerves: No cranial nerve deficit.      Sensory: No sensory deficit.      Motor: No weakness.   Psychiatric:         Mood and Affect: Mood normal.         Behavior: Behavior normal.         Thought Content: Thought content normal.         Fluids  No intake or output data in the 24 hours ending 07/13/23 0030    Laboratory  Recent Results (from the past 48 hour(s))   EKG    Collection Time: 07/12/23  6:47 PM   Result Value Ref Range    Report       [facility].    Test Date:   2023  Pt Name:    KENY HUGHES                 Department: ER  MRN:        [id number]                      Room:  Gender:     Female                       Technician: [id number]  :        1960                   Requested By:ER TRIAGE PROTOCOL  Order #:    [id number]                    Reading MD:    Measurements  Intervals                                Axis  Rate:       105                          P:          10  TN:         163                          QRS:        -6  QRSD:       103                          T:          -14  QT:         379  QTc:        502    Interpretive Statements  Sinus tachycardia  Inferior infarct, old  Probable anterior infarct, age indeterminate  Prolonged QT interval  Baseline wander in lead(s) I  No previous ECG available for comparison     CBC with Differential    Collection Time: 23  6:53 PM   Result Value Ref Range    WBC 7.9 4.8 - 10.8 K/uL    RBC 4.96 4.20 - 5.40 M/uL    Hemoglobin 14.5 12.0 - 16.0 g/dL    Hematocrit 43.4 37.0 - 47.0 %    MCV 87.5 81.4 - 97.8 fL    MCH 29.2 27.0 - 33.0 pg    MCHC 33.4 32.2 - 35.5 g/dL    RDW 44.8 35.9 - 50.0 fL    Platelet Count 245 164 - 446 K/uL    MPV 8.6 (L) 9.0 - 12.9 fL    Neutrophils-Polys 74.60 (H) 44.00 - 72.00 %    Lymphocytes 16.80 (L) 22.00 - 41.00 %    Monocytes 8.20 0.00 - 13.40 %    Eosinophils 0.40 0.00 - 6.90 %    Basophils 0.00 0.00 - 1.80 %    Nucleated RBC 0.00 0.00 - 0.20 /100 WBC    Neutrophils (Absolute) 5.89 1.82 - 7.42 K/uL    Lymphs (Absolute) 1.33 1.00 - 4.80 K/uL    Monos (Absolute) 0.65 0.00 - 0.85 K/uL    Eos (Absolute) 0.03 0.00 - 0.51 K/uL    Baso (Absolute) 0.00 0.00 - 0.12 K/uL    NRBC (Absolute) 0.00 K/uL   Complete Metabolic Panel    Collection Time: 23  6:53 PM   Result Value Ref Range    Sodium 126 (L) 135 - 145 mmol/L    Potassium 3.5 (L) 3.6 - 5.5 mmol/L    Chloride 90 (L) 96 - 112 mmol/L    Co2 22 20 - 33 mmol/L    Anion Gap 14.0 7.0 - 16.0    Glucose 119 (H) 65 - 99 mg/dL    Bun 17 8 - 22  mg/dL    Creatinine 0.73 0.50 - 1.40 mg/dL    Calcium 8.2 (L) 8.5 - 10.5 mg/dL    AST(SGOT) 23 12 - 45 U/L    ALT(SGPT) 23 2 - 50 U/L    Alkaline Phosphatase 87 30 - 99 U/L    Total Bilirubin 0.7 0.1 - 1.5 mg/dL    Albumin 3.2 3.2 - 4.9 g/dL    Total Protein 7.1 6.0 - 8.2 g/dL    Globulin 3.9 (H) 1.9 - 3.5 g/dL    A-G Ratio 0.8 g/dL   Lipase    Collection Time: 07/12/23  6:53 PM   Result Value Ref Range    Lipase 11 11 - 82 U/L   Troponin    Collection Time: 07/12/23  6:53 PM   Result Value Ref Range    Troponin T 39 (H) 6 - 19 ng/L   ESTIMATED GFR    Collection Time: 07/12/23  6:53 PM   Result Value Ref Range    GFR (CKD-EPI) 93 >60 mL/min/1.73 m 2   CORRECTED CALCIUM    Collection Time: 07/12/23  6:53 PM   Result Value Ref Range    Correct Calcium 8.8 8.5 - 10.5 mg/dL   DIFFERENTIAL MANUAL    Collection Time: 07/12/23  6:53 PM   Result Value Ref Range    Manual Diff Status PERFORMED     Comment See Comment    PERIPHERAL SMEAR REVIEW    Collection Time: 07/12/23  6:53 PM   Result Value Ref Range    Peripheral Smear Review see below    PLATELET ESTIMATE    Collection Time: 07/12/23  6:53 PM   Result Value Ref Range    Plt Estimation Normal    MORPHOLOGY    Collection Time: 07/12/23  6:53 PM   Result Value Ref Range    RBC Morphology Present     Polychromia 1+     Poikilocytosis 2+     Echinocytes 2+     Smudge Cells Few    TROPONIN    Collection Time: 07/12/23  9:24 PM   Result Value Ref Range    Troponin T 39 (H) 6 - 19 ng/L   MAGNESIUM    Collection Time: 07/12/23  9:24 PM   Result Value Ref Range    Magnesium 2.1 1.5 - 2.5 mg/dL   LACTIC ACID    Collection Time: 07/12/23 11:00 PM   Result Value Ref Range    Lactic Acid 1.6 0.5 - 2.0 mmol/L   Prothrombin Time    Collection Time: 07/12/23 11:37 PM   Result Value Ref Range    PT 18.4 (H) 12.0 - 14.6 sec    INR 1.57 (H) 0.87 - 1.13   APTT    Collection Time: 07/12/23 11:37 PM   Result Value Ref Range    APTT 28.0 24.7 - 36.0 sec   Heparin Anti-Xa    Collection Time:  07/12/23 11:37 PM   Result Value Ref Range    Heparin Xa (UFH) <0.10 IU/mL       Imaging  CT abd/pelvis w/:  1.  Pneumoperitoneum secondary to perforated viscus. Splenic flexure colonic diverticulum may be the perforation site. Gastroduodenal ulcer ulcer is considered less likely.  2.  Hepatic steatosis.  3.  Moderate gallbladder distention and pericholecystic fat stranding.  4.  Small volume hyperdense ascites.  5.  Partially visualized right lower extremity DVT    CTA chest:  1.  Saddle pulmonary embolus with associated right heart strain.  2.  Posterior pneumomediastinum secondary to pneumoperitoneum. Abdomen will be dictated separately.    RLE US:  1. There is acute noncompressible DVT in the entire visualized right lower extremity venous system.     2.  The patient was sent directly to the emergency room.    Assessment/Plan  Hypokalemia- (present on admission)  Assessment & Plan  Replete to goal > 4    Morbid obesity with BMI of 50.0-59.9, adult (HCC)- (present on admission)  Assessment & Plan  Will need counseling when clinically appropriate for dietary modifications and exercise    Acute saddle pulmonary embolism (HCC)- (present on admission)  Assessment & Plan  Originating from a right leg DVT  Pt is not a candidate for thrombolytics given her pneumoperitoneum  YANN score of 4 with elevate troponin and RV strain on CTA  STAT ECHO pending  Start heparin infusion with active titration based on Xa monitoring  IR consulted and discussed for possible thrombectomy    Acute deep venous thrombosis (DVT) determined by ultrasound (HCC)- (present on admission)  Assessment & Plan  Heparin infusion with Xa monitoring  IR order for IVC filter    Pneumoperitoneum of unknown etiology- (present on admission)  Assessment & Plan  Likely due to perforated viscus  Cont zosyn  Would add antifungal coverage-->Diflucan daily  Surgery consulting  Monitor serial abdominal exams        Discussed patient condition and risk of morbidity  and/or mortality with RN, RT, Pharmacy, Patient, cardiology and general surgery, and IR, Dr. Cortez .      The patient remains critically ill requiring active titration of heparin infusion for Xa monitoring for saddle embolus along with continuous hemodynamic monitoring and serial abdominal exams.  I have assessed and reassessed the patient's hemodynamics, respiratory status, cardiovascular status, and laboratories.  The patient remains at high risk of clinical deterioration, worsening vital organ dysfunction, and death without the above critical care interventions.    Multiple conversations with Dr. Cortez and Dr. Moctezuma about risk associated with going to surgery for pneumoperitoneum as well as risk associated with going to IR for thrombectomy.    Critical care time = 150 minutes in directly providing and coordinating critical care and extensive data review.  No time overlap and excludes procedures.

## 2023-07-13 NOTE — ANESTHESIA PREPROCEDURE EVALUATION
Case: [id number] Date/Time: 07/13/23 1059    Procedures:       LAPAROTOMY, EXPLORATORY      CHOLECYSTECTOMY    Location: [facility] OR 09 / SURGERY [facility]    Surgeons: Angel Luis Boateng M.D.          Relevant Problems   CARDIAC   (positive) Acute deep venous thrombosis (DVT) determined by ultrasound (HCC)   (positive) Acute saddle pulmonary embolism (HCC)       Physical Exam    Airway   Mallampati: II  TM distance: >3 FB  Neck ROM: full       Cardiovascular - normal exam  Rhythm: regular  Rate: normal  (-) murmur     Dental - normal exam           Pulmonary - normal exam  Breath sounds clear to auscultation     Abdominal    Neurological - normal exam                 Anesthesia Plan    ASA 4   ASA physical status 4 criteria: other (comment) and sepsis    Plan - general       Airway plan will be ETT    (PE saddle embolism .  A. Line in situ.  CVP NAEEM)      Induction: intravenous    Postoperative Plan: Postoperative administration of opioids is intended.    Pertinent diagnostic labs and testing reviewed    Informed Consent:    Anesthetic plan and risks discussed with patient.    Use of blood products discussed with: patient whom consented to blood products.

## 2023-07-13 NOTE — PROGRESS NOTES
"    Follow-up:  ECHO shows a dilated RV with reduced systolic function though a percent could not be estimated and hypokinesis of the RV free wall.  Function of the RV apex is preserved.    I discussed the ECHO results with Dr. Joy (Medical ICU), Dr. Mcneil (the reading cardiologist).  IR did not feel the patient needed immediate thrombectomy based on her hemodynamics, YANN score, and ECHO.  There wasn't a strong consensus among all these physicians whether surgery/general anesthesia is prohibitive given these findings other than being told the patient is \"high risk for surgery.\"    The patient is presently on a heparin infusion.  She has remained mildly tachycardic with a normal blood pressure.    The heparin would have to be reversed for surgery and then resumed immediately after.  I discussed the issues as it relates to surgery, her heart, the potential for sudden cardiac arrest, and hemorrhage.    While surgery/general anesthesia could result in profound hypotension and cardiac arrest due to the apparent RV strain, the untreated free air with peritonitis could lead to a profound septic state and death.      On-call Cardiac anesthesia reviewed her case in house and felt her current cardiopulmonary status as it relates to the clot burden and RV dysfunction made her too high risk for major abdominal surgery.      I re-discussed the case with Dr. Cortez and orders were placed with the plan for embolectomy and possible and IVC filter.    I called Dr. Joy and provided updates.  Pantoprazole 40mg IV BID was added to her medications.    Kip Moctezuma MD, FACS    "

## 2023-07-13 NOTE — CARE PLAN
Problem: Psychosocial  Goal: Patient's level of anxiety will decrease  Outcome: Progressing  Flowsheets (Taken 7/13/2023 0800)  Decrease Anxiety Level: Collaborated with patient to identify and develop coping strategies     Problem: Nutrition  Goal: Patient's nutritional and fluid intake will be adequate or improve  Outcome: Not Met   The patient is Watcher - Medium risk of patient condition declining or worsening         Progress made toward(s) clinical / shift goals:      Patient is not progressing towards the following goals:      Problem: Nutrition  Goal: Patient's nutritional and fluid intake will be adequate or improve  Outcome: Not Met

## 2023-07-13 NOTE — PROCEDURES
DATE OF PROCEDURE: 7/13/2023    PROCEDURE: Arterial line placement    PERFORMED BY: Kip Moctezuma MD    DIAGNOSIS: Pulmonary Embolus    INDICATIONS: Need for close hemodynamic monitoring    DESCRIPTION OF PROCEDURE: The patient's left wrist was sterilely prepped and draped.   A left radial arterial line was placed using the modified seldinger technique.  The catheter was secured to the skin with silk suture and a pressure line was attached to the catheter with good waveform on the monitor.  Sterile dressings were applied. The patient tolerated the procedure well.    Kip Moctezuma MD, FACS    JRU / NTS   DD: 7/13/2023  5:19 AM

## 2023-07-13 NOTE — ED NOTES
Patient bedside report to RN Rudolph . Pt AAO X 4 , respirations even and unlabored, on 2 liters O2 via nasal cannula, Call light in reach, Oxygen safety measures in place and RN traced the line.

## 2023-07-13 NOTE — PROGRESS NOTES
"Critical Care Progress Note    Date of admission  7/12/2023    Chief Complaint  \"Mr. Lerma is a 63-year-old female with a past medical history significant for hypothyroidism on Synthroid who presented to the ER on 7/12/2023 with complaints of right leg pain and tightness for the last 3 days.  The patient was seen at urgent care on 7/11 and had an ultrasound of her right lower extremity ordered.  That study was done on 7/12 and was positive for DVT and was told to come to the emergency department.  The patient also reports having shortness of breath over the last 3 days that she attributed to her gallbladder.  The patient is scheduled for gallbladder surgery on 7/19 with Dr. Toro.  The patient denies a history of blood clots in the past and has had no injuries to her right leg.  The patient has also had mild abdominal pain over the last 3 couple of weeks that has intensified in the last few days.  The patient denies any recent fever, cough, nausea, or vomiting.     In the ER the patient had a CTA chest that was positive for in acute saddle embolus but also noted to to have pneumoperitoneum.  The patient had a CT abdomen and pelvis with IV contrast that confirmed pneumoperitoneum from a perforated viscus.  Dr. Sonia Denney from surgery has been consulted for the CT abdomen and pelvis findings and the critical care team was consulted for management of the saddle pulmonary embolus\" - Dr. Joy's H&P note    Hospital Course  7/13 s/p thrombectomy of saddle PE and IVC filter placement. Successful.     Interval Problem Update  Reviewed last 24 hour events:  Remains critically ill   I evaluated pt this am post thrombectomy. Alert, oriented, breathing feels better.   On 10L oxymask (down from 15L). Not appear in increased WOB  No hypotension.   HR in 80s, SBP in 120s  Afebrile    Review of Systems  Review of Systems   Constitutional:  Negative for fever and malaise/fatigue.   Respiratory:  Positive for shortness of breath. "    Cardiovascular:  Negative for chest pain and leg swelling.   Gastrointestinal:  Positive for abdominal pain. Negative for nausea and vomiting.   All other systems reviewed and are negative.       Vital Signs for last 24 hours   Temp:  [36.1 °C (97 °F)-36.2 °C (97.2 °F)] 36.2 °C (97.2 °F)  Pulse:  [] 90  Resp:  [16-32] 26  BP: (114-147)/(63-82) 115/70  SpO2:  [87 %-98 %] 93 %    Hemodynamic parameters for last 24 hours       Respiratory Information for the last 24 hours       Physical Exam   Physical Exam  Vitals and nursing note reviewed.   Constitutional:       General: She is not in acute distress.     Appearance: She is ill-appearing and toxic-appearing. She is not diaphoretic.   HENT:      Head: Normocephalic.      Mouth/Throat:      Mouth: Mucous membranes are dry.   Eyes:      Pupils: Pupils are equal, round, and reactive to light.   Cardiovascular:      Rate and Rhythm: Normal rate and regular rhythm.      Pulses: Normal pulses.      Heart sounds: Normal heart sounds. No murmur heard.  Pulmonary:      Effort: No respiratory distress.      Breath sounds: Normal breath sounds. No wheezing, rhonchi or rales.   Abdominal:      General: There is no distension.      Palpations: Abdomen is soft.      Tenderness: There is no abdominal tenderness. There is no guarding.   Musculoskeletal:      Cervical back: Neck supple.      Right lower leg: No edema.      Left lower leg: No edema.   Skin:     Capillary Refill: Capillary refill takes less than 2 seconds.      Coloration: Skin is not jaundiced.      Findings: No bruising or rash.   Neurological:      General: No focal deficit present.      Mental Status: She is alert and oriented to person, place, and time.         Medications  Current Facility-Administered Medications   Medication Dose Route Frequency Provider Last Rate Last Admin    senna-docusate (Pericolace Or Senokot S) 8.6-50 MG per tablet 2 Tablet  2 Tablet Oral BID Ana Lilia Joy M.D.        And     polyethylene glycol/lytes (Miralax) PACKET 1 Packet  1 Packet Oral QDAY PRN Ana Lilia Joy M.D.        And    magnesium hydroxide (Milk Of Magnesia) suspension 30 mL  30 mL Oral QDAY PRN Ana Lilia Joy M.D.        And    bisacodyl (Dulcolax) suppository 10 mg  10 mg Rectal QDAY PRN Ana Lilia Joy M.D.        Respiratory Therapy Consult   Nebulization Continuous RT Ana Lilia Joy M.D.        lactated ringers infusion   Intravenous Continuous Ana Lilia Joy M.D.        ondansetron (Zofran) syringe/vial injection 4 mg  4 mg Intravenous Q4HRS PRN Ana Lilia Joy M.D.        ondansetron (Zofran ODT) dispertab 4 mg  4 mg Oral Q4HRS PRN Ana Lilia Joy M.D.        promethazine (Phenergan) tablet 12.5-25 mg  12.5-25 mg Oral Q4HRS PRN Ana Lilia Joy M.D.        promethazine (Phenergan) suppository 12.5-25 mg  12.5-25 mg Rectal Q4HRS PRN Ana Lilia Joy M.D.        prochlorperazine (Compazine) injection 5-10 mg  5-10 mg Intravenous Q4HRS PRN Ana Lilia Joy M.D.        insulin regular (HumuLIN R,NovoLIN R) injection  1-6 Units Subcutaneous Q6HRS Ana Lilia Joy M.D.        And    dextrose 50% (D50W) injection 25 g  25 g Intravenous Q15 MIN PRN Ana Lilia Joy M.D.        piperacillin-tazobactam (Zosyn) 4.5 g in  mL IVPB  4.5 g Intravenous Once Ana Lilia Joy M.D.        And    piperacillin-tazobactam (Zosyn) 4.5 g in  mL IVPB  4.5 g Intravenous Q8HRS Ana Lilia Joy M.D.        [START ON 7/14/2023] fluconazole (Diflucan) in NS IVPB 400 mg  400 mg Intravenous Q24HRS Ana Lilia Joy M.D.        MD Alert...ICU Electrolyte Replacement per Pharmacy   Other PHARMACY TO DOSE Ana Lilia Joy M.D.        pantoprazole (Protonix) injection 40 mg  40 mg Intravenous BID Vinnie Moctezuma M.D.        MIDAZOLAM HCL 2 MG/2ML INJ SOLN (WRAPPER)             LIDOCAINE HCL 1 % INJ SOLN             NS (Bolus) infusion 500 mL  500 mL Intravenous Once PRN Jam Cortez M.D.        fentaNYL  (Sublimaze) injection 12.5-50 mcg  12.5-50 mcg Intravenous PRN Jam Cortez M.D.   25 mcg at 07/13/23 0556    midazolam (Versed) injection 0.5-2 mg  0.5-2 mg Intravenous PRN Jam Cortez M.D.        ondansetron (Zofran) syringe/vial injection 4 mg  4 mg Intravenous Q6HRS PRN Jam Cortez M.D.        heparin infusion 25,000 units in 500 mL 0.45% NACL  0-30 Units/kg/hr (Adjusted) Intravenous Continuous Ana Lilia Joy M.D.   Stopped. (Insulin or Heparin) at 07/13/23 0311    heparin injection 4,200 Units  40 Units/kg (Adjusted) Intravenous PRN Ana Lilia Joy M.D.           Fluids  No intake or output data in the 24 hours ending 07/13/23 0634    Laboratory          Recent Labs     07/12/23 1853 07/12/23  2124   SODIUM 126*  --    POTASSIUM 3.5*  --    CHLORIDE 90*  --    CO2 22  --    BUN 17  --    CREATININE 0.73  --    MAGNESIUM  --  2.1   CALCIUM 8.2*  --      Recent Labs     07/12/23 1853   ALTSGPT 23   ASTSGOT 23   ALKPHOSPHAT 87   TBILIRUBIN 0.7   LIPASE 11   GLUCOSE 119*     Recent Labs     07/12/23 1853   WBC 7.9   NEUTSPOLYS 74.60*   LYMPHOCYTES 16.80*   MONOCYTES 8.20   EOSINOPHILS 0.40   BASOPHILS 0.00   ASTSGOT 23   ALTSGPT 23   ALKPHOSPHAT 87   TBILIRUBIN 0.7     Recent Labs     07/12/23 1853 07/12/23  2337   RBC 4.96  --    HEMOGLOBIN 14.5  --    HEMATOCRIT 43.4  --    PLATELETCT 245  --    PROTHROMBTM  --  18.4*   APTT  --  28.0   INR  --  1.57*       Imaging  X-Ray:  I have personally reviewed the images and compared with prior images.  CT:    Reviewed    TTE was personally reviewed    Assessment/Plan  * Acute saddle pulmonary embolism (HCC)- (present on admission)  Assessment & Plan  Originating from a right leg DVT  Pt is not a candidate for thrombolytics given her pneumoperitoneum  S/p emergent thrombectomy of saddle PE with IVC placement. Successful     Continue heparin gtt.     Pneumoperitoneum of unknown etiology- (present on admission)  Assessment & Plan  Due to  perforated viscus with possible from diverticulum in splenic flexure.   On piptazo and diflucan.   Pt to go to OR this am. D/w Dr. Boateng.       Hypokalemia- (present on admission)  Assessment & Plan  Replete to goal > 4    Morbid obesity with BMI of 50.0-59.9, adult ([facility])- (present on admission)  Assessment & Plan  BMI 50    Acute deep venous thrombosis (DVT) determined by ultrasound ([facility] [place])- (present on admission)  Assessment & Plan  Heparin infusion with Xa monitoring  IVC filter placed         VTE:  Heparin  Ulcer: Not Indicated  Lines: Arterial Line  Ongoing indication addressed and Elizalde Catheter  Ongoing indication addressed    I have performed a physical exam and reviewed and updated ROS and Plan today (7/13/2023). In review of yesterday's note (7/12/2023), there are no changes except as documented above.     Discussed patient condition and risk of morbidity and/or mortality with RN, RT, Pharmacy, Charge nurse / hot rounds, and Patient  The patient remains critically ill.  Critical care time = 65 minutes in directly providing and coordinating critical care and extensive data review.  No time overlap and excludes procedures.    Addendum  Pt went to OR for perforated viscus.   Pt arrived to ICU around 3pm. Received hand off from anestehsia and Gen Surgery Dr. Boateng.   Intraop, found concern of mass in splenic flexure s/p left hemicolectomy with end colostomy and cholecystectomy. Some field fecal contamination  Pt arrived intubated, on NE gtt at 0.1 mcg/kg/min  Pt received 2L plasmalyte, 1.25 LR in OR.   Minimal EBL.   Central line Right IJ was placed.   Sedated with propofol gtt    On exam, pt intubated, sedated.   Cap refill >3 secs in lower ext. 2-3 secs in UE.   Left colostomy and midline abdomen surgical dressing noted    Will get CXR, ABG, repeat labs  Restart heparin gtt at 6pm. No bolus    D/w Dr. Boateng, Surgery  D/w plan of care with bedside RN    Additional critical care time 45 mins    Dagoberto Ohara  PAMELA Ohara D.O.

## 2023-07-13 NOTE — OR NURSING
0411 Patient arrived to preop area on 5L NC. Unable to hold O2 saturation above 90%. Switched to 10L on venti mask. Patient is satting 93%

## 2023-07-13 NOTE — ASSESSMENT & PLAN NOTE
Extensive clot burden within the right and left pulmonary arteries and their segmental and subsegmental branches seen on the admit CTA with radiographic suggestion of right heart strain  7/13 Intraoperative NAEEM.  Grossly normal biventricular function.  Heparin weight based bolus and infusion commenced at admit

## 2023-07-13 NOTE — PROGRESS NOTES
Pt presents to IR. Emergent consent. Pt transferred to IR table in supine position. Pt arrived on oxymask at 12L at HOB 30 SpO2 low 90s + tachypnea. Supine on table - mask at 12L to start SpO2 90-91%. Patient underwent a Sadde Pulmonary embolism mechanical thrombectomy with IVC filter placement by Dr. Cortez. Procedure site was marked by MD and verified using imaging guidance. Pt placed on monitor, prepped and draped in a sterile fashion. Vitals were taken every 5 minutes and remained stable during procedure (see doc flow sheet for results). CO2 waveform capnography was monitored and remained WNL throughout procedure. Report called to Jigna TALBERT. Pt transported by stretcher with RN to [id number].     0614:   0616: 3000 units heparin  0617: thrombectomy, right  0650: thrombectomy, left  0652:   0654: 2000 heparin       Argon Option Elite Retrievable Inferior Vena Cava Filter   REF: [id number]  LOT: [id number]  EXP: 05/03/2026    Flowstasis device to left femoral artery in place at 0707 - per Dana needs to be removed in the next 6-12 hrs. Report on this instruction given to GALI Benito. Went over at bedside as well

## 2023-07-13 NOTE — ANESTHESIA PROCEDURE NOTES
Airway    Date/Time: 7/13/2023 11:51 AM    Performed by: Kevin Gomez M.D.  Authorized by: Kevin Gomez M.D.    Location:  OR  Urgency:  Elective  Indications for Airway Management:  Anesthesia      Spontaneous Ventilation: absent    Sedation Level:  Deep  Preoxygenated: Yes    Patient Position:  Sniffing  Final Airway Type:  Endotracheal airway  Final Endotracheal Airway:  ETT  Cuffed: Yes    Technique Used for Successful ETT Placement:  Direct laryngoscopy  Devices/Methods Used in Placement:  Cricoid pressure    Insertion Site:  Oral  Blade Type:  Michel  Laryngoscope Blade/Videolaryngoscope Blade Size:  3  ETT Size (mm):  8.0  Measured from:  Lips  ETT to Lips (cm):  22  Placement Verified by: auscultation and capnometry    Cormack-Lehane Classification:  Grade I - full view of glottis  Number of Attempts at Approach:  1   Uneventful RSI

## 2023-07-13 NOTE — ED PROVIDER NOTES
ED Provider Note    CHIEF COMPLAINT  Chief Complaint   Patient presents with    Abdominal Pain     Patient to triage via wheelchair for abdominal pain, scheduled for gallbladder surgery but received outpatient US today and was instructed to come to ED for blood clot to right leg. Patient denies use of blood thinners or history of blood clot     Leg Pain        HPI    Primary care provider: Moy Edge M.D.   History obtained from: Patient  History limited by: None     Ashley English is a 63 y.o. female who presents to the ED after being told that she has right lower extremity DVT on ultrasound today.  Patient reports right lower extremity pain and swelling for the past 2 days without injury or trauma.  She was seen at urgent care yesterday and ultrasound was ordered which was performed today which showed extensive right lower extremity DVT and patient was told to come to the ED.  She states that she is scheduled for gallbladder surgery due to gallstones in about a week and has had difficulty breathing due to her gallbladder pain.  She also reports that she has been moving less due to the pain.  She has never had blood clots before.  She denies fever/cough/nausea/vomiting.  She reports that she gets diarrhea sometimes.  She denies dysuria or hematuria.  No rash noted.    REVIEW OF SYSTEMS  Please see HPI for pertinent positives/negatives.  All other systems reviewed and are negative.     PAST MEDICAL HISTORY  Past Medical History:   Diagnosis Date    Morbid obesity with BMI of 50.0-59.9, adult (Formerly [facility]) 7/13/2023    Disorder of thyroid         SURGICAL HISTORY  Past Surgical History:   Procedure Laterality Date    KNEE ARTHROPLASTY TOTAL Left 9/12/2017    Procedure: KNEE ARTHROPLASTY TOTAL;  Surgeon: Bull James M.D.;  Location: SURGERY [facility];  Service: Orthopedics        SOCIAL HISTORY  Social History     Tobacco Use    Smoking  status: Never    Smokeless tobacco: Never   Substance and Sexual Activity    Alcohol use: No    Drug use: No    Sexual activity: Not on file        FAMILY HISTORY  No family history on file.     CURRENT MEDICATIONS  Home Medications       Reviewed by Kasie Burt R.N. (Registered Nurse) on 07/12/23 at 1847  Med List Status: Not Addressed     Medication Last Dose Status   levothyroxine (SYNTHROID) 300 MCG TABS  Active                     ALLERGIES  Allergies   Allergen Reactions    Nkda [No Known Drug Allergy]         PHYSICAL EXAM  VITAL SIGNS: /76   Pulse 100   Temp 36.1 °C (97 °F) (Temporal)   Resp (!) 23   SpO2 90%  @[id number]::@     Pulse ox interpretation: 90% I interpret this pulse ox as abnormal    Cardiac monitor interpretation: Sinus rhythm with heart rate in the 90s as interpreted by me.  The patient presented with shortness of breath with right lower extremity DVT and cardiac monitor was ordered to monitor for dysrhythmia.    Constitutional: Well developed, well nourished, alert in no apparent distress, nontoxic appearance    HENT: No external signs of trauma, normocephalic  Eyes: PERRL, conjunctiva without erythema, no discharge, no icterus    Neck: Soft and supple, trachea midline, no stridor, no tenderness, no LAD, good ROM    Cardiovascular: Regular rate and rhythm, no murmurs/rubs/gallops, strong distal pulses and good perfusion    Thorax & Lungs: No respiratory distress, CTAB    Abdomen: Soft, nontender, nondistended, no guarding, no rebound, normal BS    Back: No CVAT     Extremities: No cyanosis, no edema, no gross deformity, good ROM, intact distal pulses with brisk cap refill    Skin: Warm, dry, no pallor/cyanosis, no rash noted      Neuro: A/O times 3, no focal deficits noted    Psychiatric: Cooperative, slightly anxious      DIAGNOSTIC STUDIES / PROCEDURES    EKG  12 Lead EKG obtained at 1847 and interpreted by me:   Rate: 105   Rhythm: Sinus tachycardia  Ectopy:  None  Intervals: QTc 502  Axis: Normal   QRS: Late precordial R wave transition  ST segments: Normal  T Waves: T wave inversion inferior and precordial leads    Clinical Impression: Sinus tachycardia with QT prolongation and nonspecific T wave changes       LABS  All labs reviewed by me.     Results for orders placed or performed during the hospital encounter of 07/12/23   EC-ECHOCARDIOGRAM COMPLETE W/ CONT   Result Value Ref Range    Left Ventrical Ejection Fraction 65    CBC with Differential   Result Value Ref Range    WBC 7.9 4.8 - 10.8 K/uL    RBC 4.96 4.20 - 5.40 M/uL    Hemoglobin 14.5 12.0 - 16.0 g/dL    Hematocrit 43.4 37.0 - 47.0 %    MCV 87.5 81.4 - 97.8 fL    MCH 29.2 27.0 - 33.0 pg    MCHC 33.4 32.2 - 35.5 g/dL    RDW 44.8 35.9 - 50.0 fL    Platelet Count 245 164 - 446 K/uL    MPV 8.6 (L) 9.0 - 12.9 fL    Neutrophils-Polys 74.60 (H) 44.00 - 72.00 %    Lymphocytes 16.80 (L) 22.00 - 41.00 %    Monocytes 8.20 0.00 - 13.40 %    Eosinophils 0.40 0.00 - 6.90 %    Basophils 0.00 0.00 - 1.80 %    Nucleated RBC 0.00 0.00 - 0.20 /100 WBC    Neutrophils (Absolute) 5.89 1.82 - 7.42 K/uL    Lymphs (Absolute) 1.33 1.00 - 4.80 K/uL    Monos (Absolute) 0.65 0.00 - 0.85 K/uL    Eos (Absolute) 0.03 0.00 - 0.51 K/uL    Baso (Absolute) 0.00 0.00 - 0.12 K/uL    NRBC (Absolute) 0.00 K/uL   Complete Metabolic Panel   Result Value Ref Range    Sodium 126 (L) 135 - 145 mmol/L    Potassium 3.5 (L) 3.6 - 5.5 mmol/L    Chloride 90 (L) 96 - 112 mmol/L    Co2 22 20 - 33 mmol/L    Anion Gap 14.0 7.0 - 16.0    Glucose 119 (H) 65 - 99 mg/dL    Bun 17 8 - 22 mg/dL    Creatinine 0.73 0.50 - 1.40 mg/dL    Calcium 8.2 (L) 8.5 - 10.5 mg/dL    AST(SGOT) 23 12 - 45 U/L    ALT(SGPT) 23 2 - 50 U/L    Alkaline Phosphatase 87 30 - 99 U/L    Total Bilirubin 0.7 0.1 - 1.5 mg/dL    Albumin 3.2 3.2 - 4.9 g/dL    Total Protein 7.1 6.0 - 8.2 g/dL    Globulin 3.9 (H) 1.9 - 3.5 g/dL    A-G Ratio 0.8 g/dL   Lipase   Result Value Ref Range    Lipase 11 11  - 82 U/L   Troponin   Result Value Ref Range    Troponin T 39 (H) 6 - 19 ng/L   ESTIMATED GFR   Result Value Ref Range    GFR (CKD-EPI) 93 >60 mL/min/1.73 m 2   CORRECTED CALCIUM   Result Value Ref Range    Correct Calcium 8.8 8.5 - 10.5 mg/dL   DIFFERENTIAL MANUAL   Result Value Ref Range    Manual Diff Status PERFORMED     Comment See Comment    PERIPHERAL SMEAR REVIEW   Result Value Ref Range    Peripheral Smear Review see below    PLATELET ESTIMATE   Result Value Ref Range    Plt Estimation Normal    MORPHOLOGY   Result Value Ref Range    RBC Morphology Present     Polychromia 1+     Poikilocytosis 2+     Echinocytes 2+     Smudge Cells Few    TROPONIN   Result Value Ref Range    Troponin T 39 (H) 6 - 19 ng/L   MAGNESIUM   Result Value Ref Range    Magnesium 2.1 1.5 - 2.5 mg/dL   LACTIC ACID   Result Value Ref Range    Lactic Acid 1.6 0.5 - 2.0 mmol/L   Prothrombin Time   Result Value Ref Range    PT 18.4 (H) 12.0 - 14.6 sec    INR 1.57 (H) 0.87 - 1.13   APTT   Result Value Ref Range    APTT 28.0 24.7 - 36.0 sec   Heparin Anti-Xa   Result Value Ref Range    Heparin Xa (UFH) <0.10 IU/mL   proBrain Natriuretic Peptide, NT   Result Value Ref Range    NT-proBNP 1285 (H) 0 - 125 pg/mL   EKG   Result Value Ref Range    Report       [facility]    Test Date:  2023  Pt Name:    KENY HUGHES                 Department: ER  MRN:        [id number]                      Room:  Gender:     Female                       Technician: [id number]  :        [id number]                   Requested By:ER TRIAGE PROTOCOL  Order #:    [id number]                    Reading MD:    Measurements  Intervals                                Axis  Rate:       105                          P:          10  NV:         163                          QRS:        -6  QRSD:       103                          T:          -14  QT:         379  QTc:        502    Interpretive Statements  Sinus tachycardia  Inferior infarct,  old  Probable anterior infarct, age indeterminate  Prolonged QT interval  Baseline wander in lead(s) I  No previous ECG available for comparison          RADIOLOGY  I have independently interpreted the diagnostic imaging associated with this visit and am waiting the final reading from the radiologist.   My preliminary interpretation is as follows: Saddle embolus and pneumoperitoneum.    EC-ECHOCARDIOGRAM COMPLETE W/ CONT   Final Result      CT-ABDOMEN-PELVIS WITH   Final Result      1.  Pneumoperitoneum secondary to perforated viscus. Splenic flexure colonic diverticulum may be the perforation site. Gastroduodenal ulcer ulcer is considered less likely.   2.  Hepatic steatosis.   3.  Moderate gallbladder distention and pericholecystic fat stranding.   4.  Small volume hyperdense ascites.   5.  Partially visualized right lower extremity DVT.      Findings were communicated to SALINA STOVER via [employer] Player Connect system on 7/12/2023 11:03 PM.      CT-CTA CHEST PULMONARY ARTERY W/ RECONS   Final Result      1.  Saddle pulmonary embolus with associated right heart strain.   2.  Posterior pneumomediastinum secondary to pneumoperitoneum. Abdomen will be dictated separately.      Findings were communicated to SALINA STOVER via Voalte messaging system on 7/12/2023 10:57 PM.      DX-CHEST-PORTABLE (1 VIEW)   Final Result      Mild atelectasis in the right lower lung. No focal consolidation. No effusions.         IR-INSERT IVC FILTER WITH IG & SI    (Results Pending)          COURSE & MEDICAL DECISION MAKING  Nursing notes, VS, PMSFHx reviewed in chart.     Review of past medical records shows the patient was seen at urgent care yesterday for right leg pain and swelling and ultrasound was ordered.  Right lower extremity ultrasound was performed today and had the following findings:    1. There is acute noncompressible DVT in the entire visualized right lower extremity venous system.     2.  The patient was sent directly to the  emergency room.      Differential diagnoses considered include but are not limited to: AMI, pericardial effusion/tamponade, pericarditis, CHF/pulm edema, PE, pleural effusion, respiratory failure, DVT       ED Observation Status? Yes; I am placing the patient in to an observation status due to a diagnostic uncertainty as well as therapeutic intensity. Patient placed in observation status at 8:38 PM, 7/12/2023.     Observation plan is as follows: We will obtain laboratory and imaging studies and monitor patient in the ED.    Upon Reevaluation, the patient's condition has: not improved; and will be escalated to hospitalization.    Patient discharged from ED Observation status at 2325 on July 12, 2023.       INITIAL ASSESSMENT AND PLAN  Care Narrative: This is a 63-year-old female patient with history of thyroid disorder without prior history of blood clots who was seen at urgent care yesterday for right lower extremity pain and swelling and ultrasound was performed today showing extensive right lower extremity DVT and patient was advised to come to the ED.  We will obtain laboratory and imaging studies and closely monitor patient in the ED.      Discussion of management with other [facility] or appropriate source(s): Radiologist, surgeon, intensivist     2320: D/W Dr. Moctezuma, on-call surgeon, who will see the patient    2324: D/W Dr. Joy, intensivist, who will see the patient      History and physical exam as above.  Given her outpatient ultrasound findings and the fact that she appears to need supplemental oxygen, CTPA was performed showing saddle embolus.  She also had findings of pneumoperitoneum and therefore CT abdomen/pelvis was also added on and with findings as above.  Her EKG showed sinus tachycardia with QT prolongation and nonspecific T wave changes.  Patient with mildly elevated troponin without increase on delta troponin.  This may indicate right heart strain from her saddle embolus..  BNP also elevated  and may indicate heart strain and component of heart failure.  Patient also noted to have hyponatremia and mild hypokalemia.  Given her multiple findings, both surgeon and intensivist consulted.  Patient was evaluated by both in the ED.  Appreciate assistance from specialists.  Patient was started on Zosyn and will be admitted by intensivist.      CRITICAL CARE NOTE:  Total critical care time spent on this patient was 45 minutes exclusive of separately billable procedures.  This may include direct bedside patient care, speaking with family members, review of past medical records, reviewing the results of laboratory/diagnostic studies, consulting with other physicians, as well as evaluating the response to the therapy instituted.        FINAL IMPRESSION  1. Acute saddle pulmonary embolism, unspecified whether acute cor pulmonale present (HCC) Acute   2. Pneumoperitoneum Acute   3. Acute deep vein thrombosis (DVT) of right lower extremity, unspecified vein (HCC) Acute   4. Hyponatremia Active   5. Hypokalemia Active   6. Elevated troponin Active          DISPOSITION  Patient will be admitted by intensivist in guarded condition      Electronically signed by: Surinder Ibanez D.O., 7/12/2023 8:37 PM      Portions of this record were made with voice recognition software.  Despite my review, errors may remain.  Please interpret this chart in the appropriate context.

## 2023-07-13 NOTE — ASSESSMENT & PLAN NOTE
Likely due to perforated upper GI ulcer or perforate diverticulitis  Holding on emergency surgery until STAT ECHO can be performed to rule out right heart strain  [facility]

## 2023-07-13 NOTE — PROGRESS NOTES
Pulmonary/Critical Care Medicine   Progress Note    Date of service: 7/13/2023  Time: 0430    Spoke with Dr. Moctezuma who had cardiac anesthesiology evaluate the patient as the patient was going to go to surgery; however, when anesthesiology came to bedside they felt that the patient was too high risk for induction.  This was conveyed to Dr. Cortez who is coming in to perform thrombectomy and hopefully IVC filter.  Heparin infusion was stopped for surgery, but will be resumed for IR interventions.    Ana Lilia Joy MD  Pulmonary and Critical Care Medicine

## 2023-07-13 NOTE — DIETARY
NUTRITION SERVICES: BMI - Pt with BMI >40 (=Body mass index is 42.83 kg/m².), Class III obesity. Weight loss counseling not appropriate in acute care setting. RECOMMEND - If appropriate at DC please refer to outpatient nutrition services for weight management.

## 2023-07-13 NOTE — ASSESSMENT & PLAN NOTE
Clot present from the right common femoral vein down the entire leg  Heparin weight based bolus and infusion commenced on admit.  7/19 Heparin drip stopped secondary to bleeding.

## 2023-07-13 NOTE — ED NOTES
Bedside report to TICU and PACU Rns. Pt transported to PACU with ALCS Rns connected to monitor and oxygen. All belongings and chart transported with pt.

## 2023-07-13 NOTE — PROGRESS NOTES
0840: Dr. Boateng in room, discussed surgery with patient, questions answered. Plan for exploratory laparotomy this morning. Dr. Boateng ordered to stop heparin gtt.

## 2023-07-13 NOTE — OR SURGEON
Immediate Post- Operative Note        Findings: submassive pe, dvt, bowel perforation      Procedure(s): pulmonary thrombectomy, ivc filter      Estimated Blood Loss: Less than 5 ml        Complications: None            7/13/2023     7:10 AM     Jam Cortez M.D.

## 2023-07-13 NOTE — PROGRESS NOTES
Status post thrombectomy   heparin restarted  Tender abdomen will need laparotomy  Discussed with Dr. Ohara  Feels patient is ready for laparotomy  Will stop heparin and proceed with laparotomy this morning  Discussed with patient including the potential for colostomy, likely will take out her gallbladder as well given the fact that she was scheduled for this later this month.

## 2023-07-13 NOTE — ED NOTES
Patient diaphoretic, Charge RN notified in change of patient status. Vitals re evaluated, /74   Pulse 89   Temp 36.1 °C (97 °F) (Temporal)   Resp (!) 22   SpO2 90%   Patient placed on oxygen, Apologized for wait times.

## 2023-07-13 NOTE — ED NOTES
Pt satting continuously at 89-90% on 3lpm via NC. Pt o2 increased to 4lpm via NC now satting at 92%    NPO since 7/12/23 at 1730.

## 2023-07-13 NOTE — ANESTHESIA PROCEDURE NOTES
Central Venous Line    Performed by: Kevin Gomez M.D.  Authorized by: Kevin Gomez M.D.    Start Time:  7/13/2023 12:00 PM  End Time:  7/13/2023 12:14 PM      provider hand hygiene performed prior to central venous catheter insertion, all 5 sterile barriers used (gloves, gown, cap, mask, large sterile drape) during central venous catheter insertion and skin prep agent completely dried prior to procedure    Patient Position:  Trendelenburg  Laterality:  Right  Site:  Internal jugular  Prep:  Chlorhexidine  Catheter Size:  7 Fr  Catheter Length (cm):  20  Number of Lumens:  Triple lumen  target vein identified, needle advanced into vein and blood aspirated and guidewire advanced into vein    Seldinger Technique?: Yes    Ultrasound-Guided: ultrasound-guided  Image captured, interpreted and electronically stored.  Sterile Gel and Probe Cover Used for Ultrasound?: Yes    Intravenous Verification: verified by ultrasound, venous blood return and chest x-ray pending    all ports aspirated, all ports flushed easily, guidewire was removed intact, biopatch was applied, line was sutured in place and dressing was applied    Events: patient tolerated procedure well with no complications    PA Catheter Placed?: No     Strict sterile technique.  Patient intubated and under General.  Technically easy.  Wire out.  No apparent complications

## 2023-07-13 NOTE — ANESTHESIA POSTPROCEDURE EVALUATION
Patient: Ashley English    Procedure Summary     Date: 07/13/23 Room / Location: [facility] 09 / SURGERY [facility]    Anesthesia Start: 1142 Anesthesia Stop: 1446    Procedures:       LAPAROTOMY, EXPLORATORY      CHOLECYSTECTOMY      RESECTION, RECTUM, LOW ANTERIOR      CREATION, COLOSTOMY Diagnosis: (Perforated colon with associated large bowel obstruction, acute cholecystitis)    Surgeons: Angel Luis Boateng M.D. Responsible Provider: Kevin Gomez M.D.    Anesthesia Type: general ASA Status: 4          Final Anesthesia Type: general  Last vitals  BP   Blood Pressure: 111/63, Arterial BP: 113/113    Temp   36.2 °C (97.2 °F)    Pulse   86   Resp   (!) 24    SpO2   96 %      Anesthesia Post Evaluation    Patient location during evaluation: ICU  Patient participation: waiting for patient participation  Level of consciousness: obtunded/minimal responses    Airway patency: patent  Anesthetic complications: no  Cardiovascular status: hemodynamically stable  Respiratory status: acceptable, ETT and ventilator  Hydration status: euvolemic    PONV: none          No notable events documented.     Nurse Pain Score: 4 (NPRS)

## 2023-07-13 NOTE — OP REPORT
Surgeon: Angel Luis Boateng MD  Assist: Sukh Leggett MD  Preoperative diagnosis: Perforated viscus, acute cholecystitis  Postoperative diagnosis: Perforated colon, acute cholecystitis  Procedure: Left hemicolectomy with end colostomy/Delvis's procedure, cholecystectomy  Anesthesia: General endotracheal anesthesia  Anesthesiologist: Kevin Gomez MD  Indications: 63-year-old woman who presented with a perforated viscus.  She was also noted to have a saddle embolus in her pulmonary arteries and underwent an emergent thrombectomy.  Based on the fact that she had a perforated viscus an emergent laparotomy was indicated subsequent to the thrombectomy.  Narrative: The procedure was discussed in detail with the patient including the risks of bleeding, infection, abscess, and hematoma.  Also discussed potential for colostomy and the plan for cholecystectomy if she was stable.  Also discussed risk of common bile duct stricture, the transection and bile leak.  She understood all the above and wished to proceed.  She was placed under anesthesia by Dr. Rubio.  Her abdomen was prepped and draped with chlorhexidine prep sterile drapes.  After timeout a midline incision was made.  This incision peritoneal cavity entered.  Free air was noted.  Expiration did reveal significantly dilated small bowel and colon consistent with a bowel obstruction.  Further exploration revealed a perforation of the colon at the splenic flexure with an associated mass suspicious for malignancy.  Further expression revealed a mass in the distal sigmoid which could represent a synchronous malignancy or significant diverticular disease.  Her gallbladder was also noted to be enlarged and abnormal appearing consistent with acute cholecystitis.  Based on these it was decided to proceed with a left hemicolectomy and a cholecystectomy.  The splenic flexure and left colon and sigmoid colon were mobilized.  The ureter was identified and protected.  The transverse  colon was divided distal to the middle colic vessels.  The mesentery to the colon was divided distally down to the proximal rectum.  Again the ureter was protected during this dissection.  The proximal rectum was divided distal to the palpable inflammation versus malignancy.  The specimen was removed en bloc.  The gallbladder was then removed.  Cystic duct and cystic artery were identified and a critical view was obtained prior to dividing the structures.  Hemostasis was assured on the liver bed.  Perineal care was irrigated copiously and irrigation was removed.  A left upper quadrant colostomy site was created in the colon was brought through this site.  The fascia was then approximated with looped PDS sutures.  Given the degree of contamination that was present I elected to pack the wound in anticipation of wound VAC rather than close the skin.  The colostomy was matured with 3-0 Vicryl sutures.  The patient tolerated the procedure without apparent complication.  The patient remains critically ill and will be transported to the ICU intubated.  Wound class was class IV.    The assistant, Sukh Leggett MD, was necessary due to the complexity of the operation.  Sukh assisted with exposure and retraction and completion of the operation.

## 2023-07-13 NOTE — OR NURSING
0445 Plan of care was changed. Procedure was cancelled. Patient was transferred to ICU via gurney. Patient on monitor and transported with two Rns.    4365 Dr. Moctezuma at bedside speaking to patient. Patient understands plan of care change.

## 2023-07-13 NOTE — ANESTHESIA PROCEDURE NOTES
NAEEM    Date/Time: 7/13/2023 12:24 PM    Performed by: Kevin Gomez M.D.  Authorized by: Kevin Gomez M.D.    Start Time:7/13/2023 12:24 PM  Preanesthetic Checklist: patient identified, IV checked, site marked, risks and benefits discussed, surgical consent, monitors and equipment checked, pre-op evaluation and timeout performed    Indication for NAEEM: monitoring   Patient Location: OR  Intubated: Yes  Bite Block: Yes  Heart Visualized: Yes  Insertion: easy    **See FULL NAEEM report in patient's chart via CV Synapse**

## 2023-07-13 NOTE — ANESTHESIA TIME REPORT
Anesthesia Start and Stop Event Times     Date Time Event    7/13/2023 1123 Ready for Procedure     1142 Anesthesia Start     1446 Anesthesia Stop        Responsible Staff  07/13/23    Name Role Begin End    Kevin Gomez M.D. Anesth 1142 1446        Overtime Reason:  no overtime (within assigned shift)    Comments:

## 2023-07-13 NOTE — ED NOTES
Patient approached staff reporting exacerbation of shortness of breath. Patient vital signs reassessed and documented in Epic. Patient placed on 2L nasal cannula. Patient appears to be very diaphoretic. Triage RN and Charge RN aware.

## 2023-07-13 NOTE — ED TRIAGE NOTES
Chief Complaint   Patient presents with    Abdominal Pain     Patient to triage via wheelchair for abdominal pain, scheduled for gallbladder surgery but received outpatient US today and was instructed to come to ED for blood clot to right leg. Patient denies use of blood thinners or history of blood clot     Leg Pain   EKG completed per protocol    BP (!) 147/82   Pulse (!) 108   Temp 36.1 °C (97 °F) (Temporal)   Resp 16   SpO2 98%     Patient educated on ed triage process, instructed to notify staff of any new or worsening symptoms, verbalizes understanding. Patient returned to ed lobby, apologized for wait times.

## 2023-07-13 NOTE — ASSESSMENT & PLAN NOTE
Originating from a right leg DVT. Pt is not a candidate for thrombolytics given her pneumoperitoneum  7/13: S/p emergent thrombectomy of saddle PE with IVC placement.   7/19: Repeat TTE with normal LV function, poor RV visualization but likely dilated with reduced function, RAP low      Heparin vte started 7/29

## 2023-07-13 NOTE — ASSESSMENT & PLAN NOTE
Holding heparin in the setting of massive intraabdominal hemorrhage, liver laceration and thrombocytopenia  S/p IVC filter placed this admission  Follow-up with vascular clinic for IVC filter retrieval when appropriate  Resume anticoagulation when appropriate

## 2023-07-14 PROBLEM — K66.8 PNEUMOPERITONEUM OF UNKNOWN ETIOLOGY: Status: RESOLVED | Noted: 2023-01-01 | Resolved: 2023-01-01

## 2023-07-14 PROBLEM — R19.8 PERFORATED VISCUS: Status: ACTIVE | Noted: 2023-01-01

## 2023-07-14 PROBLEM — R65.21 SEPTIC SHOCK (HCC): Status: ACTIVE | Noted: 2023-01-01

## 2023-07-14 PROBLEM — A41.9 SEPTIC SHOCK (HCC): Status: ACTIVE | Noted: 2023-01-01

## 2023-07-14 PROBLEM — J96.01 ACUTE RESPIRATORY FAILURE WITH HYPOXIA (HCC): Status: ACTIVE | Noted: 2023-01-01

## 2023-07-14 PROBLEM — N17.9 ACUTE KIDNEY INJURY (HCC): Status: ACTIVE | Noted: 2023-01-01

## 2023-07-14 NOTE — WOUND TEAM
" [facility]  Inpatient Services  New Ostomy Initial Evaluation    HPI:  Reviewed  PMH: Reviewed   SH: Reviewed    Past Surgical History:   Procedure Laterality Date    KNEE ARTHROPLASTY TOTAL Left 9/12/2017    Procedure: KNEE ARTHROPLASTY TOTAL;  Surgeon: Bull James M.D.;  Location: SURGERY [facility];  Service: Orthopedics       Surgery Date: 7/13/23    Surgeon(s):  ROM Russell M.D.    Procedure(s):  LAPAROTOMY, EXPLORATORY  CHOLECYSTECTOMY     Permanence: Unknown    Pertinent History:     \"63-year-old woman who presented with a perforated viscus.  She was also noted to have a saddle embolus in her pulmonary arteries and underwent an emergent thrombectomy.  Based on the fact that she had a perforated viscus an emergent laparotomy was indicated subsequent to the thrombectomy\"                      Colostomy 07/13/23 End/Werner's Pouch LUQ (Active)   Wound Image   07/13/23 1800   Stomal Appliance Assessment Changed;Leaking 07/13/23 1800   Stoma Assessment Dusky;[place] 07/13/23 1800   Stoma Shape Budded Less Than One Inch;Oval 07/13/23 1800   Stoma Size (in) 2 07/13/23 1800   Peristomal Assessment Clean;Dry;Intact 07/13/23 1800   Mucocutaneous Junction Intact 07/13/23 1800   Treatment Appliance Changed;Bag Change;Cleansed with water/washcloth;Site care 07/13/23 1800   Peristomal Protectant No Sting Skin Prep;Paste Ring 07/13/23 1800   Stomal Appliance Paste Ring, 2\";2 3/4\" (70mm) [facility] 07/13/23 1800   WOUND RN ONLY - Stomal Appliance  2 Piece;Paste Ring, 2\";2 3/4\" (70mm) [facility] 07/13/23 1800   Appliance (Pouch) # [id number], [id number], [id number] 07/13/23 1800   Appliance Brand Braxton 07/13/23 1800   WOUND NURSE ONLY - Time Spent with Patient (mins) 60 07/13/23 1800                                                       Colostomy Interventions:  Removed appliance (using push pull method) - By Ostomy RN  Cleansed sara-stomal skin with moist warm washcloth - By Ostomy " "RN  Created/Checked template fit - By Ostomy RN  Traced Shape to back of barrier - By Ostomy RN  Confirmed fit - By Ostomy RN  Removed plastic backing and \"Dog Eared\" paper edges - By Ostomy RN  Stretched paste ring to fit barrier opening - By Ostomy RN  Applied paste ring to back of barrier - By Ostomy RN  Applied barrier to skin and adhered with friction - By Ostomy RN  Attached pouch - By Ostomy RN  Closed Pouch end - By Ostomy RN  Connected  fecal high output pouch spout to down drain bag (due to loose liquid stool) - By Ostomy RN    Patient Education: Ostomy RN to follow-up daily for education     Date:  07/13/23  Patient not medically stable, no education provided    Needs for next visit:         Evaluation:      Date:  07/13/23    Patient with very high amounts of stool output already. Attached to high output pouch at this time. Patient stoma almost flush to skin and dusky in areas. Wound team to continue to follow        Flatus: Present  Stool Output:  Large amount  Urine Output:    Mobility: Unable to assess    DIET ORDERS (From admission to next 24h)       Start     Ordered    07/13/23 1524  Diet NPO Restrict to: Strict (okay to receive meds through the NG/OG tube)  ALL MEALS        Question Answer Comment   Type: Now    Diet NPO Restrict to: Strict okay to receive meds through the NG/OG tube       07/13/23 1523                    Plan: Ostomy nurses to continue to follow for ostomy needs and teaching until patient independent with care or discharge.  Ostomy Nurse follow-up frequency:   With wound VAC changes    Secure Start Signed:  Not Medically Stable  Outpatient Referral Placed:  Not Medically Stable  5 Sets of appliances in Ostomy bag for discharge:  Not Medically Stable - but 5 sets ordered     INSURANCE OPTIONS: \"Miscellaneous\"    TBD    Anticipated Discharge Plans:  TBD    Ostomy Supplies for DC:  To be determined in 4 to 6 weeks once stomal edema has fully resolved  "

## 2023-07-14 NOTE — CARE PLAN
The patient is Unstable - High likelihood or risk of patient condition declining or worsening    Shift Goals  Clinical Goals: hemodynamic stability  Patient Goals: enrico  Family Goals: enrico    Progress made toward(s) clinical / shift goals:    Problem: Pain - Standard  Goal: Alleviation of pain or a reduction in pain to the patient’s comfort goal  Outcome: Progressing

## 2023-07-14 NOTE — PROGRESS NOTES
Critical Care Progress Note    Date of admission  7/12/2023    Chief Complaint  Admitted for saddle PE and perforated viscus.     Hospital Course  7/13 s/p bilateral thrombectomy for saddle PE and IVC filter placement. Successful.   7/13 s/p left hemicolectomy with end colostomy. Intraop found perforated colon at splenic flexure with associated mass. Concerning of malignancy. Remains intubated post op.     Interval Problem Update  Reviewed last 24 hour events:  Remains critically ill   Sedation with propofol gtt. Did SAT today off propofol  Fentanyl gtt/prn pain control.   Pt's hemodynamics continue to get worse overnight requiring increasing doses of pressors.   Pt is mottling in lower extremities.   She is surprisingly alert, oriented and appropriate. RASS 0.   On NE gtt at 0.5, vasopressin   HR in 90s, NSR  SBP 90s with MAP >65. On NE gtt 0.55 and vasopressin to keep MAP >65  On full vent support. FIO2 40%/8  ABG acceptable  Febrile 39C  WBC 18K  Hgb 13.7, plt 221  Lactate rising  Creatinine 1.8 (from 0.8)  Low UOP, received 1L fluid bolus last night.    cc/hr  UOP 100cc output.   Ostomy 150 cc, brown stool output  Wound vac with no output    Heparin gtt resumed last night for PE.   Starting hydrocortisone 50 Q6H    TTE with LVEF 65%, dilated RV, RV pressure overload from PE    Review of Systems  Review of Systems   Constitutional:  Negative for fever and malaise/fatigue.   Respiratory:  Positive for shortness of breath.    Cardiovascular:  Negative for chest pain and leg swelling.   Gastrointestinal:  Positive for abdominal pain. Negative for nausea and vomiting.   All other systems reviewed and are negative.       Vital Signs for last 24 hours   Temp:  [36.2 °C (97.2 °F)-37.2 °C (98.9 °F)] 36.2 °C (97.2 °F)  Pulse:  [] 94  Resp:  [16-44] 32  BP: ()/(39-83) 93/51  SpO2:  [91 %-98 %] 98 %    Hemodynamic parameters for last 24 hours  CVP:  [10 MM HG-20 MM HG] 19 MM HG    Respiratory Information  for the last 24 hours  Vent Mode: APVCMV  Rate (breaths/min): 22  Vt Target (mL): 410  PEEP/CPAP: 8  MAP: 10  Control VTE (exp VT): 550    Physical Exam   Physical Exam  Vitals and nursing note reviewed.   Constitutional:       General: She is not in acute distress.     Appearance: She is ill-appearing and toxic-appearing. She is not diaphoretic.   HENT:      Head: Normocephalic.      Mouth/Throat:      Mouth: Mucous membranes are dry.   Eyes:      Pupils: Pupils are equal, round, and reactive to light.   Cardiovascular:      Rate and Rhythm: Normal rate and regular rhythm.      Pulses: Normal pulses.      Heart sounds: Normal heart sounds. No murmur heard.  Pulmonary:      Effort: No respiratory distress.      Breath sounds: Normal breath sounds. No wheezing, rhonchi or rales.   Abdominal:      General: There is no distension.      Palpations: Abdomen is soft.      Tenderness: There is no abdominal tenderness. There is no guarding.      Comments: Colostomy bag with stool  Woudn vac in place in mid abdomen   Musculoskeletal:      Cervical back: Neck supple.      Right lower leg: No edema.      Left lower leg: No edema.   Skin:     Capillary Refill: Capillary refill takes less than 2 seconds.      Coloration: Skin is not jaundiced.      Findings: No bruising or rash.   Neurological:      General: No focal deficit present.      Mental Status: She is alert.      Comments: Mvoing all ext appropriate  No focal deficit         Medications  Current Facility-Administered Medications   Medication Dose Route Frequency Provider Last Rate Last Admin    lactated ringers infusion   Intravenous Continuous Kerrie L. Latona 125 mL/hr at 07/13/23 2234 Rate Change at 07/13/23 2234    ondansetron (Zofran) syringe/vial injection 4 mg  4 mg Intravenous Q4HRS PRN Ana Lilia Joy M.D.        ondansetron (Zofran ODT) dispertab 4 mg  4 mg Oral Q4HRS PRN Ana Lilia Joy M.D.        promethazine (Phenergan) tablet 12.5-25 mg  12.5-25 mg  Oral Q4HRS PRN Ana Lilia Joy M.D.        promethazine (Phenergan) suppository 12.5-25 mg  12.5-25 mg Rectal Q4HRS PRN Ana Lilia Joy M.D.        prochlorperazine (Compazine) injection 5-10 mg  5-10 mg Intravenous Q4HRS PRN Ana Lilia Joy M.D.        insulin regular (HumuLIN R,NovoLIN R) injection  1-6 Units Subcutaneous Q6HRS Ana Lilia Joy M.D.        And    dextrose 50% (D50W) injection 25 g  25 g Intravenous Q15 MIN PRN Ana Lilia Joy M.D.        piperacillin-tazobactam (Zosyn) 4.5 g in  mL IVPB  4.5 g Intravenous Q8HRS Ana Lilia Joy M.D.   Stopped at 07/14/23 0542    pantoprazole (Protonix) injection 40 mg  40 mg Intravenous BID Vinnie Moctezuma M.D.   40 mg at 07/14/23 0412    Nozin nasal  swab  1 Applicator Each Nostril BID Dagoberto Ohara D.O.   1 Applicator at 07/14/23 0412    Respiratory Therapy Consult   Nebulization Continuous RT Dagoberto Ohara D.O.        senna-docusate (Pericolace Or Senokot S) 8.6-50 MG per tablet 2 Tablet  2 Tablet Enteral Tube BID Dagoberto Ohara D.O.        And    polyethylene glycol/lytes (Miralax) PACKET 1 Packet  1 Packet Enteral Tube QDAY PRN Dagoberto Ohara D.O.        And    magnesium hydroxide (Milk Of Magnesia) suspension 30 mL  30 mL Enteral Tube QDAY PRN Dagoberto Ohara D.O.        And    bisacodyl (Dulcolax) suppository 10 mg  10 mg Rectal QDAY PRN Dagoberto Ohara D.O.        MD Alert...ICU Electrolyte Replacement per Pharmacy   Other PHARMACY TO DOSE Dagoberto Ohara D.O.        lidocaine (Xylocaine) 1 % injection 2 mL  2 mL Tracheal Tube Q30 MIN PRN Dagoberto Ohara D.O.        fentaNYL (Sublimaze) injection 100 mcg  100 mcg Intravenous Q15 MIN PRN Dagoberto Ohara D.O.        And    fentaNYL (Sublimaze) injection 200 mcg  200 mcg Intravenous Q15 MIN PRN AMARJIT Painting.O.        And    fentaNYL (SUBLIMAZE) 50 mcg/mL in 50mL (Continuous Infusion)   Intravenous Continuous Dagoberto Ohara, D.O. 2 mL/hr at 07/13/23 1730 100 mcg/hr at 07/13/23 1730    And    propofol (DIPRIVAN)  injection  0-80 mcg/kg/min (Ideal) Intravenous Continuous Dagoberto Ohara, D.O. 12.3 mL/hr at 07/14/23 0413 30 mcg/kg/min at 07/14/23 0413    norepinephrine (Levophed) 8 mg in 250 mL NS infusion (premix)  0-1 mcg/kg/min (Ideal) Intravenous Continuous Dagoberto Ohara, D.O. 64.2 mL/hr at 07/14/23 0641 0.5 mcg/kg/min at 07/14/23 0641    acetaminophen (Tylenol) suppository 650 mg  650 mg Rectal Q6HRS PRN Kerrie L. Latona        acetaminophen (Tylenol) tablet 650 mg  650 mg Enteral Tube Q6HRS PRN Kerrie L. Latona   650 mg at 07/14/23 0412    vasopressin (Vasostrict) 20 Units in  mL Infusion  0.03 Units/min Intravenous Continuous Kerrie L. Latona 9 mL/hr at 07/13/23 2311 0.03 Units/min at 07/13/23 2311    heparin infusion 25,000 units in 500 mL 0.45% NACL  0-30 Units/kg/hr (Adjusted) Intravenous Continuous Ana Lilia Joy M.D. 46.2 mL/hr at 07/14/23 0547 22 Units/kg/hr at 07/14/23 0547    heparin injection 4,200 Units  40 Units/kg (Adjusted) Intravenous PRN Ana Lilia Joy M.D.   4,200 Units at 07/14/23 0007       Fluids    Intake/Output Summary (Last 24 hours) at 7/14/2023 0710  Last data filed at 7/14/2023 0641  Gross per 24 hour   Intake 7954.05 ml   Output 2945 ml   Net 5009.05 ml       Laboratory  Recent Labs     07/13/23  1536 07/13/23  2259 07/14/23  0337   ISTATAPH 7.277* 7.411 7.335*   ISTATAPCO2 53.5* 33.2 38.0*   ISTATAPO2 110* 101* 91*   ISTATATCO2 27 22 21   NCXIRDW1OIT 97 98 96   ISTATARTHCO3 25.0 21.1 20.3   ISTATARTBE -3 -3 -5*   ISTATTEMP 98.6 F 100.7 F 102.2 F   ISTATFIO2  --  60 50   ISTATSPEC Arterial Arterial Arterial   ISTATAPHTC 7.277* 7.394* 7.307*   ALWGUTMO7TW 110* 108* 103*         Recent Labs     07/12/23  1853 07/12/23  2124 07/13/23  1650 07/14/23  0500   SODIUM 126*  --  133* 135   POTASSIUM 3.5*  --  3.9 4.2   CHLORIDE 90*  --  99 101   CO2 22  --  21 20   BUN 17  --  14 22   CREATININE 0.73  --  0.79 1.80*   MAGNESIUM  --  2.1 2.1 2.1   PHOSPHORUS  --   --   --  5.3*   CALCIUM 8.2*  --  6.9*  7.3*       Recent Labs     07/12/23 1853 07/13/23  1650 07/14/23  0500   ALTSGPT 23 25 25   ASTSGOT 23 40 35   ALKPHOSPHAT 87 64 67   TBILIRUBIN 0.7 0.7 0.6   LIPASE 11  --   --    GLUCOSE 119* 134* 115*       Recent Labs     07/12/23 1853 07/13/23  1650 07/14/23  0500   WBC 7.9 6.0 18.0*   NEUTSPOLYS 74.60* 78.90* 76.00*   LYMPHOCYTES 16.80* 4.70* 2.40*   MONOCYTES 8.20 0.80 3.20   EOSINOPHILS 0.40 0.00 0.00   BASOPHILS 0.00 0.00 0.00   ASTSGOT 23 40 35   ALTSGPT 23 25 25   ALKPHOSPHAT 87 64 67   TBILIRUBIN 0.7 0.7 0.6       Recent Labs     07/12/23 1853 07/12/23  2337 07/13/23 1650 07/13/23  2300 07/14/23  0500   RBC 4.96  --  4.85  --  4.71   HEMOGLOBIN 14.5  --  14.2 14.4 13.7   HEMATOCRIT 43.4  --  43.6 43.6 42.1   PLATELETCT 245  --  227  --  221   PROTHROMBTM  --  18.4*  --   --   --    APTT  --  28.0  --   --   --    INR  --  1.57*  --   --   --          Imaging  X-Ray:  I have personally reviewed the images and compared with prior images.  CT:    Reviewed    TTE was personally reviewed    Assessment/Plan  * Septic shock (HCC)  Assessment & Plan  This is Septic shock Present on admission  SIRS criteria identified on my evaluation include: Fever, with temperature greater than 100.9 deg F, Leukocytosis, with WBC greater than 12,000 and Bandemia, greater than 10% bands  Indicators of septic shock include: Severe sepsis present and persistent hypotension as well as initial lactate level result is greater than or equal to 4mmol/L   Sources is: GI, fecal peritonitis, perforated bowel  Sepsis protocol initiated  Crystalloid Fluid Administration: Fluid resuscitation ordered per standard protocol - 30 mL/kg per current or ideal body weight  IV antibiotics as appropriate for source of sepsis  Reassessment: I have reassessed the patient's hemodynamic status    Profound septic shock with multiorgan failure with intravascular depletion   Source has been controlled. S/p ex lap with left hemicolectomy and end  colostomy. Stool was noted in colostomy bag.   On NE gtt at 0.5 mcg/kg/min and vasopressin  Started on hydrocortisone 50 Q6H  Will add phenylephrine gtt  Bedside POCUS: RV appears improving and good RV function   Blood culture on 7/12 negative  Repeat blood cultures  On piptazo  MRSA screen, will empirically cover for MRSA for now. If negative screen, can discontinue.   Serial lactates  Monitor UOP, strcit I/Os.       Acute respiratory failure with hypoxia (HCC)  Assessment & Plan  Intubated for OR.   Not requiring much oxygenation     Plan:   Daily SAT/SBT  Pt is off propofol. RASS 0  Pain in good control with fentanyl   Given pt is in profound septic shock with multiorgan failure, will hold off on extubation   Continue spontaneous mode and rest at night.         Acute saddle pulmonary embolism (HCC)- (present on admission)  Assessment & Plan  Originating from a right leg DVT  Pt is not a candidate for thrombolytics given her pneumoperitoneum  S/p emergent thrombectomy of saddle PE with IVC placement. Successful   Heparin gtt resumed post op    Perforated viscus  Assessment & Plan  7/13 S/p ex lap with left hemicolectomy and end colostomy by Dr. Boateng  Due to perforated colon at splenic fixture, associated with mass.   Concerning of malignancy. Path pending.   Fecal peritonitis  Now in profound septic shock.   See septic shock section         Acute kidney injury (HCC)  Assessment & Plan  Creatinine rising post op  Will give more fluids.   Be mindful of pt's dilated RV, RV pressure overload with post saddle PE  Though my bedside POCUS, RV size and function appears improving  Strict I/Os.     Hypokalemia- (present on admission)  Assessment & Plan  Replete to goal > 4    Morbid obesity with BMI of 50.0-59.9, adult ([facility])- (present on admission)  Assessment & Plan  BMI 50    Acute deep venous thrombosis (DVT) determined by ultrasound ([facility])- (present on admission)  Assessment & Plan  Heparin infusion with Xa  monitoring  IVC filter placed         VTE:  Heparin  Ulcer: Not Indicated  Lines: Arterial Line  Ongoing indication addressed and Elizalde Catheter  Ongoing indication addressed    I have performed a physical exam and reviewed and updated ROS and Plan today (7/14/2023). In review of yesterday's note (7/13/2023), there are no changes except as documented above.     Discussed patient condition and risk of morbidity and/or mortality with RN, RT, Pharmacy, Charge nurse / hot rounds, and Patient  The patient remains critically ill.  Critical care time = 140 minutes in directly providing and coordinating critical care and extensive data review.  No time overlap and excludes procedures.

## 2023-07-14 NOTE — CARE PLAN
Ventilator Daily Summary    Vent Day # 2    ETT: 8.0 @ 22      Ventilator settings: APV/CMV   22  410  8  40%    Weaning trials: SBT 5/8 40%        Plan: Continue current ventilator settings and wean mechanical ventilation as tolerated per physician orders.

## 2023-07-14 NOTE — PROGRESS NOTES
1600: Per Dr. Ohara and Dr. Boateng to start heparin gtt at initial dose, no bolus at 1800. Xa to be drawn at 0000 7/14/23.

## 2023-07-14 NOTE — CARE PLAN
Problem: Ventilation  Goal: Ability to achieve and maintain unassisted ventilation or tolerate decreased levels of ventilator support  Description: Target End Date:  4 days     Document on Vent flowsheet    1.  Support and monitor invasive and noninvasive mechanical ventilation  2.  Monitor ventilator weaning response  3.  Perform ventilator associated pneumonia prevention interventions  4.  Manage ventilation therapy by monitoring diagnostic test results  Outcome: Progressing         Ventilator Daily Summary    Vent Day # 2    ETT:  8.0 @ 21    Ventilator settings:  22/410/8/40     Weaning trials: none    Respiratory Procedures: none    Plan: Continue current ventilator settings and wean mechanical ventilation as tolerated per physician orders.

## 2023-07-14 NOTE — ASSESSMENT & PLAN NOTE
Initially 7/13 Intubated for OR and 7/15 extubated, re-intubated 7/19 extubated 7/27  High risk of worsening respiratory status but is DNR/DNI    iHDS to achieve euvolemia   IS poor; PEP therapy and mobilization  Aspiration precautions

## 2023-07-14 NOTE — DIETARY
Nutrition Services:  Pt is day #2 NPO. S/P hemicolectomy with colostomy. Pt intubated, requiring pressors. Consider nutrition support when stable. RD following.

## 2023-07-14 NOTE — DISCHARGE PLANNING
Case Management Discharge Planning    Admission Date: 7/12/2023  GMLOS: 3.9  ALOS: 1    6-Clicks ADL Score: 6  6-Clicks Mobility Score: 6  Therapy orders not indicated at this time    Pt Vent Day #2, POD1    Anticipated Discharge Dispo: Discharge Disposition: Still a Patient (30)  Per chart review pt resides in [place]  Family support:  Rani Webb Sister   [phone]       DME Needed: pending hospital course    Action(s) Taken: chart reviewed    Escalations Completed: None    Medically Clear: No    Next Steps: f/u with pt and medical team to discuss dc needs and barriers.  Assessment to be completed to assess for HCM needs.    Barriers to Discharge: Medical clearance/Specialty Clearance

## 2023-07-14 NOTE — PROGRESS NOTES
"  DATE: 7/14/2023    Post Operative Day  1  exploratory laparotomy, left colectomy with end-colostomy, cholecystectomy .    INTERVAL EVENTS:  Remains intubated and sedated on vasopressors for hemodynamic support. Leukocytosis increase of unclear significance given surgery yesterday.    PHYSICAL EXAMINATION:  Vital Signs: BP 93/51   Pulse 94   Temp 36.2 °C (97.2 °F) (Temporal)   Resp (!) 32   Ht 1.778 m (5' 10\")   Wt (!) 135 kg (298 lb 8.1 oz)   SpO2 98%     Gen: intubated and sedated.  Abd: Soft, midline wound vac in place with good seal, left-upper quadrant  colostomy pink, present, productive of soft brown stool.    LABORATORY VALUES:   Recent Labs     07/12/23 1853 07/13/23 1650 07/13/23 2300 07/14/23  0500   WBC 7.9 6.0  --  18.0*   RBC 4.96 4.85  --  4.71   HEMOGLOBIN 14.5 14.2 14.4 13.7   HEMATOCRIT 43.4 43.6 43.6 42.1   MCV 87.5 89.9  --  89.4   MCH 29.2 29.3  --  29.1   MCHC 33.4 32.6  --  32.5   RDW 44.8 48.8  --  49.1   PLATELETCT 245 227  --  221   MPV 8.6* 9.0  --  9.6     Recent Labs     07/12/23 1853 07/13/23 1650 07/14/23  0500   SODIUM 126* 133* 135   POTASSIUM 3.5* 3.9 4.2   CHLORIDE 90* 99 101   CO2 22 21 20   GLUCOSE 119* 134* 115*   BUN 17 14 22   CREATININE 0.73 0.79 1.80*   CALCIUM 8.2* 6.9* 7.3*     Recent Labs     07/12/23 1853 07/12/23 2337 07/13/23 1650 07/14/23  0500   ASTSGOT 23  --  40 35   ALTSGPT 23  --  25 25   TBILIRUBIN 0.7  --  0.7 0.6   ALKPHOSPHAT 87  --  64 67   GLOBULIN 3.9*  --  2.8 3.4   INR  --  1.57*  --   --      Recent Labs     07/12/23 2337   APTT 28.0   INR 1.57*        IMAGING:   DX-CHEST-PORTABLE (1 VIEW)   Final Result      Stable bibasilar pleural-parenchymal opacification and low lung volumes. Lines/tubes adequate.      DX-CHEST-LIMITED (1 VIEW)   Final Result         Endotracheal tube with tip projecting over the mid thoracic trachea.   Gastric drainage tube courses below diaphragm, tip is not seen.   Right central venous catheter with tip " projecting over the expected area of the lower SVC.            EC-NAEEM W/O CONT         DX-CHEST-LIMITED (1 VIEW)   Final Result      Bibasilar underinflation atelectasis which could obscure an additional process. This is similar to the prior study. Lung volumes are very low.      IR-INSERT IVC FILTER WITH IG & SI   Final Result      1.  Ultrasound guided access LEFT common femoral vein.   2.  BILATERAL selective pulmonary arteriogram demonstrating extensive bilateral pulmonary emboli.   3.  Successful BILATERAL pulmonary thrombectomy   4.  Large IVC at the level of the renal veins   5.  Successful placement of infrarenal IVC filter   6.  Pursestring suture should be removed in 6-12 hours.         Please note that this study required greater than 50% more than the usual procedure time due to the patient's anatomy and large habitus.            IR-THROMBO MECHANICAL ARTERY,INIT   Final Result      1.  Ultrasound guided access LEFT common femoral vein.   2.  BILATERAL selective pulmonary arteriogram demonstrating extensive bilateral pulmonary emboli.   3.  Successful BILATERAL pulmonary thrombectomy   4.  Large IVC at the level of the renal veins   5.  Successful placement of infrarenal IVC filter   6.  Pursestring suture should be removed in 6-12 hours.         Please note that this study required greater than 50% more than the usual procedure time due to the patient's anatomy and large habitus.            EC-ECHOCARDIOGRAM COMPLETE W/ CONT   Final Result      CT-ABDOMEN-PELVIS WITH   Final Result      1.  Pneumoperitoneum secondary to perforated viscus. Splenic flexure colonic diverticulum may be the perforation site. Gastroduodenal ulcer ulcer is considered less likely.   2.  Hepatic steatosis.   3.  Moderate gallbladder distention and pericholecystic fat stranding.   4.  Small volume hyperdense ascites.   5.  Partially visualized right lower extremity DVT.      Findings were communicated to SALINA STOVER via [employer]  messaging system on 7/12/2023 11:03 PM.      CT-CTA CHEST PULMONARY ARTERY W/ RECONS   Final Result      1.  Saddle pulmonary embolus with associated right heart strain.   2.  Posterior pneumomediastinum secondary to pneumoperitoneum. Abdomen will be dictated separately.      Findings were communicated to SALINA STOVER via [employer] messaging system on 7/12/2023 10:57 PM.      DX-CHEST-PORTABLE (1 VIEW)   Final Result      Mild atelectasis in the right lower lung. No focal consolidation. No effusions.             ASSESSMENT AND PLAN:   63 year-old woman with saddle pulmonary embolus and pneumoperitoneum now status-post exploratory laparotomy, left colectomy and end-colostomy, and cholecystectomy POD#1.  Remains intubated and vasopressors for hemodynamic support.  Recommend continued IV antibiotics for four-days after source control.  Continue to monitor leukocytosis.       ____________________________________     Mao Delgado M.D.    DD: 7/14/2023  7:14 AM

## 2023-07-14 NOTE — WOUND TEAM
"[facility]  Inpatient Services  Initial Wound and Skin Care Evaluation    Admission Date: 7/12/2023     Last order of IP CONSULT TO WOUND CARE was found on 7/13/2023 from Hospital Encounter on 7/12/2023     HPI, PMH, SH: Reviewed    Past Surgical History:   Procedure Laterality Date    KNEE ARTHROPLASTY TOTAL Left 9/12/2017    Procedure: KNEE ARTHROPLASTY TOTAL;  Surgeon: Bull James M.D.;  Location: SURGERY [facility];  Service: Orthopedics     Social History     Tobacco Use    Smoking status: Never    Smokeless tobacco: Never   Substance Use Topics    Alcohol use: No     Chief Complaint   Patient presents with    Abdominal Pain     Patient to triage via wheelchair for abdominal pain, scheduled for gallbladder surgery but received outpatient US today and was instructed to come to ED for blood clot to right leg. Patient denies use of blood thinners or history of blood clot     Leg Pain     Diagnosis: Pneumoperitoneum [K66.8]    Unit where seen by Wound Team: T933/01     WOUND CONSULT RELATED TO:  Abdomen VAC placement     WOUND HISTORY:   \"63-year-old woman who presented with a perforated viscus.  She was also noted to have a saddle embolus in her pulmonary arteries and underwent an emergent thrombectomy.  Based on the fact that she had a perforated viscus an emergent laparotomy was indicated subsequent to the thrombectomy\"       WOUND ASSESSMENT/LDA  Wound 07/13/23 Incision Abdomen Open Surgical full thickness (Active)   Date First Assessed/Time First Assessed: 07/13/23 1429   Primary Wound Type: Incision  Location: Abdomen  Wound Description (Comments): Open Surgical full thickness      Assessments 7/13/2023  6:00 PM   Wound Image      Site Assessment Pink;Pale;Red   Periwound Assessment Clean;Dry;Intact   Margins Unattached edges;Defined edges   Closure Secondary intention   Drainage Amount Small   Drainage Description Serous   Treatments Cleansed;Site care   Wound Cleansing " Approved Wound Cleanser   Periwound Protectant Skin Protectant Wipes to Periwound;Drape   Dressing Status Clean;Dry;Intact   Dressing Changed New   Dressing Cleansing/Solutions Not Applicable   Dressing Options Wound Vac   Dressing Change/Treatment Frequency Monday, Wednesday, Friday, and As Needed   NEXT Dressing Change/Treatment Date 07/17/23   NEXT Weekly Photo (Inpatient Only) 07/19/23   Wound Team Following 3x Weekly   Non-staged Wound Description Full thickness   Wound Length (cm) 24 cm   Wound Width (cm) 9 cm   Wound Depth (cm) 4.5 cm   Wound Surface Area (cm^2) 216 cm^2   Wound Volume (cm^3) 972 cm^3   Tunneling (cm) 4 cm   Tunneling Clock Position of Wound 12 o'clock   Exposed Structures Adipose;Sutures       Negative Pressure Wound Therapy 07/13/23 Surgical Abdomen (Active)   Placement Date/Time: 07/13/23 1839   Present on Original Admission: No  Wound Type: Surgical  Location: Abdomen      Assessments 7/13/2023  6:00 PM   Wound Image     NPWT Pump Mode / Pressure Setting Continuous;Ulta;125 mmHg   Dressing Type Black Foam (Regular);Large   Number of Foam Pieces Used 4   Canister Changed Yes   NEXT Dressing Change/Treatment Date 07/17/23        Vascular:    AIDA:   No results found.    Lab Values:    Lab Results   Component Value Date/Time    WBC 7.9 07/12/2023 06:53 PM    RBC 4.96 07/12/2023 06:53 PM    HEMOGLOBIN 14.5 07/12/2023 06:53 PM    HEMATOCRIT 43.4 07/12/2023 06:53 PM    HBA1C 5.7 (H) 08/12/2017 07:17 AM         Culture Results show:  No results found for this or any previous visit (from the past 720 hour(s)).    Pain Level/Medicated:   patient intubated and with IV pain meds        INTERVENTIONS BY WOUND TEAM:  Chart and images reviewed. Discussed with bedside RN. All areas of concern (based on picture review, LDA review and discussion with bedside RN) have been thoroughly assessed. Documentation of areas based on significant findings. This RN in to assess patient. Performed standard wound care  which includes appropriate positioning, dressing removal and non-selective debridement. Pictures and measurements obtained weekly if/when required.    Wound:  Abdomen open surgical   Preparation for Dressing removal: Removed without difficulty  Cleansed/Non-selectively Debrided with:  Wound cleanser and Gauze  Natacha wound: Cleansed with Wound cleanser, Prepped with No Sting and Drape  Primary Dressing:  One piece adaptic to base of wound. 2 pieces of black regular foam cut into spiral and placed into wound bed, third piece used to fill gap.   Secondary (Outer) Dressing: Secured with drape, hole cut and button (4th piece) and trak pad applied. Suction obtained at 125mmhg.     Advanced Wound Care Discharge Planning  Number of Clinicians necessary to complete wound care: 1 (easier with 2 to pull together abdomen)  Is patient requiring IV pain medications for dressing changes:  Yes  Length of time for dressing change 25   min. (This does not include chart review, pre-medication time, set up, clean up or time spent charting.)    Interdisciplinary consultation: Patient, Bedside RN (Aysha), MD Boateng updated Via voalte. .    EVALUATION / RATIONALE FOR TREATMENT:     Date:  07/13/23  Wound Status:  Initial evaluation    Patient mid abdomen with full thickness open surgical incision. Adipose and scant suture visible to base of wound but fascia appears intact and adipose appears viable. Some tunneling at 1200 and protected exposed suture with adaptic non-adherent. Patient will benefit from NPWT to assist with wound closure by secondary intention, management of bio-burden and exudate through mechanical debridement, and increase oxygenation and granulation tissue production to wound bed.            Goals: Steady decrease in wound area and depth weekly.    WOUND TEAM PLAN OF CARE - Frequency of Follow-up:   Nursing to follow dressing orders written for wound care. Contact wound team if area fails to progress, deteriorates or with  any questions/concerns if something comes up before next scheduled follow up (See below as to whether wound is following and frequency of wound follow up)   NPWT change 3 times weekly - Next change Monday 7/17/23    NURSING PLAN OF CARE ORDERS:  Dressing changes: See Dressing Care orders    NUTRITION RECOMMENDATIONS   Wound Team Recommendations:  N/A    DIET ORDERS (From admission to next 24h)       Start     Ordered    07/13/23 1524  Diet NPO Restrict to: Strict (okay to receive meds through the NG/OG tube)  ALL MEALS        Question Answer Comment   Type: Now    Diet NPO Restrict to: Strict okay to receive meds through the NG/OG tube       07/13/23 1523                    PREVENTATIVE INTERVENTIONS:    Q shift Alessandro - performed per nursing policy  Q shift pressure point assessments - performed per nursing policy    Surface/Positioning  ICU Low Airloss - Currently in Place  Reposition q 2 hours - Currently in Place    Offloading/Redistribution  Float Heels off Bed with Pillows - Currently in Place           Respiratory  Anchorfast - Currently in Place    Containment/Moisture Prevention    Fecal ostomy - Currently in Place  Elizalde Catheter - Currently in Place    Mobilization      Not Mobilizing      Anticipated discharge plans:  TBD         Vac Discharge Needs:  Vac Discharge plan is purely a recommendation from wound team and not a requirement for discharge unless otherwise stated by physician.  Not Applicable Pt not on a wound vac

## 2023-07-14 NOTE — ASSESSMENT & PLAN NOTE
Ischemic ATN  7/21: Initiated CRRT for volume removal/ ultrafiltration after failed diuretic challenge  7/23: Switch CRRT to iHD to facilitate mobility and PT, still anuric  7/24 iHD  Renal dose meds, avoid nephrotoxins  Q shift bladder scan

## 2023-07-14 NOTE — ASSESSMENT & PLAN NOTE
7/13 - ex lap with left hemicolectomy and end colostomy by Dr. Boateng  Found perforated colon at splenic fixture, associated with mass. Path neg for malignancy  Fecal peritonitis  7/30 zosyn stopped

## 2023-07-14 NOTE — PROGRESS NOTES
1445: Patient arrived back from OR, report received from OR RN and anesthesiologist. Patient running propofol at  15mcg/kg/min and norepi 0.1mcg/kg/min per anesthesia.     Dr. Ohara to write vent orders and pharm orders.

## 2023-07-14 NOTE — ASSESSMENT & PLAN NOTE
This is Septic shock Not present on admission  SIRS criteria identified on my evaluation include: Tachycardia, with heart rate greater than 90 BPM, Tachypnea, with respirations greater than 20 per minute and Leukocytosis, with WBC greater than 12,000  Indicators of septic shock include: Severe sepsis present and initial lactate level result is greater than or equal to 4mmol/L   Sources is: GI, fecal peritonitis, perforated bowel  Sepsis protocol initiated  Crystalloid Fluid Administration: Fluid resuscitation ordered different than standard protocol - 1 lt IVF bolus given, concern for pulmonary edema, volume overload.  IV antibiotics as appropriate for source of sepsis: re started zosyn on 7/19  Reassessment: I have reassessed the patient's hemodynamic status, no need for vassopressors at this point.  Hypotension Improved with 1 lit IVF, suspected partly due to poor oral intake but cannot rule out a new episode of sepsis.  Lactic acid was elevated, continue to trend q 4 hr until is less than 2.

## 2023-07-15 NOTE — CARE PLAN
Problem: Hyperinflation  Goal: Prevent or improve atelectasis  Description: Target End Date:  3 to 4 days    1. Instruct incentive spirometry usage  2.  Perform hyperinflation therapy as indicated  Outcome: Progressing  PEP QID      Problem: Ventilation  Goal: Ability to achieve and maintain unassisted ventilation or tolerate decreased levels of ventilator support  Description: Target End Date:  4 days     Document on Vent flowsheet    1.  Support and monitor invasive and noninvasive mechanical ventilation  2.  Monitor ventilator weaning response  3.  Perform ventilator associated pneumonia prevention interventions  4.  Manage ventilation therapy by monitoring diagnostic test results  Outcome: Met

## 2023-07-15 NOTE — CARE PLAN
The patient is Watcher - Medium risk of patient condition declining or worsening    Shift Goals  Clinical Goals: extubate, wean pressors, ,mobility, pulm hygiene  Patient Goals: rest  Family Goals: updates      Problem: Pain - Standard  Goal: Alleviation of pain or a reduction in pain to the patient’s comfort goal  Outcome: Progressing     Problem: Knowledge Deficit - Standard  Goal: Patient and family/care givers will demonstrate understanding of plan of care, disease process/condition, diagnostic tests and medications  Outcome: Progressing     Problem: Psychosocial  Goal: Patient's level of anxiety will decrease  Outcome: Progressing  Goal: Patient's ability to verbalize feelings about condition will improve  Outcome: Progressing  Goal: Patient's ability to re-evaluate and adapt role responsibilities will improve  Outcome: Progressing  Goal: Patient and family will demonstrate ability to cope with life altering diagnosis and/or procedure  Outcome: Progressing  Goal: Spiritual and cultural needs incorporated into hospitalization  Outcome: Progressing     Problem: Respiratory  Goal: Patient will achieve/maintain optimum respiratory ventilation and gas exchange  Outcome: Progressing     Problem: Venous Thromboembolism (VTE) Prevention  Goal: The patient will remain free from venous thromboembolism (VTE)  Outcome: Progressing     Problem: Nutrition  Goal: Patient's nutritional and fluid intake will be adequate or improve  Outcome: Progressing  Goal: Enteral nutrition will be maintained or improve  Outcome: Progressing  Goal: Enteral nutrition will be maintained or improve  Outcome: Progressing     Problem: Urinary Elimination  Goal: Establish and maintain regular urinary output  Outcome: Progressing     Problem: Skin Integrity  Goal: Skin integrity is maintained or improved  Outcome: Progressing     Problem: Skin Care - Ostomy  Goal: Skin remains free from irritation  Outcome: Progressing     Problem: Knowledge  Deficit - Ostomy  Goal: Patient will demonstrate ability to manage and maintain ostomy  Outcome: Progressing     Problem: Discharge Barriers/Self-Care - Ostomy  Goal: Ostomy patient's continuum of care needs will be met  Outcome: Progressing

## 2023-07-15 NOTE — PROGRESS NOTES
Critical Care Progress Note    Date of admission  7/12/2023    Chief Complaint  Admitted for saddle PE and perforated viscus.     Hospital Course  7/13 s/p bilateral thrombectomy for saddle PE and IVC filter placement. Successful.   7/13 s/p left hemicolectomy with end colostomy. Intraop found perforated colon at splenic flexure with associated mass. Concerning of malignancy. Remains intubated post op.     Interval Problem Update  Reviewed last 24 hour events:  Remains critically ill   Off sedation, RASS 0  On spontaneous mode and tolerating. FiO2 40%/ PEEP 8. Plan to extubate.   ABG acceptable.   Weaning down pressors. Off vasopressin and phenylephrine gt.t   On NE gtt at 0.1 mcg/kg/min  Lactate resolved.   Afebrile.   Ostomy output 800cc, no bleeding  Creatinine 1.45 improving. Making good UOP  Pt is mottling in lower extremities, but improving.   BG 110s - 130s    LR 150cc/hr.   Heparin gtt.   On hydrocortisone 50 Q6H  TTE with LVEF 65%, dilated RV, RV pressure overload from PE  Repeat bedside POCUS with improvement of RV pressure overload. Better RV size and better TAPSE. Grossly normal LVEF.     Addendum:   11AM pt was reevaluated and pt was extubated and doing well on NC.   Alert, oriented. Comfortable.       Review of Systems  Review of Systems   Constitutional:  Negative for fever and malaise/fatigue.   Respiratory:  Positive for shortness of breath.    Cardiovascular:  Negative for chest pain and leg swelling.   Gastrointestinal:  Positive for abdominal pain. Negative for nausea and vomiting.   All other systems reviewed and are negative.       Vital Signs for last 24 hours   Pulse:  [] 93  Resp:  [4-42] 19  BP: ()/(40-86) 118/61  SpO2:  [81 %-98 %] 93 %    Hemodynamic parameters for last 24 hours  CVP:  [14 MM HG-235 MM HG] 235 MM HG    Respiratory Information for the last 24 hours  Vent Mode: Spont  Rate (breaths/min): 22  Vt Target (mL): 410  PEEP/CPAP: 8  P Support: 5  MAP: 9.3  Length of  Weaning Trial (Hours): 4.5 hours  Control VTE (exp VT): 487    Physical Exam   Physical Exam  Vitals and nursing note reviewed.   Constitutional:       General: She is not in acute distress.     Appearance: She is ill-appearing and toxic-appearing. She is not diaphoretic.   HENT:      Head: Normocephalic.      Mouth/Throat:      Mouth: Mucous membranes are dry.   Eyes:      Pupils: Pupils are equal, round, and reactive to light.   Cardiovascular:      Rate and Rhythm: Normal rate and regular rhythm.      Pulses: Normal pulses.      Heart sounds: Normal heart sounds. No murmur heard.  Pulmonary:      Effort: No respiratory distress.      Breath sounds: Normal breath sounds. No wheezing, rhonchi or rales.   Abdominal:      General: There is no distension.      Palpations: Abdomen is soft.      Tenderness: There is no abdominal tenderness. There is no guarding.      Comments: Colostomy bag with stool  Woudn vac in place in mid abdomen   Musculoskeletal:      Cervical back: Neck supple.      Right lower leg: No edema.      Left lower leg: No edema.   Skin:     Capillary Refill: Capillary refill takes less than 2 seconds.      Coloration: Skin is not jaundiced.      Findings: No bruising or rash.   Neurological:      General: No focal deficit present.      Mental Status: She is alert.      Comments: Mvoing all ext appropriate  No focal deficit         Medications  Current Facility-Administered Medications   Medication Dose Route Frequency Provider Last Rate Last Admin    magnesium sulfate IVPB premix 2 g  2 g Intravenous Once EVERETTE PaintingONik        hydrocortisone sodium succinate PF (Solu-CORTEF) 100 MG injection 50 mg  50 mg Intravenous Q6HRS Dagoberto Ohara D.O.   50 mg at 07/15/23 0545    phenylephrine 40 mg/250 mL NS premix  0-5 mcg/kg/min (Ideal) Intravenous Continuous Dagoberto Ohara D.O.   Stopped at 07/14/23 1722    Linezolid (Zyvox) premix 600 mg  600 mg Intravenous Q12HRS Dagoberto Ohara D.O.   Stopped at 07/15/23 0646     lactated ringers infusion   Intravenous Continuous Dagoberto Ohara D.O. 150 mL/hr at 07/15/23 0345 New Bag at 07/15/23 0345    ondansetron (Zofran) syringe/vial injection 4 mg  4 mg Intravenous Q4HRS PRN Ana Lilia Joy M.D.        ondansetron (Zofran ODT) dispertab 4 mg  4 mg Oral Q4HRS PRN Ana Lilia Joy M.D.        promethazine (Phenergan) tablet 12.5-25 mg  12.5-25 mg Oral Q4HRS PRN Ana Lilia Joy M.D.        promethazine (Phenergan) suppository 12.5-25 mg  12.5-25 mg Rectal Q4HRS PRN Ana Lilia Joy M.D.        prochlorperazine (Compazine) injection 5-10 mg  5-10 mg Intravenous Q4HRS PRN Ana Lilia Joy M.D.        insulin regular (HumuLIN R,NovoLIN R) injection  1-6 Units Subcutaneous Q6HRS Ana Lilia Joy M.D.        And    dextrose 50% (D50W) injection 25 g  25 g Intravenous Q15 MIN PRN Ana Lilia Joy M.D.        piperacillin-tazobactam (Zosyn) 4.5 g in  mL IVPB  4.5 g Intravenous Q8HRS Ana Lilia Joy M.D.   Stopped at 07/15/23 0520    pantoprazole (Protonix) injection 40 mg  40 mg Intravenous BID Vinnie Moctezuma M.D.   40 mg at 07/15/23 0545    Nozin nasal  swab  1 Applicator Each Nostril BID Dagoberto Ohara D.O.   1 Applicator at 07/15/23 0600    Respiratory Therapy Consult   Nebulization Continuous RT Dagoberto Ohara D.O.        senna-docusate (Pericolace Or Senokot S) 8.6-50 MG per tablet 2 Tablet  2 Tablet Enteral Tube BID Dagoberto Ohara D.O.   2 Tablet at 07/14/23 1728    And    polyethylene glycol/lytes (Miralax) PACKET 1 Packet  1 Packet Enteral Tube QDAY PRN Dagoberto Ohara D.O.        And    magnesium hydroxide (Milk Of Magnesia) suspension 30 mL  30 mL Enteral Tube QDAY PRN Dagoberto Ohara D.O.        And    bisacodyl (Dulcolax) suppository 10 mg  10 mg Rectal QDAY PRN Dagoberto Ohara D.O.        MD Alert...ICU Electrolyte Replacement per Pharmacy   Other PHARMACY TO DOSE Dagoberto Ohara D.O.        lidocaine (Xylocaine) 1 % injection 2 mL  2 mL Tracheal Tube Q30 MIN PRN Dagoberto Ohara D.O.         fentaNYL (Sublimaze) injection 100 mcg  100 mcg Intravenous Q15 MIN PRN AMARJIT Painting.O.        And    fentaNYL (Sublimaze) injection 200 mcg  200 mcg Intravenous Q15 MIN PRN Dagoberto Ohara D.O.   200 mcg at 07/14/23 2159    And    fentaNYL (SUBLIMAZE) 50 mcg/mL in 50mL (Continuous Infusion)   Intravenous Continuous Dagoberto Ohara D.O.   Stopped at 07/14/23 1258    And    propofol (DIPRIVAN) injection  0-80 mcg/kg/min (Ideal) Intravenous Continuous Dagoberto Ohara D.O.   Stopped at 07/14/23 1018    norepinephrine (Levophed) 8 mg in 250 mL NS infusion (premix)  0-1 mcg/kg/min (Ideal) Intravenous Continuous AMARJIT Painting.O. 29.5 mL/hr at 07/15/23 0740 0.23 mcg/kg/min at 07/15/23 0740    acetaminophen (Tylenol) suppository 650 mg  650 mg Rectal Q6HRS PRN Kerrie LNik Latona        acetaminophen (Tylenol) tablet 650 mg  650 mg Enteral Tube Q6HRS PRN Kerrie L. Latona   650 mg at 07/15/23 0545    vasopressin (Vasostrict) 20 Units in  mL Infusion  0.03 Units/min Intravenous Continuous Kerrie L. Latona   Stopped at 07/15/23 0348    heparin infusion 25,000 units in 500 mL 0.45% NACL  0-30 Units/kg/hr (Adjusted) Intravenous Continuous Ana Lilia Joy M.D. 54.6 mL/hr at 07/15/23 0657 26 Units/kg/hr at 07/15/23 0657    heparin injection 4,200 Units  40 Units/kg (Adjusted) Intravenous PRN Ana Lilia Joy M.D.   4,200 Units at 07/14/23 2358       Fluids    Intake/Output Summary (Last 24 hours) at 7/15/2023 0819  Last data filed at 7/15/2023 0600  Gross per 24 hour   Intake 7738.76 ml   Output 2680 ml   Net 5058.76 ml         Laboratory  Recent Labs     07/13/23  2259 07/14/23  0337 07/15/23  0202   ISTATAPH 7.411 7.335* 7.417   ISTATAPCO2 33.2 38.0* 32.4   ISTATAPO2 101* 91* 82   ISTATATCO2 22 21 22   ZKTVHSX5ADS 98 96 96   ISTATARTHCO3 21.1 20.3 20.9   ISTATARTBE -3 -5* -3   ISTATTEMP 100.7 F 102.2 F 100.6 F   ISTATFIO2 60 50 40   ISTATSPEC Arterial Arterial Arterial   ISTATAPHTC 7.394* 7.307* 7.400   [id number] 108* 103* 88*            Recent Labs     07/13/23 1650 07/14/23  0500 07/14/23  1415 07/15/23  0600   SODIUM 133* 135 132* 131*   POTASSIUM 3.9 4.2 4.3 3.7   CHLORIDE 99 101 100 99   CO2 21 20 18* 20   BUN 14 22 24* 26*   CREATININE 0.79 1.80* 1.67* 1.45*   MAGNESIUM 2.1 2.1  --  1.8   PHOSPHORUS  --  5.3*  --  3.7   CALCIUM 6.9* 7.3* 6.8* 6.8*       Recent Labs     07/12/23  1853 07/13/23  1650 07/14/23  0500 07/14/23  1415 07/15/23  0600   ALTSGPT 23 25 25  --  69*   ASTSGOT 23 40 35  --  63*   ALKPHOSPHAT 87 64 67  --  83   TBILIRUBIN 0.7 0.7 0.6  --  0.5   LIPASE 11  --   --   --   --    GLUCOSE 119* 134* 115* 107* 117*       Recent Labs     07/13/23 1650 07/14/23  0500 07/15/23  0600   WBC 6.0 18.0* 17.4*   NEUTSPOLYS 78.90* 76.00* 84.20*   LYMPHOCYTES 4.70* 2.40* 8.80*   MONOCYTES 0.80 3.20 3.50   EOSINOPHILS 0.00 0.00 0.00   BASOPHILS 0.00 0.00 0.00   ASTSGOT 40 35 63*   ALTSGPT 25 25 69*   ALKPHOSPHAT 64 67 83   TBILIRUBIN 0.7 0.6 0.5       Recent Labs     07/12/23  2337 07/13/23 1650 07/13/23  2300 07/14/23  0500 07/15/23  0600   RBC  --  4.85  --  4.71 3.31*   HEMOGLOBIN  --  14.2 14.4 13.7 10.5*   HEMATOCRIT  --  43.6 43.6 42.1 29.9*   PLATELETCT  --  227  --  221 186   PROTHROMBTM 18.4*  --   --   --   --    APTT 28.0  --   --   --   --    INR 1.57*  --   --   --   --          Imaging  X-Ray:  I have personally reviewed the images and compared with prior images.  CT:    Reviewed    TTE was personally reviewed    Assessment/Plan  * Septic shock (HCC)  Assessment & Plan  This is Septic shock Present on admission  SIRS criteria identified on my evaluation include: Fever, with temperature greater than 100.9 deg F, Leukocytosis, with WBC greater than 12,000 and Bandemia, greater than 10% bands  Indicators of septic shock include: Severe sepsis present and persistent hypotension as well as initial lactate level result is greater than or equal to 4mmol/L   Sources is: GI, fecal peritonitis, perforated bowel  Sepsis  protocol initiated  Crystalloid Fluid Administration: Fluid resuscitation ordered per standard protocol - 30 mL/kg per current or ideal body weight  IV antibiotics as appropriate for source of sepsis  Reassessment: I have reassessed the patient's hemodynamic status    Profound septic shock with multiorgan failure and intravascular depletion   Source has been controlled. S/p ex lap with left hemicolectomy and end colostomy. Stool was noted in colostomy bag.   Received aggressive fluid resuscitation post op. Had total 4L fluid boluses followed with gtt.   On hydrocrotisone  On 3 pressors on 7/14, and after fluid resuscitation, we were able to wean down pressor.   Bedside POCUS: RV appears improving and good RV function   Blood culture on 7/12 and 7/14 negative to date  On piptazo  MRSA screen pending. On linezolid, can discontinue if negative.     Serial lactates  Monitor UOP, strcit I/Os.       Acute respiratory failure with hypoxia (HCC)  Assessment & Plan  Intubated for OR.   Not requiring much oxygenation     Doing well on SBT, extubated succesfully today   RASS 0  Pain control with fentanyl IV prn pushes        Acute saddle pulmonary embolism (HCC)- (present on admission)  Assessment & Plan  Originating from a right leg DVT  Pt is not a candidate for thrombolytics given her pneumoperitoneum  S/p emergent thrombectomy of saddle PE with IVC placement. Successful   Heparin gtt resumed post operatively.   No bleeding.   Eventually will convert to DOACs when pt is more stable.     Perforated viscus  Assessment & Plan  7/13 S/p ex lap with left hemicolectomy and end colostomy by Dr. Boateng  Due to perforated colon at splenic fixture, associated with mass.   Concerning of malignancy. Path pending.   Fecal peritonitis  Now in profound septic shock.   See septic shock section         Acute kidney injury (HCC)  Assessment & Plan  Creatinine improving with fluids.   7/14 s/p 3L fluid boluses, continue LR  Making great UOP    Strict I/Os.     Hypokalemia- (present on admission)  Assessment & Plan  Replete to goal > 4    Morbid obesity with BMI of 50.0-59.9, adult (HCC)- (present on admission)  Assessment & Plan  BMI 50    Acute deep venous thrombosis (DVT) determined by ultrasound ([facility])- (present on admission)  Assessment & Plan  Heparin infusion with Xa monitoring  IVC filter placed         VTE:  Heparin  Ulcer: Not Indicated  Lines: Arterial Line  Ongoing indication addressed and Elizalde Catheter  Ongoing indication addressed    I have performed a physical exam and reviewed and updated ROS and Plan today (7/15/2023). In review of yesterday's note (7/14/2023), there are no changes except as documented above.     Discussed patient condition and risk of morbidity and/or mortality with RN, RT, Pharmacy, Charge nurse / hot rounds, and Patient  The patient remains critically ill.  Critical care time = 85 minutes in directly providing and coordinating critical care and extensive data review.  No time overlap and excludes procedures.

## 2023-07-15 NOTE — PROGRESS NOTES
"  DATE: 7/15/2023    Post Operative Day  2   exploratory laparotomy, left colectomy with end-colostomy, cholecystectomy .    INTERVAL EVENTS:  Improving. Extubated and off vasopressors. Ostomy productive.    PHYSICAL EXAMINATION:  Vital Signs: BP 92/53   Pulse 79   Temp 36.2 °C (97.2 °F) (Temporal)   Resp (!) 25   Ht 1.778 m (5' 10\")   Wt (!) 154 kg (339 lb 15.2 oz)   SpO2 95%     Gen: no acute distress, on nasal cannula.  Abd: Soft, midline wound vac in place with good seal, left-upper quadrant colostomy present, productive of soft brown stool.    LABORATORY VALUES:   Recent Labs     07/13/23  1650 07/13/23  2300 07/14/23  0500 07/15/23  0600   WBC 6.0  --  18.0* 17.4*   RBC 4.85  --  4.71 3.31*   HEMOGLOBIN 14.2 14.4 13.7 10.5*   HEMATOCRIT 43.6 43.6 42.1 29.9*   MCV 89.9  --  89.4 90.3   MCH 29.3  --  29.1 31.7   MCHC 32.6  --  32.5 35.1   RDW 48.8  --  49.1 45.8   PLATELETCT 227  --  221 186   MPV 9.0  --  9.6 9.3     Recent Labs     07/14/23  0500 07/14/23  1415 07/15/23  0600   SODIUM 135 132* 131*   POTASSIUM 4.2 4.3 3.7   CHLORIDE 101 100 99   CO2 20 18* 20   GLUCOSE 115* 107* 117*   BUN 22 24* 26*   CREATININE 1.80* 1.67* 1.45*   CALCIUM 7.3* 6.8* 6.8*     Recent Labs     07/12/23  2337 07/13/23  1650 07/14/23  0500 07/15/23  0600   ASTSGOT  --  40 35 63*   ALTSGPT  --  25 25 69*   TBILIRUBIN  --  0.7 0.6 0.5   ALKPHOSPHAT  --  64 67 83   GLOBULIN  --  2.8 3.4 2.9   INR 1.57*  --   --   --      Recent Labs     07/12/23 2337   APTT 28.0   INR 1.57*        IMAGING:   DX-CHEST-PORTABLE (1 VIEW)   Final Result      Stable thoracic underinflation. Right IJ line and ETT appear adequate. Slightly improved bibasilar pleural-parenchymal opacification      DX-CHEST-PORTABLE (1 VIEW)   Final Result      Stable bibasilar pleural-parenchymal opacification and low lung volumes. Lines/tubes adequate.      DX-CHEST-LIMITED (1 VIEW)   Final Result         Endotracheal tube with tip projecting over the mid thoracic " trachea.   Gastric drainage tube courses below diaphragm, tip is not seen.   Right central venous catheter with tip projecting over the expected area of the lower SVC.            EC-NAEEM W/O CONT         DX-CHEST-LIMITED (1 VIEW)   Final Result      Bibasilar underinflation atelectasis which could obscure an additional process. This is similar to the prior study. Lung volumes are very low.      IR-INSERT IVC FILTER WITH IG & SI   Final Result      1.  Ultrasound guided access LEFT common femoral vein.   2.  BILATERAL selective pulmonary arteriogram demonstrating extensive bilateral pulmonary emboli.   3.  Successful BILATERAL pulmonary thrombectomy   4.  Large IVC at the level of the renal veins   5.  Successful placement of infrarenal IVC filter   6.  Pursestring suture should be removed in 6-12 hours.         Please note that this study required greater than 50% more than the usual procedure time due to the patient's anatomy and large habitus.            IR-THROMBO MECHANICAL ARTERY,INIT   Final Result      1.  Ultrasound guided access LEFT common femoral vein.   2.  BILATERAL selective pulmonary arteriogram demonstrating extensive bilateral pulmonary emboli.   3.  Successful BILATERAL pulmonary thrombectomy   4.  Large IVC at the level of the renal veins   5.  Successful placement of infrarenal IVC filter   6.  Pursestring suture should be removed in 6-12 hours.         Please note that this study required greater than 50% more than the usual procedure time due to the patient's anatomy and large habitus.            EC-ECHOCARDIOGRAM COMPLETE W/ CONT   Final Result      CT-ABDOMEN-PELVIS WITH   Final Result      1.  Pneumoperitoneum secondary to perforated viscus. Splenic flexure colonic diverticulum may be the perforation site. Gastroduodenal ulcer ulcer is considered less likely.   2.  Hepatic steatosis.   3.  Moderate gallbladder distention and pericholecystic fat stranding.   4.  Small volume hyperdense  ascites.   5.  Partially visualized right lower extremity DVT.      Findings were communicated to SALINA STOVER via Voalte messaging system on 7/12/2023 11:03 PM.      CT-CTA CHEST PULMONARY ARTERY W/ RECONS   Final Result      1.  Saddle pulmonary embolus with associated right heart strain.   2.  Posterior pneumomediastinum secondary to pneumoperitoneum. Abdomen will be dictated separately.      Findings were communicated to SALINA STOVER via Vo[employer] messaging system on 7/12/2023 10:57 PM.      DX-CHEST-PORTABLE (1 VIEW)   Final Result      Mild atelectasis in the right lower lung. No focal consolidation. No effusions.             ASSESSMENT AND PLAN:  63 year-old woman with saddle pulmonary embolus and pneumoperitoneum now status-post exploratory laparotomy, left colectomy and end-colostomy, and cholecystectomy POD#2.  Improving, now extubated and off vasopressor support.  Can begin clear liquids once cleared to swallow given that the ostomy is productive.  Recommend continued IV antibiotics for four-days after source control.  Continue to monitor leukocytosis.    Appreciate ICU and consulting physician care.       ____________________________________     Mao Delgado M.D.    DD: 7/15/2023  1:12 PM

## 2023-07-15 NOTE — CARE PLAN
The patient is Watcher - Medium risk of patient condition declining or worsening    Shift Goals  Clinical Goals: decrease presser requirement; monitor kidney function and fluid status  Patient Goals: rest  Family Goals: comfort; rest    Progress made toward(s) clinical / shift goals:        Problem: Pain - Standard  Goal: Alleviation of pain or a reduction in pain to the patient’s comfort goal  Outcome: Progressing     Problem: Respiratory  Goal: Patient will achieve/maintain optimum respiratory ventilation and gas exchange  Outcome: Progressing     Problem: Venous Thromboembolism (VTE) Prevention  Goal: The patient will remain free from venous thromboembolism (VTE)  Outcome: Progressing     Problem: Urinary Elimination  Goal: Establish and maintain regular urinary output  Outcome: Progressing     Problem: Skin Integrity  Goal: Skin integrity is maintained or improved  Outcome: Progressing       Patient is not progressing towards the following goals:

## 2023-07-15 NOTE — CARE PLAN
Problem: Ventilation  Goal: Ability to achieve and maintain unassisted ventilation or tolerate decreased levels of ventilator support  Description: Target End Date:  4 days     Document on Vent flowsheet    1.  Support and monitor invasive and noninvasive mechanical ventilation  2.  Monitor ventilator weaning response  3.  Perform ventilator associated pneumonia prevention interventions  4.  Manage ventilation therapy by monitoring diagnostic test results  Outcome: Progressing     Ventilator Daily Summary    Vent Day # 3    ETT 8.0 @ 22    Ventilator settings changed this shift:no    APVcmv 22 410 8 40%    Weaning trials:no    Respiratory Procedures:no    Plan: Continue current ventilator settings and wean mechanical ventilation as tolerated per physician orders.

## 2023-07-15 NOTE — CARE PLAN
The patient is Watcher - Medium risk of patient condition declining or worsening    Shift Goals  Clinical Goals: hemodynamic stability, pulmonary hygiene, wean sedation, wean vasopressors  Patient Goals: enrico  Family Goals: comfort, rest      Problem: Pain - Standard  Goal: Alleviation of pain or a reduction in pain to the patient’s comfort goal  Outcome: Progressing     Problem: Knowledge Deficit - Standard  Goal: Patient and family/care givers will demonstrate understanding of plan of care, disease process/condition, diagnostic tests and medications  Outcome: Progressing     Problem: Psychosocial  Goal: Patient's level of anxiety will decrease  Outcome: Progressing  Goal: Patient's ability to verbalize feelings about condition will improve  Outcome: Progressing  Goal: Patient's ability to re-evaluate and adapt role responsibilities will improve  Outcome: Progressing  Goal: Patient and family will demonstrate ability to cope with life altering diagnosis and/or procedure  Outcome: Progressing  Goal: Spiritual and cultural needs incorporated into hospitalization  Outcome: Progressing     Problem: Respiratory  Goal: Patient will achieve/maintain optimum respiratory ventilation and gas exchange  Outcome: Progressing     Problem: Venous Thromboembolism (VTE) Prevention  Goal: The patient will remain free from venous thromboembolism (VTE)  Outcome: Progressing     Problem: Nutrition  Goal: Patient's nutritional and fluid intake will be adequate or improve  Outcome: Progressing  Goal: Enteral nutrition will be maintained or improve  Outcome: Progressing  Goal: Enteral nutrition will be maintained or improve  Outcome: Progressing     Problem: Urinary Elimination  Goal: Establish and maintain regular urinary output  Outcome: Progressing     Problem: Skin Integrity  Goal: Skin integrity is maintained or improved  Outcome: Progressing     Problem: Skin Care - Ostomy  Goal: Skin remains free from irritation  Outcome:  Progressing     Problem: Knowledge Deficit - Ostomy  Goal: Patient will demonstrate ability to manage and maintain ostomy  Outcome: Progressing     Problem: Discharge Barriers/Self-Care - Ostomy  Goal: Ostomy patient's continuum of care needs will be met  Outcome: Progressing

## 2023-07-16 NOTE — PROGRESS NOTES
"  DATE: 7/16/2023    Post Operative Day  3  exploratory laparotomy, left colectomy with end-colostomy, cholecystectomy .    INTERVAL EVENTS:  Continues to improve, tolerating liquids with good output from ostomy. Leukocytosis stable at 18.     PHYSICAL EXAMINATION:  Vital Signs: /62   Pulse 83   Temp 36.2 °C (97.2 °F) (Temporal)   Resp (!) 9   Ht 1.778 m (5' 10\")   Wt (!) 154 kg (339 lb 1.1 oz)   SpO2 95%     Gen: No acute distress, on nasal cannula.  Abd: Soft, midline wound vac in place with good seal, left-sided colostomy present somewhat retracted, mucosa with areas of pink and black, productive of soft brown stool.    LABORATORY VALUES:   Recent Labs     07/14/23  0500 07/15/23  0600 07/16/23  0450   WBC 18.0* 17.4* 18.2*   RBC 4.71 3.31* 2.89*   HEMOGLOBIN 13.7 10.5* 9.1*   HEMATOCRIT 42.1 29.9* 26.9*   MCV 89.4 90.3 93.1   MCH 29.1 31.7 31.5   MCHC 32.5 35.1 33.8   RDW 49.1 45.8 49.2   PLATELETCT 221 186 134*   MPV 9.6 9.3 9.2     Recent Labs     07/14/23  1415 07/15/23  0600 07/16/23  0450   SODIUM 132* 131* 134*   POTASSIUM 4.3 3.7 3.3*   CHLORIDE 100 99 101   CO2 18* 20 23   GLUCOSE 107* 117* 106*   BUN 24* 26* 25*   CREATININE 1.67* 1.45* 1.21   CALCIUM 6.8* 6.8* 7.6*     Recent Labs     07/14/23  0500 07/15/23  0600 07/16/23  0450   ASTSGOT 35 63* 38   ALTSGPT 25 69* 53*   TBILIRUBIN 0.6 0.5 0.5   ALKPHOSPHAT 67 83 100*   GLOBULIN 3.4 2.9 3.1            IMAGING:   DX-CHEST-PORTABLE (1 VIEW)   Final Result      Stable thoracic underinflation. Right IJ line and ETT appear adequate. Slightly improved bibasilar pleural-parenchymal opacification      DX-CHEST-PORTABLE (1 VIEW)   Final Result      Stable bibasilar pleural-parenchymal opacification and low lung volumes. Lines/tubes adequate.      DX-CHEST-LIMITED (1 VIEW)   Final Result         Endotracheal tube with tip projecting over the mid thoracic trachea.   Gastric drainage tube courses below diaphragm, tip is not seen.   Right central venous " catheter with tip projecting over the expected area of the lower SVC.            EC-NAEEM W/O CONT         DX-CHEST-LIMITED (1 VIEW)   Final Result      Bibasilar underinflation atelectasis which could obscure an additional process. This is similar to the prior study. Lung volumes are very low.      IR-INSERT IVC FILTER WITH IG & SI   Final Result      1.  Ultrasound guided access LEFT common femoral vein.   2.  BILATERAL selective pulmonary arteriogram demonstrating extensive bilateral pulmonary emboli.   3.  Successful BILATERAL pulmonary thrombectomy   4.  Large IVC at the level of the renal veins   5.  Successful placement of infrarenal IVC filter   6.  Pursestring suture should be removed in 6-12 hours.         Please note that this study required greater than 50% more than the usual procedure time due to the patient's anatomy and large habitus.            IR-THROMBO MECHANICAL ARTERY,INIT   Final Result      1.  Ultrasound guided access LEFT common femoral vein.   2.  BILATERAL selective pulmonary arteriogram demonstrating extensive bilateral pulmonary emboli.   3.  Successful BILATERAL pulmonary thrombectomy   4.  Large IVC at the level of the renal veins   5.  Successful placement of infrarenal IVC filter   6.  Pursestring suture should be removed in 6-12 hours.         Please note that this study required greater than 50% more than the usual procedure time due to the patient's anatomy and large habitus.            EC-ECHOCARDIOGRAM COMPLETE W/ CONT   Final Result      CT-ABDOMEN-PELVIS WITH   Final Result      1.  Pneumoperitoneum secondary to perforated viscus. Splenic flexure colonic diverticulum may be the perforation site. Gastroduodenal ulcer ulcer is considered less likely.   2.  Hepatic steatosis.   3.  Moderate gallbladder distention and pericholecystic fat stranding.   4.  Small volume hyperdense ascites.   5.  Partially visualized right lower extremity DVT.      Findings were communicated to SALINA SHAW  CK via Voalte messaging system on 7/12/2023 11:03 PM.      CT-CTA CHEST PULMONARY ARTERY W/ RECONS   Final Result      1.  Saddle pulmonary embolus with associated right heart strain.   2.  Posterior pneumomediastinum secondary to pneumoperitoneum. Abdomen will be dictated separately.      Findings were communicated to SALINA STOVER via Voalte messaging system on 7/12/2023 10:57 PM.      DX-CHEST-PORTABLE (1 VIEW)   Final Result      Mild atelectasis in the right lower lung. No focal consolidation. No effusions.             ASSESSMENT AND PLAN:  63 year-old woman with saddle pulmonary embolus and pneumoperitoneum now status-post exploratory laparotomy, left colectomy and end-colostomy, and cholecystectomy POD#3.  Continues to improve.  Can advance diet as tolerated.  Will monitor ostomy closely, please contact at time of next appliance chance for better evaluation at bedside.  Recommend continued IV antibiotics for four-days after source control.  Continue to monitor leukocytosis, will re-image if fails to improve prior to stopping antibiotics.    Appreciate ICU and consulting physician care.       ____________________________________     Mao Delgado M.D.    DD: 7/16/2023  7:17 AM

## 2023-07-16 NOTE — CARE PLAN
The patient is Watcher - Medium risk of patient condition declining or worsening    Shift Goals  Clinical Goals: stable fluid balance, pulmonary hygiene  Patient Goals: pain control, move around  Family Goals: visit and updates with POC      Problem: Pain - Standard  Goal: Alleviation of pain or a reduction in pain to the patient’s comfort goal  Outcome: Progressing     Problem: Knowledge Deficit - Standard  Goal: Patient and family/care givers will demonstrate understanding of plan of care, disease process/condition, diagnostic tests and medications  Outcome: Progressing     Problem: Psychosocial  Goal: Patient's level of anxiety will decrease  Outcome: Progressing  Goal: Patient's ability to verbalize feelings about condition will improve  Outcome: Progressing  Goal: Patient's ability to re-evaluate and adapt role responsibilities will improve  Outcome: Progressing  Goal: Patient and family will demonstrate ability to cope with life altering diagnosis and/or procedure  Outcome: Progressing  Goal: Spiritual and cultural needs incorporated into hospitalization  Outcome: Progressing     Problem: Respiratory  Goal: Patient will achieve/maintain optimum respiratory ventilation and gas exchange  Outcome: Progressing     Problem: Venous Thromboembolism (VTE) Prevention  Goal: The patient will remain free from venous thromboembolism (VTE)  Outcome: Progressing     Problem: Nutrition  Goal: Patient's nutritional and fluid intake will be adequate or improve  Outcome: Progressing  Goal: Enteral nutrition will be maintained or improve  Outcome: Progressing  Goal: Enteral nutrition will be maintained or improve  Outcome: Progressing     Problem: Urinary Elimination  Goal: Establish and maintain regular urinary output  Outcome: Progressing     Problem: Skin Integrity  Goal: Skin integrity is maintained or improved  Outcome: Progressing     Problem: Skin Care - Ostomy  Goal: Skin remains free from irritation  Outcome:  Progressing     Problem: Knowledge Deficit - Ostomy  Goal: Patient will demonstrate ability to manage and maintain ostomy  Outcome: Progressing     Problem: Discharge Barriers/Self-Care - Ostomy  Goal: Ostomy patient's continuum of care needs will be met  Outcome: Progressing     Problem: Fall Risk  Goal: Patient will remain free from falls  Outcome: Progressing

## 2023-07-16 NOTE — PROGRESS NOTES
Critical Care Progress Note    Date of admission  7/12/2023    Chief Complaint  Admitted for saddle PE and perforated viscus.     Hospital Course  7/13 s/p bilateral thrombectomy for saddle PE and IVC filter placement. Successful.   7/13 s/p left hemicolectomy with end colostomy. Intraop found perforated colon at splenic flexure with associated mass. Concerning of malignancy. Remains intubated post op.     Interval Problem Update  Reviewed last 24 hour events:  Overall improving.   Alert, oriented, RASS 0  Off pressors   Lactate resolved.  On 4L NC, sat >92%  -1000cc  Tolerating clear diet.   Afebrile.   Ostomy output 300cc, no bleeding  Creatinine much improved.   Excellent UOP. Did briefly stop fluid, but UOP decreased a bit, so LR was resumed at 75cc/hr     Was able to sit in chair yesterday. PT/OT following  On heparin gtt.   On piptazo. To finish 4 day course post OR, end date 7/17  On hydrocortisone 50 Q6H. Will discontinue today   Heparin gtt.     TTE with LVEF 65%, dilated RV, RV pressure overload from PE  7/15 Repeat bedside POCUS with improvement of RV pressure overload. Better RV size and better TAPSE. Grossly normal LVEF.     Review of Systems  Review of Systems   Constitutional:  Negative for fever and malaise/fatigue.   Respiratory:  Negative for shortness of breath.    Cardiovascular:  Negative for chest pain and leg swelling.   Gastrointestinal:  Positive for abdominal pain. Negative for nausea and vomiting.   All other systems reviewed and are negative.       Vital Signs for last 24 hours   Pulse:  [] 83  Resp:  [7-52] 9  BP: ()/(51-64) 100/62  SpO2:  [83 %-98 %] 95 %    Hemodynamic parameters for last 24 hours       Respiratory Information for the last 24 hours       Physical Exam   Physical Exam  Vitals and nursing note reviewed.   Constitutional:       General: She is not in acute distress.     Appearance: She is ill-appearing and toxic-appearing. She is not diaphoretic.   HENT:       Head: Normocephalic.      Mouth/Throat:      Mouth: Mucous membranes are dry.   Eyes:      Pupils: Pupils are equal, round, and reactive to light.   Cardiovascular:      Rate and Rhythm: Normal rate and regular rhythm.      Pulses: Normal pulses.      Heart sounds: Normal heart sounds. No murmur heard.  Pulmonary:      Effort: No respiratory distress.      Breath sounds: Normal breath sounds. No wheezing, rhonchi or rales.   Abdominal:      General: There is no distension.      Palpations: Abdomen is soft.      Tenderness: There is no abdominal tenderness. There is no guarding.      Comments: Colostomy bag with stool  Woudn vac in place in mid abdomen   Musculoskeletal:      Cervical back: Neck supple.      Right lower leg: No edema.      Left lower leg: No edema.   Skin:     Capillary Refill: Capillary refill takes less than 2 seconds.      Coloration: Skin is not jaundiced.      Findings: No bruising or rash.   Neurological:      General: No focal deficit present.      Mental Status: She is alert.      Comments: Mvoing all ext appropriate  No focal deficit         Medications  Current Facility-Administered Medications   Medication Dose Route Frequency Provider Last Rate Last Admin    potassium chloride SA (Kdur) tablet 40 mEq  40 mEq Oral Q6HR Dagoberto Ohara, D.O.   40 mEq at 07/16/23 0752    HYDROmorphone (Dilaudid) injection 0.5-1 mg  0.5-1 mg Intravenous Q2HRS PRN Hardeep Guillen M.D.   1 mg at 07/16/23 0533    hydrocortisone sodium succinate PF (Solu-CORTEF) 100 MG injection 50 mg  50 mg Intravenous Q6HRS Dagoberto Ohara D.O.   50 mg at 07/16/23 0533    phenylephrine 40 mg/250 mL NS premix  0-5 mcg/kg/min (Ideal) Intravenous Continuous Dagoberto Ohara D.O.   Stopped at 07/14/23 1722    lactated ringers infusion   Intravenous Continuous Hardeep Guillen M.D. 75 mL/hr at 07/16/23 0400 Rate Verify at 07/16/23 0400    ondansetron (Zofran) syringe/vial injection 4 mg  4 mg Intravenous Q4HRS PRN Ana Lilia Joy M.D.         ondansetron (Zofran ODT) dispertab 4 mg  4 mg Oral Q4HRS PRN Ana Lilia Joy M.D.        promethazine (Phenergan) tablet 12.5-25 mg  12.5-25 mg Oral Q4HRS PRN Ana Lilia Joy M.D.        promethazine (Phenergan) suppository 12.5-25 mg  12.5-25 mg Rectal Q4HRS PRN Ana Lilia Joy M.D.        prochlorperazine (Compazine) injection 5-10 mg  5-10 mg Intravenous Q4HRS PRN Ana Lilia Joy M.D.        dextrose 50% (D50W) injection 25 g  25 g Intravenous Q15 MIN PRN Ana Lilia Joy M.D.        piperacillin-tazobactam (Zosyn) 4.5 g in  mL IVPB  4.5 g Intravenous Q8HRS Ana Lilia Joy M.D.   Stopped at 07/16/23 0632    pantoprazole (Protonix) injection 40 mg  40 mg Intravenous BID Vinnie Moctezuma M.D.   40 mg at 07/16/23 0533    Nozin nasal  swab  1 Applicator Each Nostril BID Dagoberto Ohara D.O.   1 Applicator at 07/16/23 0533    Respiratory Therapy Consult   Nebulization Continuous RT Dagoberto Ohara D.O.        senna-docusate (Pericolace Or Senokot S) 8.6-50 MG per tablet 2 Tablet  2 Tablet Enteral Tube BID Dagoberto Ohara D.O.   2 Tablet at 07/15/23 1739    And    polyethylene glycol/lytes (Miralax) PACKET 1 Packet  1 Packet Enteral Tube QDAY PRN Dagoberto Ohara D.O.        And    magnesium hydroxide (Milk Of Magnesia) suspension 30 mL  30 mL Enteral Tube QDAY PRN Dagoberto Ohara D.O.        And    bisacodyl (Dulcolax) suppository 10 mg  10 mg Rectal QDAY PRN Dagoberto Ohara D.O.        MD Alert...ICU Electrolyte Replacement per Pharmacy   Other PHARMACY TO DOSE Dagoberto Ohara D.O.        lidocaine (Xylocaine) 1 % injection 2 mL  2 mL Tracheal Tube Q30 MIN PRN Dagoberto Tedja, D.O.        norepinephrine (Levophed) 8 mg in 250 mL NS infusion (premix)  0-1 mcg/kg/min (Ideal) Intravenous Continuous Dagoberto Ohara D.O.   Stopped at 07/15/23 1153    acetaminophen (Tylenol) suppository 650 mg  650 mg Rectal Q6HRS PRN Kerrie L. Latona        acetaminophen (Tylenol) tablet 650 mg  650 mg Enteral Tube Q6HRS PRN Kerrie Montana   650  mg at 07/15/23 0545    vasopressin (Vasostrict) 20 Units in  mL Infusion  0.03 Units/min Intravenous Continuous Kerrie PEREZNik Latap   Stopped at 07/15/23 0348    heparin infusion 25,000 units in 500 mL 0.45% NACL  0-30 Units/kg/hr (Adjusted) Intravenous Continuous Ana Lilia Joy M.D. 54.6 mL/hr at 07/16/23 0600 26 Units/kg/hr at 07/16/23 0600    heparin injection 4,200 Units  40 Units/kg (Adjusted) Intravenous PRN Ana Lilia Joy M.D.   4,200 Units at 07/14/23 2358       Fluids    Intake/Output Summary (Last 24 hours) at 7/16/2023 0808  Last data filed at 7/16/2023 0600  Gross per 24 hour   Intake 3340.07 ml   Output 2775 ml   Net 565.07 ml         Laboratory  Recent Labs     07/13/23  2259 07/14/23  0337 07/15/23  0202   ISTATAPH 7.411 7.335* 7.417   ISTATAPCO2 33.2 38.0* 32.4   ISTATAPO2 101* 91* 82   ISTATATCO2 22 21 22   UFGXFUU5XSZ 98 96 96   ISTATARTHCO3 21.1 20.3 20.9   ISTATARTBE -3 -5* -3   ISTATTEMP 100.7 F 102.2 F 100.6 F   ISTATFIO2 60 50 40   ISTATSPEC Arterial Arterial Arterial   ISTATAPHTC 7.394* 7.307* 7.400   [id number] 108* 103* 88*           Recent Labs     07/14/23  0500 07/14/23  1415 07/15/23  0600 07/16/23  0450   SODIUM 135 132* 131* 134*   POTASSIUM 4.2 4.3 3.7 3.3*   CHLORIDE 101 100 99 101   CO2 20 18* 20 23   BUN 22 24* 26* 25*   CREATININE 1.80* 1.67* 1.45* 1.21   MAGNESIUM 2.1  --  1.8 2.4   PHOSPHORUS 5.3*  --  3.7 3.6   CALCIUM 7.3* 6.8* 6.8* 7.6*       Recent Labs     07/14/23  0500 07/14/23  1415 07/15/23  0600 07/16/23  0450   ALTSGPT 25  --  69* 53*   ASTSGOT 35  --  63* 38   ALKPHOSPHAT 67  --  83 100*   TBILIRUBIN 0.6  --  0.5 0.5   GLUCOSE 115* 107* 117* 106*       Recent Labs     07/14/23  0500 07/15/23  0600 07/16/23  0450   WBC 18.0* 17.4* 18.2*   NEUTSPOLYS 76.00* 84.20* 89.70*   LYMPHOCYTES 2.40* 8.80* 7.70*   MONOCYTES 3.20 3.50 0.00   EOSINOPHILS 0.00 0.00 0.00   BASOPHILS 0.00 0.00 0.00   ASTSGOT 35 63* 38   ALTSGPT 25 69* 53*   ALKPHOSPHAT 67 83 100*    TBILIRUBIN 0.6 0.5 0.5       Recent Labs     07/14/23  0500 07/15/23  0600 07/16/23  0450   RBC 4.71 3.31* 2.89*   HEMOGLOBIN 13.7 10.5* 9.1*   HEMATOCRIT 42.1 29.9* 26.9*   PLATELETCT 221 186 134*         Imaging  X-Ray:  I have personally reviewed the images and compared with prior images.  CT:    Reviewed    TTE was personally reviewed    Assessment/Plan  * Septic shock (HCC)  Assessment & Plan  This is Septic shock Present on admission  SIRS criteria identified on my evaluation include: Fever, with temperature greater than 100.9 deg F, Leukocytosis, with WBC greater than 12,000 and Bandemia, greater than 10% bands  Indicators of septic shock include: Severe sepsis present and persistent hypotension as well as initial lactate level result is greater than or equal to 4mmol/L   Sources is: GI, fecal peritonitis, perforated bowel  Sepsis protocol initiated  Crystalloid Fluid Administration: Fluid resuscitation ordered per standard protocol - 30 mL/kg per current or ideal body weight  IV antibiotics as appropriate for source of sepsis  Reassessment: I have reassessed the patient's hemodynamic status    Profound septic shock with multiorgan failure and intravascular depletion   Source has been controlled.   7/13 S/p ex lap with left hemicolectomy and end colostomy. + fecal peritonitis. Stool was noted in colostomy bag.   7/13-7/14  Required aggressive fluid resuscitation post op. Had total 4L fluid boluses followed with LR at 150cc/hr  - On 3 pressors, and after fluid resuscitation, we were able to wean down pressors  7/14 Bedside POCUS: RV appears improving and good RV function   7/12 and 7/14 BCx negative to date  7/15 off all pressors.   7/13-7/15 renal function improving, now back to normal     On piptazo. Complete 4 day course post op (end date 7/17)  MRSA screen negative, linezolid discontinued  Lactate normalized  Monitor UOP, strcit I/Os.       Acute respiratory failure with hypoxia (HCC)  Assessment &  Plan  7/13 Intubated for OR   7/15 extubated    On 4l NC,   Incentive spirometry   RASS 0  Pain control with fentanyl IV prn pushes        Acute kidney injury (HCC)  Assessment & Plan  DUNG post op due to pre renal, intravascular depletion  Creatinine continues to improve with fluids.   7/14 s/p 3L fluid boluses, continue LR at 150cc/hr  7/15 LR at 150cc/hr, decreased to 75/hr.     Making great UOP, creatinine resolved  Excellent oral intake, clear diet  Will give a trial off fluid and monitor UOP and volume status closely   Strict I/Os.     Acute saddle pulmonary embolism (HCC)- (present on admission)  Assessment & Plan  Originating from a right leg DVT. Pt is not a candidate for thrombolytics given her pneumoperitoneum  7/13 S/p emergent thrombectomy of saddle PE with IVC placement. Successful   Heparin gtt resumed post operatively.   No bleeding.   Eventually will convert to DOACs. Plan tomorrow    Perforated viscus  Assessment & Plan  7/13 S/p ex lap with left hemicolectomy and end colostomy by Dr. Boateng  - found perforated colon at splenic fixture, associated with mass. Concerning of malignancy. Path pending.   - Fecal peritonitis    Post operatively, pt improving.   Good stool output, no bleeding.   Tolerating clears  Early mobility, goal level 3  See septic shock section         Hypokalemia- (present on admission)  Assessment & Plan  Replete to goal > 4    Morbid obesity with BMI of 50.0-59.9, adult ([facility])- (present on admission)  Assessment & Plan  BMI 50    Acute deep venous thrombosis (DVT) determined by ultrasound ([facility])- (present on admission)  Assessment & Plan  Heparin infusion with Xa monitoring  IVC filter placed         VTE:  Heparin  Ulcer: Not Indicated  Lines: Arterial Line  Ongoing indication addressed and Elizalde Catheter  Ongoing indication addressed    I have performed a physical exam and reviewed and updated ROS and Plan today (7/16/2023). In review of yesterday's note (7/15/2023), there are no  changes except as documented above.     Discussed patient condition and risk of morbidity and/or mortality with RN, RT, Pharmacy, Charge nurse / hot rounds, and Patient  The patient remains critically ill.  Critical care time = 60 minutes in directly providing and coordinating critical care and extensive data review.  No time overlap and excludes procedures.

## 2023-07-16 NOTE — CARE PLAN
The patient is Watcher - Medium risk of patient condition declining or worsening    Shift Goals  Clinical Goals: hemodynamic stability  Patient Goals: control pain  Family Goals: not present    Progress made toward(s) clinical / shift goals:    Problem: Pain - Standard  Goal: Alleviation of pain or a reduction in pain to the patient’s comfort goal  Outcome: Progressing     Problem: Knowledge Deficit - Standard  Goal: Patient and family/care givers will demonstrate understanding of plan of care, disease process/condition, diagnostic tests and medications  Outcome: Progressing     Problem: Fall Risk  Goal: Patient will remain free from falls  Outcome: Progressing

## 2023-07-17 NOTE — DISCHARGE PLANNING
Care Transition Team Assessment    LSW met with pt at bedside to complete assessment. Pt verified the information on the face sheet. Pt reported she lives alone in a single story house that has two steps to enter. Prior to this hospitalization pt was independent with all ADLs and IADLs. Pt drives independently and does not use any DME at baseline, however does have a cane at home. Pt reported she is retired and receives PERS in the amount of about $3,500/month. No SA or MH concerns at this time. Pt reported she has great family support including her sister, brother, and niece who all live nearby. Pt has never used HHC or been to IRF/SNF in the past, however is agreeable with discussing this further pending PT/OT recs.    Information Source  Orientation Level: Oriented X4  Information Given By: Patient  Informant's Name: Ashley English  Who is responsible for making decisions for patient? : Patient    Readmission Evaluation  Is this a readmission?: No    Elopement Risk  Legal Hold: No  Ambulatory or Self Mobile in Wheelchair: No-Not an Elopement Risk  Elopement Risk: Not at Risk for Elopement    Interdisciplinary Discharge Planning  Lives with - Patient's Self Care Capacity: Alone and Unable to Care For Self  Patient or legal guardian wants to designate a caregiver: No  Support Systems: Family Member(s)  Housing / Facility: 1 Story House  Durable Medical Equipment: Other - Specify (cane)    Discharge Preparedness  What is your plan after discharge?: Skilled nursing facility  What are your discharge supports?: Sibling, Other (comment) (niece)  Prior Functional Level: Ambulatory, Drives Self, Independent with Activities of Daily Living, Independent with Medication Management  Difficulity with ADLs: None  Difficulity with IADLs: None    Functional Assesment  Prior Functional Level: Ambulatory, Drives Self, Independent with Activities of Daily Living, Independent with Medication Management    Finances  Financial Barriers  to Discharge: No  Prescription Coverage: Yes    Vision / Hearing Impairment  Vision Impairment : Yes  Right Eye Vision: Wears Glasses  Left Eye Vision: Wears Glasses    Advance Directive  Advance Directive?: None    Domestic Abuse  Have you ever been the victim of abuse or violence?: No  Physical Abuse or Sexual Abuse: No  Verbal Abuse or Emotional Abuse: No  Possible Abuse/Neglect Reported to::     Psychological Assessment  History of Substance Abuse: None  History of Psychiatric Problems: No  Non-compliant with Treatment: No  Newly Diagnosed Illness: No    Discharge Risks or Barriers  Discharge risks or barriers?: Lives alone, no community support  Patient risk factors: Lives alone and no community support    Anticipated Discharge Information  Discharge Disposition: D/T to SNF with Medicare cert in anticipation of skilled care (03)  Discharge Address: [address] [place] [address]  [place]az [place]ARK [id number]

## 2023-07-17 NOTE — PROCEDURES
Vascular Access Team    Date of Insertion: 7/17/2023  Arm Circumference: n/a  Line Length: 10  Line Size: 18  Vein Occupancy %: 26  Reason for Midline: access  Labs: WBC 11.1, , BUN 20, Cr 1.08, GFR 58, INR 0    Orders confirmed, vessel patency confirmed with ultrasound. Risks and benefits of procedure explained to patient and education regarding line associated bloodstream infections provided. Questions answered.     PowerGlide Midline placed in LUE per licensed provider order with ultrasound guidance. 18g, 10 cm line placed in cephalic vein after 1 attempt(s).  Catheter inserted with brisk blood return. Secured with 0cm external from insertion site.  Line flushed without resistance with 10 mL 0.9% normal saline.  Midline secured with Biopatch and Tegaderm.     Midline placement is confirmed by nurse using ultrasound and ability to flush and draw blood. Midline is appropriate for use at this time.  No X-ray is needed for placement confirmation. Pt tolerated procedure well.  Patient condition relayed to unit RN or ordering physician via this post procedure note in the EMR.    Ultrasound images uploaded to PACS and viewable in the EMR - yes  Ultrasound imaged printed and placed in paper chart - no      BARD PowerGlide Midline ref # [id number], Lot # [id number], Expiration Date 1/31/2024

## 2023-07-17 NOTE — PROGRESS NOTES
Patient transported to [facility] and left in chair by ICU RN. Patient states she is unable to stand. Skin check unable to be completed at this time due to this. Patient has a wound vac in place. Patient has a purewick in place. Patient has yankauer suction in place for secretions. Call bell is within reach. Heparin is running at 26 units/kg/hr.

## 2023-07-17 NOTE — CARE PLAN
Problem: Hyperinflation  Goal: Prevent or improve atelectasis  Description: Target End Date:  3 to 4 days    1. Instruct incentive spirometry usage  2.  Perform hyperinflation therapy as indicated  Outcome: Progressing   PEP  QID

## 2023-07-17 NOTE — HOSPITAL COURSE
7/13 s/p bilateral thrombectomy for saddle PE and IVC filter placement. Successful.   7/13 s/p left colectomy and end-colostomy, and cholecystectomy. Intraop found perforated colon at splenic flexure with associated mass. Concerning of malignancy. Remains intubated post op.   7/19: Admitted to ICU is hemorrhagic shock, MTP, OR for intrabdominal bleed, liver lac, returns to ICU in shock and intubated  7/20: VD 2-Hb stable, anuric, back to OR for removal of packing, colon resection, revision colostomy (to RUQ), vasoplegic with increasing pressors overnight  7/21: CRRT started  7/22: VD 4-weaned of pressors, following, increase UF to 100-->150 overnight  7/23: 2.5 L off yesterday, off pressors, very weak today, hypophos needs aggressive replacement, switch CRRT to iHD, mobilize, SBT/SAT tomorrow

## 2023-07-17 NOTE — PROGRESS NOTES
Critical Care Progress Note    Date of admission  7/12/2023    Chief Complaint  63 y.o. female admitted 7/12/2023 with  high risk PE and perforated viscus.     Hospital Course  7/13 s/p bilateral thrombectomy for saddle PE and IVC filter placement. Successful.   7/13 s/p left colectomy and end-colostomy, and cholecystectomy. Intraop found perforated colon at splenic flexure with associated mass. Concerning of malignancy. Remains intubated post op.     Interval Problem Update  Reviewed last 24 hour events:  Off pressors    Neuro:   AxO x4  Dilaudid  pushes     CV:  HR 80s  -120s  No pressors    Resp:   4 L NC 92%  CXR LLV/Atx  PEP qid    GI: Good ostomy output, passing gas    I/O: +212  UOP: 2100    Tmax: AF  Heme: WBC 11.1  Abx: P/T d 6   Micro: NGTD  Endo: BG WTR    LDA: CVC, Elizalde, Wound vac  SUP: PPI bid  VTE: Heparin gtt  Diet: Clear liquid        Review of Systems  Review of Systems   All other systems reviewed and are negative.       Vital Signs for last 24 hours   Pulse:  [72-96] 88  Resp:  [8-37] 19  BP: ()/(53-77) 106/66  SpO2:  [90 %-98 %] 93 %    Hemodynamic parameters for last 24 hours       Respiratory Information for the last 24 hours       Physical Exam   Physical Exam  Constitutional:       General: She is not in acute distress.  HENT:      Head: Normocephalic.      Nose: No congestion.      Mouth/Throat:      Mouth: Mucous membranes are moist.      Pharynx: Oropharynx is clear.   Eyes:      Extraocular Movements: Extraocular movements intact.      Pupils: Pupils are equal, round, and reactive to light.   Cardiovascular:      Rate and Rhythm: Regular rhythm. Tachycardia present.      Heart sounds: No murmur heard.  Pulmonary:      Effort: Pulmonary effort is normal. No respiratory distress.      Breath sounds: No wheezing.   Abdominal:      General: There is distension.      Palpations: Abdomen is soft.      Comments: Ostomy clean, good output   Musculoskeletal:      Right lower leg:  Edema present.      Left lower leg: Edema present.   Skin:     General: Skin is warm.      Capillary Refill: Capillary refill takes less than 2 seconds.   Neurological:      General: No focal deficit present.      Mental Status: She is alert and oriented to person, place, and time. Mental status is at baseline.         Medications  Current Facility-Administered Medications   Medication Dose Route Frequency Provider Last Rate Last Admin    oxyCODONE immediate-release (Roxicodone) tablet 5 mg  5 mg Oral Q4HRS PRN Cristian Peña M.D.        HYDROmorphone (Dilaudid) injection 0.5-1 mg  0.5-1 mg Intravenous Q2HRS PRN Cristian Peña M.D.        furosemide (Lasix) injection 40 mg  40 mg Intravenous Once Cristian Peña M.D.        omeprazole (PriLOSEC) capsule 20 mg  20 mg Oral BID Dagoberto Ohara, D.O.   20 mg at 07/17/23 0509    senna-docusate (Pericolace Or Senokot S) 8.6-50 MG per tablet 2 Tablet  2 Tablet Oral BID Dagoberto Ohara D.O.   2 Tablet at 07/17/23 0506    And    polyethylene glycol/lytes (Miralax) PACKET 1 Packet  1 Packet Oral QDAY PRN Dagoberto Ohara D.O.        And    magnesium hydroxide (Milk Of Magnesia) suspension 30 mL  30 mL Oral QDAY PRN Dagoberto Ohara D.O.        And    bisacodyl (Dulcolax) suppository 10 mg  10 mg Rectal QDAY PRN Dagoberto Ohara D.O.        acetaminophen (Tylenol) tablet 650 mg  650 mg Oral Q6HRS PRN Dagoberto Ohara D.O.        levothyroxine (Synthroid) tablet 225 mcg  225 mcg Oral AM ES Dagoberto Ohara D.O.   225 mcg at 07/17/23 0510    ondansetron (Zofran) syringe/vial injection 4 mg  4 mg Intravenous Q4HRS PRN Ana Lilia Joy M.D.        ondansetron (Zofran ODT) dispertab 4 mg  4 mg Oral Q4HRS PRN Ana Lilia Joy M.D.        promethazine (Phenergan) tablet 12.5-25 mg  12.5-25 mg Oral Q4HRS PRN Ana Lilia Joy M.D.        promethazine (Phenergan) suppository 12.5-25 mg  12.5-25 mg Rectal Q4HRS PRN Ana Lilia Joy M.D.        prochlorperazine (Compazine) injection 5-10 mg  5-10 mg  Intravenous Q4HRS PRN Ana Lilia Joy M.D.        dextrose 50% (D50W) injection 25 g  25 g Intravenous Q15 MIN PRN Ana Lilia Joy M.D.        piperacillin-tazobactam (Zosyn) 4.5 g in  mL IVPB  4.5 g Intravenous Q8HRS Dagoberto Ohara D.O. 25 mL/hr at 07/17/23 1032 4.5 g at 07/17/23 1032    Nozin nasal  swab  1 Applicator Each Nostril BID aDgoberto Ohara D.O.   1 Applicator at 07/17/23 0505    Respiratory Therapy Consult   Nebulization Continuous RT Dagoberto Ohara D.O. MD Alert...ICU Electrolyte Replacement per Pharmacy   Other PHARMACY TO DOSE Dagoberto Ohara D.O.        heparin infusion 25,000 units in 500 mL 0.45% NACL  0-30 Units/kg/hr (Adjusted) Intravenous Continuous Ana Lilia Joy M.D. 54.6 mL/hr at 07/17/23 0700 26 Units/kg/hr at 07/17/23 0700    heparin injection 4,200 Units  40 Units/kg (Adjusted) Intravenous PRN Ana Lilia Joy M.D.   4,200 Units at 07/14/23 2358       Fluids    Intake/Output Summary (Last 24 hours) at 7/17/2023 1056  Last data filed at 7/17/2023 0800  Gross per 24 hour   Intake 1565.96 ml   Output 2180 ml   Net -614.04 ml       Laboratory  Recent Labs     07/15/23  0202   ISTATAPH 7.417   ISTATAPCO2 32.4   ISTATAPO2 82   ISTATATCO2 22   GDWAZFY9YYS 96   ISTATARTHCO3 20.9   ISTATARTBE -3   ISTATTEMP 100.6 F   ISTATFIO2 40   ISTATSPEC Arterial   ISTATAPHTC 7.400   [id number] 88*         Recent Labs     07/15/23  0600 07/16/23  0450 07/17/23  0429   SODIUM 131* 134* 134*   POTASSIUM 3.7 3.3* 3.5*   CHLORIDE 99 101 103   CO2 20 23 24   BUN 26* 25* 20   CREATININE 1.45* 1.21 1.08   MAGNESIUM 1.8 2.4  --    PHOSPHORUS 3.7 3.6  --    CALCIUM 6.8* 7.6* 7.5*     Recent Labs     07/15/23  0600 07/16/23  0450 07/17/23 0429   ALTSGPT 69* 53* 51*   ASTSGOT 63* 38 53*   ALKPHOSPHAT 83 100* 215*   TBILIRUBIN 0.5 0.5 0.7   GLUCOSE 117* 106* 87     Recent Labs     07/15/23  0600 07/16/23  0450 07/17/23 0429   WBC 17.4* 18.2* 11.1*   NEUTSPOLYS 84.20* 89.70* 89.30*   LYMPHOCYTES 8.80*  7.70* 4.10*   MONOCYTES 3.50 0.00 6.60   EOSINOPHILS 0.00 0.00 0.00   BASOPHILS 0.00 0.00 0.00   ASTSGOT 63* 38 53*   ALTSGPT 69* 53* 51*   ALKPHOSPHAT 83 100* 215*   TBILIRUBIN 0.5 0.5 0.7     Recent Labs     07/15/23  0600 07/16/23  0450 07/17/23  0429   RBC 3.31* 2.89* 2.54*   HEMOGLOBIN 10.5* 9.1* 9.1*   HEMATOCRIT 29.9* 26.9* 24.4*   PLATELETCT 186 134* 125*       Imaging  X-Ray:  I have personally reviewed the images and compared with prior images.    Assessment/Plan  * Septic shock (HCC)  Assessment & Plan  This is Septic shock Present on admission  SIRS criteria identified on my evaluation include: Fever, with temperature greater than 100.9 deg F, Leukocytosis, with WBC greater than 12,000 and Bandemia, greater than 10% bands  Indicators of septic shock include: Severe sepsis present and persistent hypotension as well as initial lactate level result is greater than or equal to 4mmol/L   Sources is: GI, fecal peritonitis, perforated bowel  Sepsis protocol initiated  Crystalloid Fluid Administration: Fluid resuscitation ordered per standard protocol - 30 mL/kg per current or ideal body weight  IV antibiotics as appropriate for source of sepsis  Reassessment: I have reassessed the patient's hemodynamic status    Profound septic shock with multiorgan failure and intravascular depletion   Source has been controlled.   7/13 S/p ex lap with left hemicolectomy and end colostomy. + fecal peritonitis. Stool was noted in colostomy bag.   7/13-7/14  Required aggressive fluid resuscitation post op. Had total 4L fluid boluses followed with LR at 150cc/hr  - On 3 pressors, and after fluid resuscitation, we were able to wean down pressors  7/14 Bedside POCUS: RV appears improving and good RV function   7/12 and 7/14 BCx negative to date  7/15 off all pressors.   7/13-7/15 renal function improving, now back to normal   7/17 Will begin deresuscitation, diureses    PipTazo end date today (7/17), no signs of relapsing  sepsis    Monitor UOP, strcit I/Os.       Acute hypoxemic respiratory failure (HCC)  Assessment & Plan  7/13 Intubated for OR   7/15 extubated    Continnue to require supplemental 02 to maintain saturation, weaning to NC on 4L now  CXR with ATX and low volumes  Mobility, PT ordered  Incentive spirometry         Perforated viscus  Assessment & Plan  7/13 S/p ex lap with left hemicolectomy and end colostomy by Dr. Boateng  - found perforated colon at splenic fixture, associated with mass. Concerning of malignancy. Path pending.   - Fecal peritonitis    Post operatively, pt improving.   Good stool output, no bleeding.   Advance diet today  Early mobility, goal level 3        Acute kidney injury (HCC)  Assessment & Plan  DUNG post op due to pre renal, intravascular depletion      Making great UOP, creatinine resolved  Excellent oral intake, clear diet  Will give a trial off fluid and monitor UOP and volume status closely   Begin diuresis today, lasix 40 x 1, follow output    Hypokalemia- (present on admission)  Assessment & Plan  Replete to goal > 4    Morbid obesity with BMI of 50.0-59.9, adult ([facility])- (present on admission)  Assessment & Plan  BMI 50    Acute saddle pulmonary embolism (HCC)- (present on admission)  Assessment & Plan  Originating from a right leg DVT. Pt is not a candidate for thrombolytics given her pneumoperitoneum  7/13 S/p emergent thrombectomy of saddle PE with IVC placement.     Contune Heparin infusion (Xa titration).  Switch to DOAC as soon as feasible (today if tolerates advancing diet)  No bleeding.       Acute deep venous thrombosis (DVT) determined by ultrasound ([facility])- (present on admission)  Assessment & Plan  Heparin infusion with Xa monitoring  IVC filter placed  Will need IVC filter retrieval in time, as soon as feasible         VTE:  Heparin  Ulcer: PPI  Lines: Central Line  Ongoing indication addressed and Elizalde Catheter  Ongoing indication addressed    I have performed a physical exam  and reviewed and updated ROS and Plan today (7/17/2023). In review of yesterday's note (7/16/2023), there are no changes except as documented above.     Discussed patient condition and risk of morbidity and/or mortality with Hospitalist, RN, RT, Therapies, Pharmacy, and Patient  The patient remains critically ill.  Critical care time = 32 minutes in directly providing and coordinating critical care and extensive data review.  No time overlap and excludes procedures.

## 2023-07-17 NOTE — PROGRESS NOTES
4 Eyes Skin Assessment Completed by Surendra Hodges, RN and Halie Willard, RN.    Head WDL  Ears WDL  Nose WDL  Mouth WDL  Neck WDL  Breast/Chest WDL  Shoulder Blades WDL  Spine: Unable to assess. Patient does not want to move to allow RN to be able to accurately assess  (R) Arm/Elbow/Hand WDL  (L) Arm/Elbow/Hand WDL  Abdomen Incision  Groin WDL  Scrotum/Coccyx/Buttocks Unable to assess. Patient does not want to move to allow RN to be able to accurately   (R) Leg WDL  (L) Leg Scar  (R) Heel/Foot/Toe WDL  (L) Heel/Foot/Toe WDL          Devices In Places Tele Box, Nasal Cannula, Wound Vac, Purewick      Interventions In Place Gray Ear Foams, Pillows, and Q2 Turns    Possible Skin Injury No    Pictures Uploaded Into Epic N/A  Wound Consult Placed N/A  RN Wound Prevention Protocol Ordered No

## 2023-07-17 NOTE — DIETARY
Nutrition Services:  Pt followed by RD for diet advancement. Diet now advanced to Full liquid with good PO intake recorded at %. RD to monitor per department policy.

## 2023-07-17 NOTE — CARE PLAN
The patient is Stable - Low risk of patient condition declining or worsening    Shift Goals  Clinical Goals: stable fluid balance, pulmonary hygiene  Patient Goals: pain control, move around  Family Goals: visit and updates with POC    Progress made toward(s) clinical / shift goals:  PT rests through shift, Pt states that pain is controled, PT remains hemodynamically stable      Problem: Pain - Standard  Goal: Alleviation of pain or a reduction in pain to the patient’s comfort goal  Outcome: Progressing     Problem: Knowledge Deficit - Standard  Goal: Patient and family/care givers will demonstrate understanding of plan of care, disease process/condition, diagnostic tests and medications  Outcome: Progressing     Problem: Psychosocial  Goal: Patient's level of anxiety will decrease  Outcome: Progressing  Goal: Patient's ability to verbalize feelings about condition will improve  Outcome: Progressing  Goal: Patient's ability to re-evaluate and adapt role responsibilities will improve  Outcome: Progressing  Goal: Patient and family will demonstrate ability to cope with life altering diagnosis and/or procedure  Outcome: Progressing  Goal: Spiritual and cultural needs incorporated into hospitalization  Outcome: Progressing     Problem: Respiratory  Goal: Patient will achieve/maintain optimum respiratory ventilation and gas exchange  Outcome: Progressing     Problem: Venous Thromboembolism (VTE) Prevention  Goal: The patient will remain free from venous thromboembolism (VTE)  Outcome: Progressing     Problem: Nutrition  Goal: Patient's nutritional and fluid intake will be adequate or improve  Outcome: Progressing  Goal: Enteral nutrition will be maintained or improve  Outcome: Progressing  Goal: Enteral nutrition will be maintained or improve  Outcome: Progressing     Problem: Urinary Elimination  Goal: Establish and maintain regular urinary output  Outcome: Progressing     Problem: Skin Integrity  Goal: Skin integrity  is maintained or improved  Outcome: Progressing     Problem: Skin Care - Ostomy  Goal: Skin remains free from irritation  Outcome: Progressing     Problem: Knowledge Deficit - Ostomy  Goal: Patient will demonstrate ability to manage and maintain ostomy  Outcome: Progressing     Problem: Discharge Barriers/Self-Care - Ostomy  Goal: Ostomy patient's continuum of care needs will be met  Outcome: Progressing     Problem: Fall Risk  Goal: Patient will remain free from falls  Outcome: Progressing

## 2023-07-17 NOTE — CARE PLAN
The patient is Stable - Low risk of patient condition declining or worsening    Shift Goals  Clinical Goals: Improve IS, mobility, advance diet per surgery, remove central line  Patient Goals: Pain control, improve strength  Family Goals: No family present    Progress made toward(s) clinical / shift goals:  Working on IS with patient. Pt to work with PT OT. IV access obtained, RN will remove central line. Pt encouraged to move throughout the day to work in increasing strength.      Problem: Pain - Standard  Goal: Alleviation of pain or a reduction in pain to the patient’s comfort goal  Outcome: Progressing     Problem: Knowledge Deficit - Standard  Goal: Patient and family/care givers will demonstrate understanding of plan of care, disease process/condition, diagnostic tests and medications  Outcome: Progressing     Problem: Psychosocial  Goal: Patient's level of anxiety will decrease  Outcome: Progressing     Problem: Respiratory  Goal: Patient will achieve/maintain optimum respiratory ventilation and gas exchange  Outcome: Progressing     Problem: Urinary Elimination  Goal: Establish and maintain regular urinary output  Outcome: Progressing

## 2023-07-17 NOTE — PROGRESS NOTES
"  DATE: 7/17/2023  Post Operative Day  4 exploratory laparotomy, left colectomy with end-colostomy, cholecystectomy .    INTERVAL EVENTS:  Feeling relatively well. No N/V. Pain tolerable. Tolerating CLD.    PHYSICAL EXAMINATION:  Vital Signs: /72   Pulse 86   Temp 36.2 °C (97.2 °F) (Temporal)   Resp (!) 29   Ht 1.778 m (5' 10\")   Wt (!) 154 kg (339 lb 1.1 oz)   SpO2 95%     A&OX3.   Abdomen soft, ntnd, wound vac intact with serosang drainage, left sided colostomy pink patent and productive. 300cc liquid stool/24 hours.     LABORATORY VALUES:   Recent Labs     07/15/23  0600 07/16/23 0450 07/17/23 0429   WBC 17.4* 18.2* 11.1*   RBC 3.31* 2.89* 2.54*   HEMOGLOBIN 10.5* 9.1* 9.1*   HEMATOCRIT 29.9* 26.9* 24.4*   MCV 90.3 93.1 96.1   MCH 31.7 31.5 35.8*   MCHC 35.1 33.8 37.3*   RDW 45.8 49.2 50.2*   PLATELETCT 186 134* 125*   MPV 9.3 9.2 9.4     Recent Labs     07/15/23  0600 07/16/23  0450 07/17/23  0429   SODIUM 131* 134* 134*   POTASSIUM 3.7 3.3* 3.5*   CHLORIDE 99 101 103   CO2 20 23 24   GLUCOSE 117* 106* 87   BUN 26* 25* 20   CREATININE 1.45* 1.21 1.08   CALCIUM 6.8* 7.6* 7.5*     Recent Labs     07/15/23  0600 07/16/23 0450 07/17/23  0429   ASTSGOT 63* 38 53*   ALTSGPT 69* 53* 51*   TBILIRUBIN 0.5 0.5 0.7   ALKPHOSPHAT 83 100* 215*   GLOBULIN 2.9 3.1 3.1            IMAGING:   IR-MIDLINE CATHETER INSERTION WO GUIDANCE > AGE 3   Final Result                  Ultrasound-guided midline placement performed by qualified nursing staff    as above.          IR-US GUIDED PIV   Final Result    Ultrasound-guided PERIPHERAL IV INSERTION performed by    qualified nursing staff as above.      DX-CHEST-PORTABLE (1 VIEW)   Final Result      Stable thoracic underinflation. Right IJ line adequate. Stable bibasilar pleural-parenchymal opacification      DX-CHEST-PORTABLE (1 VIEW)   Final Result      Stable thoracic underinflation. Right IJ line and ETT appear adequate. Slightly improved bibasilar pleural-parenchymal " opacification      DX-CHEST-PORTABLE (1 VIEW)   Final Result      Stable bibasilar pleural-parenchymal opacification and low lung volumes. Lines/tubes adequate.      DX-CHEST-LIMITED (1 VIEW)   Final Result         Endotracheal tube with tip projecting over the mid thoracic trachea.   Gastric drainage tube courses below diaphragm, tip is not seen.   Right central venous catheter with tip projecting over the expected area of the lower SVC.            EC-NAEEM W/O CONT         DX-CHEST-LIMITED (1 VIEW)   Final Result      Bibasilar underinflation atelectasis which could obscure an additional process. This is similar to the prior study. Lung volumes are very low.      IR-INSERT IVC FILTER WITH IG & SI   Final Result      1.  Ultrasound guided access LEFT common femoral vein.   2.  BILATERAL selective pulmonary arteriogram demonstrating extensive bilateral pulmonary emboli.   3.  Successful BILATERAL pulmonary thrombectomy   4.  Large IVC at the level of the renal veins   5.  Successful placement of infrarenal IVC filter   6.  Pursestring suture should be removed in 6-12 hours.         Please note that this study required greater than 50% more than the usual procedure time due to the patient's anatomy and large habitus.            IR-THROMBO MECHANICAL ARTERY,INIT   Final Result      1.  Ultrasound guided access LEFT common femoral vein.   2.  BILATERAL selective pulmonary arteriogram demonstrating extensive bilateral pulmonary emboli.   3.  Successful BILATERAL pulmonary thrombectomy   4.  Large IVC at the level of the renal veins   5.  Successful placement of infrarenal IVC filter   6.  Pursestring suture should be removed in 6-12 hours.         Please note that this study required greater than 50% more than the usual procedure time due to the patient's anatomy and large habitus.            EC-ECHOCARDIOGRAM COMPLETE W/ CONT   Final Result      CT-ABDOMEN-PELVIS WITH   Final Result      1.  Pneumoperitoneum secondary to  perforated viscus. Splenic flexure colonic diverticulum may be the perforation site. Gastroduodenal ulcer ulcer is considered less likely.   2.  Hepatic steatosis.   3.  Moderate gallbladder distention and pericholecystic fat stranding.   4.  Small volume hyperdense ascites.   5.  Partially visualized right lower extremity DVT.      Findings were communicated to SALINA STOVER via Voalte messaging system on 7/12/2023 11:03 PM.      CT-CTA CHEST PULMONARY ARTERY W/ RECONS   Final Result      1.  Saddle pulmonary embolus with associated right heart strain.   2.  Posterior pneumomediastinum secondary to pneumoperitoneum. Abdomen will be dictated separately.      Findings were communicated to SALINA STOVER via Voalte messaging system on 7/12/2023 10:57 PM.      DX-CHEST-PORTABLE (1 VIEW)   Final Result      Mild atelectasis in the right lower lung. No focal consolidation. No effusions.             ASSESSMENT AND PLAN:  Advance to full liquid diet.  Ok to transfer to [facility] from surgery standpoint    Appreciate ICU and consulting physician care.       ____________________________________     Taylor Bocanegra M.D.    DD: 7/17/2023  11:55 AM

## 2023-07-17 NOTE — WOUND TEAM
"[facility]  Inpatient Services  Wound and Skin Care Evaluation    Admission Date: 7/12/2023     Last order of IP CONSULT TO WOUND CARE was found on 7/13/2023 from Hospital Encounter on 7/12/2023     HPI, PMH, SH: Reviewed    Past Surgical History:   Procedure Laterality Date    [place] EXPLORATORY OF ABDOMEN N/A 7/13/2023    Procedure: LAPAROTOMY, EXPLORATORY;  Surgeon: Angel Luis Boateng M.D.;  Location: SURGERY [facility];  Service: General    KY COLOSTOMY  7/13/2023    Procedure: CREATION, COLOSTOMY;  Surgeon: Angel Luis Boateng M.D.;  Location: SURGERY [facility];  Service: General    CHOLECYSTECTOMY N/A 7/13/2023    Procedure: CHOLECYSTECTOMY;  Surgeon: Angel Luis Boateng M.D.;  Location: SURGERY [facility];  Service: General    LOW ANTERIOR RESECTION N/A 7/13/2023    Procedure: RESECTION, RECTUM, LOW ANTERIOR;  Surgeon: Angel Luis Boateng M.D.;  Location: SURGERY [facility];  Service: General    KNEE ARTHROPLASTY TOTAL Left 9/12/2017    Procedure: KNEE ARTHROPLASTY TOTAL;  Surgeon: Bull James M.D.;  Location: SURGERY [facility];  Service: Orthopedics     Social History     Tobacco Use    Smoking status: Never    Smokeless tobacco: Never   Substance Use Topics    Alcohol use: No     Chief Complaint   Patient presents with    Abdominal Pain     Patient to triage via wheelchair for abdominal pain, scheduled for gallbladder surgery but received outpatient US today and was instructed to come to ED for blood clot to right leg. Patient denies use of blood thinners or history of blood clot     Leg Pain     Diagnosis: Pneumoperitoneum [K66.8]    Unit where seen by Wound Team: [id number]/01     WOUND CONSULT RELATED TO:  Abdomen VAC change    WOUND HISTORY:   \"63-year-old woman who presented with a perforated viscus.  She was also noted to have a saddle embolus in her pulmonary arteries and underwent an emergent thrombectomy.  Based on the fact that she had a perforated viscus an emergent laparotomy was " "indicated subsequent to the thrombectomy\"       WOUND ASSESSMENT/LDA  Wound 07/13/23 Incision Abdomen Open Surgical full thickness (Active)   Date First Assessed/Time First Assessed: 07/13/23 1429   Primary Wound Type: Incision  Location: Abdomen  Wound Description (Comments): Open Surgical full thickness      Assessments 7/17/2023 11:00 AM   Wound Image     Site Assessment Pink;Pale;Yellow;Red   Periwound Assessment Clean;Dry;Intact   Margins Unattached edges;Defined edges   Closure Secondary intention   Drainage Amount Scant   Drainage Description Serosanguineous   Treatments Cleansed;Offloading;Site care   Wound Cleansing Approved Wound Cleanser   Periwound Protectant No-sting Skin Prep;Skin Protectant Wipes to Periwound;Drape   Dressing Status Clean;Intact;Dry   Dressing Changed Changed   Dressing Cleansing/Solutions Not Applicable   Dressing Options Wound Vac   Dressing Change/Treatment Frequency Monday, Wednesday, Friday, and As Needed   NEXT Dressing Change/Treatment Date 07/19/23   NEXT Weekly Photo (Inpatient Only) 07/19/23   Wound Team Following 3x Weekly   Non-staged Wound Description Full thickness   Exposed Structures Adipose, Suture       Negative Pressure Wound Therapy 07/13/23 Surgical Abdomen (Active)   Placement Date/Time: 07/13/23 1839   Present on Original Admission: No  Wound Type: Surgical  Location: Abdomen      Assessments 7/17/2023 11:00 AM   NPWT Pump Mode / Pressure Setting Ulta;Continuous;125 mmHg   Dressing Type Black Foam (Regular);Medium   Number of Foam Pieces Used 4   Canister Changed No   NEXT Dressing Change/Treatment Date 07/19/23        Vascular:    AIDA:   No results found.    Lab Values:    Lab Results   Component Value Date/Time    WBC 11.1 (H) 07/17/2023 04:29 AM    RBC 2.54 (L) 07/17/2023 04:29 AM    HEMOGLOBIN 9.1 (L) 07/17/2023 04:29 AM    HEMATOCRIT 24.4 (L) 07/17/2023 04:29 AM    HBA1C 5.7 (H) 08/12/2017 07:17 AM         Culture Results show:  No results found for this or " any previous visit (from the past 720 hour(s)).    Pain Level/Medicated:  Premed with IV, tolerated very well      INTERVENTIONS BY WOUND TEAM:  Chart and images reviewed. Discussed with bedside RN. All areas of concern (based on picture review, LDA review and discussion with bedside RN) have been thoroughly assessed. Documentation of areas based on significant findings. This RN in to assess patient. Performed standard wound care which includes appropriate positioning, dressing removal and non-selective debridement. Pictures and measurements obtained weekly if/when required.    Wound:  Abdomen open surgical   Preparation for Dressing removal: Removed without difficulty  Cleansed/Non-selectively Debrided with:  Wound cleanser and Gauze  Natacha wound: Cleansed with Wound cleanser, Prepped with No Sting and Drape  Primary Dressing:  One piece adaptic to base of wound. 2 pieces of black regular foam cut into spiral and placed into wound bed, third piece used to fill gap.   Secondary (Outer) Dressing: Secured with drape, hole cut and button (4th piece) and trak pad applied. Suction obtained at 125mmhg.     Advanced Wound Care Discharge Planning  Number of Clinicians necessary to complete wound care: 1 (easier with 2 to pull together abdomen)  Is patient requiring IV pain medications for dressing changes:  Yes  Length of time for dressing change 30   min. (This does not include chart review, pre-medication time, set up, clean up or time spent charting.)    Interdisciplinary consultation: Patient, Bedside RN (Yuko),       EVALUATION / RATIONALE FOR TREATMENT:     Date: 7/17/23  Wound Status: No changes  Patient abdomen with adipose and one suture still present to base, more yellow tissue noted than previous assessment. Fascia appears intact. Continued adaptic at this time to assure protection over suture. Continue Regular VAC at this time.       Date:  07/13/23  Wound Status:  Initial evaluation    Patient mid abdomen with  full thickness open surgical incision. Adipose and scant suture visible to base of wound but fascia appears intact and adipose appears viable. Some tunneling at 1200 and protected exposed suture with adaptic non-adherent. Patient will benefit from NPWT to assist with wound closure by secondary intention, management of bio-burden and exudate through mechanical debridement, and increase oxygenation and granulation tissue production to wound bed.            Goals: Steady decrease in wound area and depth weekly.    WOUND TEAM PLAN OF CARE - Frequency of Follow-up:   Nursing to follow dressing orders written for wound care. Contact wound team if area fails to progress, deteriorates or with any questions/concerns if something comes up before next scheduled follow up (See below as to whether wound is following and frequency of wound follow up)   NPWT change 3 times weekly - Next change Monday 7/17/23    NURSING PLAN OF CARE ORDERS:  Dressing changes: See Dressing Care orders    NUTRITION RECOMMENDATIONS   Wound Team Recommendations:  N/A    DIET ORDERS (From admission to next 24h)       Start     Ordered    07/13/23 1524  Diet NPO Restrict to: Strict (okay to receive meds through the NG/OG tube)  ALL MEALS        Question Answer Comment   Type: Now    Diet NPO Restrict to: Strict okay to receive meds through the NG/OG tube       07/13/23 1523                    PREVENTATIVE INTERVENTIONS:    Q shift Alessandro - performed per nursing policy  Q shift pressure point assessments - performed per nursing policy    Surface/Positioning  ICU Low Airloss - Currently in Place  Reposition q 2 hours - Currently in Place    Offloading/Redistribution  Float Heels off Bed with Pillows - Currently in Place           Respiratory  Anchorfast - Currently in Place    Containment/Moisture Prevention    Fecal ostomy - Currently in Place  Elizalde Catheter - Currently in Place    Mobilization      Not Mobilizing      Anticipated discharge plans:  TBD          Vac Discharge Needs:  Vac Discharge plan is purely a recommendation from wound team and not a requirement for discharge unless otherwise stated by physician.  Not Applicable Pt not on a wound vac

## 2023-07-17 NOTE — WOUND TEAM
" [facility]  Inpatient Services  New Ostomy Initial Evaluation    HPI:  Reviewed  PMH: Reviewed   SH: Reviewed    Past Surgical History:   Procedure Laterality Date    AL EXPLORATORY OF ABDOMEN N/A 7/13/2023    Procedure: LAPAROTOMY, EXPLORATORY;  Surgeon: Angel Luis Boateng M.D.;  Location: SURGERY [facility];  Service: General    AL COLOSTOMY  7/13/2023    Procedure: CREATION, COLOSTOMY;  Surgeon: Angel Luis Boateng M.D.;  Location: SURGERY [facility];  Service: General    CHOLECYSTECTOMY N/A 7/13/2023    Procedure: CHOLECYSTECTOMY;  Surgeon: Angel Luis Boateng M.D.;  Location: SURGERY [facility];  Service: General    LOW ANTERIOR RESECTION N/A 7/13/2023    Procedure: RESECTION, RECTUM, LOW ANTERIOR;  Surgeon: Angel Luis Boateng M.D.;  Location: SURGERY [facility];  Service: General    KNEE ARTHROPLASTY TOTAL Left 9/12/2017    Procedure: KNEE ARTHROPLASTY TOTAL;  Surgeon: Bull James M.D.;  Location: [facility];  Service: Orthopedics       Surgery Date: 7/13/23    Surgeon(s):  ROM Russell M.D.    Procedure(s):  LAPAROTOMY, EXPLORATORY  CHOLECYSTECTOMY     Permanence: Unknown    Pertinent History:     \"63-year-old woman who presented with a perforated viscus.  She was also noted to have a saddle embolus in her pulmonary arteries and underwent an emergent thrombectomy.  Based on the fact that she had a perforated viscus an emergent laparotomy was indicated subsequent to the thrombectomy\"       Colostomy 07/13/23 End/Werner's Pouch LUQ (Active)   Wound Image   07/13/23 1800   Stomal Appliance Assessment Clean;Dry;Intact;Changed 07/17/23 1100   Stoma Assessment Black;Brown;Pink;Dusky 07/17/23 1100   Stoma Shape Budded Less Than One Inch 07/17/23 1100   Stoma Size (in) 2 07/17/23 1100   Peristomal Assessment Clean;Dry;Intact 07/17/23 1100   Mucocutaneous Junction Intact 07/17/23 1100   Treatment Appliance Changed;Bag Change;Cleansed with " "water/washcloth;Site care 07/17/23 1100   Peristomal Protectant No Sting Skin Prep;Paste Ring 07/17/23 1100   Stomal Appliance Paste Ring, 2\";2 3/4\" (70mm) [facility] 07/17/23 1100   Output (mL) 300 mL 07/16/23 1800   Output Color Brown 07/17/23 0800   WOUND RN ONLY - Stomal Appliance  2 Piece;Paste Ring, 2\";2 3/4\" (70mm) [facility] 07/17/23 1100   Appliance (Pouch) # [id number], 02321, 780 07/13/23 1800   Appliance Brand Braxton 07/17/23 1100   WOUND NURSE ONLY - Time Spent with Patient (mins) 60 07/17/23 1100                                  Colostomy Interventions:  Removed appliance (using push pull method) - By Ostomy RN  Cleansed sara-stomal skin with moist warm washcloth - By Ostomy RN  Created/Checked template fit - By Ostomy RN  Traced Shape to back of barrier - By Ostomy RN  Confirmed fit - By Ostomy RN  Removed plastic backing and \"Dog Eared\" paper edges - By Ostomy RN  Stretched paste ring to fit barrier opening - By Ostomy RN  Applied paste ring to back of barrier - By Ostomy RN  Applied barrier to skin and adhered with friction - By Ostomy RN  Attached pouch - By Ostomy RN  Closed Pouch end - By Ostomy RN  Connected  fecal high output pouch spout to down drain bag (due to loose liquid stool) - By Ostomy RN    Patient Education: Ostomy RN to follow-up daily for education     Date:  07/13/23  Patient not medically stable, no education provided    Needs for next visit: Bag for supplies, Folder        Evaluation:      Date: 7/17/23  Patient with loose watery stools at this time still, continue high output pouch. Patient hopefully transferring out of ICU soon, but will be difficult for patient education due to close proximity to wound VAC dressing and due to patient having difficult visualizing stoma.     Date:  07/13/23    Patient with very high amounts of stool output already. Attached to high output pouch at this time. Patient stoma almost flush to skin and dusky in areas. Wound team to continue to follow        Flatus: " "Present  Stool Output:  Large amount  Urine Output:    Mobility: Unable to assess    DIET ORDERS (From admission to next 24h)       Start     Ordered    07/13/23 1524  Diet NPO Restrict to: Strict (okay to receive meds through the NG/OG tube)  ALL MEALS        Question Answer Comment   Type: Now    Diet NPO Restrict to: Strict okay to receive meds through the NG/OG tube       07/13/23 1523                    Plan: Ostomy nurses to continue to follow for ostomy needs and teaching until patient independent with care or discharge.  Ostomy Nurse follow-up frequency:   With wound VAC changes    Secure Start Signed:  Not Medically Stable  Outpatient Referral Placed:  Not Medically Stable  5 Sets of appliances in Ostomy bag for discharge:  Not Medically Stable - but 5 sets ordered     INSURANCE OPTIONS: \"Miscellaneous\"    TBD    Anticipated Discharge Plans:  TBD    Ostomy Supplies for DC:  To be determined in 4 to 6 weeks once stomal edema has fully resolved  "

## 2023-07-18 NOTE — DISCHARGE PLANNING
Received Choice form at 1152  Agency/Facility Name: [facility] / [facility]  Referral sent per Choice form @ 1209    1207-  Received Choice form at 1152  Agency/Facility Name: Local SNFs  Referral sent per Choice form @ 1201

## 2023-07-18 NOTE — THERAPY
"Physical Therapy   Initial Evaluation     Patient Name: Ashley English  Age:  63 y.o., Sex:  female  Medical Record #: [id number]  Today's Date: 7/18/2023     Precautions  Precautions: (P) Fall Risk  Comments: (P) abdominal wound vac    Assessment  Patient is 63 y.o. female with a diagnosis of Pneumoperitoneum and had an exploratory laparotomy, colectomy and colostomy on 7/13. PMH includes morbid obesity, hypothyroidism, cholelithiasis.     Pt tolerated session fairly considering acute medical status. Pt demonstrates poor motivation and self limiting behaviors. She reports complete inability to move her Les however demonstrates full AROM ankle dorsiflexion and knee extension in bed. Pt did require MAX A for bed mobility despite therapist encouraging patient to perform transition on her own. She requires CGA to maintain static sitting edge of bed. Pt did have one loss of balance in sitting requiring MOD A for correction. Pt was unwilling to attempt standing stating \"I cant do it and I wont\". She required MAX A for return to supine position.     She will benefit from acute PT services at this time to improve her strength, functional independence, activity tolerance and decrease the burden of care for a caregiver.     At this time anticipate discharge to post acute placement likely SNF.     Plan    Physical Therapy Initial Treatment Plan   Treatment Plan : (P) Bed Mobility, Debridement, Equipment, Group Therapy, Gait Training, Manual Therapy, Neuro Re-Education / Balance, Orthotics Training , Self Care / Home Evaluation, Stair Training, Therapeutic Exercise, Therapeutic Activities  Treatment Frequency: (P) 3 Times per Week  Duration: (P) Until Therapy Goals Met    DC Equipment Recommendations: (P) Unable to determine at this time  Discharge Recommendations: (P) Recommend post-acute placement for additional physical therapy services prior to discharge home       Subjective    \"I'm dead weight, I cant move at all, " "you're going to need 2 people to move me\". Pt with decreased motivation and self limiting behaviors throughout session.      Objective       07/18/23 0814   Initial Contact Note    Initial Contact Note Order Received and Verified, Physical Therapy Evaluation in Progress with Full Report to Follow.   Precautions   Precautions Fall Risk   Comments abdominal wound vac   Vitals   Pulse 84   Patient BP Position Supine   Pulse Oximetry 92 %   O2 (LPM) 3   O2 Delivery Device Silicone Nasal Cannula   Pain 0 - 10 Group   Location Abdomen   Location Orientation Mid   Pain Rating Scale (NPRS) 5   Prior Living Situation   Housing / Facility 1 Story House   Steps Into Home 1   Steps In Home 0   Lives with - Patient's Self Care Capacity Alone and Able to Care For Self   Prior Level of Functional Mobility   Bed Mobility Independent   Transfer Status Independent   Ambulation Independent   Ambulation Distance household   Assistive Devices Used None   Stairs Independent   Cognition    Level of Consciousness Alert   Passive ROM Lower Body   Comments Limited by body habitus   Active ROM Lower Body    Comments Limited by body habitus and weakness   Strength Lower Body   Lower Body Strength  X   Rt Hip Flexion Strength 2 (P)   Rt Knee Extension Strength 3 (F)   Rt Ankle Dorsiflexion Strength 3 (F)   Lt Hip Flexion Strength 2 (P)   Lt Knee Extension Strength 3 (F)   Lt Ankle Dorsiflexion Strength 3 (F)   Comments Pt reports \"I cant move my legs\" however demonstrated the above MMT in sitting. Overpressure not applied due to abdominal pain   Balance Assessment   Sitting Balance (Static) Fair -   Sitting Balance (Dynamic) Poor   Bed Mobility    Supine to Sit Maximal Assist   Sit to Supine Maximal Assist   Gait Analysis   Gait Level Of Assist Unable to Participate   Functional Mobility   Sit to Stand Unable to Participate   Bed, Chair, Wheelchair Transfer Unable to Participate   How much difficulty does the patient currently have...   Turning " over in bed (including adjusting bedclothes, sheets and blankets)? 1   Sitting down on and standing up from a chair with arms (e.g., wheelchair, bedside commode, etc.) 1   Moving from lying on back to sitting on the side of the bed? 1   How much help from another person does the patient currently need...   Moving to and from a bed to a chair (including a wheelchair)? 1   Need to walk in a hospital room? 1   Climbing 3-5 steps with a railing? 1   6 clicks Mobility Score 6   Short Term Goals    Short Term Goal # 1 Pt will perform supine<>sit with MIN A   Short Term Goal # 2 Pt will perform sit<>stand and stand pivot transfers with MOD A and FWW.   Short Term Goal # 3 Pt will ambulate 10' with FWW and MOD A.   Education Group   Education Provided Role of Physical Therapist   Role of Physical Therapist Patient Response Patient;Acceptance;Explanation;Demonstration;Verbal Demonstration   Physical Therapy Initial Treatment Plan    Treatment Plan  Bed Mobility;Debridement;Equipment;Group Therapy;Gait Training;Manual Therapy;Neuro Re-Education / Balance;Orthotics Training ;Self Care / Home Evaluation;Stair Training;Therapeutic Exercise;Therapeutic Activities   Treatment Frequency 3 Times per Week   Duration Until Therapy Goals Met   Problem List    Problems Pain;Impaired Bed Mobility;Impaired Transfers;Functional ROM Deficit;Impaired Ambulation;Functional Strength Deficit;Impaired Balance;Impaired Coordination;Decreased Activity Tolerance;Safety Awareness Deficits / Cognition;Limited Knowledge of Post-Op Precautions   Anticipated Discharge Equipment and Recommendations   DC Equipment Recommendations Unable to determine at this time   Discharge Recommendations Recommend post-acute placement for additional physical therapy services prior to discharge home   Interdisciplinary Plan of Care Collaboration   IDT Collaboration with  Nursing   Patient Position at End of Therapy In Bed;Bed Alarm On;Phone within Reach;Tray Table within  Reach;Call Light within Reach   Session Information   Date / Session Number  7/18-1 (1/3, 7/24)

## 2023-07-18 NOTE — ASSESSMENT & PLAN NOTE
Intubated 7/13, extubated 7/15.  Continue anticoagulation for PE  Encourage incentive spirometry and mobilization  Wean off oxygen as tolerated

## 2023-07-18 NOTE — THERAPY
"Occupational Therapy   Initial Evaluation     Patient Name: Ashley English  Age:  63 y.o., Sex:  female  Medical Record #: [id number]  Today's Date: 7/18/2023     Precautions: Fall Risk  Comments: wound vac to abdomen, colostomy    Assessment    Patient is 63 y.o. female with h/o obesity, thyroid disorder, gallstones (with plan for surgical removal in about a week), who presented to urgent care with BLE pain & swelling. Pt dx with RLE DVT, transferred to [facility] where PE and perforated viscus were diagnosed. Pt underwent emergent thrombectomy, IVC placement and emergent laparotomy with L hemicolectomy and colostomy creation as well as cholecystectomy. Pt seen for OT eval. See grid below for details. Pt only able to tolerate EOB and required heavy max A to mobilize to/from. She had several small LOB in sitting. Total A LB ADL. Pt's body habitus negatively impacts ADL and mobility in addition to new weakness and balance impairment. Pt is normally completely independent. She is below functional baseline and will benefit from ongoing acute OT. Pt will require placement prior to returning home.     Plan    Occupational Therapy Initial Treatment Plan   Treatment Interventions: Self Care / Activities of Daily Living, Adaptive Equipment, Neuro Re-Education / Balance, Therapeutic Exercises, Therapeutic Activity  Treatment Frequency: 4 Times per Week  Duration: Until Therapy Goals Met    DC Equipment Recommendations: Unable to determine at this time  Discharge Recommendations: Recommend post-acute placement for additional occupational therapy services prior to discharge home     Subjective    \"My body is limiting me.\"     Objective       07/18/23 1219   Prior Living Situation   Prior Services None;Home-Independent   Housing / Facility 1 Story House   Steps Into Home 1   Steps In Home 0   Bathroom Set up Bathtub / Shower Combination   Equipment Owned Single Point Cane   Lives with - Patient's Self Care Capacity Alone and " Able to Care For Self   Comments Pt reports she has local family, but they are all busy with their own lives including already caregiving for others.   Prior Level of ADL Function   Comments pt was independent with BADL PTA   Prior Level of IADL Function   Prior Level Of Mobility Independent Without Device in Community;Independent Without Device in Home   Driving / Transportation Driving Independent   Occupation (Pre-Hospital Vocational) Retired Due To Age   Comments pt was independent with I-ADL and functional mobility PTA; she endorses a very sedentary lifestyle PTA   Active ROM Upper Body   Active ROM Upper Body  WDL   Strength Upper Body   Upper Body Strength  WDL   Coordination Upper Body   Coordination WDL   Balance Assessment   Sitting Balance (Static) Poor +   Sitting Balance (Dynamic) Poor   Weight Shift Sitting Poor   Comments unable to stand; had posterior LOBs in static sitting requiring assist to recover   Bed Mobility    Supine to Sit Maximal Assist   Sit to Supine Maximal Assist   Scooting Total Assist  (seated)   Rolling Maximal Assist to Rt.;Maximum Assist to Lt.  (to change pad, adjust linens)   ADL Assessment   Grooming   (declined due to already completed)   Lower Body Dressing Total Assist  (manage socks)   Toileting Total Assist  (Pure Wick, colostomy)   Functional Mobility   Sit to Stand Unable to Participate   Bed, Chair, Wheelchair Transfer Unable to Participate   Mobility Supine > < EOB, sitting balance   Short Term Goals   Short Term Goal # 1 Pt will sit unsupported >15 min EOB to participate in ADL   Short Term Goal # 2 Pt will scoot laterally along EOB with CGA in preparation for transfers   Short Term Goal # 3 Pt will complete seated grooming with supv EOB

## 2023-07-18 NOTE — PROGRESS NOTES
"  DATE: 7/18/2023    Post Operative Day  5 exploratory laparotomy, left colectomy with end-colostomy, cholecystectomy .    INTERVAL EVENTS:  Transferred to T8 from T9. Tolerated full liquid diet. Ostomy working well.    PHYSICAL EXAMINATION:  Vital Signs: /68   Pulse 84   Temp 36.6 °C (97.9 °F) (Temporal)   Resp 18   Ht 1.778 m (5' 10\")   Wt (!) 149 kg (327 lb 6.1 oz)   SpO2 92%     A&OX4, conversational  RRR  Abdomen soft, non tender, wound vac in place, ostomy with air and stool in bag.    LABORATORY VALUES:   Recent Labs     07/16/23 0450 07/17/23 0429 07/18/23  0130   WBC 18.2* 11.1* 10.0   RBC 2.89* 2.54* 2.70*   HEMOGLOBIN 9.1* 9.1* 8.9*   HEMATOCRIT 26.9* 24.4* 25.6*   MCV 93.1 96.1 94.8   MCH 31.5 35.8* 33.0   MCHC 33.8 37.3* 34.8   RDW 49.2 50.2* 51.5*   PLATELETCT 134* 125* 113*   MPV 9.2 9.4 9.2     Recent Labs     07/16/23 0450 07/17/23 0429 07/18/23  0130   SODIUM 134* 134* 135   POTASSIUM 3.3* 3.5* 3.6   CHLORIDE 101 103 102   CO2 23 24 24   GLUCOSE 106* 87 118*   BUN 25* 20 18   CREATININE 1.21 1.08 1.18   CALCIUM 7.6* 7.5* 7.3*     Recent Labs     07/16/23 0450 07/17/23 0429 07/18/23  0130   ASTSGOT 38 53* 62*   ALTSGPT 53* 51* 55*   TBILIRUBIN 0.5 0.7 0.6   ALKPHOSPHAT 100* 215* 213*   GLOBULIN 3.1 3.1 3.4            IMAGING:   IR-MIDLINE CATHETER INSERTION WO GUIDANCE > AGE 3   Final Result                  Ultrasound-guided midline placement performed by qualified nursing staff    as above.          IR-US GUIDED PIV   Final Result    Ultrasound-guided PERIPHERAL IV INSERTION performed by    qualified nursing staff as above.      DX-CHEST-PORTABLE (1 VIEW)   Final Result      Stable thoracic underinflation. Right IJ line adequate. Stable bibasilar pleural-parenchymal opacification      DX-CHEST-PORTABLE (1 VIEW)   Final Result      Stable thoracic underinflation. Right IJ line and ETT appear adequate. Slightly improved bibasilar pleural-parenchymal opacification    "   DX-CHEST-PORTABLE (1 VIEW)   Final Result      Stable bibasilar pleural-parenchymal opacification and low lung volumes. Lines/tubes adequate.      DX-CHEST-LIMITED (1 VIEW)   Final Result         Endotracheal tube with tip projecting over the mid thoracic trachea.   Gastric drainage tube courses below diaphragm, tip is not seen.   Right central venous catheter with tip projecting over the expected area of the lower SVC.            EC-NAEEM W/O CONT         DX-CHEST-LIMITED (1 VIEW)   Final Result      Bibasilar underinflation atelectasis which could obscure an additional process. This is similar to the prior study. Lung volumes are very low.      IR-INSERT IVC FILTER WITH IG & SI   Final Result      1.  Ultrasound guided access LEFT common femoral vein.   2.  BILATERAL selective pulmonary arteriogram demonstrating extensive bilateral pulmonary emboli.   3.  Successful BILATERAL pulmonary thrombectomy   4.  Large IVC at the level of the renal veins   5.  Successful placement of infrarenal IVC filter   6.  Pursestring suture should be removed in 6-12 hours.         Please note that this study required greater than 50% more than the usual procedure time due to the patient's anatomy and large habitus.            IR-THROMBO MECHANICAL ARTERY,INIT   Final Result      1.  Ultrasound guided access LEFT common femoral vein.   2.  BILATERAL selective pulmonary arteriogram demonstrating extensive bilateral pulmonary emboli.   3.  Successful BILATERAL pulmonary thrombectomy   4.  Large IVC at the level of the renal veins   5.  Successful placement of infrarenal IVC filter   6.  Pursestring suture should be removed in 6-12 hours.         Please note that this study required greater than 50% more than the usual procedure time due to the patient's anatomy and large habitus.            EC-ECHOCARDIOGRAM COMPLETE W/ CONT   Final Result      CT-ABDOMEN-PELVIS WITH   Final Result      1.  Pneumoperitoneum secondary to perforated  viscus. Splenic flexure colonic diverticulum may be the perforation site. Gastroduodenal ulcer ulcer is considered less likely.   2.  Hepatic steatosis.   3.  Moderate gallbladder distention and pericholecystic fat stranding.   4.  Small volume hyperdense ascites.   5.  Partially visualized right lower extremity DVT.      Findings were communicated to SALINA STOVER via Voalte messaging system on 7/12/2023 11:03 PM.      CT-CTA CHEST PULMONARY ARTERY W/ RECONS   Final Result      1.  Saddle pulmonary embolus with associated right heart strain.   2.  Posterior pneumomediastinum secondary to pneumoperitoneum. Abdomen will be dictated separately.      Findings were communicated to SALINA STOVER via Voalte messaging system on 7/12/2023 10:57 PM.      DX-CHEST-PORTABLE (1 VIEW)   Final Result      Mild atelectasis in the right lower lung. No focal consolidation. No effusions.             ASSESSMENT AND PLAN:   Acute saddle pulmonary embolism (HCC)- (present on admission)  Assessment & Plan  Extensive clot burden within the right and left pulmonary arteries and their segmental and subsegmental branches seen on the admit CTA with radiographic suggestion of right heart strain  STAT ECHO pending  Heparin weight based bolus and infusion commenced at admit    Acute deep venous thrombosis (DVT) determined by ultrasound (HCC)- (present on admission)  Assessment & Plan  Clot present from the right common femoral vein down the entire leg  Heparin weight based bolus and infusion commenced on admit    Advance diet to GI soft.   Approaching discharge from surgical standpoint. Physical therapy recommending post acute placement.   Will follow.        ____________________________________     Taylor Bocanegra M.D.    DD: 7/18/2023  10:48 AM

## 2023-07-18 NOTE — ASSESSMENT & PLAN NOTE
Status post ex lap with left hemicolectomy and end colostomy as well as cholecystitis.  Pathology revealed diverticulum, no malignancy.  Colostomy with good stool output.  Advance diet

## 2023-07-18 NOTE — PROGRESS NOTES
MONITOR SUMMARY:    RHYTHM: SR, ST   HEART RATE: 90-101bpm  ECTOPY: (r)PVC  MEASUREMENT: .19/.09/.34

## 2023-07-18 NOTE — CARE PLAN
The patient is Watcher - Medium risk of patient condition declining or worsening    Shift Goals  Clinical Goals: Monitor Wound Vac, outputs, pain, hep gtt  Patient Goals: Comfort  Family Goals: enrico; n/a at this time    Progress made toward(s) clinical / shift goals:  patient stable throughout shift

## 2023-07-18 NOTE — PROGRESS NOTES
Assumed care of patient after receiving report from Elizabeth night shift RN. Patient is currently resting in bed.

## 2023-07-18 NOTE — CONSULTS
Hospital Medicine Consultation    Date of Service  7/17/2023    Referring Physician  Vinnie Zamarripa M.D.    Consulting Physician  Cristian Peña M.D.    Reason for Consultation  63-year-old obese female with hypothyroidism, cholelithiasis, perforated viscous       History of Presenting Illness  63-year-old obese female with hypothyroidism, cholelithiasis with planned cholecystectomy on 7/19 presented to the ED 7/12/2023 with right lower extremity pain and tightness for the past 3 days.  She went to urgent care on 711 and had an ultrasound ordered performed on 712 that was positive for DVT so they were instructed to come to the emergency department.  CTA chest performed that showed an acute saddle embolus as well as pneumoperitoneum.  CT abdomen pelvis with contrast showed the pneumoperitoneum.  She went for thrombectomy as well as IVC placement, and is Post Operative Day  4 exploratory laparotomy, left colectomy with end-colostomy, cholecystectomy. Wound vac in place, she is tolerating well, started PO intake.     Review of Systems  ROS  All other systems reviewed and are negative.    Past Medical History   has a past medical history of Disorder of thyroid and Morbid obesity with BMI of 50.0-59.9, adult (HCC) (7/13/2023).    Surgical History   has a past surgical history that includes knee arthroplasty total (Left, 9/12/2017); pr exploratory of abdomen (N/A, 7/13/2023); pr colostomy (7/13/2023); cholecystectomy (N/A, 7/13/2023); and low anterior resection (N/A, 7/13/2023).    Family History  family history is not on file.    Social History   reports that she has never smoked. She has never used smokeless tobacco. She reports that she does not drink alcohol and does not use drugs.    Medications  Prior to Admission Medications   Prescriptions Last Dose Informant Patient Reported? Taking?   levothyroxine (SYNTHROID) 150 MCG Tab 7/12/2023 at AM  Yes No   Sig: Take 225 mcg by mouth every morning on an empty  stomach. 225MCG=1.5 TABLET      Facility-Administered Medications: None       Allergies  Allergies   Allergen Reactions    Nkda [No Known Drug Allergy]        Physical Exam  Temp:  [36.3 °C (97.4 °F)-37.2 °C (99 °F)] 36.3 °C (97.4 °F)  Pulse:  [] 89  Resp:  [8-29] 18  BP: ()/(53-84) 141/73  SpO2:  [90 %-96 %] 91 %    Physical Exam    Fluids      Laboratory  Recent Labs     07/16/23  0450 07/17/23 0429 07/18/23  0130   WBC 18.2* 11.1* 10.0   RBC 2.89* 2.54* 2.70*   HEMOGLOBIN 9.1* 9.1* 8.9*   HEMATOCRIT 26.9* 24.4* 25.6*   MCV 93.1 96.1 94.8   MCH 31.5 35.8* 33.0   MCHC 33.8 37.3* 34.8   RDW 49.2 50.2* 51.5*   PLATELETCT 134* 125* 113*   MPV 9.2 9.4 9.2     Recent Labs     07/16/23 0450 07/17/23 0429 07/18/23  0130   SODIUM 134* 134* 135   POTASSIUM 3.3* 3.5* 3.6   CHLORIDE 101 103 102   CO2 23 24 24   GLUCOSE 106* 87 118*   BUN 25* 20 18   CREATININE 1.21 1.08 1.18   CALCIUM 7.6* 7.5* 7.3*                     Imaging  IR-MIDLINE CATHETER INSERTION WO GUIDANCE > AGE 3   Final Result                  Ultrasound-guided midline placement performed by qualified nursing staff    as above.          IR-US GUIDED PIV   Final Result    Ultrasound-guided PERIPHERAL IV INSERTION performed by    qualified nursing staff as above.      DX-CHEST-PORTABLE (1 VIEW)   Final Result      Stable thoracic underinflation. Right IJ line adequate. Stable bibasilar pleural-parenchymal opacification      DX-CHEST-PORTABLE (1 VIEW)   Final Result      Stable thoracic underinflation. Right IJ line and ETT appear adequate. Slightly improved bibasilar pleural-parenchymal opacification      DX-CHEST-PORTABLE (1 VIEW)   Final Result      Stable bibasilar pleural-parenchymal opacification and low lung volumes. Lines/tubes adequate.      DX-CHEST-LIMITED (1 VIEW)   Final Result         Endotracheal tube with tip projecting over the mid thoracic trachea.   Gastric drainage tube courses below diaphragm, tip is not seen.   Right central  venous catheter with tip projecting over the expected area of the lower SVC.            EC-NAEEM W/O CONT         DX-CHEST-LIMITED (1 VIEW)   Final Result      Bibasilar underinflation atelectasis which could obscure an additional process. This is similar to the prior study. Lung volumes are very low.      IR-INSERT IVC FILTER WITH IG & SI   Final Result      1.  Ultrasound guided access LEFT common femoral vein.   2.  BILATERAL selective pulmonary arteriogram demonstrating extensive bilateral pulmonary emboli.   3.  Successful BILATERAL pulmonary thrombectomy   4.  Large IVC at the level of the renal veins   5.  Successful placement of infrarenal IVC filter   6.  Pursestring suture should be removed in 6-12 hours.         Please note that this study required greater than 50% more than the usual procedure time due to the patient's anatomy and large habitus.            IR-THROMBO MECHANICAL ARTERY,INIT   Final Result      1.  Ultrasound guided access LEFT common femoral vein.   2.  BILATERAL selective pulmonary arteriogram demonstrating extensive bilateral pulmonary emboli.   3.  Successful BILATERAL pulmonary thrombectomy   4.  Large IVC at the level of the renal veins   5.  Successful placement of infrarenal IVC filter   6.  Pursestring suture should be removed in 6-12 hours.         Please note that this study required greater than 50% more than the usual procedure time due to the patient's anatomy and large habitus.            EC-ECHOCARDIOGRAM COMPLETE W/ CONT   Final Result      CT-ABDOMEN-PELVIS WITH   Final Result      1.  Pneumoperitoneum secondary to perforated viscus. Splenic flexure colonic diverticulum may be the perforation site. Gastroduodenal ulcer ulcer is considered less likely.   2.  Hepatic steatosis.   3.  Moderate gallbladder distention and pericholecystic fat stranding.   4.  Small volume hyperdense ascites.   5.  Partially visualized right lower extremity DVT.      Findings were communicated to  SALINA STOVER via Voalte messaging system on 7/12/2023 11:03 PM.      CT-CTA CHEST PULMONARY ARTERY W/ RECONS   Final Result      1.  Saddle pulmonary embolus with associated right heart strain.   2.  Posterior pneumomediastinum secondary to pneumoperitoneum. Abdomen will be dictated separately.      Findings were communicated to SALINA STOVER via Voalte messaging system on 7/12/2023 10:57 PM.      DX-CHEST-PORTABLE (1 VIEW)   Final Result      Mild atelectasis in the right lower lung. No focal consolidation. No effusions.             Assessment/Plan  * Septic shock (HCC)  Assessment & Plan  Resolved    Acute hypoxemic respiratory failure (HCC)  Assessment & Plan  Intubated 7/13, extubated 7/15.  On a couple liters nasal cannula, no distress, good work of breathing, overall improving.  Encourage I-S    Perforated viscus  Assessment & Plan  Status post ex lap with left hemicolectomy and end colostomy as well as cholecystitis.  Pathology revealed diverticulum, no malignancy.  Colostomy with good stool output.  Advance diet    Acute kidney injury (HCC)  Assessment & Plan  7/17 Resolved    Acute deep venous thrombosis (DVT) determined by ultrasound ([facility])- (present on admission)  Assessment & Plan  Heparin drip, IVC filter placed protocol.      Total time spent 51 minutes. I spent greater than 50% of the time for patient care, counseling, and coordination on this date, including unit/floor time, and face-to-face time with the patient as per interval events, my own review of patient's imaging and lab analysis and developing my assessment and plan above.

## 2023-07-18 NOTE — CARE PLAN
The patient is Watcher - Medium risk of patient condition declining or worsening    Shift Goals  Clinical Goals: monitor wound/wound vac; q2 turns; hep gtt; pain mgmt  Patient Goals: comfort; sleep well tonight  Family Goals: enrico; n/a at this time    Progress made toward(s) clinical / shift goals:  progressing     Problem: Nutrition  Goal: Patient's nutritional and fluid intake will be adequate or improve  Outcome: Progressing     Problem: Skin Integrity  Goal: Skin integrity is maintained or improved  Outcome: Progressing     Problem: Fall Risk  Goal: Patient will remain free from falls  Outcome: Progressing     Problem: Optimal Care of the Patient with VTE  Goal: Peripheral tissue perfusion will improve  Outcome: Progressing  Goal: Complications related to the disease process, condition or treatment will be avoided or minimized  Outcome: Progressing  Goal: Symptoms of redness, swelling, and pain at the site of impaired tissue perfusion will improve  Outcome: Progressing     Problem: Knowledge Deficit - VTE  Goal: Patient and family/care givers will demonstrate understanding of plan of care, disease process/condition, diagnostic tests and medications  Outcome: Progressing       Patient is not progressing towards the following goals:

## 2023-07-19 PROBLEM — S36.113A LIVER LACERATION, CLOSED, INITIAL ENCOUNTER: Status: ACTIVE | Noted: 2023-01-01

## 2023-07-19 PROBLEM — E87.6 HYPOKALEMIA: Status: RESOLVED | Noted: 2023-01-01 | Resolved: 2023-01-01

## 2023-07-19 PROBLEM — R65.21 SEPTIC SHOCK (HCC): Status: RESOLVED | Noted: 2023-01-01 | Resolved: 2023-01-01

## 2023-07-19 PROBLEM — I95.9 HYPOTENSION: Status: RESOLVED | Noted: 2023-01-01 | Resolved: 2023-01-01

## 2023-07-19 PROBLEM — R57.8 HEMORRHAGIC SHOCK (HCC): Status: ACTIVE | Noted: 2023-01-01

## 2023-07-19 PROBLEM — I95.9 HYPOTENSION: Status: ACTIVE | Noted: 2023-01-01

## 2023-07-19 PROBLEM — E87.20 METABOLIC ACIDOSIS: Status: ACTIVE | Noted: 2023-01-01

## 2023-07-19 PROBLEM — A41.9 SEPTIC SHOCK (HCC): Status: RESOLVED | Noted: 2023-01-01 | Resolved: 2023-01-01

## 2023-07-19 PROBLEM — D62 ACUTE BLOOD LOSS ANEMIA: Status: ACTIVE | Noted: 2023-01-01

## 2023-07-19 NOTE — PROGRESS NOTES
Assumed care of patient, bedside report received from Surendra TALBERT. Updated on POC, call light within reach and fall precautions in place. Bed locked and in lowest position. Patient instructed to call for assistance. All questions answered, no other needs at this time. Patient repositioned for comfort, heat pack given. Heparin gtt verified with day RN.

## 2023-07-19 NOTE — PROGRESS NOTES
0545 200mcg [place] push per louis  0546 epi gtt placed to 0.5 per Dr. Louis verbal order for SBP 70s  0547 50 meq bicarb push STAT per nat  Time out called 0549-- CVC placement

## 2023-07-19 NOTE — ANESTHESIA POSTPROCEDURE EVALUATION
Patient: Ashley English    Procedure Summary     Date: 07/19/23 Room / Location: [place] [address] / SURGERY [facility]    Anesthesia Start: 0821 Anesthesia Stop: 0939    Procedure: LAPAROTOMY, EXPLORATORY FOR CONTROL OF BLEEDING, LIVER PACKING, PLACEMENT OF ABTHERA, TEMPORARY ABDOMINAL CLOSURE (Abdomen) Diagnosis: (Liver  laceration, Hemorrhagic Shock)    Surgeons: Taylor Bocanegra M.D. Responsible Provider: Manjinder Dee M.D.    Anesthesia Type: general ASA Status: 5 - Emergent          Final Anesthesia Type: general  Last vitals  BP   Blood Pressure: (!) 80/59, Arterial BP: (!) 171/97    Temp   37.8 °C (100 °F)    Pulse   64   Resp   (!) 21    SpO2   (!) 87 %      Anesthesia Post Evaluation    Patient location during evaluation: ICU  Patient participation: complete - patient cannot participate  Level of consciousness: obtunded/minimal responses  Pain score: 0    Airway patency: patent  Anesthetic complications: no  Cardiovascular status: hypotensive and hemodynamically unstable  Respiratory status: ventilator  Hydration status: acceptable    PONV: none  patient was unable to participate        No notable events documented.     Nurse Pain Score: 3 (NPRS)

## 2023-07-19 NOTE — HOSPITAL COURSE
63-year-old obese female with hypothyroidism, cholelithiasis with planned cholecystectomy on 7/19 presented to the ED 7/12/2023 with right lower extremity pain and tightness for the past 3 days.  She went to urgent care on 711 and had an ultrasound ordered performed on 712 that was positive for DVT so they were instructed to come to the emergency department.  CTA chest performed that showed an acute saddle embolus as well as pneumoperitoneum.  CT abdomen pelvis with contrast showed the pneumoperitoneum.  She went for thrombectomy as well as IVC placement, and is Post Operative Day  4 exploratory laparotomy, left colectomy with end-colostomy, cholecystectomy. Wound vac in place, she is tolerating well, started PO intake.

## 2023-07-19 NOTE — CARE PLAN
The patient is Stable - Low risk of patient condition declining or worsening    Shift Goals  Clinical Goals: heparin gtt will remain therapeutic, wound vac, colostomy output  Patient Goals: comfort  Family Goals: enrico; n/a at this time    Progress made toward(s) clinical / shift goals: Heparin has been therapeutic, next lab draw at 0545.     Problem: Pain - Standard  Goal: Alleviation of pain or a reduction in pain to the patient’s comfort goal  Outcome: Progressing  Note: PRN pain medications available. Pt experiencing severe pain at beginning of shift, medication provided per MAR.      Problem: Knowledge Deficit - Standard  Goal: Patient and family/care givers will demonstrate understanding of plan of care, disease process/condition, diagnostic tests and medications  Outcome: Progressing     Problem: Skin Integrity  Goal: Skin integrity is maintained or improved  Outcome: Progressing  Note: Wound vac in place over midline abd incision and working appropriately. Q2 turns in place.      Problem: Skin Care - Ostomy  Goal: Skin remains free from irritation  Outcome: Progressing       Patient is not progressing towards the following goals:

## 2023-07-19 NOTE — CARE PLAN
Problem: Ventilation  Goal: Ability to achieve and maintain unassisted ventilation or tolerate decreased levels of ventilator support  Description: Target End Date:  4 days     Document on Vent flowsheet    1.  Support and monitor invasive and noninvasive mechanical ventilation  2.  Monitor ventilator weaning response  3.  Perform ventilator associated pneumonia prevention interventions  4.  Manage ventilation therapy by monitoring diagnostic test results  Outcome: Progressing     Ventilator Daily Summary    Vent Day #1    ETT: 8.0 @24    Ventilator settings:   CMV R22 [id number]    Weaning trials:  None  Respiratory Procedures:  Intubation   Plan: Continue current ventilator settings and wean mechanical ventilation as tolerated per physician orders.

## 2023-07-19 NOTE — OP REPORT
DATE OF OPERATION:  7/19/2023    PREOPERATIVE DIAGNOSIS:  Hemorrhagic shock.    POSTOPERATIVE DIAGNOSIS: Hemorrhagic shock. Liver laceration.    PROCEDURE PERFORMED: Repoening of previous Exploratory laparotomy.  Abdominal washout, hepatorrhaphy, packing of the abdomen, and temporary abdominal closure with negative pressure wound therapy device greater than 50 cm²    SURGEON:    Taylor Bocanegra M.D.    ASSISTANT:    LORENZO Alves M.D.    ANESTHESIOLOGIST:  Manjinder Dee M.D.    ANESTHESIA:   General endotracheal anesthesia.    ASA CLASSIFICATION:  V. Emergent.    INDICATIONS: The patient is a 63 year-old woman postop day 5 from an exploratory laparotomy, colon resection, colostomy and cholecystectomy.  She was doing well yesterday, but overnight was transferred to the ICU in shock.  Her hemoglobin fell significantly overnight and massive transfusion protocol was started which she was responsive to.  She was brought to the operating room emergently assuming she had intra-abdominal bleeding.  Her sister provided emergency consent and a 2 physician consent was signed.    The nature of the surgical procedure warranted additional skilled operative assistance from another surgeon. The assistant was present for a substantial portion of the operation and provided assistance to the operating physician throughout the critical aspects of the procedure. The surgical assistant performed the following: provided assistance with optimal surgical exposure of the operative field, provided high complexity, subspecialty decision making input, and hepatorrhaphy.    FINDINGS: At least 3 L of blood in the abdomen.  5 cm liver laceration deep into the parenchyma but did not appear to involve any central vessels.   Viable small bowel and large bowel.   6 laparotomy pads left around the liver for packing.        WOUND CLASSIFICATION:  Class I, Clean.    SPECIMEN:     None.    ESTIMATED BLOOD LOSS:   3000 mL.    PROCEDURE: Following two physician consent consent, the patient was properly identified, taken to the operating room and placed in supine position where a general endotracheal anesthesia administered.  Due to the emergent nature of the procedure and focus on resuscitation antibiotics were not given. The patient arrived with a previously placed Elizalde catheter. Sequential compression devices were employed. The abdomen was widely prepped and draped into a sterile field.    The colostomy was sutured closed.  The abdomen was prepped with Betadine scrub and paint and draped in the usual sterile fashion after removal of the wound VAC.  The abdomen was reopened and a large volume of blood came rushing out, at least 3000 mL.  The abdomen was packed quickly with multiple open laps.  On checking with Dr. Dee the patient had appropriate blood pressure and so I began suctioning blood and removing laparotomy pads.  I found a liver laceration that was a least 5 cm.  I called and Dr. Walter, who used a large 5 chromic with a horizontal mattress suture to close the laceration.  This was packed with Surgicel and fibrillar.  I did have some question that there might be some bleeding from the lesser sac, but it was unclear whether this was run down from old blood or if this was active.  I placed laparotomy pads in the area for pressure.    Due to the patient's instability the decision was made to close patient's abdomen and return her to the ICU.      The abdominal contents were returned to the normal anatomic position with interposition of Seprafilm. A damage control closure was employed. An ABThera™ Open Abdomen Negative Pressure dressing was passed on to the operative field. The visceral protective layer was trimmed to the appropriate dimensions and placed within the abdominal cavity. The edges were extended into the paracolic gutters and dependent pelvis ensuring full coverage of all viscera. The medial tension  perforated foam units were trimmed to the appropriate dimensions and placed within the wound. The upper foam was secured to the skin in a radial fashion with interrupted staples. The self-adhesive drape was applied and connected to closed suction drainage.     The patient tolerated the procedure well, and there were no apparent complications. All sponge, needle, and instrument counts were correct on 2 separate occasions. The patient was mechanically ventilated and transferred to to the surgical intensive care unit (SICU) with hemodynamic instability and ongoing resuscitation.      We will plan to return to the operating room tomorrow for a second look laparotomy.  This was discussed with the patient's Sister Rani who is agreeable to this plan.         ____________________________________     Taylor Bocanegra M.D.    DD: 7/19/2023  9:25 AM

## 2023-07-19 NOTE — ANESTHESIA TIME REPORT
Anesthesia Start and Stop Event Times     Date Time Event    7/19/2023 0815 Ready for Procedure     0821 Anesthesia Start     0939 Anesthesia Stop        Responsible Staff  07/19/23    Name Role Begin End    Manjinder Dee M.D. Anesth 0821 0939        Overtime Reason:  no overtime (within assigned shift)    Comments:

## 2023-07-19 NOTE — PROGRESS NOTES
"NOC HOSPITALIST CROSS COVER    RAPID RESPONSE called for hypotension. Initially called earlier in the shift for bp in the 80s with a recheck in 70s. Patient noted to be fluid overloaded on STAT CXR, thus a dose of midodrine 10 mg PO once was given. Repeat BP noted to be 65/50, along with associated shortness of breath and increased work of breathing, for which a rapid response was called. Patient was seen and examined emergently at bedside. She was tachycardic, tachypneic, and in clear distress. The patient was complaining of midepigastric pain, shortness of breath, and feeling \"terrible.\" Her lung sounds were clear to diminished, without respiratory distress. She was pale, diaphoretic, and had poor capillary refill. Abdomen was soft and tender to palpation. Wound vac was present. Her ostomy was demonstrating new bloody output. Due to clinical presentation, intensivist was consulted for upgrade to ICU for pressor support.     Her initial labs demonstrated significant lactic acidosis of 5.7, WBC increase from 10-->19.7, NT-proBNP of 7391, and troponin of 62. Her CXR demonstrated bibasilar atelectasis. She was given a 250 mL NS bolus while pending intensivist evaluation.        Vitals:    07/19/23 0400   BP:    Pulse:    Resp:    Temp: 37.8 °C (100 °F)   SpO2:         Plan:  #Shock  -Septic vs hemorrhagic shock minimally responsive to IV fluids  -NS bolus 250 mL once  -Labs as above  -CT abdomen pelvis   -Intensivist consulted for upgrade to ICU for pressor support  -Dr Louis to bedside and will assume care of the patient at this juncture. Please see his consult note for further details    Patient remains critically ill at this time and has a high likelihood of decompensation.           -----------------------------------------------------------------------------------------------------------    Electronically signed by:  Qing Muir DNP, APRN, AGAP-BC  Hospitalist Services         "

## 2023-07-19 NOTE — ANESTHESIA PREPROCEDURE EVALUATION
Case: [id number] Date/Time: 07/19/23 0840    Procedure: LAPAROTOMY, EXPLORATORY FOR CONTROL OF BLEEDING    Location: [facility] OR 11 / SURGERY [facility]    Surgeons: Taylor Bocanegra M.D.          Relevant Problems   CARDIAC   (positive) Acute deep venous thrombosis (DVT) determined by ultrasound (HCC)   (positive) Acute saddle pulmonary embolism (HCC)         (positive) Acute kidney injury (HCC)       Physical Exam    Airway - unable to assess  Mallampati: II  TM distance: >3 FB  Neck ROM: full  Patient is intubated/trached     Cardiovascular - normal exam  Rhythm: regular  Rate: abnormal  (-) murmur     Dental - normal exam           Pulmonary - normal exam  Breath sounds clear to auscultation     Abdominal    Neurological - sedated/unconcious               Anesthesia Plan    ASA 5- EMERGENT   ASA physical status 5 criteria: multiple organ/system dysfunction and ischemic bowel in the face of significant cardiac pathology or multiple organ/system dysfunctionASA physical status emergent criteria: acute hemorrhage    Plan - general       Airway plan will be ETT          Induction: intravenous    Postoperative Plan: Postoperative administration of opioids is intended.    Pertinent diagnostic labs and testing reviewed    Informed Consent:    Anesthetic plan and risks discussed with patient.    Use of blood products discussed with: patient whom consented to blood products.

## 2023-07-19 NOTE — ASSESSMENT & PLAN NOTE
Unclear underlying  but cannot rule out sepsis, s/p resuscitation with IVF 1 lt. Will be cautious with further IVF  New blood cultures  Re started antibiotic zosyn  Consider levophed as initial pressor if needed  Held heparin ggt given some bloody output from ostomy, will repeat Xa in early AM, consider to re start if Hb is stable.  CT abdomen stat

## 2023-07-19 NOTE — ASSESSMENT & PLAN NOTE
Status post thrombectomy and IVC placement  Continue heparin drip  Transition to oral anticoagulation tomorrow if okay with surgery

## 2023-07-19 NOTE — PROGRESS NOTES
"Pulmonary/Critical Care Progress note      S  Ashley English is a 63 y.o. who was admitted on 7/12/2023 for the problem of hypotension.  I was called at the bedside by nursing staff due to patient became encephalopathic and with persistent hypotension despite maximized pressor levophed. Also patient with marked dyspnea.    O  BP (!) 47/26   Pulse 99   Temp 37.8 °C (100 °F) (Bladder)   Resp (!) 46   Ht 1.778 m (5' 10\")   Wt (!) 149 kg (327 lb 6.1 oz)   LMP 06/29/2017 (Approximate)   SpO2 88%   BMI 46.97 kg/m²     EXAM  GENERAL: Looks according to age   HENT: Head: Atraumatic, normocephalic.  Eyes: EOMI, PERRLA  Mouth: semi Moist mucous membranes, no lesions.  NECK: Trachea midline. No cervical or preauricular adenopathy. No thyromegaly palpated.  LUNGS: decrease vesicular sounds due to body habitus, increased Respiratory effort/respiratory distress  HEART: ST, prominent S2   ABDOMEN:  mildly tender. Non distended, wound vac in place, ostomy with bloody output  EXTREMITIES: No clubbing, no cyanosis, no edema. Mottleting   VASCULAR: capillary refill 2- 3 secs, distal pulses present and equal all limbs.  NEUROLOGIC: at times Alerted but disoriented x3.       A/P  -patient developed respiratory distress due to severe metabolic acidosis, multiple doses of bicarb were administered as well as maximized 4 vassopressors, suspected multifactorial including septic shock, acute blood loss anemia, probable obstructive physiology, PE, among others  -The patient was intubated and a central line was placed to facilitate the administration of vasopresors.   -Patient was found hypoglycemic and 2 amps of D50% were administered, cortisol was ordered and solucortef started at stress doses  -given severe metabolic acidosis, a bicarb ggt was started  -multiple bolus of IVF were administered  -1 RBC of blood was ordered (sample coagulated and unable to obtain a cbc)  -Suspected a RV failure so will obtain a new echo and " will start flolan  -Consider methilene blue as a 5th pressor    Lam Louis MD FACP  Pulmonary/Critical Care   Critical Care time 70 min in addition to any other CC time.

## 2023-07-19 NOTE — PROCEDURES
Central Line Insertion    Date/Time: 7/19/2023 6:20 AM    Performed by: Lam Louis M.D.  Authorized by: Lam Louis M.D.    Consent:     Consent obtained:  Emergent situation  Pre-procedure details:     Hand hygiene: Hand hygiene performed prior to insertion      Sterile barrier technique: All elements of maximal sterile technique followed      Skin preparation:  ChloraPrep    Skin preparation agent: Skin preparation agent completely dried prior to procedure    Sedation:     Sedation type:  Deep (under sedation for mechanical ventilation, immediate post intubation)  Procedure details:     Location:  R internal jugular    Site selection rationale:  No predominant lung disease L vs R, R more readily accessible    Patient position:  Trendelenburg    Procedural supplies:  Triple lumen    Catheter size:  7.5 Fr    Landmarks identified: yes      Ultrasound guidance: yes      Sterile ultrasound techniques: Sterile gel and sterile probe covers were used      Number of attempts:  1    Successful placement: yes    Post-procedure details:     Post-procedure:  Dressing applied and line sutured    Guidewire: guidewire removal confirmed      Assessment:  Blood return through all ports, free fluid flow, no pneumothorax on x-ray and placement verified by x-ray    Patient tolerance of procedure:  Tolerated well, no immediate complications

## 2023-07-19 NOTE — CARE PLAN
Problem: Hyperinflation  Goal: Prevent or improve atelectasis  Description: Target End Date:  3 to 4 days    1. Instruct incentive spirometry usage  2.  Perform hyperinflation therapy as indicated  Outcome: Progressing   PEP BID

## 2023-07-19 NOTE — PROGRESS NOTES
0529 time out called  0534  50 meq bicarb given   0536  BP cuff not reading, per Dr. Louis increase epi gtt to 0.3  0537 200mcg [place] IVP per Heriberto  Lactic acid 13.7  0537 30mg etomidate given  0539 100mg rocuronium given   0540  intubated  8.0 @ 22

## 2023-07-19 NOTE — PROGRESS NOTES
[facility] Daily Progress Note    Date of Service  7/18/2023    Chief Complaint  Ashley English is a 63 y.o. female admitted 7/12/2023 with abdominal pain    Hospital Course  63-year-old obese female with hypothyroidism, cholelithiasis with planned cholecystectomy on 7/19 presented to the ED 7/12/2023 with right lower extremity pain and tightness for the past 3 days.  She went to urgent care on 711 and had an ultrasound ordered performed on 712 that was positive for DVT so they were instructed to come to the emergency department.  CTA chest performed that showed an acute saddle embolus as well as pneumoperitoneum.  CT abdomen pelvis with contrast showed the pneumoperitoneum.  She went for thrombectomy as well as IVC placement, and is Post Operative Day  4 exploratory laparotomy, left colectomy with end-colostomy, cholecystectomy. Wound vac in place, she is tolerating well, started PO intake.     Interval Problem Update    On heparin drip pain is controlled  Tolerating diet  I reviewed CBC and CMP  On 3 L nasal cannula  Afebrile      I have discussed this patient's plan of care and discharge plan at IDT rounds today with Case Management, Nursing, Nursing leadership, and other members of the IDT team.    Consultants/Specialty  general surgery    Code Status  Full Code    Disposition  The patient is not medically cleared for discharge to home or a post-acute facility.  Anticipate discharge to: skilled nursing facility    I have placed the appropriate orders for post-discharge needs.    Review of Systems  Review of Systems   Constitutional:  Positive for malaise/fatigue. Negative for chills and fever.   Respiratory:  Positive for cough and sputum production.    All other systems reviewed and are negative.       Physical Exam  Temp:  [36.6 °C (97.9 °F)-36.8 °C (98.2 °F)] 36.8 °C (98.2 °F)  Pulse:  [] 92  Resp:  [18] 18  BP: (110-141)/(62-84) 121/79  SpO2:  [91 %-96 %] 94 %    Physical Exam  Vitals  and nursing note reviewed.   Constitutional:       General: She is not in acute distress.     Appearance: She is obese.   HENT:      Head: Normocephalic and atraumatic.      Nose: Nose normal. No rhinorrhea.      Mouth/Throat:      Pharynx: No oropharyngeal exudate or posterior oropharyngeal erythema.   Eyes:      General: No scleral icterus.        Right eye: No discharge.         Left eye: No discharge.   Cardiovascular:      Rate and Rhythm: Normal rate and regular rhythm.      Heart sounds: Normal heart sounds. No murmur heard.     No friction rub. No gallop.   Pulmonary:      Effort: Pulmonary effort is normal. No respiratory distress.      Breath sounds: No stridor. Decreased breath sounds and rales present. No wheezing or rhonchi.   Chest:      Chest wall: No tenderness.   Abdominal:      General: Bowel sounds are normal. There is no distension.      Palpations: Abdomen is soft. There is no mass.      Tenderness: There is no abdominal tenderness. There is no rebound.      Comments: Left lower quadrant stoma with brown stool  Midline incision with wound VAC in place no surrounding erythema   Musculoskeletal:         General: Swelling present. No tenderness.      Cervical back: Neck supple. No rigidity.   Skin:     General: Skin is warm and dry.      Coloration: Skin is not cyanotic or jaundiced.      Nails: There is no clubbing.   Neurological:      General: No focal deficit present.      Mental Status: She is alert and oriented to person, place, and time.      Cranial Nerves: No cranial nerve deficit.      Motor: No weakness.   Psychiatric:         Mood and Affect: Mood normal.         Behavior: Behavior normal.         Fluids    Intake/Output Summary (Last 24 hours) at 7/18/2023 1820  Last data filed at 7/18/2023 1500  Gross per 24 hour   Intake 240 ml   Output 2950 ml   Net -2710 ml       Laboratory  Recent Labs     07/16/23  0450 07/17/23  0429 07/18/23  0130   WBC 18.2* 11.1* 10.0   RBC 2.89* 2.54* 2.70*    HEMOGLOBIN 9.1* 9.1* 8.9*   HEMATOCRIT 26.9* 24.4* 25.6*   MCV 93.1 96.1 94.8   MCH 31.5 35.8* 33.0   MCHC 33.8 37.3* 34.8   RDW 49.2 50.2* 51.5*   PLATELETCT 134* 125* 113*   MPV 9.2 9.4 9.2     Recent Labs     07/16/23  0450 07/17/23  0429 07/18/23  0130   SODIUM 134* 134* 135   POTASSIUM 3.3* 3.5* 3.6   CHLORIDE 101 103 102   CO2 23 24 24   GLUCOSE 106* 87 118*   BUN 25* 20 18   CREATININE 1.21 1.08 1.18   CALCIUM 7.6* 7.5* 7.3*                   Imaging  IR-MIDLINE CATHETER INSERTION WO GUIDANCE > AGE 3   Final Result                  Ultrasound-guided midline placement performed by qualified nursing staff    as above.          IR-US GUIDED PIV   Final Result    Ultrasound-guided PERIPHERAL IV INSERTION performed by    qualified nursing staff as above.      DX-CHEST-PORTABLE (1 VIEW)   Final Result      Stable thoracic underinflation. Right IJ line adequate. Stable bibasilar pleural-parenchymal opacification      DX-CHEST-PORTABLE (1 VIEW)   Final Result      Stable thoracic underinflation. Right IJ line and ETT appear adequate. Slightly improved bibasilar pleural-parenchymal opacification      DX-CHEST-PORTABLE (1 VIEW)   Final Result      Stable bibasilar pleural-parenchymal opacification and low lung volumes. Lines/tubes adequate.      DX-CHEST-LIMITED (1 VIEW)   Final Result         Endotracheal tube with tip projecting over the mid thoracic trachea.   Gastric drainage tube courses below diaphragm, tip is not seen.   Right central venous catheter with tip projecting over the expected area of the lower SVC.            EC-NAEEM W/O CONT   Final Result      DX-CHEST-LIMITED (1 VIEW)   Final Result      Bibasilar underinflation atelectasis which could obscure an additional process. This is similar to the prior study. Lung volumes are very low.      IR-INSERT IVC FILTER WITH IG & SI   Final Result      1.  Ultrasound guided access LEFT common femoral vein.   2.  BILATERAL selective pulmonary arteriogram  demonstrating extensive bilateral pulmonary emboli.   3.  Successful BILATERAL pulmonary thrombectomy   4.  Large IVC at the level of the renal veins   5.  Successful placement of infrarenal IVC filter   6.  Pursestring suture should be removed in 6-12 hours.         Please note that this study required greater than 50% more than the usual procedure time due to the patient's anatomy and large habitus.            IR-THROMBO MECHANICAL ARTERY,INIT   Final Result      1.  Ultrasound guided access LEFT common femoral vein.   2.  BILATERAL selective pulmonary arteriogram demonstrating extensive bilateral pulmonary emboli.   3.  Successful BILATERAL pulmonary thrombectomy   4.  Large IVC at the level of the renal veins   5.  Successful placement of infrarenal IVC filter   6.  Pursestring suture should be removed in 6-12 hours.         Please note that this study required greater than 50% more than the usual procedure time due to the patient's anatomy and large habitus.            EC-ECHOCARDIOGRAM COMPLETE W/ CONT   Final Result      CT-ABDOMEN-PELVIS WITH   Final Result      1.  Pneumoperitoneum secondary to perforated viscus. Splenic flexure colonic diverticulum may be the perforation site. Gastroduodenal ulcer ulcer is considered less likely.   2.  Hepatic steatosis.   3.  Moderate gallbladder distention and pericholecystic fat stranding.   4.  Small volume hyperdense ascites.   5.  Partially visualized right lower extremity DVT.      Findings were communicated to SALINA STOVER via [employer] messaging system on 7/12/2023 11:03 PM.      CT-CTA CHEST PULMONARY ARTERY W/ RECONS   Final Result      1.  Saddle pulmonary embolus with associated right heart strain.   2.  Posterior pneumomediastinum secondary to pneumoperitoneum. Abdomen will be dictated separately.      Findings were communicated to SALINA STOVER via [employer] system on 7/12/2023 10:57 PM.      DX-CHEST-PORTABLE (1 VIEW)   Final Result      Mild atelectasis  in the right lower lung. No focal consolidation. No effusions.              Assessment/Plan  * Septic shock (HCC)  Assessment & Plan  Resolved    Acute hypoxemic respiratory failure (HCC)  Assessment & Plan  Intubated 7/13, extubated 7/15.  Continue anticoagulation for PE  Encourage incentive spirometry and mobilization  Wean off oxygen as tolerated    Perforated viscus  Assessment & Plan  Status post ex lap with left hemicolectomy and end colostomy as well as cholecystitis.  Pathology revealed diverticulum, no malignancy.  Colostomy with good stool output.  Advance diet    Acute kidney injury (HCC)  Assessment & Plan  7/17 Resolved    Acute saddle pulmonary embolism (HCC)- (present on admission)  Assessment & Plan  Status post thrombectomy and IVC placement  Continue heparin drip  Transition to oral anticoagulation tomorrow if okay with surgery    Acute deep venous thrombosis (DVT) determined by ultrasound ([facility])- (present on admission)  Assessment & Plan  Heparin drip, IVC filter placed protocol.           VTE prophylaxis: therapeutic anticoagulation with heparin    I have performed a physical exam and reviewed and updated ROS and Plan today (7/18/2023). In review of yesterday's note (7/17/2023), there are no changes except as documented above.

## 2023-07-19 NOTE — PROGRESS NOTES
Pt arrives to floor with RRT at 0250. Primary RNSymone assumes pt care. Continuous monitoring in place.     Temp: 97.1 F, temporal  HR: 116  RR: 18  BP: 134/59  SpO2: 96 % on 10 L oxymask  Weight: 146.6 kg    Discussed call light/phone system, communication board and POC. Fall precautions in place. Bed alarm on. Bed is locked and in low position. Treaded slippers in place.      2 RN skin check complete with GALI Ashby    Current impaired skin areas:    Pt is currently unstable, unable to perform a skin assessment at this time     Devices in place: oxymask, electrodes x 5, LUE midline, PIV to L forearm, BP cuff, SpO2 probe, SCDs placed     Devices assessed and repositioned PRN.     The following preventative measures and interventions in place: TAPs system in place, Q2 turns, elbows and heels floated on pillows    Wound consult placedYES/NO: N/A

## 2023-07-19 NOTE — PROCEDURES
Intubation    Date/Time: 7/19/2023 6:17 AM    Performed by: Lam Louis M.D.  Authorized by: Lam Louis M.D.    Consent:     Consent obtained:  Verbal and emergent situation    Consent given by:  Patient    Risks discussed:  Aspiration, bleeding, brain injury, death, dental trauma, pneumothorax, laryngeal injury and hypoxia    Alternatives discussed:  No treatment  Pre-procedure details:     Patient status:  Awake    Mallampati score:  III    Pretreatment meds: etomidate.    Paralytics:  Rocuronium  Procedure details:     Preoxygenation:  BiPAP    CPR in progress: no      Intubation method:  Oral    Oral intubation technique:  Fiber optic    Laryngoscope type:  GlideScope    Laryngoscope blade:  Mac 3    Cormack-Lehane Classification:  Grade 2b    Tube size (mm):  7.5    Tube type:  Cuffed    Number of attempts:  1    Ventilation between attempts: no      Cricoid pressure: no      Tube visualized through cords: yes    Placement assessment:     ETT to lip:  22    Tube secured with:  ETT camejo    Breath sounds:  Equal    Placement verification: chest rise, condensation, CXR verification, direct visualization, equal breath sounds and ETCO2 detector      CXR findings:  ETT in proper place  Post-procedure details:     Patient tolerance of procedure:  Tolerated well, no immediate complications

## 2023-07-19 NOTE — DISCHARGE PLANNING
SW provided family support to sister, Rani before and after OR visit.  Discussed family history and offered supports.

## 2023-07-19 NOTE — PROCEDURES
"Arterial Line Insertion    Date/Time: 7/19/2023 10:22 AM    Performed by: Cristian Peña M.D.  Authorized by: Cristian Peña M.D.  Consent: The procedure was performed in an emergent situation.  Consent given by: power of   Patient identity confirmed: arm band and hospital-assigned identification number  Time out: Immediately prior to procedure a \"time out\" was called to verify the correct patient, procedure, equipment, support staff and site/side marked as required.  Preparation: Patient was prepped and draped in the usual sterile fashion.  Indications: multiple ABGs, respiratory failure and hemodynamic monitoring  Location: left radial  Anesthesia: local infiltration    Anesthesia:  Local Anesthetic: lidocaine 1% without epinephrine    Sedation:  Patient sedated: yes    Vladislav's test normal: yes  Needle gauge: 20  Seldinger technique: Seldinger technique used  Number of attempts: 1  Post-procedure: line sutured  Post-procedure CMS: normal  Patient tolerance: patient tolerated the procedure well with no immediate complications              "

## 2023-07-19 NOTE — ASSESSMENT & PLAN NOTE
She underwent exploratory laparatomy with left colectomy with end colostomy and s/p cholecystectomy

## 2023-07-19 NOTE — PROGRESS NOTES
At 0002 RN notified of hypotension 86/61. RN in to assess pt, pt sweaty. Manual BP taken, 70/50. . All other vitals signs stable. Ostomy with bloody output. APRN Wood notified, midodrine and CXR ordered.     Repeat BP following dose, 65/50.  Pt with increased breathing effort, O2 sat 99% on 5L. Rapid response called.     Patient down to CT with rapid team. ICU ordered received. Report called to ICU GALI Ashby. Tele monitor removed. Belongings taken to new room T930.

## 2023-07-19 NOTE — PROGRESS NOTES
PREOPERATIVE NOTE:Patient transferred to ICU for hypotension, pressors initiated, patient intubated and central line placed. CT last night showed scattered ascites. Escalated to 4 pressors. Hb this morning came back at 2.7, down from 8. Massive transfusion protocol started. Dr. Peña spoke with patients sister and she understands proceeding to surgery may not save her life, but she would like to try. I recommend ex lap, procedures as indicated.    The operative strategy was explained and reviewed. Alternatives discussed. Potential complications including, but not limited to, infection, bleeding, damage to adjacent structures, and anesthetic complications were discussed. Two physician consent signed. Implied emergent consent.  Patient with grave prognosis.       ____________________________________     Taylor Bocanegra M.D.    DD: 7/19/2023  8:16 AM

## 2023-07-19 NOTE — PROGRESS NOTES
Monitor Summary  Rhythm: SR with 1st degree block  Rate: 80-90s  Ectopy: None Noted  .22 / .06 / .35

## 2023-07-19 NOTE — ASSESSMENT & PLAN NOTE
On the basis of hypoperfusion and lactic acid production in the setting of hemorrhagic shock, vasoplegia and acute hepatic injury    Improving  Continue resuscitation blood product driven  Follow markers of perfusion, trend lactate

## 2023-07-19 NOTE — PROGRESS NOTES
Patient had decline in condition with BP to 60/40, pulse ox reading not available and decline mentation. MD and charge RN called to bedside    0437 200 mg [place] given  0441 Dr Louis at bedside  0444 Levo to 1.3 per MD  0448 200mcg [place]  0500 25g D50 given for FSBS 66

## 2023-07-19 NOTE — PROGRESS NOTES
Pt vented, only wrist band check in pre op.   See MD note, no consents  Anesthesia at bedside, pt rolled straight to OR

## 2023-07-19 NOTE — CARE PLAN
The patient is Unstable - High likelihood or risk of patient condition declining or worsening    Shift Goals  Clinical Goals: hemodynamic stability  Patient Goals: comfort, ice water  Family Goals: enrico    Progress made toward(s) clinical / shift goals:    Problem: Pain - Standard  Goal: Alleviation of pain or a reduction in pain to the patient’s comfort goal  Outcome: Progressing     Problem: Safety - Medical Restraint  Goal: Remains free of injury from restraints (Restraint for Interference with Medical Device)  Outcome: Not Progressing       Patient is not progressing towards the following goals:      Problem: Safety - Medical Restraint  Goal: Remains free of injury from restraints (Restraint for Interference with Medical Device)  Outcome: Not Progressing

## 2023-07-19 NOTE — PROGRESS NOTES
0514  200mcg [place]; Dr. Louis present at bedside  0521 epi gtt initiated as per MAR  0523  50 meq bicarb given  0528  patient prepped for intubation per Dr. Louis

## 2023-07-19 NOTE — ASSESSMENT & PLAN NOTE
Repoening of previous exploratory laparotomy.  Abdominal washout, hepatorrhaphy, packing of the abdomen, and temporary abdominal closure with negative pressure wound therapy device greater than 50 cm²

## 2023-07-19 NOTE — PROGRESS NOTES
Reevaluated patient 1:04 PM      Has had continued bloody output, near 2 L from abdominal wound drain, now decreasing and becoming more serous.  Have been actively titrating vasoactives to achieve MAP goals, still on NE @ 0.45 and Vaso.    Have continued TEG-guide transfusion, receive 3 red boxes total this AM.   Most recent TEG with elevated MA and platelet dysfunction, PLTS 48, transfuse 1 unit of platelets on this. INR 3, so one of FFP was given as well, though R time has corrected, including heparinase study indicating that heparin effect not operative.  Patient with ischemic liver injury likely accounting in part for elevated INR.     Continues to be profoundly acidemic and in shock.  Will continue to resuscitate and support MAP goals.  Now oliguric as well.     Patient is critically ill.     There has been no overlap in critical care time.   Critical Care Time not including procedures: 20 minutes

## 2023-07-19 NOTE — CONSULTS
Pulmonary/Critical Care Consultation    Date of consult: 7/19/2023    Referring Physician  Priyank Ellis M.D.    Reason for Consultation  hypotension    History of Presenting Illness  63 y.o. female who presented 7/12/2023 with acute DVT, acute saddle PE and new pneumoperitoneum due to perforated viscus for what she was admitted and treated with thrombectomy, heparin ggt and IVC filter placement. She also underwent exploratory laparatomy (today day 5 post op) with left colectomy with end colostomy and s/p cholecystectomy with a wound vac in place.   I was called at the bedside due to hypotension and tachycardia.     Code Status  Full Code    Review of Systems  Review of Systems   Constitutional:  Positive for malaise/fatigue. Negative for chills, fever and weight loss.   HENT:  Negative for congestion, nosebleeds and sore throat.    Eyes:  Negative for discharge and redness.   Respiratory:  Negative for cough, shortness of breath and wheezing.    Cardiovascular:  Positive for chest pain. Negative for palpitations and leg swelling.   Gastrointestinal:  Positive for abdominal pain and blood in stool. Negative for nausea and vomiting.   Genitourinary:  Negative for dysuria.   Musculoskeletal:  Negative for myalgias.   Skin:  Negative for rash.   Neurological:  Negative for dizziness and headaches.   Endo/Heme/Allergies:  Does not bruise/bleed easily.   Psychiatric/Behavioral:  Negative for memory loss.    All other systems reviewed and are negative.      Past Medical History   has a past medical history of Disorder of thyroid and Morbid obesity with BMI of 50.0-59.9, adult (HCC) (7/13/2023).    Surgical History   has a past surgical history that includes knee arthroplasty total (Left, 9/12/2017); pr exploratory of abdomen (N/A, 7/13/2023); pr colostomy (7/13/2023); cholecystectomy (N/A, 7/13/2023); and low anterior resection (N/A, 7/13/2023).    Family History  family history is not on file.    Social History    reports that she has never smoked. She has never used smokeless tobacco. She reports that she does not drink alcohol and does not use drugs.    Medications  Home Medications       Reviewed by Sophia Perry R.N. (Registered Nurse) on 07/13/23 at 1042  Med List Status: Complete     Medication Last Dose Status   levothyroxine (SYNTHROID) 150 MCG Tab 7/12/2023 Active                  Current Facility-Administered Medications   Medication Dose Route Frequency Provider Last Rate Last Admin    piperacillin-tazobactam (Zosyn) 4.5 g in  mL IVPB  4.5 g Intravenous Q8HRS SARAI Barboza        norepinephrine (Levophed) 8 mg in 250 mL NS infusion (premix)  0-1 mcg/kg/min (Ideal) Intravenous Continuous Lam Louis M.D. 12.8 mL/hr at 07/19/23 0341 0.1 mcg/kg/min at 07/19/23 0341    oxyCODONE immediate-release (Roxicodone) tablet 5 mg  5 mg Oral Q4HRS PRN Cristian Peña M.D.   5 mg at 07/18/23 1749    HYDROmorphone (Dilaudid) injection 0.5-1 mg  0.5-1 mg Intravenous Q2HRS PRN Cristian Peña M.D.   1 mg at 07/19/23 0324    omeprazole (PriLOSEC) capsule 20 mg  20 mg Oral BID Dagoberto Ohara D.O.   20 mg at 07/18/23 1740    senna-docusate (Pericolace Or Senokot S) 8.6-50 MG per tablet 2 Tablet  2 Tablet Oral BID Dagoberto Ohara, D.O.   2 Tablet at 07/18/23 1740    And    polyethylene glycol/lytes (Miralax) PACKET 1 Packet  1 Packet Oral QDAY PRN Dagoberto Ohara D.O.        And    magnesium hydroxide (Milk Of Magnesia) suspension 30 mL  30 mL Oral QDAY PRN Dagoberto Ohara, D.O.        And    bisacodyl (Dulcolax) suppository 10 mg  10 mg Rectal QDAY PRN Dagoberto Ohara D.O.        acetaminophen (Tylenol) tablet 650 mg  650 mg Oral Q6HRS PRN Dagoberto Tedja, D.O.        levothyroxine (Synthroid) tablet 225 mcg  225 mcg Oral AM ES Dagoberto Ohara D.O.   225 mcg at 07/18/23 0544    ondansetron (Zofran) syringe/vial injection 4 mg  4 mg Intravenous Q4HRS PRN Ana Lilia Joy M.D.        ondansetron (Zofran ODT) dispertab 4 mg  4 mg Oral  Q4HRS PRN Ana Lilia Joy M.D.        promethazine (Phenergan) tablet 12.5-25 mg  12.5-25 mg Oral Q4HRS PRN Ana Lilia Joy M.D.        promethazine (Phenergan) suppository 12.5-25 mg  12.5-25 mg Rectal Q4HRS PRN Ana Lilia Joy M.D.        prochlorperazine (Compazine) injection 5-10 mg  5-10 mg Intravenous Q4HRS PRN Ana Lilia Joy M.D.        dextrose 50% (D50W) injection 25 g  25 g Intravenous Q15 MIN PRN Ana Lilia Joy M.D.        Nozin nasal  swab  1 Applicator Each Nostril BID Dagoberto Ohara D.O.   1 Applicator at 07/18/23 1740    Respiratory Therapy Consult   Nebulization Continuous RT Dagoberto Ohara D.O.        heparin infusion 25,000 units in 500 mL 0.45% NACL  0-30 Units/kg/hr (Adjusted) Intravenous Continuous Ana Lilia Joy M.D.   Stopped at 07/19/23 0151    heparin injection 4,200 Units  40 Units/kg (Adjusted) Intravenous PRN Ana Lilia Joy M.D.   4,200 Units at 07/14/23 2358       Allergies  Allergies   Allergen Reactions    Nkda [No Known Drug Allergy]        Vital Signs last 24 hours  Temp:  [36.2 °C (97.1 °F)-36.8 °C (98.2 °F)] 36.2 °C (97.1 °F)  Pulse:  [] 116  Resp:  [14-18] 18  BP: ()/(32-79) 134/59  SpO2:  [92 %-100 %] 96 %    Physical Exam  Physical Exam  Vitals and nursing note reviewed.   Constitutional:       General: She is not in acute distress.     Appearance: She is ill-appearing. She is not toxic-appearing.   HENT:      Head: Normocephalic.      Nose: No congestion or rhinorrhea.      Mouth/Throat:      Mouth: Mucous membranes are dry.   Eyes:      Extraocular Movements: Extraocular movements intact.      Pupils: Pupils are equal, round, and reactive to light.   Cardiovascular:      Rate and Rhythm: Regular rhythm. Tachycardia present.      Pulses: Normal pulses.      Heart sounds: Normal heart sounds. No murmur heard.     No gallop.   Pulmonary:      Effort: No respiratory distress.      Breath sounds: No stridor. No wheezing or rales.      Comments:  Decrease vesicular sounds overall  Abdominal:      General: There is no distension.      Palpations: There is no mass.      Tenderness: There is abdominal tenderness. There is no guarding or rebound.      Comments: Wound vac in place. Ostomy with bloody secretion   Musculoskeletal:      Right lower leg: Edema present.      Left lower leg: Edema present.   Skin:     General: Skin is dry.      Capillary Refill: Capillary refill takes more than 3 seconds.      Findings: No rash.   Neurological:      General: No focal deficit present.      Mental Status: She is alert and oriented to person, place, and time.         Fluids    Intake/Output Summary (Last 24 hours) at 7/19/2023 0413  Last data filed at 7/19/2023 0341  Gross per 24 hour   Intake 1534.77 ml   Output 2630 ml   Net -1095.23 ml       Laboratory  Recent Results (from the past 48 hour(s))   CBC with Differential    Collection Time: 07/17/23  4:29 AM   Result Value Ref Range    WBC 11.1 (H) 4.8 - 10.8 K/uL    RBC 2.54 (L) 4.20 - 5.40 M/uL    Hemoglobin 9.1 (L) 12.0 - 16.0 g/dL    Hematocrit 24.4 (L) 37.0 - 47.0 %    MCV 96.1 81.4 - 97.8 fL    MCH 35.8 (H) 27.0 - 33.0 pg    MCHC 37.3 (H) 32.2 - 35.5 g/dL    RDW 50.2 (H) 35.9 - 50.0 fL    Platelet Count 125 (L) 164 - 446 K/uL    MPV 9.4 9.0 - 12.9 fL    Neutrophils-Polys 89.30 (H) 44.00 - 72.00 %    Lymphocytes 4.10 (L) 22.00 - 41.00 %    Monocytes 6.60 0.00 - 13.40 %    Eosinophils 0.00 0.00 - 6.90 %    Basophils 0.00 0.00 - 1.80 %    Nucleated RBC 0.00 0.00 - 0.20 /100 WBC    Neutrophils (Absolute) 9.91 (H) 1.82 - 7.42 K/uL    Lymphs (Absolute) 0.46 (L) 1.00 - 4.80 K/uL    Monos (Absolute) 0.73 0.00 - 0.85 K/uL    Eos (Absolute) 0.00 0.00 - 0.51 K/uL    Baso (Absolute) 0.00 0.00 - 0.12 K/uL    NRBC (Absolute) 0.00 K/uL   Comp Metabolic Panel (CMP)    Collection Time: 07/17/23  4:29 AM   Result Value Ref Range    Sodium 134 (L) 135 - 145 mmol/L    Potassium 3.5 (L) 3.6 - 5.5 mmol/L    Chloride 103 96 - 112 mmol/L     Co2 24 20 - 33 mmol/L    Anion Gap 7.0 7.0 - 16.0    Glucose 87 65 - 99 mg/dL    Bun 20 8 - 22 mg/dL    Creatinine 1.08 0.50 - 1.40 mg/dL    Calcium 7.5 (L) 8.5 - 10.5 mg/dL    AST(SGOT) 53 (H) 12 - 45 U/L    ALT(SGPT) 51 (H) 2 - 50 U/L    Alkaline Phosphatase 215 (H) 30 - 99 U/L    Total Bilirubin 0.7 0.1 - 1.5 mg/dL    Albumin 1.8 (L) 3.2 - 4.9 g/dL    Total Protein 4.9 (L) 6.0 - 8.2 g/dL    Globulin 3.1 1.9 - 3.5 g/dL    A-G Ratio 0.6 g/dL   Heparin Anti-Xa    Collection Time: 07/17/23  4:29 AM   Result Value Ref Range    Heparin Xa (UFH) 0.36 IU/mL   CORRECTED CALCIUM    Collection Time: 07/17/23  4:29 AM   Result Value Ref Range    Correct Calcium 9.3 8.5 - 10.5 mg/dL   ESTIMATED GFR    Collection Time: 07/17/23  4:29 AM   Result Value Ref Range    GFR (CKD-EPI) 58 (A) >60 mL/min/1.73 m 2   DIFFERENTIAL MANUAL    Collection Time: 07/17/23  4:29 AM   Result Value Ref Range    Manual Diff Status PERFORMED     Comment See Comment    PERIPHERAL SMEAR REVIEW    Collection Time: 07/17/23  4:29 AM   Result Value Ref Range    Peripheral Smear Review see below    PLATELET ESTIMATE    Collection Time: 07/17/23  4:29 AM   Result Value Ref Range    Plt Estimation Decreased    MORPHOLOGY    Collection Time: 07/17/23  4:29 AM   Result Value Ref Range    RBC Morphology Present     Poikilocytosis 2+     Echinocytes 2+    CBC with Differential    Collection Time: 07/18/23  1:30 AM   Result Value Ref Range    WBC 10.0 4.8 - 10.8 K/uL    RBC 2.70 (L) 4.20 - 5.40 M/uL    Hemoglobin 8.9 (L) 12.0 - 16.0 g/dL    Hematocrit 25.6 (L) 37.0 - 47.0 %    MCV 94.8 81.4 - 97.8 fL    MCH 33.0 27.0 - 33.0 pg    MCHC 34.8 32.2 - 35.5 g/dL    RDW 51.5 (H) 35.9 - 50.0 fL    Platelet Count 113 (L) 164 - 446 K/uL    MPV 9.2 9.0 - 12.9 fL    Neutrophils-Polys 98.30 (H) 44.00 - 72.00 %    Lymphocytes 0.00 (L) 22.00 - 41.00 %    Monocytes 0.80 0.00 - 13.40 %    Eosinophils 0.00 0.00 - 6.90 %    Basophils 0.00 0.00 - 1.80 %    Nucleated RBC 0.40 (H)  0.00 - 0.20 /100 WBC    Neutrophils (Absolute) 9.83 (H) 1.82 - 7.42 K/uL    Lymphs (Absolute) 0.00 (L) 1.00 - 4.80 K/uL    Monos (Absolute) 0.08 0.00 - 0.85 K/uL    Eos (Absolute) 0.00 0.00 - 0.51 K/uL    Baso (Absolute) 0.00 0.00 - 0.12 K/uL    NRBC (Absolute) 0.04 K/uL    Anisocytosis 1+     Macrocytosis 1+    Comp Metabolic Panel (CMP)    Collection Time: 07/18/23  1:30 AM   Result Value Ref Range    Sodium 135 135 - 145 mmol/L    Potassium 3.6 3.6 - 5.5 mmol/L    Chloride 102 96 - 112 mmol/L    Co2 24 20 - 33 mmol/L    Anion Gap 9.0 7.0 - 16.0    Glucose 118 (H) 65 - 99 mg/dL    Bun 18 8 - 22 mg/dL    Creatinine 1.18 0.50 - 1.40 mg/dL    Calcium 7.3 (L) 8.5 - 10.5 mg/dL    AST(SGOT) 62 (H) 12 - 45 U/L    ALT(SGPT) 55 (H) 2 - 50 U/L    Alkaline Phosphatase 213 (H) 30 - 99 U/L    Total Bilirubin 0.6 0.1 - 1.5 mg/dL    Albumin 1.7 (L) 3.2 - 4.9 g/dL    Total Protein 5.1 (L) 6.0 - 8.2 g/dL    Globulin 3.4 1.9 - 3.5 g/dL    A-G Ratio 0.5 g/dL   CORRECTED CALCIUM    Collection Time: 07/18/23  1:30 AM   Result Value Ref Range    Correct Calcium 9.1 8.5 - 10.5 mg/dL   ESTIMATED GFR    Collection Time: 07/18/23  1:30 AM   Result Value Ref Range    GFR (CKD-EPI) 52 (A) >60 mL/min/1.73 m 2   MORPHOLOGY    Collection Time: 07/18/23  1:30 AM   Result Value Ref Range    RBC Morphology Present    PERIPHERAL SMEAR REVIEW    Collection Time: 07/18/23  1:30 AM   Result Value Ref Range    Peripheral Smear Review see below    DIFFERENTIAL MANUAL    Collection Time: 07/18/23  1:30 AM   Result Value Ref Range    Myelocytes 0.90 %    Manual Diff Status PERFORMED    PLATELET ESTIMATE    Collection Time: 07/18/23  1:30 AM   Result Value Ref Range    Plt Estimation Decreased    Heparin Xa (Unfractionated)    Collection Time: 07/18/23  5:46 AM   Result Value Ref Range    Heparin Xa (UFH) 0.40 IU/mL   proBrain Natriuretic Peptide, NT    Collection Time: 07/19/23 12:47 AM   Result Value Ref Range    NT-proBNP 7391 (H) 0 - 125 pg/mL   CBC  WITH DIFFERENTIAL    Collection Time: 07/19/23 12:47 AM   Result Value Ref Range    WBC 19.7 (H) 4.8 - 10.8 K/uL    RBC 2.65 (L) 4.20 - 5.40 M/uL    Hemoglobin 8.0 (L) 12.0 - 16.0 g/dL    Hematocrit 24.2 (L) 37.0 - 47.0 %    MCV 91.3 81.4 - 97.8 fL    MCH 30.2 27.0 - 33.0 pg    MCHC 33.1 32.2 - 35.5 g/dL    RDW 51.4 (H) 35.9 - 50.0 fL    Platelet Count 128 (L) 164 - 446 K/uL    MPV 10.7 9.0 - 12.9 fL    Neutrophils-Polys 93.00 (H) 44.00 - 72.00 %    Lymphocytes 4.40 (L) 22.00 - 41.00 %    Monocytes 0.90 0.00 - 13.40 %    Eosinophils 0.00 0.00 - 6.90 %    Basophils 0.00 0.00 - 1.80 %    Nucleated RBC 0.10 0.00 - 0.20 /100 WBC    Neutrophils (Absolute) 18.32 (H) 1.82 - 7.42 K/uL    Lymphs (Absolute) 0.87 (L) 1.00 - 4.80 K/uL    Monos (Absolute) 0.18 0.00 - 0.85 K/uL    Eos (Absolute) 0.00 0.00 - 0.51 K/uL    Baso (Absolute) 0.00 0.00 - 0.12 K/uL    NRBC (Absolute) 0.02 K/uL    Anisocytosis 1+     Macrocytosis 1+    Comp Metabolic Panel    Collection Time: 07/19/23 12:47 AM   Result Value Ref Range    Sodium 132 (L) 135 - 145 mmol/L    Potassium 4.0 3.6 - 5.5 mmol/L    Chloride 97 96 - 112 mmol/L    Co2 22 20 - 33 mmol/L    Anion Gap 13.0 7.0 - 16.0    Glucose 186 (H) 65 - 99 mg/dL    Bun 18 8 - 22 mg/dL    Creatinine 1.21 0.50 - 1.40 mg/dL    Calcium 7.4 (L) 8.5 - 10.5 mg/dL    AST(SGOT) 49 (H) 12 - 45 U/L    ALT(SGPT) 51 (H) 2 - 50 U/L    Alkaline Phosphatase 176 (H) 30 - 99 U/L    Total Bilirubin 0.6 0.1 - 1.5 mg/dL    Albumin 1.8 (L) 3.2 - 4.9 g/dL    Total Protein 5.2 (L) 6.0 - 8.2 g/dL    Globulin 3.4 1.9 - 3.5 g/dL    A-G Ratio 0.5 g/dL   ESTIMATED GFR    Collection Time: 07/19/23 12:47 AM   Result Value Ref Range    GFR (CKD-EPI) 51 (A) >60 mL/min/1.73 m 2   CORRECTED CALCIUM    Collection Time: 07/19/23 12:47 AM   Result Value Ref Range    Correct Calcium 9.2 8.5 - 10.5 mg/dL   TROPONIN    Collection Time: 07/19/23 12:47 AM   Result Value Ref Range    Troponin T 62 (H) 6 - 19 ng/L   PROCALCITONIN    Collection  Time: 23 12:47 AM   Result Value Ref Range    Procalcitonin 7.13 (H) <0.25 ng/mL   DIFFERENTIAL MANUAL    Collection Time: 23 12:47 AM   Result Value Ref Range    Myelocytes 1.70 %    Manual Diff Status PERFORMED    PERIPHERAL SMEAR REVIEW    Collection Time: 23 12:47 AM   Result Value Ref Range    Peripheral Smear Review see below    PLATELET ESTIMATE    Collection Time: 23 12:47 AM   Result Value Ref Range    Plt Estimation Decreased    MORPHOLOGY    Collection Time: 23 12:47 AM   Result Value Ref Range    RBC Morphology Present    LACTIC ACID    Collection Time: 23 12:48 AM   Result Value Ref Range    Lactic Acid 5.7 (HH) 0.5 - 2.0 mmol/L   EKG    Collection Time: 23  1:38 AM   Result Value Ref Range    Report       [facility]    Test Date:  2023  Pt Name:    KENY HUGHES                 Department: 183  MRN:        [id number]                      Room:       [facility]  Gender:     Female                       Technician: MYKEL  :        [id number]                   Requested By:LIZ PIZARRO  Order #:    [id number]                    Reading MD:    Measurements  Intervals                                Axis  Rate:       109                          P:          35  VA:         151                          QRS:        29  QRSD:       92                           T:          -21  QT:         327  QTc:        441    Interpretive Statements  Sinus tachycardia  Borderline repolarization abnormality  Compared to ECG 2023 18:47:37  Myocardial infarct finding no longer present  Prolonged QT interval no longer present     ABG - LAB    Collection Time: 23  1:57 AM   Result Value Ref Range    Ph 7.42 7.40 - 7.50    Pco2 27.7 26.0 - 37.0 mmHg    Po2 107.7 (H) 64.0 - 87.0 mmHg    O2 Saturation 97.2 93.0 - 99.0 %    Hco3 18 17 - 25 mmol/L    Base Excess -6 (L) -4 - 3 mmol/L    Body Temp 36.1 Centigrade    O2 Therapy 5     Ph -TC 7.44 7.40 - 7.50    Pco2 -TC 26.6 26.0 -  37.0 mmHg    Po2 -.3 (H) 64.0 - 87.0 mmHg   POCT arterial blood gas device results    Collection Time: 07/19/23  1:59 AM   Result Value Ref Range    Ph 7.427 7.400 - 7.500    Pco2 28.0 26.0 - 37.0 mmHg    Po2 107 (H) 64 - 87 mmHg    Tco2 19 (L) 20 - 33 mmol/L    S02 98 93 - 99 %    Hco3 18.5 17.0 - 25.0 mmol/L    BE -5 (L) -4 - 3 mmol/L    Body Temp see below degrees    Specimen Arterial     Vladislav Test Pass     DelSys Other     LPM 5 lpm   POCT lactate device results    Collection Time: 07/19/23  1:59 AM   Result Value Ref Range    iStat Lactate 7.2 (HH) 0.5 - 2.0 mmol/L       Imaging  CT-ABDOMEN-PELVIS WITH   Final Result         1.  Scattered moderate abdominal ascites.   2.  Trace bilateral pleural effusions, left greater than right   3.  Linear densities the bilateral lung bases, greater on the left, appearance suggests atelectasis, component of infiltrate not excluded.   4.  DVT in the right external iliac vein extending into the common femoral vein, compatible with reported history of DVT.   5.  Diverticulosis   6.  Hepatomegaly   7.  Diffuse hepatic steatosis      DX-CHEST-PORTABLE (1 VIEW)   Final Result         1.  Bibasilar atelectasis or evolving infiltrates.      IR-MIDLINE CATHETER INSERTION WO GUIDANCE > AGE 3   Final Result                  Ultrasound-guided midline placement performed by qualified nursing staff    as above.          IR-US GUIDED PIV   Final Result    Ultrasound-guided PERIPHERAL IV INSERTION performed by    qualified nursing staff as above.      DX-CHEST-PORTABLE (1 VIEW)   Final Result      Stable thoracic underinflation. Right IJ line adequate. Stable bibasilar pleural-parenchymal opacification      DX-CHEST-PORTABLE (1 VIEW)   Final Result      Stable thoracic underinflation. Right IJ line and ETT appear adequate. Slightly improved bibasilar pleural-parenchymal opacification      DX-CHEST-PORTABLE (1 VIEW)   Final Result      Stable bibasilar pleural-parenchymal  opacification and low lung volumes. Lines/tubes adequate.      DX-CHEST-LIMITED (1 VIEW)   Final Result         Endotracheal tube with tip projecting over the mid thoracic trachea.   Gastric drainage tube courses below diaphragm, tip is not seen.   Right central venous catheter with tip projecting over the expected area of the lower SVC.            EC-NAEEM W/O CONT   Final Result      DX-CHEST-LIMITED (1 VIEW)   Final Result      Bibasilar underinflation atelectasis which could obscure an additional process. This is similar to the prior study. Lung volumes are very low.      IR-INSERT IVC FILTER WITH IG & SI   Final Result      1.  Ultrasound guided access LEFT common femoral vein.   2.  BILATERAL selective pulmonary arteriogram demonstrating extensive bilateral pulmonary emboli.   3.  Successful BILATERAL pulmonary thrombectomy   4.  Large IVC at the level of the renal veins   5.  Successful placement of infrarenal IVC filter   6.  Pursestring suture should be removed in 6-12 hours.         Please note that this study required greater than 50% more than the usual procedure time due to the patient's anatomy and large habitus.            IR-THROMBO MECHANICAL ARTERY,INIT   Final Result      1.  Ultrasound guided access LEFT common femoral vein.   2.  BILATERAL selective pulmonary arteriogram demonstrating extensive bilateral pulmonary emboli.   3.  Successful BILATERAL pulmonary thrombectomy   4.  Large IVC at the level of the renal veins   5.  Successful placement of infrarenal IVC filter   6.  Pursestring suture should be removed in 6-12 hours.         Please note that this study required greater than 50% more than the usual procedure time due to the patient's anatomy and large habitus.            EC-ECHOCARDIOGRAM COMPLETE W/ CONT   Final Result      CT-ABDOMEN-PELVIS WITH   Final Result      1.  Pneumoperitoneum secondary to perforated viscus. Splenic flexure colonic diverticulum may be the perforation site.  Gastroduodenal ulcer ulcer is considered less likely.   2.  Hepatic steatosis.   3.  Moderate gallbladder distention and pericholecystic fat stranding.   4.  Small volume hyperdense ascites.   5.  Partially visualized right lower extremity DVT.      Findings were communicated to SALINA STOVER via Voalte messaging system on 7/12/2023 11:03 PM.      CT-CTA CHEST PULMONARY ARTERY W/ RECONS   Final Result      1.  Saddle pulmonary embolus with associated right heart strain.   2.  Posterior pneumomediastinum secondary to pneumoperitoneum. Abdomen will be dictated separately.      Findings were communicated to SALINA STOVER via Voalte messaging system on 7/12/2023 10:57 PM.      DX-CHEST-PORTABLE (1 VIEW)   Final Result      Mild atelectasis in the right lower lung. No focal consolidation. No effusions.             Assessment/Plan  * Septic shock (HCC)  Assessment & Plan  This is Septic shock Not present on admission  SIRS criteria identified on my evaluation include: Tachycardia, with heart rate greater than 90 BPM, Tachypnea, with respirations greater than 20 per minute and Leukocytosis, with WBC greater than 12,000  Indicators of septic shock include: Severe sepsis present and initial lactate level result is greater than or equal to 4mmol/L   Sources is: GI, fecal peritonitis, perforated bowel  Sepsis protocol initiated  Crystalloid Fluid Administration: Fluid resuscitation ordered different than standard protocol - 1 lt IVF bolus given, concern for pulmonary edema, volume overload.  IV antibiotics as appropriate for source of sepsis: re started zosyn on 7/19  Reassessment: I have reassessed the patient's hemodynamic status, no need for vassopressors at this point.  Hypotension Improved with 1 lit IVF, suspected partly due to poor oral intake but cannot rule out a new episode of sepsis.  Lactic acid was elevated, continue to trend q 4 hr until is less than 2.       Acute blood loss anemia  Assessment & Plan  Bloody output  from ostomy noted, held heparin ggt  Check Hb at 5 am,   Transfuse if H/H is <7/21  Check TEG, will replace with blood products accordingly.    Acute hypoxemic respiratory failure (HCC)- (present on admission)  Assessment & Plan  -7/13 Intubated for OR and 7/15 extubated  -Continnue to require supplemental 02 to maintain saturation, NC on 5L now  -CXR with Atelectasis and low volumes, cannot rule out an infiltrate  -Mobility, PT ordered  -Incentive spirometry         Pneumoperitoneum- (present on admission)  Assessment & Plan  -She underwent exploratory laparatomy (today day 5 post op) with left colectomy with end colostomy and s/p cholecystectomy with a wound vac in place    Acute deep venous thrombosis (DVT) determined by ultrasound ([facility])- (present on admission)  Assessment & Plan  -Heparin infusion with Xa monitoring was held due to bleeding noted in the ostomy, will repeat Xa levels in AM and CBC, if Hb is stable can re start heparin ggt.  -S/p IVC filter placed this admission  -Will need IVC filter retrieval in time, as soon as feasible    Hypotension  Assessment & Plan  Unclear underlying  but cannot rule out sepsis, s/p resuscitation with IVF 1 lt. Will be cautious with further IVF  New blood cultures  Re started antibiotic zosyn  Consider levophed as initial pressor if needed  Held heparin ggt given some bloody output from ostomy, will repeat Xa in early AM, consider to re start if Hb is stable.  CT abdomen stat    Perforated viscus  Assessment & Plan  - 7/13 S/p ex lap with left hemicolectomy and end colostomy by Dr. Boateng  - found perforated colon at splenic fixture, associated with mass. Path neg for malignancy  - Fecal peritonitis  -Post operatively, pt improving.   -Good stool output, no bleeding.   -Early mobility, goal level 3        Acute kidney injury (HCC)  Assessment & Plan  -DUNG post op due to pre renal, intravascular depletion has resolved  -ok UOP  -Poor oral intake, she reports was  chocking with food earlier today and stopped eating  -s/p diuresis     Morbid obesity with BMI of 50.0-59.9, adult (HCC)- (present on admission)  Assessment & Plan  BMI 50, place her in high risk for increase morbidity during this admission    Acute saddle pulmonary embolism (HCC)- (present on admission)  Assessment & Plan  -Originating from a right leg DVT. Pt is not a candidate for thrombolytics given her pneumoperitoneum but on 7/13 S/p emergent thrombectomy of saddle PE with IVC placement.   -hold heparin ggt until ostomy is less bloody and Hb proves to be stable (check Xa levels in early am).        Discussed patient condition and risk of morbidity and/or mortality with Hospitalist, RN, RT, and Patient.    The patient remains critically ill.  Critical care time = 55 minutes in directly providing and coordinating critical care and extensive data review.  No time overlap and excludes procedures.      Lam Louis MD FACP FCCP  Pulmonary/Critical Care

## 2023-07-19 NOTE — WOUND TEAM
Consult received regarding patient VAC and ostomy. Wound team has already been following patient for VAC and ostomy changes since 7/13/23. Please refer to wound team notes for details, orders in place.

## 2023-07-19 NOTE — PROGRESS NOTES
Critical Care Progress Note    Date of admission  7/12/2023    Chief Complaint  63 y.o. female who presented 7/12/2023 with acute DVT, acute saddle PE and new pneumoperitoneum due to perforated viscus for what she was admitted and treated with thrombectomy, heparin ggt and IVC filter placement. She also underwent exploratory laparatomy (today day 5 post op) with left colectomy with end colostomy and s/p cholecystectomy with a wound vac in place.   I was called at the bedside due to hypotension and tachycardia.    Hospital Course  7/13 s/p bilateral thrombectomy for saddle PE and IVC filter placement. Successful.   7/13 s/p left colectomy and end-colostomy, and cholecystectomy. Intraop found perforated colon at splenic flexure with associated mass. Concerning of malignancy. Remains intubated post op.   7/19: Admitted to ICU is hemorrhagic shock, MTP, OR for intrabdominal bleed, liver lac, returns to ICU in shock and intubated    Interval Problem Update  Reviewed last 24 hour events:  Admitted by Dr. Hardy overnight from the floor for rapidly progressive recalcitrant shock of unknown etiology, intubated, resuscitated, profound acidemia, treated with multiple pushes of bicarbonate, on 4 vasopressors at 1 point, found to have underfilled LV, on POCUS, with LVOT obstruction, free fluid in the abdomen.    Initiated MTP, gave 1 red box, volume responsive, down titrated vasopressors, discussed with acute care general surgery who took her to the operating room emergently for ex lap and found 3 L of blood in her abdomen.    Underwent primary repair, liver packing, abdomen left open, return to ICU intubated      Review of Systems  Review of Systems   Unable to perform ROS: Critical illness        Vital Signs for last 24 hours   Temp:  [36.2 °C (97.1 °F)-37.8 °C (100 °F)] 37.8 °C (100 °F)  Pulse:  [] 81  Resp:  [14-46] 28  BP: ()/(26-84) 112/56  SpO2:  [45 %-100 %] 95 %    Hemodynamic parameters for last 24  hours       Respiratory Information for the last 24 hours  Vent Mode: APVCMV  Rate (breaths/min): 28  Vt Target (mL): 420  PEEP/CPAP: 14  MAP: 19  Control VTE (exp VT): 420    Physical Exam   Physical Exam  Constitutional:       General: She is in acute distress.      Appearance: She is ill-appearing and toxic-appearing.   HENT:      Head: Normocephalic.      Mouth/Throat:      Mouth: Mucous membranes are dry.   Eyes:      Pupils: Pupils are equal, round, and reactive to light.   Cardiovascular:      Rate and Rhythm: Regular rhythm. Tachycardia present.   Pulmonary:      Breath sounds: Rales present.   Abdominal:      General: There is distension.   Musculoskeletal:      Right lower leg: Edema present.      Left lower leg: Edema present.   Skin:     Capillary Refill: Capillary refill takes more than 3 seconds.      Comments: Cold extremities significantly delayed refill   Neurological:      Comments: Intubated sedated         Medications  Current Facility-Administered Medications   Medication Dose Route Frequency Provider Last Rate Last Admin    piperacillin-tazobactam (Zosyn) 4.5 g in  mL IVPB  4.5 g Intravenous Q8HRS Elizabet Muir, A.P.R.N. 25 mL/hr at 07/19/23 0623 4.5 g at 07/19/23 0623    norepinephrine (Levophed) 8 mg in 250 mL NS infusion (premix)  0-1 mcg/kg/min (Ideal) Intravenous Continuous Lam Louis M.D. 154.1 mL/hr at 07/19/23 0607 1.2 mcg/kg/min at 07/19/23 0607    vasopressin (Vasostrict) 20 Units in  mL Infusion  0.03 Units/min Intravenous Continuous Lam Louis M.D. 9 mL/hr at 07/19/23 0600 0.03 Units/min at 07/19/23 0600    EPINEPHrine (Adrenalin) infusion 4 mg/250 mL (premix)  0-0.5 mcg/kg/min (Ideal) Intravenous Continuous Lam Louis M.D. 128.4 mL/hr at 07/19/23 0626 0.5 mcg/kg/min at 07/19/23 0626    midazolam (Versed) injection 2 mg  2 mg Intravenous Q4HRS PRN Lam Louis M.D.   2 mg at 07/19/23 0607    dexmedetomidine (PRECEDEX) 400 mcg/100mL NS  premix infusion  0-1.5 mcg/kg/hr (Ideal) Intravenous Continuous Lam Louis M.D.   Dose not Required at 07/19/23 0615    PHENYLEPHRINE HCL 10 MG/ML INJ SOLN (WRAPPER)             PHENYLEPHRINE HCL-NACL 1-0.9 MG/10ML-% IV SOSY             sodium bicarbonate 150 mEq in D5W infusion (premix)   Intravenous Continuous Lam Louis M.D. 175 mL/hr at 07/19/23 0801 New Bag at 07/19/23 0801    hydrocortisone sodium succinate PF (Solu-CORTEF) 100 MG injection 100 mg  100 mg Intravenous Q8HRS Lam Louis M.D.   100 mg at 07/19/23 0646    phenylephrine 40 mg/250 mL NS premix  0-5 mcg/kg/min (Ideal) Intravenous Continuous Lam Louis M.D.   Stopped at 07/19/23 0734    Respiratory Therapy Consult   Nebulization Continuous RT Cristian Peña M.D.        famotidine (Pepcid) tablet 20 mg  20 mg Enteral Tube Q12HRS Cristian Peña M.D.        Or    famotidine (Pepcid) injection 20 mg  20 mg Intravenous Q12HRS Cristian Peña M.D.        senna-docusate (Pericolace Or Senokot S) 8.6-50 MG per tablet 2 Tablet  2 Tablet Enteral Tube BID Cristian Peña M.D.        And    polyethylene glycol/lytes (Miralax) PACKET 1 Packet  1 Packet Enteral Tube QDAY PRN Cristian Peña M.D.        And    magnesium hydroxide (Milk Of Magnesia) suspension 30 mL  30 mL Enteral Tube QDAY PRN Cristian Peña M.D.        And    bisacodyl (Dulcolax) suppository 10 mg  10 mg Rectal QDAY PRN Cristian Peña M.D. MD Alert...ICU Electrolyte Replacement per Pharmacy   Other PHARMACY TO DOSE Cristian Peña M.D.        lidocaine (Xylocaine) 1 % injection 2 mL  2 mL Tracheal Tube Q30 MIN PRN Cristian Peña M.D.        fentaNYL (Sublimaze) injection 100 mcg  100 mcg Intravenous Q15 MIN PRN Cristian Peña M.D.        And    fentaNYL (Sublimaze) injection 200 mcg  200 mcg Intravenous Q15 MIN PRN Cristian Peña M.D.        And    fentaNYL (SUBLIMAZE) 50 mcg/mL in 50mL (Continuous Infusion)    Intravenous Continuous Cristian Peña M.D.        And    propofol (DIPRIVAN) injection  0-80 mcg/kg/min (Ideal) Intravenous Continuous Cristian Peña M.D.        oxyCODONE immediate-release (Roxicodone) tablet 5 mg  5 mg Oral Q4HRS PRN Cristian Peña M.D.   5 mg at 07/18/23 1749    HYDROmorphone (Dilaudid) injection 0.5-1 mg  0.5-1 mg Intravenous Q2HRS PRN Cristian Peña M.D.   1 mg at 07/19/23 0324    omeprazole (PriLOSEC) capsule 20 mg  20 mg Oral BID Dagoberto Ohara D.O.   20 mg at 07/18/23 1740    acetaminophen (Tylenol) tablet 650 mg  650 mg Oral Q6HRS PRN Dagoberto Ohara D.O.        levothyroxine (Synthroid) tablet 225 mcg  225 mcg Oral AM ES Dagoberto Ohara D.O.   225 mcg at 07/18/23 0544    ondansetron (Zofran) syringe/vial injection 4 mg  4 mg Intravenous Q4HRS PRN Ana Lilia Joy M.D.        ondansetron (Zofran ODT) dispertab 4 mg  4 mg Oral Q4HRS PRN Ana Lilia Joy M.D.        promethazine (Phenergan) tablet 12.5-25 mg  12.5-25 mg Oral Q4HRS PRN Ana Lilia Joy M.D.        promethazine (Phenergan) suppository 12.5-25 mg  12.5-25 mg Rectal Q4HRS PRN Ana Lilia Joy M.D.        prochlorperazine (Compazine) injection 5-10 mg  5-10 mg Intravenous Q4HRS PRN Ana Lilia Joy M.D.        dextrose 50% (D50W) injection 25 g  25 g Intravenous Q15 MIN PRN Ana Lilia Joy M.D.   25 g at 07/19/23 0558    Nozin nasal  swab  1 Applicator Each Nostril BID Dagoberto Ohara D.O.   1 Applicator at 07/18/23 1740    heparin infusion 25,000 units in 500 mL 0.45% NACL  0-30 Units/kg/hr (Adjusted) Intravenous Continuous Ana Lilia Joy M.D.   Stopped at 07/19/23 0151    heparin injection 4,200 Units  40 Units/kg (Adjusted) Intravenous PRN Ana Lilia Joy M.D.   4,200 Units at 07/14/23 2358       Fluids    Intake/Output Summary (Last 24 hours) at 7/19/2023 1020  Last data filed at 7/19/2023 1019  Gross per 24 hour   Intake 3424.19 ml   Output 8990 ml   Net -5565.81 ml       Laboratory  Recent Labs      07/19/23  0157 07/19/23  0159 07/19/23  0716 07/19/23  0756 07/19/23  0809 07/19/23  0925   PSXLS34L 7.42  --   --   --   --  6.87*   QWKQMS319C 27.7  --   --   --   --  42.6*   NIVWR065H 107.7*  --   --   --   --  88.9*   GSCW2FNM 97.2  --   --   --   --  93.5   ARTHCO3 18  --   --   --   --  8*   Z1QXPTUHJ 5  --   --   --   --  100   ARTBE -6*  --   --   --   --  -25*   ISTATAPH  --    < > 6.875* 6.845* 6.895*  --    ISTATAPCO2  --    < > 46.7* 53.7* 43.2*  --    ISTATAPO2  --    < > 107* 111* 119*  --    ISTATATCO2  --    < > 10* 11* 10*  --    [id number]  --    < > 92* 92* 94  --    ISTATARTHCO3  --    < > 8.6* 9.3* 8.4*  --    ISTATARTBE  --    < > -21* -23* -23*  --    ISTATTEMP  --    < > 97.7 F 96.6 F 37.0 C  --    ISTATFIO2  --   --  100 100 100  --    ISTATSPEC  --    < > Arterial Arterial Arterial  --    ISTATAPHTC  --    < > 6.881* 6.857* 6.895*  --    [id number]  --    < > 104* 105* 119*  --     < > = values in this interval not displayed.         Recent Labs     07/19/23  0047 07/19/23  0449 07/19/23  0740   SODIUM 132* 136 139   POTASSIUM 4.0 4.9 5.9*   CHLORIDE 97 101 96   CO2 22 11* 7*   BUN 18 19 17   CREATININE 1.21 1.56* 1.71*   MAGNESIUM  --   --  2.5   PHOSPHORUS  --   --  10.5*   CALCIUM 7.4* 7.2* 7.3*     Recent Labs     07/19/23 0047 07/19/23  0449 07/19/23  0740   ALTSGPT 51* 46 413*   ASTSGOT 49* 80* 656*   ALKPHOSPHAT 176* 149* 131*   TBILIRUBIN 0.6 0.5 0.6   GLUCOSE 186* 72 127*     Recent Labs     07/19/23  0047 07/19/23  0449 07/19/23  0637 07/19/23  0710 07/19/23  0740 07/19/23  0803   WBC 19.7*  --  21.3* 31.6* 31.4* 36.5*   NEUTSPOLYS 93.00*  --  78.00*  --  57.70 58.20   LYMPHOCYTES 4.40*  --  2.00*  --  11.50* 9.90*   MONOCYTES 0.90  --  1.00  --  3.90 0.80   EOSINOPHILS 0.00  --  1.00  --  0.00 0.80   BASOPHILS 0.00  --  0.00  --  0.00 0.00   ASTSGOT 49* 80*  --   --  656*  --    ALTSGPT 51* 46  --   --  413*  --    ALKPHOSPHAT 176* 149*  --   --  131*  --    TBILIRUBIN 0.6  0.5  --   --  0.6  --      Recent Labs     07/19/23  0048 07/19/23  0637 07/19/23  0710 07/19/23  0740 07/19/23  0803   RBC  --    < > 0.86* 1.76* 2.55*   HEMOGLOBIN  --    < > 3.6* 6.3* 8.0*   HEMATOCRIT  --    < > 9.7* 18.7* 25.5*   PLATELETCT  --    < > 86* 51* 84*   PROTHROMBTM 22.4*  --   --   --  36.0*   APTT  --   --   --   --  112.1*   INR 2.03*  --   --   --  3.80*    < > = values in this interval not displayed.       Imaging  X-Ray:  I have personally reviewed the images and compared with prior images.    Assessment/Plan  Metabolic acidosis  Assessment & Plan  On the basis of hypoperfusion and lactic acid production in the setting of hemorrhagic shock    Continue resuscitation blood product driven  Bicarbonate infusion for temporizing measure  Follow markers of perfusion, trend lactate      Hemorrhagic shock (HCC)  Assessment & Plan  7/19: Overnight with worsening hypotension, obtundation and vasopressor requirement.  Bedside POCUS with underfilled hyperdynamic LV and FF in RUQ.  MTP intiated and patient taken to OR with ACGS. Found to have copious intrabdominal hemmorhage with a liver laceration near falciform ligament.  Packed left open    MTP initiated in ICU and carried on into OR    Serial H/H, transfuse for Hb <7 unless unstable  Correct and monitor coagulopathy  Hold heparin  Follow up TEG        Acute blood loss anemia  Assessment & Plan  Bloody output from ostomy noted, held heparin ggt  Check Hb at 5 am,   Transfuse if H/H is <7/21  Check TEG, will replace with blood products accordingly.    Acute hypoxemic respiratory failure (HCC)- (present on admission)  Assessment & Plan  Initially 7/13 Intubated for OR and 7/15 extubated    7/19:  reintubated in the setting of recalcitrant and rapidly progressive shock of unknown etiology felt to be hemorrhagic versus septic.  The patient was not protecting her airway with vasopressor requirements and obtunded.   status post massive transfusion and operative  intervention to include ex lap intra-abdominal hemorrhage washout liver packing open abdomen.  At risk of ARDS    LPV  RT/O2 protocols  Daily and PRN VBG/ABGs  Titration of ventilator to optimize lung protection, gas exchange and acid-base status   Sedation as tolerated/indicated  Daily CXR  HOB >30 degrees and chlorhexidine for VAP prevention  Pepcid for GI prophylaxis  SAT/SBT when able (ABCDEF Bundle)  Early mobility              Perforated viscus  Assessment & Plan  - 7/13 S/p ex lap with left hemicolectomy and end colostomy by Dr. Boateng  - found perforated colon at splenic fixture, associated with mass. Path neg for malignancy  - Fecal peritonitis  -Post operatively, pt improving.   -Good stool output, no bleeding.   -Early mobility, goal level 3        Acute kidney injury (HCC)  Assessment & Plan  -DUNG post op due to pre renal, intravascular depletion has resolved  -ok UOP  -Poor oral intake, she reports was chocking with food earlier today and stopped eating  -s/p diuresis     Pneumoperitoneum- (present on admission)  Assessment & Plan  -She underwent exploratory laparatomy (today day 5 post op) with left colectomy with end colostomy and s/p cholecystectomy with a wound vac in place    Morbid obesity with BMI of 50.0-59.9, adult (HCC)- (present on admission)  Assessment & Plan  BMI 50, place her in high risk for increase morbidity during this admission    Acute saddle pulmonary embolism (HCC)- (present on admission)  Assessment & Plan  -Originating from a right leg DVT. Pt is not a candidate for thrombolytics given her pneumoperitoneum but on 7/13 S/p emergent thrombectomy of saddle PE with IVC placement.   -hold heparin ggt until ostomy is less bloody and Hb proves to be stable (check Xa levels in early am).    Acute deep venous thrombosis (DVT) determined by ultrasound ([facility])- (present on admission)  Assessment & Plan  -Heparin infusion with Xa monitoring was held due to bleeding noted in the ostomy, will  repeat Xa levels in AM and CBC, if Hb is stable can re start heparin ggt.  -S/p IVC filter placed this admission  -Will need IVC filter retrieval in time, as soon as feasible         VTE:  Contraindicated  Ulcer: H2 Antagonist  Lines: Central Line  Ongoing indication addressed, Arterial Line  Ongoing indication addressed, and Elizalde Catheter  Ongoing indication addressed    I have performed a physical exam and reviewed and updated ROS and Plan today (7/19/2023). In review of yesterday's note (7/18/2023), there are no changes except as documented above.     Discussed patient condition and risk of morbidity and/or mortality with Family, RN, RT, Therapies, Pharmacy, and general surgery  The patient remains critically ill.  Critical care time = 120 minutes in directly providing and coordinating critical care and extensive data review.  No time overlap and excludes procedures.

## 2023-07-20 PROBLEM — K72.00: Status: ACTIVE | Noted: 2023-01-01

## 2023-07-20 PROBLEM — K66.8 PNEUMOPERITONEUM: Status: RESOLVED | Noted: 2023-01-01 | Resolved: 2023-01-01

## 2023-07-20 NOTE — OR NURSING
Pt arrived at the desk with the ICU team. Anesthesia present and spoke with sister, Rani. Rani signed the consents for the patient to go to surgery. WHO checklist completed. Pt transported to OR.

## 2023-07-20 NOTE — CARE PLAN
Problem: Ventilation  Goal: Ability to achieve and maintain unassisted ventilation or tolerate decreased levels of ventilator support  Description: Target End Date:  4 days     Document on Vent flowsheet    1.  Support and monitor invasive and noninvasive mechanical ventilation  2.  Monitor ventilator weaning response  3.  Perform ventilator associated pneumonia prevention interventions  4.  Manage ventilation therapy by monitoring diagnostic test results  Outcome: Not Met         Ventilator Daily Summary    Vent Day # 2    ETT:  8.0 @ 24    Ventilator settings:  APVCMV: 22/420/10/50    Weaning trials: none    Respiratory Procedures: none    Plan: Continue current ventilator settings and wean mechanical ventilation as tolerated per physician orders.

## 2023-07-20 NOTE — PROGRESS NOTES
Discussed pt's critical AST/ALT with Dr. Peña.    Pt with critical Ca as well, Ca Gluconate currently infusing.

## 2023-07-20 NOTE — PROGRESS NOTES
"Critical Care Progress Note    Date of admission  7/12/2023    Chief Complaint  \"63 y.o. female who presented 7/12/2023 with acute DVT, acute saddle PE and new pneumoperitoneum due to perforated viscus for what she was admitted and treated with thrombectomy, heparin ggt and IVC filter placement. She also underwent exploratory laparatomy (today day 5 post op) with left colectomy with end colostomy and s/p cholecystectomy with a wound vac in place.   I was called at the bedside due to hypotension and tachycardia.\" H+P 7/12    Hospital Course  7/13 s/p bilateral thrombectomy for saddle PE and IVC filter placement. Successful.   7/13 s/p left colectomy and end-colostomy, and cholecystectomy. Intraop found perforated colon at splenic flexure with associated mass. Concerning of malignancy. Remains intubated post op.   7/19: Admitted to ICU is hemorrhagic shock, MTP, OR for intrabdominal bleed, liver lac, returns to ICU in shock and intubated    Interval Problem Update  Reviewed last 24 hour events:  Overnight weaned of NE, then restarted  Olioanuric, lasix 80 x1  1500cc out of wound vac  Following per bedside team    Neuro:   Fent @ 300  Dex @ 0.3    CV:  HR 60s-70s  -130s  NE @ 0.24  Vaso off    Resp:   Failed ABT apnea  VD2 APV  22/420/10/ 0.5  7.38/37/70/94%  CXR pulmonary edema, RLL atx      GI: OGT LCS    I/O: -2595 (4400 out of wound vac)  +7439 ins, weight increased  4L HC03      450 out of ostomy  5L blood loss    Tmax: 37.8  Heme: WBC 23.6    Abx: P/T d2 empiric    Micro: NGTD    Endo: BG elevated  ISS    LDA: Midline RUE, PIVs, RIJ CVC, LRAL, ETT, woundvac  SUP: H2RA  VTE: Held  Diet: NPO currently      Review of Systems  Review of Systems   Unable to perform ROS: Critical illness        Vital Signs for last 24 hours   Pulse:  [] 71  Resp:  [13-37] 22  BP: (100-137)/(49-64) 127/64  SpO2:  [94 %-100 %] 97 %    Hemodynamic parameters for last 24 hours       Respiratory Information for the last " 24 hours  Vent Mode: APVCMV  Rate (breaths/min): 22  Vt Target (mL): 420  PEEP/CPAP: 8  P Support: 5  MAP: 12  Length of Weaning Trial (Hours): 1.0  Control VTE (exp VT): 437    Physical Exam   Physical Exam  Constitutional:       General: She is not in acute distress.     Appearance: She is ill-appearing.   HENT:      Head: Normocephalic.      Nose: Nose normal.      Mouth/Throat:      Mouth: Mucous membranes are dry.   Eyes:      Extraocular Movements: Extraocular movements intact.      Pupils: Pupils are equal, round, and reactive to light.   Cardiovascular:      Rate and Rhythm: Normal rate and regular rhythm.   Pulmonary:      Effort: Pulmonary effort is normal.      Breath sounds: Rhonchi and rales present.      Comments: Mechanical BS  Abdominal:      General: There is distension.      Comments: Woundvac in place, no rigidity, significant distention   Musculoskeletal:      Cervical back: Neck supple.      Right lower leg: Edema present.      Left lower leg: Edema present.   Skin:     General: Skin is warm.      Capillary Refill: Capillary refill takes 2 to 3 seconds.   Neurological:      Comments: Sedated currently, responds to painful stim, awakes to voice, no focality         Medications  Current Facility-Administered Medications   Medication Dose Route Frequency Provider Last Rate Last Admin    insulin regular (HumuLIN R,NovoLIN R) injection  2-9 Units Subcutaneous Q6HRS Krerie Montana   2 Units at 07/20/23 0535    And    dextrose 50% (D50W) injection 25 g  25 g Intravenous Q15 MIN PRN Kerrie Montana        calcium GLUConate 2 g in NaCl IVPB premix  2 g Intravenous Once Cristian Peña M.D. 100 mL/hr at 07/20/23 1259 2 g at 07/20/23 1259    piperacillin-tazobactam (Zosyn) 4.5 g in  mL IVPB  4.5 g Intravenous Q8HRS CARISSA BarbozaPJOSEPHNNik 25 mL/hr at 07/20/23 1301 4.5 g at 07/20/23 1301    norepinephrine (Levophed) 8 mg in 250 mL NS infusion (premix)  0-1 mcg/kg/min (Ideal) Intravenous Continuous  Lam Louis M.D. 38.5 mL/hr at 07/20/23 1200 0.3 mcg/kg/min at 07/20/23 1200    vasopressin (Vasostrict) 20 Units in  mL Infusion  0.03 Units/min Intravenous Continuous Lam Louis M.D.   Stopped at 07/20/23 0505    dexmedetomidine (PRECEDEX) 400 mcg/100mL NS premix infusion  0-1.5 mcg/kg/hr (Ideal) Intravenous Continuous Lam Louis M.D. 3.4 mL/hr at 07/19/23 2006 0.2 mcg/kg/hr at 07/19/23 2006    Respiratory Therapy Consult   Nebulization Continuous RT Cristian Peña M.D.        famotidine (Pepcid) tablet 20 mg  20 mg Enteral Tube Q12HRS Cristian Peña M.D.        Or    famotidine (Pepcid) injection 20 mg  20 mg Intravenous Q12HRS Cristian Peña M.D.   20 mg at 07/20/23 0516    senna-docusate (Pericolace Or Senokot S) 8.6-50 MG per tablet 2 Tablet  2 Tablet Enteral Tube BID Cristian Peña M.D.   2 Tablet at 07/19/23 1156    And    polyethylene glycol/lytes (Miralax) PACKET 1 Packet  1 Packet Enteral Tube QDAY PRN Cristian Peña M.D.        And    magnesium hydroxide (Milk Of Magnesia) suspension 30 mL  30 mL Enteral Tube QDAY PRN Cristian Peña M.D.        And    bisacodyl (Dulcolax) suppository 10 mg  10 mg Rectal QDAY PRN Cristian Peña M.D.        MD Alert...ICU Electrolyte Replacement per Pharmacy   Other PHARMACY TO DOSE Cristian Peña M.D.        lidocaine (Xylocaine) 1 % injection 2 mL  2 mL Tracheal Tube Q30 MIN PRN Cristian Peña M.D.        fentaNYL (Sublimaze) injection 100 mcg  100 mcg Intravenous Q15 MIN PRN Cristian Peña M.D.        And    fentaNYL (Sublimaze) injection 200 mcg  200 mcg Intravenous Q15 MIN PRN Cristian Peña M.D.        And    fentaNYL (SUBLIMAZE) 50 mcg/mL in 50mL (Continuous Infusion)   Intravenous Continuous Cristian Peña M.D. 4 mL/hr at 07/20/23 1036 200 mcg/hr at 07/20/23 1036    oxyCODONE immediate-release (Roxicodone) tablet 5 mg  5 mg Oral Q4HRS PRN Cristian Peña M.D.   5 mg at 07/18/23 7425     HYDROmorphone (Dilaudid) injection 0.5-1 mg  0.5-1 mg Intravenous Q2HRS PRN Cristian Peña M.D.   1 mg at 07/19/23 0324    levothyroxine (Synthroid) tablet 225 mcg  225 mcg Oral AM ES Dagoberto Ohara D.O.   225 mcg at 07/18/23 0544    ondansetron (Zofran) syringe/vial injection 4 mg  4 mg Intravenous Q4HRS PRN Ana Lilia Joy M.D.        promethazine (Phenergan) tablet 12.5-25 mg  12.5-25 mg Oral Q4HRS PRN Ana Lilia Joy M.D.        promethazine (Phenergan) suppository 12.5-25 mg  12.5-25 mg Rectal Q4HRS PRN Ana Lilia Joy M.D.        prochlorperazine (Compazine) injection 5-10 mg  5-10 mg Intravenous Q4HRS PRN Ana Lilia Joy M.D.        Nozin nasal  swab  1 Applicator Each Nostril BID Dagoberto Ohara D.O.   1 Applicator at 07/20/23 0600       Fluids    Intake/Output Summary (Last 24 hours) at 7/20/2023 1331  Last data filed at 7/20/2023 1200  Gross per 24 hour   Intake 6466.28 ml   Output 3475 ml   Net 2991.28 ml       Laboratory  Recent Labs     07/19/23  0157 07/19/23  0159 07/19/23  0925 07/19/23  0955 07/19/23  1525 07/20/23  0436   GXHXC57I 7.42  --  6.87*  --   --   --    XWUSPB218C 27.7  --  42.6*  --   --   --    DOHAY800E 107.7*  --  88.9*  --   --   --    VNLB9KLZ 97.2  --  93.5  --   --   --    ARTHCO3 18  --  8*  --   --   --    R3VXOGDPS 5  --  100  --   --   --    ARTBE -6*  --  -25*  --   --   --    ISTATAPH  --    < >  --  6.943* 7.214* 7.384*   ISTATAPCO2  --    < >  --  46.7* 38.3* 37.4*   ISTATAPO2  --    < >  --  84 102* 70   ISTATATCO2  --    < >  --  11* 17* 23   TKNTACE6PSX  --    < >  --  87* 96 94   ISTATARTHCO3  --    < >  --  10.1* 15.5* 22.3   ISTATARTBE  --    < >  --  -21* -12* -2   ISTATTEMP  --    < >  --  94.6 F 98.2 F 100.2 F   ISTATFIO2  --    < >  --  100 70 50   ISTATSPEC  --    < >  --  Arterial Arterial Arterial   ISTATAPHTC  --    < >  --  6.969* 7.217* 7.371*   EBYRBYIG6JV  --    < >  --  73 100* 74    < > = values in this interval not displayed.          Recent Labs     07/19/23  0740 07/19/23  0950 07/19/23  1355 07/20/23  0025 07/20/23  0529   SODIUM 139   < > 138 140 139   POTASSIUM 5.9*   < > 4.7 4.7 4.4   CHLORIDE 96   < > 97 100 99   CO2 7*   < > 12* 18* 22   BUN 17   < > 18 22 24*   CREATININE 1.71*   < > 1.57* 1.76* 1.90*   MAGNESIUM 2.5  --   --  1.9 1.9   PHOSPHORUS 10.5*  --   --  6.4* 6.0*   CALCIUM 7.3*   < > 7.3* 7.2* 7.0*    < > = values in this interval not displayed.     Recent Labs     07/19/23  1355 07/20/23  0025 07/20/23  0529   ALTSGPT 2158* 3109* 3175*   ASTSGOT 4030* >7000* >7000*   ALKPHOSPHAT 163* 176* 188*   TBILIRUBIN 2.5* 5.2* 4.5*   DBILIRUBIN 2.0*  --   --    GLUCOSE 178* 193* 189*     Recent Labs     07/19/23  0803 07/19/23  0950 07/19/23  1355 07/19/23  1848 07/20/23  0025 07/20/23  0529 07/20/23  1140   WBC 36.5* 19.5* 23.1*   < > 28.8* 23.6* 22.4*   NEUTSPOLYS 58.20 69.50 88.10*  --   --   --   --    LYMPHOCYTES 9.90* 9.90* 4.20*  --   --   --   --    MONOCYTES 0.80 0.00 4.20  --   --   --   --    EOSINOPHILS 0.80 0.00 0.00  --   --   --   --    BASOPHILS 0.00 0.80 0.90  --   --   --   --    ASTSGOT  --  2196* 4030*  --  >7000* >7000*  --    ALTSGPT  --  1384* 2158*  --  3109* 3175*  --    ALKPHOSPHAT  --  142* 163*  --  176* 188*  --    TBILIRUBIN  --  0.9 2.5*  --  5.2* 4.5*  --     < > = values in this interval not displayed.     Recent Labs     07/19/23  0803 07/19/23  0950 07/19/23  1355 07/19/23  1848 07/20/23  0025 07/20/23  0529 07/20/23  1140   RBC 2.55* 3.49* 3.98*   < > 3.90* 3.60* 3.71*   HEMOGLOBIN 8.0* 10.8* 12.1   < > 12.0 11.1* 11.3*   HEMATOCRIT 25.5* 35.1* 36.1*   < > 34.4* 31.3* 31.4*   PLATELETCT 84* 49* 68*   < > 65* 60* 59*   PROTHROMBTM 36.0* 30.3* 31.0*  --   --   --   --    APTT 112.1* 95.6* 59.6*  --   --   --   --    INR 3.80* 3.03* 3.11*  --   --   --   --     < > = values in this interval not displayed.       Imaging  X-Ray:  I have personally reviewed the images and compared with prior  images.    Assessment/Plan  Acute hepatic necrosis  Assessment & Plan  Ischemic hepatopathy due to shock, evidenced by markedly elevated transaminases, elevated Tbili, hypoalbunemia, elevated lactate, and coagulopathy consistent with hepatic failure and decrease synthetic function    Ammonia elevated at 70 today, follow daily  No clinical evidence of AHE  Will start lactulose rectally if ammonia uptrends    Follow synthetic function  Avoid hepatotoxins as able      Metabolic acidosis  Assessment & Plan  On the basis of hypoperfusion and lactic acid production in the setting of hemorrhagic shock    Improving  Continue resuscitation blood product driven  Follow markers of perfusion, trend lactate      Hemorrhagic shock (HCC)- (present on admission)  Assessment & Plan  7/19: Overnight with worsening hypotension, obtundation and vasopressor requirement.  Bedside POCUS with underfilled hyperdynamic LV and FF in RUQ.  MTP intiated and patient taken to OR with ACGS. Found to have copious intrabdominal hemmorhage with a liver laceration near falciform ligament.  Packed left open    MTP initiated in ICU and carried on into OR    Serial H/H, transfuse for Hb <7 unless unstable  Correct and monitor coagulopathy  Hold heparin  Follow up TEG        Acute blood loss anemia  Assessment & Plan  Intraabdominal bleed    S/P MTP and operative control  Follow H/H  Transfuse if H/H is <7/21  Check TEG, will replace with blood products accordingly.    Acute hypoxemic respiratory failure (HCC)- (present on admission)  Assessment & Plan  Initially 7/13 Intubated for OR and 7/15 extubated    7/19:  reintubated in the setting of recalcitrant and rapidly progressive shock of unknown etiology felt to be hemorrhagic versus septic.  The patient was not protecting her airway with vasopressor requirements and obtunded.   status post massive transfusion and operative intervention to include ex lap intra-abdominal hemorrhage washout liver packing  open abdomen.  At risk of ARDS    LPV  RT/O2 protocols  Daily and PRN VBG/ABGs  Titration of ventilator to optimize lung protection, gas exchange and acid-base status   Sedation as tolerated/indicated  Daily CXR  HOB >30 degrees and chlorhexidine for VAP prevention  Pepcid for GI prophylaxis  SAT/SBT when able (ABCDEF Bundle)  Early mobility              Acute kidney injury (HCC)  Assessment & Plan  Worsening renal function post shock, ATN likely  Oliguric today with worsenign sCr    Markedly hypervolemic and VOL post massive rescucitation  Will likely need RRT at some point though no emergent indication   Lasix challenge this Am with minimal effect  Q 12 BMPs    Renal dose meds, avoid nephrotoxins  Strict I/Os  Follow renal function        Acute saddle pulmonary embolism (HCC)- (present on admission)  Assessment & Plan  Originating from a right leg DVT. Pt is not a candidate for thrombolytics given her pneumoperitoneum  7/13: S/p emergent thrombectomy of saddle PE with IVC placement.     7/19: Repeat TTE with normal LV function, poor RV visualization but likely dilated with reduced function, RAP low    Continue to hold heparin as high risk for rebleed until surgically cleared    Acute deep venous thrombosis (DVT) determined by ultrasound ([facility])- (present on admission)  Assessment & Plan  Holding heparin in the setting of massive intraabdominal hemorrhage and liver laceration    S/p IVC filter placed this admission  Will need IVC filter retrieval in time, as soon as feasible    Perforated viscus- (present on admission)  Assessment & Plan  - 7/13 S/p ex lap with left hemicolectomy and end colostomy by Dr. Boateng  - found perforated colon at splenic fixture, associated with mass. Path neg for malignancy  - Fecal peritonitis  -Post operatively, pt improving.   -Good stool output, no bleeding.   -Early mobility, goal level 3        Morbid obesity with BMI of 50.0-59.9, adult ([facility])- (present on admission)  Assessment &  Plan  BMI 50, place her in high risk for increase morbidity during this admission         VTE:  Contraindicated  Ulcer: H2 Antagonist  Lines: Central Line  Ongoing indication addressed, Arterial Line  Ongoing indication addressed, and Elizalde Catheter  Ongoing indication addressed    I have performed a physical exam and reviewed and updated ROS and Plan today (7/20/2023). In review of yesterday's note (7/19/2023), there are no changes except as documented above.     Discussed patient condition and risk of morbidity and/or mortality with Family, RN, RT, Therapies, Pharmacy, and general surgery  The patient remains critically ill.  Critical care time = 65 minutes in directly providing and coordinating critical care and extensive data review.  No time overlap and excludes procedures.

## 2023-07-20 NOTE — ANESTHESIA PREPROCEDURE EVALUATION
Case: [id number] Date/Time: 07/20/23 1151    Procedures:       LAPAROTOMY, EXPLORATORY - REMOVAL OF PACKING, POSSIBLE WOUND CLOSURE      CLOSURE, WOUND    Location: [facility] OR 14 / SURGERY [facility]    Surgeons: Taylor Bocanegra M.D.          Relevant Problems   CARDIAC   (positive) Acute deep venous thrombosis (DVT) determined by ultrasound (HCC)   (positive) Acute saddle pulmonary embolism (HCC)         (positive) Acute hepatic necrosis   (positive) Acute kidney injury (HCC)   (positive) Liver laceration, closed, initial encounter       Physical Exam    Airway - unable to assess  Mallampati: II  TM distance: >3 FB  Neck ROM: full  Patient is intubated/trached     Cardiovascular - normal exam  Rhythm: regular  Rate: normal  (-) murmur     Dental     Unable to assess dental       Pulmonary - normal exam  Breath sounds clear to auscultation     Abdominal    Neurological - sedated/unconcious                 Anesthesia Plan    ASA 4   ASA physical status 4 criteria: other (comment) and DIC    Plan - general         (Saddle Pulmonary embolism, liver laceration, intubated, morbid obesity )      Induction: inhalational    Postoperative Plan: Postoperative administration of opioids is intended.    Pertinent diagnostic labs and testing reviewed    Informed Consent:      Use of blood products discussed with: whom consented to blood products.

## 2023-07-20 NOTE — CARE PLAN
The patient is Unstable - High likelihood or risk of patient condition declining or worsening    Shift Goals  Clinical Goals: improved h/h, decreased vasopressor support  Patient Goals: enrico  Family Goals: get better    Progress made toward(s) clinical / shift goals:       Problem: Pain - Standard  Goal: Alleviation of pain or a reduction in pain to the patient’s comfort goal  Outcome: Progressing     Problem: Knowledge Deficit - Standard  Goal: Patient and family/care givers will demonstrate understanding of plan of care, disease process/condition, diagnostic tests and medications  Outcome: Progressing     Problem: Psychosocial  Goal: Patient's level of anxiety will decrease  Outcome: Progressing  Goal: Patient's ability to verbalize feelings about condition will improve  Outcome: Progressing  Goal: Patient's ability to re-evaluate and adapt role responsibilities will improve  Outcome: Progressing  Goal: Patient and family will demonstrate ability to cope with life altering diagnosis and/or procedure  Outcome: Progressing  Goal: Spiritual and cultural needs incorporated into hospitalization  Outcome: Progressing     Problem: Respiratory  Goal: Patient will achieve/maintain optimum respiratory ventilation and gas exchange  Outcome: Progressing     Problem: Venous Thromboembolism (VTE) Prevention  Goal: The patient will remain free from venous thromboembolism (VTE)  Outcome: Progressing     Problem: Nutrition  Goal: Patient's nutritional and fluid intake will be adequate or improve  Outcome: Progressing  Goal: Enteral nutrition will be maintained or improve  Outcome: Progressing  Goal: Enteral nutrition will be maintained or improve  Outcome: Progressing     Problem: Urinary Elimination  Goal: Establish and maintain regular urinary output  Outcome: Progressing     Problem: Skin Integrity  Goal: Skin integrity is maintained or improved  Outcome: Progressing     Problem: Skin Care - Ostomy  Goal: Skin remains free from  irritation  Outcome: Progressing     Problem: Knowledge Deficit - Ostomy  Goal: Patient will demonstrate ability to manage and maintain ostomy  Outcome: Progressing     Problem: Discharge Barriers/Self-Care - Ostomy  Goal: Ostomy patient's continuum of care needs will be met  Outcome: Progressing     Problem: Fall Risk  Goal: Patient will remain free from falls  Outcome: Progressing     Problem: Optimal Care of the Patient with VTE  Goal: Peripheral tissue perfusion will improve  Outcome: Progressing  Goal: Complications related to the disease process, condition or treatment will be avoided or minimized  Outcome: Progressing  Goal: Symptoms of redness, swelling, and pain at the site of impaired tissue perfusion will improve  Outcome: Progressing     Problem: Knowledge Deficit - VTE  Goal: Patient and family/care givers will demonstrate understanding of plan of care, disease process/condition, diagnostic tests and medications  Outcome: Progressing     Problem: Discharge Barriers/Planning  Goal: Patient's continuum of care needs are met  Outcome: Progressing     Problem: Safety - Medical Restraint  Goal: Remains free of injury from restraints (Restraint for Interference with Medical Device)  Outcome: Progressing  Goal: Free from restraint(s) (Restraint for Interference with Medical Device)  Outcome: Progressing       Patient is not progressing towards the following goals:

## 2023-07-20 NOTE — OR NURSING
Patient rolled through preop without stopping in a room, consents and identity verified. ICU RN gave report to anesthesiologist prior to patient rolling back to surgery.

## 2023-07-20 NOTE — CARE PLAN
The patient is Watcher - Medium risk of patient condition declining or worsening    Shift Goals  Clinical Goals: hemodynamic stability, address coagulopathy  Patient Goals: enrico  Family Goals: stable condition    Progress made toward(s) clinical / shift goals:    Problem: Pain - Standard  Goal: Alleviation of pain or a reduction in pain to the patient’s comfort goal  Outcome: Progressing  Note: Continuous fentanyl infusion     Problem: Safety - Medical Restraint  Goal: Remains free of injury from restraints (Restraint for Interference with Medical Device)  Outcome: Progressing       Patient is not progressing towards the following goals:

## 2023-07-20 NOTE — CARE PLAN
Problem: Ventilation  Goal: Ability to achieve and maintain unassisted ventilation or tolerate decreased levels of ventilator support  Description: Target End Date:  4 days     Document on Vent flowsheet    1.  Support and monitor invasive and noninvasive mechanical ventilation  2.  Monitor ventilator weaning response  3.  Perform ventilator associated pneumonia prevention interventions  4.  Manage ventilation therapy by monitoring diagnostic test results  Outcome: Progressing     Ventilator Daily Summary    Vent Day #2    ETT: 8.0 @24    Ventilator settings:   CMV R22 [id number]  Weaning trials:  Yes   Respiratory Procedures:  None   Plan: Continue current ventilator settings and wean mechanical ventilation as tolerated per physician orders.

## 2023-07-20 NOTE — PROGRESS NOTES
"  DATE: 7/20/2023 7/13: ex lap, cholecystectomy, partial colectomy with colostomy  7/19: ex lap, control of bleeding, ABTHERA  7/20: second look     INTERVAL EVENTS:  Decreased pressor requirement, H/H stable x 24 hours    PHYSICAL EXAMINATION:  Vital Signs: /56   Pulse 68   Temp (!) 35.3 °C (95.5 °F) (Bladder)   Resp (!) 22   Ht 1.779 m (5' 10.03\")   Wt (!) 156 kg (344 lb 12.8 oz)   SpO2 97%     Less pale, opens eyes to touch  RRR  Abdomen soft, nt, Large output from wound vac serosanguinous.    LABORATORY VALUES:   Recent Labs     07/19/23  1848 07/20/23  0025 07/20/23  0529   WBC 30.3* 28.8* 23.6*   RBC 4.26 3.90* 3.60*   HEMOGLOBIN 12.8 12.0 11.1*   HEMATOCRIT 37.2 34.4* 31.3*   MCV 87.3 88.2 86.9   MCH 30.0 30.8 30.8   MCHC 34.4 34.9 35.5   RDW 48.6 49.8 49.1   PLATELETCT 70* 65* 60*   MPV 11.1 11.7 12.5     Recent Labs     07/19/23  1355 07/20/23  0025 07/20/23  0529   SODIUM 138 140 139   POTASSIUM 4.7 4.7 4.4   CHLORIDE 97 100 99   CO2 12* 18* 22   GLUCOSE 178* 193* 189*   BUN 18 22 24*   CREATININE 1.57* 1.76* 1.90*   CALCIUM 7.3* 7.2* 7.0*     Recent Labs     07/19/23  0803 07/19/23  0950 07/19/23  1355 07/20/23  0025 07/20/23  0529 07/20/23  0747   ASTSGOT  --  2196* 4030* >7000* >7000*  --    ALTSGPT  --  1384* 2158* 3109* 3175*  --    TBILIRUBIN  --  0.9 2.5* 5.2* 4.5*  --    IBILIRUBIN  --   --  0.5  --   --   --    DBILIRUBIN  --   --  2.0*  --   --   --    ALKPHOSPHAT  --  142* 163* 176* 188*  --    GLOBULIN  --  2.3  --  2.1 2.1  --    INR 3.80* 3.03* 3.11*  --   --   --    AMMONIA  --   --   --   --   --  71*     Recent Labs     07/19/23  0803 07/19/23  0950 07/19/23  1355   APTT 112.1* 95.6* 59.6*   INR 3.80* 3.03* 3.11*        IMAGING:   DX-CHEST-PORTABLE (1 VIEW)   Final Result         1.  Pulmonary edema and/or infiltrates are identified, which are stable since the prior exam.   2.  Trace bilateral pleural effusions      EC-ECHOCARDIOGRAM COMPLETE W/ CONT   Final Result    "   DX-CHEST-PORTABLE (1 VIEW)   Final Result      1.  Pulmonary edema, worse than prior exam   2.  Slight increased inflation from prior.   3.  Supportive tubing as described above.      DX-CHEST-PORTABLE (1 VIEW)   Final Result         1.  Bibasilar atelectasis versus infiltrates.      CT-ABDOMEN-PELVIS WITH   Final Result         1.  Scattered moderate abdominal ascites.   2.  Trace bilateral pleural effusions, left greater than right   3.  Linear densities the bilateral lung bases, greater on the left, appearance suggests atelectasis, component of infiltrate not excluded.   4.  DVT in the right external iliac vein extending into the common femoral vein, compatible with reported history of DVT.   5.  Diverticulosis   6.  Hepatomegaly   7.  Diffuse hepatic steatosis      DX-CHEST-PORTABLE (1 VIEW)   Final Result         1.  Bibasilar atelectasis or evolving infiltrates.      IR-MIDLINE CATHETER INSERTION WO GUIDANCE > AGE 3   Final Result                  Ultrasound-guided midline placement performed by qualified nursing staff    as above.          IR-US GUIDED PIV   Final Result    Ultrasound-guided PERIPHERAL IV INSERTION performed by    qualified nursing staff as above.      DX-CHEST-PORTABLE (1 VIEW)   Final Result      Stable thoracic underinflation. Right IJ line adequate. Stable bibasilar pleural-parenchymal opacification      DX-CHEST-PORTABLE (1 VIEW)   Final Result      Stable thoracic underinflation. Right IJ line and ETT appear adequate. Slightly improved bibasilar pleural-parenchymal opacification      DX-CHEST-PORTABLE (1 VIEW)   Final Result      Stable bibasilar pleural-parenchymal opacification and low lung volumes. Lines/tubes adequate.      DX-CHEST-LIMITED (1 VIEW)   Final Result         Endotracheal tube with tip projecting over the mid thoracic trachea.   Gastric drainage tube courses below diaphragm, tip is not seen.   Right central venous catheter with tip projecting over the expected area of  the lower SVC.            EC-NAEEM W/O CONT   Final Result      DX-CHEST-LIMITED (1 VIEW)   Final Result      Bibasilar underinflation atelectasis which could obscure an additional process. This is similar to the prior study. Lung volumes are very low.      IR-INSERT IVC FILTER WITH IG & SI   Final Result      1.  Ultrasound guided access LEFT common femoral vein.   2.  BILATERAL selective pulmonary arteriogram demonstrating extensive bilateral pulmonary emboli.   3.  Successful BILATERAL pulmonary thrombectomy   4.  Large IVC at the level of the renal veins   5.  Successful placement of infrarenal IVC filter   6.  Pursestring suture should be removed in 6-12 hours.         Please note that this study required greater than 50% more than the usual procedure time due to the patient's anatomy and large habitus.            IR-THROMBO MECHANICAL ARTERY,INIT   Final Result      1.  Ultrasound guided access LEFT common femoral vein.   2.  BILATERAL selective pulmonary arteriogram demonstrating extensive bilateral pulmonary emboli.   3.  Successful BILATERAL pulmonary thrombectomy   4.  Large IVC at the level of the renal veins   5.  Successful placement of infrarenal IVC filter   6.  Pursestring suture should be removed in 6-12 hours.         Please note that this study required greater than 50% more than the usual procedure time due to the patient's anatomy and large habitus.            EC-ECHOCARDIOGRAM COMPLETE W/ CONT   Final Result      CT-ABDOMEN-PELVIS WITH   Final Result      1.  Pneumoperitoneum secondary to perforated viscus. Splenic flexure colonic diverticulum may be the perforation site. Gastroduodenal ulcer ulcer is considered less likely.   2.  Hepatic steatosis.   3.  Moderate gallbladder distention and pericholecystic fat stranding.   4.  Small volume hyperdense ascites.   5.  Partially visualized right lower extremity DVT.      Findings were communicated to SALINA STOVER via User Replay system on  7/12/2023 11:03 PM.      CT-CTA CHEST PULMONARY ARTERY W/ RECONS   Final Result      1.  Saddle pulmonary embolus with associated right heart strain.   2.  Posterior pneumomediastinum secondary to pneumoperitoneum. Abdomen will be dictated separately.      Findings were communicated to SALINA STOVER via Vo[employer] messaging system on 7/12/2023 10:57 PM.      DX-CHEST-PORTABLE (1 VIEW)   Final Result      Mild atelectasis in the right lower lung. No focal consolidation. No effusions.             ASSESSMENT AND PLAN:   Liver laceration, closed, initial encounter- (present on admission)  Assessment & Plan  Repoening of previous exploratory laparotomy.  Abdominal washout, hepatorrhaphy, packing of the abdomen, and temporary abdominal closure with negative pressure wound therapy device greater than 50 cm²    Hemorrhagic shock (HCC)- (present on admission)  Assessment & Plan  7/19  Received 15 units of PRBC, 12 units of FFP and 3 units of platelets.    Perforated viscus- (present on admission)  Assessment & Plan  7/13 Left hemicolectomy with end colostomy/Delvis's procedure, cholecystectomy.    Acute saddle pulmonary embolism (HCC)- (present on admission)  Assessment & Plan  Extensive clot burden within the right and left pulmonary arteries and their segmental and subsegmental branches seen on the admit CTA with radiographic suggestion of right heart strain  7/13 Intraoperative NAEEM.  Grossly normal biventricular function.  Heparin weight based bolus and infusion commenced at admit    Acute deep venous thrombosis (DVT) determined by ultrasound ([facility])- (present on admission)  Assessment & Plan  Clot present from the right common femoral vein down the entire leg  Heparin weight based bolus and infusion commenced on admit.  7/19 Heparin drip stopped secondary to bleeding.    To OR today for planned second look laparotomy, removal of packing, possible abdominal wall closure.   Patient's sister is at bedside and gives consent to  proceed.        ____________________________________     Taylor Bocanegra M.D.    DD: 7/20/2023  10:10 AM

## 2023-07-20 NOTE — ACP (ADVANCE CARE PLANNING)
Advanced Care Planing and Goals of Care       Date/time: 0730    Medical decision maker: Sister at the bedside    Present: Myself, care team and the sisterRani    Narrative of discussion:   Essentially I spoke to the patients sister who has been present at her bedside attentively throughout this.  I explained to her the nature of the severity of Marcia critical illness, specifically her hemodynamic instability and profound metabolic and acid base derangements and the need for very invasive interventions to potentially save her life which would put her at risk of multiple organ failure, and even death.  She was reticent to proceed given that Ashley had related to her that she wouldn't want to be on life support, values independence and would not be happy in a debilitated state.  She also relayed to me that Marcia own health and functional status premorbidly was not optimal and any deterioration of that state would be potentially not within Marcia wishes.       Given the emergence of the situation, and after a long discussion of expected outcomes form this (DUNG, need for RRT, cardiac arrest) we have opted to proceed with surgical intervention to address what appears to massive intraperitoneal hemorrhage from unknown source (based solely on clinical picture and POCUS findings) and to make decisions as we go.    However, I did explain to her that if Ashley were to arrest, and that she most certainly could imminently, given her acidemia and hypotension and vasoactive support requirements, that she would not benefit from CPR or therapies designed to treat cardiac arrest. We have decided together that if her heart were to stop or she had a fatal arhythmia that we would let her go peacefully and not attempt heroic interventions to resuscitate her, but other than that to continue supportive care and try to address her emergent life threat.    Thus she was made DNR.I ok for now and to proceed with all other therapies and readdress  her condition as we go.       Summary: DNR/I ok    Time statement:  I discussed advance care planning with the patient's family and patient's DPOA for at least 35 minutes, including diagnosis, prognosis, plan of care, risks and benefits of any therapies that could be offered, as well as alternatives including palliation and hospice, as appropriate, exclusive of evaluation and management or other separately billable procedures.

## 2023-07-20 NOTE — CARE PLAN
The patient is Watcher - Medium risk of patient condition declining or worsening    Shift Goals  Clinical Goals: Wean vasopressors, strict I/O  Patient Goals: Unable to assess  Family Goals: Updates on plan of care    Progress made toward(s) clinical / shift goals:  Weaning/titrating vasopressors per order. I/O q2h per order.        Problem: Pain - Standard  Goal: Alleviation of pain or a reduction in pain to the patient’s comfort goal  Outcome: Progressing     Problem: Knowledge Deficit - Standard  Goal: Patient and family/care givers will demonstrate understanding of plan of care, disease process/condition, diagnostic tests and medications  Outcome: Progressing     Problem: Psychosocial  Goal: Patient's level of anxiety will decrease  Outcome: Progressing     Problem: Safety - Medical Restraint  Goal: Remains free of injury from restraints (Restraint for Interference with Medical Device)  Outcome: Progressing     Patient is not progressing towards the following goals:      Problem: Safety - Medical Restraint  Goal: Free from restraint(s) (Restraint for Interference with Medical Device)  Outcome: Not Met     Remains in restraints at this time.

## 2023-07-20 NOTE — PROGRESS NOTES
Discussed in rounds with Dr. Peña - mani oneill Ca with next labs at noon.    Tacos bicarb drip.    Discussed no urine output.

## 2023-07-20 NOTE — ASSESSMENT & PLAN NOTE
Liver laceration and Ischemic hepatopathy due to shock, evidenced by markedly elevated transaminases, elevated Tbili, hypoalbunemia  Avoid hepatotoxins  Hepatocellular/cholestatic pattern in etiology

## 2023-07-21 NOTE — THERAPY
Physical Therapy Contact Note    Discussed with RN; patient not medically appropriate for therapy today. Will re attempt as able and appropriate.    Marga Faulkner, PT, DPT  [phone]

## 2023-07-21 NOTE — FLOWSHEET NOTE
07/21/23 0758   Spontaneous Breathing Trial (SBT)   Spontaneous breathing trial (SBT) outcome Pt weaned for 1 hour and returned to rest settings per protocol - SBT Pass   Length of Weaning Trial (Hours) 1   Pt passed SBT but not ready to extubate Not ready - continue daily assessments for extubation   Weaning Parameters   RR (bpm) 19   $ FVC / Vital Capacity (liters)  0.64  (pt not following, forced parameters)   NIF (cm H2O)  -13  (Pt not following, forced parameters)   Rapid Shallow Breathing Index (RR/VT) 38   Spontaneous VE 9.5   Spontaneous

## 2023-07-21 NOTE — CARE PLAN
The patient is Unstable - High likelihood or risk of patient condition declining or worsening    Shift Goals  Clinical Goals: Wean vasopressors, strict I/O  Patient Goals: Unable to assess  Family Goals: Updates on plan of care    Patient is not progressing towards the following goals:    Problem: Pain - Standard  Goal: Alleviation of pain or a reduction in pain to the patient’s comfort goal  Outcome: Not Progressing     Problem: Respiratory  Goal: Patient will achieve/maintain optimum respiratory ventilation and gas exchange  Outcome: Not Progressing     Problem: Safety - Medical Restraint  Goal: Remains free of injury from restraints (Restraint for Interference with Medical Device)  Outcome: Not Progressing  Goal: Free from restraint(s) (Restraint for Interference with Medical Device)  Outcome: Not Progressing

## 2023-07-21 NOTE — OP REPORT
Surgeon: Angel Luis Boateng MD  Assist: Taylor Starks MD, Willy Kohler MD  Preoperative diagnosis: Laparotomy for postoperative hemorrhage with damage control approach  Postoperative diagnosis: Laparotomy for postoperative hemorrhage with damage control approach  Procedure: Reopening of recent laparotomy with removal of liver packs, colon resection, and revision of colostomy  Anesthesia: General endotracheal anesthesia  Anesthesiologist: Karmen Gracia MD  Indications: 63-year-old woman who underwent an emergent laparotomy 6 days postoperatively for hemorrhage.  The etiology was unclear.  A damage control approach was used with packing and an ABThera device.  A second look laparotomy is now indicated.  Narrative: The procedure was discussed in detail with the patient Sister including the risks of bleeding, infection, abscess, and hematoma.  The potential for further bowel resection and revision of colostomy was discussed as well.  Consent was obtained.  She was placed under anesthesia by Dr. Gracia.  Her ABThera was removed.  The packs that were present from the previous operation were removed.  There were a total of 6 PACs which was was documented in the previous operation.  There is no evidence of any active bleeding.  Careful inspection did not reveal any other source of bleeding that needed to be addressed.  Became apparent that the colostomy that was present was retracted and somewhat dusky and should be revised.  It was determined that it was better to move it over to the right upper quadrant.  The colon was divided immediately proximal to the colostomy.  The transverse colon mesentery was divided partially.  The transverse colon was further mobilized by dividing omentum.  A right upper quadrant colostomy site was created.  The colon was noted to pass through this colostomy with sufficient length and to be tension-free.  It was also well perfused.  Some marginally perfused and somewhat traumatized omentum was  identified.  This was resected.  Hemostasis was assured.  The left upper quadrant colostomy site was closed with multiple #1 Vicryl sutures.  The midline fascia was then approximated with interrupted #1 Vicryl sutures.  Both the previous colostomy site in the midline were packed with Kerlix gauze and ABD pads.  The colostomy was then matured with #3-0 Vicryl sutures.  He was noted to be well-perfused.  A colostomy bag was placed.  Patient tolerated procedure well without apparent complication.  The final counts were reported as correct.  Given the expected change in the preoperative and postoperative counts, films were obtained at the end of the case which did not reveal any retained instruments or sponges.  The patient will return to the ICU intubated and critically ill.  Wound class was class IV.

## 2023-07-21 NOTE — CARE PLAN
Problem: Ventilation  Goal: Ability to achieve and maintain unassisted ventilation or tolerate decreased levels of ventilator support  Description: Target End Date:  4 days     Document on Vent flowsheet    1.  Support and monitor invasive and noninvasive mechanical ventilation  2.  Monitor ventilator weaning response  3.  Perform ventilator associated pneumonia prevention interventions  4.  Manage ventilation therapy by monitoring diagnostic test results  Outcome: Progressing         Ventilator Daily Summary    Vent Day # 3    ETT: 8.0 @ 24    Ventilator settings:  apvcmv: 22/420/8/40    Weaning trials: none    Respiratory Procedures: none    Plan: Continue current ventilator settings and wean mechanical ventilation as tolerated per physician orders.

## 2023-07-21 NOTE — CARE PLAN
Problem: Ventilation  Goal: Ability to achieve and maintain unassisted ventilation or tolerate decreased levels of ventilator support  Description: Target End Date:  4 days     Document on Vent flowsheet    1.  Support and monitor invasive and noninvasive mechanical ventilation  2.  Monitor ventilator weaning response  3.  Perform ventilator associated pneumonia prevention interventions  4.  Manage ventilation therapy by monitoring diagnostic test results  Outcome: Not Met     Ventilator Daily Summary    Vent Day # 3    ETT: 8.0 @ 24    Ventilator settings: 22, 420, +8, 40%    Weaning trials: SBT x1    Plan: Continue current ventilator settings and wean mechanical ventilation as tolerated per physician orders.

## 2023-07-21 NOTE — OR NURSING
Miscount on closing and final count. Additional 6 laps left in pt from previous procedure removed and accounted for. Flat plate x-ray done.

## 2023-07-21 NOTE — PROGRESS NOTES
Report to Symone. Pt remains in OR, coming back shortly.    Dr Boateng came to bedside and gave brief update to pt's sister.

## 2023-07-21 NOTE — DIETARY
Nutrition Services:  Pt has been NPO x 3 days. S/P colon resection and revision of colostomy on 7/20. Pt currently intubated. Recommend nutrition support when feasible per MD. RD following

## 2023-07-21 NOTE — CARE PLAN
The patient is Unstable - High likelihood or risk of patient condition declining or worsening    Shift Goals  Clinical Goals: Wean vasopressors, strict I/O  Patient Goals: Unable to assess  Family Goals: Updates on plan of care    Progress made toward(s) clinical / shift goals:    Problem: Pain - Standard  Goal: Alleviation of pain or a reduction in pain to the patient’s comfort goal  Outcome: Progressing     Problem: Knowledge Deficit - Standard  Goal: Patient and family/care givers will demonstrate understanding of plan of care, disease process/condition, diagnostic tests and medications  Outcome: Progressing     Problem: Skin Care - Ostomy  Goal: Skin remains free from irritation  Outcome: Progressing     Problem: Safety - Medical Restraint  Goal: Remains free of injury from restraints (Restraint for Interference with Medical Device)  Outcome: Progressing  Goal: Free from restraint(s) (Restraint for Interference with Medical Device)  Outcome: Progressing       Patient is not progressing towards the following goals:

## 2023-07-21 NOTE — ANESTHESIA TIME REPORT
Anesthesia Start and Stop Event Times     Date Time Event    7/20/2023 1553 Ready for Procedure     1656 Anesthesia Start     1911 Anesthesia Stop        Responsible Staff  07/20/23    Name Role Begin End    Kojo Malcolm D.O. Anesth 1656 1712    Homa Gracia M.D. Anesth 1712 1911        Overtime Reason:  no overtime (within assigned shift)    Comments:

## 2023-07-21 NOTE — PROGRESS NOTES
[facility] Progress Note     CRRT/CVVHD treatment ordered by Dr Buenrostro.    Treatment started at 1357  Pt intubated and sedated, with generalized edema, BP supported by 2 pressors, O2 sat of 99%, and diminished BS. LNTDC was placed for CRRT tx, placement verified through chest xray and is ready to use. Lines were reversed for treatment for better patency. Consent was secured. Orders were verified with Dr. Buenrostro before treatment initiation     CVC access pressures WNL and on current ordered BFR.   TF: K=4 Ca= 3 Hco3= 35 Na= 140     In service given to ROMEL Joy RN.    Please call Dialysis Room [phone] for questions or machine troubleshooting.

## 2023-07-21 NOTE — PROGRESS NOTES
"Critical Care Progress Note    Date of admission  7/12/2023    Chief Complaint  \"63 y.o. female who presented 7/12/2023 with acute DVT, acute saddle PE and new pneumoperitoneum due to perforated viscus for what she was admitted and treated with thrombectomy, heparin ggt and IVC filter placement. She also underwent exploratory laparatomy (today day 5 post op) with left colectomy with end colostomy and s/p cholecystectomy with a wound vac in place.   I was called at the bedside due to hypotension and tachycardia.\" H+P 7/12    Hospital Course  7/13 s/p bilateral thrombectomy for saddle PE and IVC filter placement. Successful.   7/13 s/p left colectomy and end-colostomy, and cholecystectomy. Intraop found perforated colon at splenic flexure with associated mass. Concerning of malignancy. Remains intubated post op.   7/19: Admitted to ICU is hemorrhagic shock, MTP, OR for intrabdominal bleed, liver lac, returns to ICU in shock and intubated  7/20: Hb stable, anuric, back to OR for removal of packing, colon resection, revision colostomy (to RUQ), vasoplegic with increasing pressors overnight    Interval Problem Update  Reviewed last 24 hour events:  OR yesterday, packing removed, colostomy revised, fascia closed  Added Vaso overnight  Lactate climbing, remains anuric    Pt following shakes head and denies pain    TTE with LV fxn ok, reduced RV function, low RAP    Neuro:   AxO on SAT  Fent @ 100  Dex off overnight    CV:  HR 80s  -120s  NE @ 0.25  Vaso 0.03    Resp:   VD3  APVCMV  22/420/8/0.4  7.4/35/80/96    GI: NPO, OGT to LCS  150 out colostomy    I/O: +1356 (+9L on stay)  UOP: nil  1L out drain    D5 1/2 NS at 75    Tmax: 38.4  Heme: WBC 20.6  Abx: P/T d3    Micro:   Procal 7.1  Cxs NGTD    Endo: Hypoglycemic this AM, given d50 then D10 gtt    LDA: ETT, RIJ CVC, LRAL,Elizalde, woundvac  SUP: H2RA  VTE: held  Diet: NPO      Review of Systems  Review of Systems   Unable to perform ROS: Critical illness        Vital " Signs for last 24 hours   Pulse:  [] 66  Resp:  [17-30] 22  BP: ()/(46-84) 138/63  SpO2:  [86 %-100 %] 98 %    Hemodynamic parameters for last 24 hours       Respiratory Information for the last 24 hours  Vent Mode: APVCMV  Rate (breaths/min): 22  Vt Target (mL): 420  PEEP/CPAP: 8  MAP: 11  Length of Weaning Trial (Hours): 1  Control VTE (exp VT): 418    Physical Exam   Physical Exam  Constitutional:       General: She is not in acute distress.     Appearance: She is ill-appearing.   HENT:      Head: Normocephalic.      Nose: Nose normal.      Mouth/Throat:      Mouth: Mucous membranes are dry.   Eyes:      Extraocular Movements: Extraocular movements intact.      Pupils: Pupils are equal, round, and reactive to light.   Cardiovascular:      Rate and Rhythm: Normal rate and regular rhythm.   Pulmonary:      Effort: Pulmonary effort is normal.      Breath sounds: Rhonchi and rales present.      Comments: Mechanical BS  Abdominal:      General: There is distension.      Palpations: Abdomen is soft.      Comments: Woundvac in place, no rigidity, no significant distention   Musculoskeletal:      Cervical back: Neck supple.      Right lower leg: Edema present.      Left lower leg: Edema present.   Skin:     General: Skin is warm.      Capillary Refill: Capillary refill takes 2 to 3 seconds.   Neurological:      Comments: Sedated currently, responds to painful stim, awakes to voice, no focality, follows, nods head to questions         Medications  Current Facility-Administered Medications   Medication Dose Route Frequency Provider Last Rate Last Admin    D10%-0.45% NaCl infusion   Intravenous Continuous Kerrie L. Latona 75 mL/hr at 07/21/23 1800 Rate Verify at 07/21/23 1800    heparin injection 2,800 Units  2,800 Units Intracatheter PRN Marita Buenrostro M.D.        insulin regular (HumuLIN R,NovoLIN R) injection  2-9 Units Subcutaneous Q6HRS Kerrie L. Latap   2 Units at 07/21/23 0544    And    dextrose 50%  (D50W) injection 25 g  25 g Intravenous Q15 MIN PRN Kerrie LEE Latona   25 g at 07/21/23 0225    piperacillin-tazobactam (Zosyn) 4.5 g in  mL IVPB  4.5 g Intravenous Q8HRS SARAI Barboza   Stopped at 07/21/23 1628    norepinephrine (Levophed) 8 mg in 250 mL NS infusion (premix)  0-1 mcg/kg/min (Ideal) Intravenous Continuous Lam Louis M.D. 25.7 mL/hr at 07/21/23 1800 0.2 mcg/kg/min at 07/21/23 1800    vasopressin (Vasostrict) 20 Units in  mL Infusion  0.03 Units/min Intravenous Continuous Lam Louis M.D. 9 mL/hr at 07/21/23 1506 0.03 Units/min at 07/21/23 1506    dexmedetomidine (PRECEDEX) 400 mcg/100mL NS premix infusion  0-1.5 mcg/kg/hr (Ideal) Intravenous Continuous Lam Louis M.D. 3.4 mL/hr at 07/19/23 2006 0.2 mcg/kg/hr at 07/19/23 2006    Respiratory Therapy Consult   Nebulization Continuous RT Cristian Peña M.D.        famotidine (Pepcid) tablet 20 mg  20 mg Enteral Tube Q12HRS Cristian Peña M.D.        Or    famotidine (Pepcid) injection 20 mg  20 mg Intravenous Q12HRS Cristian Peña M.D.   20 mg at 07/21/23 1831    senna-docusate (Pericolace Or Senokot S) 8.6-50 MG per tablet 2 Tablet  2 Tablet Enteral Tube BID Cristian Peña M.D.   2 Tablet at 07/19/23 1156    And    polyethylene glycol/lytes (Miralax) PACKET 1 Packet  1 Packet Enteral Tube QDAY PRN Cristian Peña M.D.        And    magnesium hydroxide (Milk Of Magnesia) suspension 30 mL  30 mL Enteral Tube QDAY PRN Cristian Peña M.D.        And    bisacodyl (Dulcolax) suppository 10 mg  10 mg Rectal QDAY PRN Cristian Peña M.D.        MD Alert...ICU Electrolyte Replacement per Pharmacy   Other PHARMACY TO DOSE Cristian Peña M.D.        lidocaine (Xylocaine) 1 % injection 2 mL  2 mL Tracheal Tube Q30 MIN PRN Cristian Peña M.D.        fentaNYL (Sublimaze) injection 100 mcg  100 mcg Intravenous Q15 MIN PRN Cristian Peña M.D.        And    fentaNYL (Sublimaze) injection  200 mcg  200 mcg Intravenous Q15 MIN PRN Cristian Peña M.D.        And    fentaNYL (SUBLIMAZE) 50 mcg/mL in 50mL (Continuous Infusion)   Intravenous Continuous Cristian Peña M.D. 4 mL/hr at 07/21/23 1225 200 mcg/hr at 07/21/23 1225    oxyCODONE immediate-release (Roxicodone) tablet 5 mg  5 mg Oral Q4HRS PRN Cristian Peña M.D.   5 mg at 07/18/23 1749    HYDROmorphone (Dilaudid) injection 0.5-1 mg  0.5-1 mg Intravenous Q2HRS PRN Cristian Peña M.D.   0.5 mg at 07/21/23 1035    levothyroxine (Synthroid) tablet 225 mcg  225 mcg Oral AM ES Dagoberto Ohara D.O.   225 mcg at 07/18/23 0544    ondansetron (Zofran) syringe/vial injection 4 mg  4 mg Intravenous Q4HRS PRN Ana Lilia Joy M.D.        promethazine (Phenergan) tablet 12.5-25 mg  12.5-25 mg Oral Q4HRS PRWERO Joy M.D.        promethazine (Phenergan) suppository 12.5-25 mg  12.5-25 mg Rectal Q4HRS PRWERO Joy M.D.        prochlorperazine (Compazine) injection 5-10 mg  5-10 mg Intravenous Q4HRS PRWERO Joy M.D.        Nozin nasal  swab  1 Applicator Each Nostril BID Dagoberto Ohara D.O.   1 Applicator at 07/21/23 1831       Fluids    Intake/Output Summary (Last 24 hours) at 7/21/2023 1850  Last data filed at 7/21/2023 1800  Gross per 24 hour   Intake 3548.52 ml   Output 671 ml   Net 2877.52 ml       Laboratory  Recent Labs     07/19/23  0157 07/19/23  0159 07/19/23  0925 07/19/23  0955 07/19/23  1525 07/20/23  0436 07/21/23  0431   EEXJL98M 7.42  --  6.87*  --   --   --   --    DETBOT992J 27.7  --  42.6*  --   --   --   --    JAFPD370E 107.7*  --  88.9*  --   --   --   --    VATS5DFK 97.2  --  93.5  --   --   --   --    ARTHCO3 18  --  8*  --   --   --   --    N6NYFWVOQ 5  --  100  --   --   --   --    ARTBE -6*  --  -25*  --   --   --   --    ISTATAPH  --    < >  --    < > 7.214* 7.384* 7.401   ISTATAPCO2  --    < >  --    < > 38.3* 37.4* 35.0   ISTATAPO2  --    < >  --    < > 102* 70 80   ISTATATCO2  --    < >  --     < > 17* 23 23   LSTRDDL0QVO  --    < >  --    < > 96 94 96   ISTATARTHCO3  --    < >  --    < > 15.5* 22.3 21.7   ISTATARTBE  --    < >  --    < > -12* -2 -3   ISTATTEMP  --    < >  --    < > 98.2 F 100.2 F 100.2 F   ISTATFIO2  --    < >  --    < > 70 50 50   ISTATSPEC  --    < >  --    < > Arterial Arterial Arterial   ISTATAPHTC  --    < >  --    < > 7.217* 7.371* 7.388*   MZQNQMQD7ZN  --    < >  --    < > 100* 74 84    < > = values in this interval not displayed.         Recent Labs     07/20/23  0025 07/20/23  0529 07/20/23  1140 07/20/23 1934 07/21/23  0535   SODIUM 140 139 140 141 140   POTASSIUM 4.7 4.4 4.3 4.4 4.2   CHLORIDE 100 99 100 103 102   CO2 18* 22 22 21 21   BUN 22 24* 28* 33* 39*   CREATININE 1.76* 1.90* 2.34* 2.58* 3.25*   MAGNESIUM 1.9 1.9  --   --  2.1   PHOSPHORUS 6.4* 6.0*  --   --  5.7*   CALCIUM 7.2* 7.0* 6.7* 6.4* 6.7*     Recent Labs     07/19/23  1355 07/20/23  0025 07/20/23  0529 07/20/23  1140 07/20/23 1934 07/21/23  0535   ALTSGPT 2158*   < > 3175* 3389*  --  2613*   ASTSGOT 4030*   < > >7000* >7000*  --  4617*   ALKPHOSPHAT 163*   < > 188* 227*  --  268*   TBILIRUBIN 2.5*   < > 4.5* 4.5*  --  4.5*   DBILIRUBIN 2.0*  --   --   --   --   --    GLUCOSE 178*   < > 189* 132* 89 160*    < > = values in this interval not displayed.     Recent Labs     07/19/23  0803 07/19/23  0950 07/19/23  1355 07/19/23  1848 07/20/23  0529 07/20/23  1140 07/21/23  0535   WBC 36.5* 19.5* 23.1*   < > 23.6* 22.4* 20.6*   NEUTSPOLYS 58.20 69.50 88.10*  --   --   --   --    LYMPHOCYTES 9.90* 9.90* 4.20*  --   --   --   --    MONOCYTES 0.80 0.00 4.20  --   --   --   --    EOSINOPHILS 0.80 0.00 0.00  --   --   --   --    BASOPHILS 0.00 0.80 0.90  --   --   --   --    ASTSGOT  --  2196* 4030*   < > >7000* >7000* 4617*   ALTSGPT  --  1384* 2158*   < > 3175* 3389* 2613*   ALKPHOSPHAT  --  142* 163*   < > 188* 227* 268*   TBILIRUBIN  --  0.9 2.5*   < > 4.5* 4.5* 4.5*    < > = values in this interval not  displayed.     Recent Labs     07/19/23  0803 07/19/23  0950 07/19/23  1355 07/19/23  1848 07/20/23  0529 07/20/23  1140 07/20/23  1934 07/21/23  0535 07/21/23  1250 07/21/23  1815   RBC 2.55* 3.49* 3.98*   < > 3.60* 3.71*  --  3.02*  --   --    HEMOGLOBIN 8.0* 10.8* 12.1   < > 11.1* 11.3*   < > 10.1* 9.2* 9.8*   HEMATOCRIT 25.5* 35.1* 36.1*   < > 31.3* 31.4*   < > 28.0* 25.6* 28.0*   PLATELETCT 84* 49* 68*   < > 60* 59*  --  64*  --   --    PROTHROMBTM 36.0* 30.3* 31.0*  --   --   --   --   --   --   --    APTT 112.1* 95.6* 59.6*  --   --   --   --   --   --   --    INR 3.80* 3.03* 3.11*  --   --   --   --   --   --   --     < > = values in this interval not displayed.       Imaging  X-Ray:  I have personally reviewed the images and compared with prior images.    Assessment/Plan  Acute hepatic necrosis  Assessment & Plan  Ischemic hepatopathy due to shock, evidenced by markedly elevated transaminases, elevated Tbili, hypoalbunemia, elevated lactate, and coagulopathy consistent with hepatic failure and decrease synthetic function    Improving LFTs    Ammonia elevated at 70 today, will clear with CRRT, follow ammonia levels     Follow synthetic function  Avoid hepatotoxins as able      Metabolic acidosis  Assessment & Plan  On the basis of hypoperfusion and lactic acid production in the setting of hemorrhagic shock    Improving  Continue resuscitation blood product driven  Follow markers of perfusion, trend lactate      Hemorrhagic shock (HCC)- (present on admission)  Assessment & Plan  7/19: Overnight with worsening hypotension, obtundation and vasopressor requirement.  Bedside POCUS with underfilled hyperdynamic LV and FF in RUQ.  MTP intiated and patient taken to OR with ACGS. Found to have copious intrabdominal hemmorhage with a liver laceration near falciform ligament.  Packed left open  7/20: OR re opened, packing removed, fascia closed  7/21: no recurrent bleeding as of now    MTP initiated in ICU and carried  on into OR    Serial H/H, transfuse for Hb <7 unless unstable  Correct and monitor coagulopathy  Hold heparin  Follow up TEG        Acute blood loss anemia  Assessment & Plan  Intraabdominal bleed    S/P MTP and operative control  Follow H/H  Transfuse if H/H is <7/21  Check TEG, will replace with blood products accordingly.    Acute hypoxemic respiratory failure (HCC)- (present on admission)  Assessment & Plan  Initially 7/13 Intubated for OR and 7/15 extubated    7/19:  reintubated in the setting of recalcitrant and rapidly progressive shock of unknown etiology felt to be hemorrhagic versus septic.  The patient was not protecting her airway with vasopressor requirements and obtunded.   status post massive transfusion and operative intervention to include ex lap intra-abdominal hemorrhage washout liver packing open abdomen.  At risk of ARDS    LPV  RT/O2 protocols  Daily and PRN VBG/ABGs  Titration of ventilator to optimize lung protection, gas exchange and acid-base status   Sedation as tolerated/indicated  Daily CXR  HOB >30 degrees and chlorhexidine for VAP prevention  Pepcid for GI prophylaxis  SAT/SBT when able (ABCDEF Bundle)  Early mobility              Acute kidney injury (HCC)  Assessment & Plan  Worsening renal function post shock, ATN likely  Remains anuric    Initiated CRRT today for volume removal and ultrafiltration after failed diuretic challenge    Markedly hypervolemic and VOL post massive rescucitation  Q 12 BMPs    Renal dose meds, avoid nephrotoxins  Strict I/Os  Follow renal function        Acute saddle pulmonary embolism (HCC)- (present on admission)  Assessment & Plan  Originating from a right leg DVT. Pt is not a candidate for thrombolytics given her pneumoperitoneum  7/13: S/p emergent thrombectomy of saddle PE with IVC placement.     7/19: Repeat TTE with normal LV function, poor RV visualization but likely dilated with reduced function, RAP low    Continue to hold heparin as high risk for  rebleed until surgically cleared    Acute deep venous thrombosis (DVT) determined by ultrasound ([facility])- (present on admission)  Assessment & Plan  Holding heparin in the setting of massive intraabdominal hemorrhage and liver laceration    S/p IVC filter placed this admission  Will need IVC filter retrieval in time, as soon as feasible    Perforated viscus- (present on admission)  Assessment & Plan  - 7/13 S/p ex lap with left hemicolectomy and end colostomy by Dr. Boateng  - Found perforated colon at splenic fixture, associated with mass. Path neg for malignancy  - Fecal peritonitis          Morbid obesity with BMI of 50.0-59.9, adult ([facility])- (present on admission)  Assessment & Plan  BMI 50, place her in high risk for increase morbidity during this admission         VTE:  Contraindicated  Ulcer: H2 Antagonist  Lines: Central Line  Ongoing indication addressed, Arterial Line  Ongoing indication addressed, and Elizalde Catheter  Ongoing indication addressed    I have performed a physical exam and reviewed and updated ROS and Plan today (7/21/2023). In review of yesterday's note (7/20/2023), there are no changes except as documented above.     Discussed patient condition and risk of morbidity and/or mortality with Family, RN, RT, Therapies, Pharmacy, and Patient  The patient remains critically ill.  Critical care time = 105 minutes in directly providing and coordinating critical care and extensive data review.  No time overlap and excludes procedures.

## 2023-07-21 NOTE — PROGRESS NOTES
Hypoglycemia Intervention    Hypoglycemia protocol intervention:  Blood glucose 85 at 0221. (15 minutes after initial 25g dextrose amp administration for fsbg of 49)  Intervention: 25 g IV dextrose per MAR   JADON Montana notified of the above at 0227.     Received order for continuous infusion D10 .45NS  @75mL/hr

## 2023-07-21 NOTE — PROGRESS NOTES
1047: Dr. Peña to bedside for dialysis cath placement     1050: Procedural time out called, all agree     1148: Procedure complete, patient tolerated well.

## 2023-07-21 NOTE — ANESTHESIA POSTPROCEDURE EVALUATION
Patient: Ashley English    Procedure Summary     Date: 07/20/23 Room / Location: [place] [address] / SURGERY [facility]    Anesthesia Start: 1656 Anesthesia Stop: 1911    Procedures:       REOPENING OF RECENT LAPAROTOMY, REMOVAL OF LIVER PACKING, COLON RESECTION, PARTIAL COLECTOMY, ABDOMINAL WALL CLOSURE, REVISION OF COLOSTOMY (Abdomen)      CLOSURE, WOUND (Abdomen) Diagnosis: (second look laparotomy, removal of packing, interperitoneal hemorrhage)    Surgeons: Taylor Bocanegra M.D. Responsible Provider: Homa Gracia M.D.    Anesthesia Type: general ASA Status: 4          Final Anesthesia Type: general  Last vitals  BP   Blood Pressure: 131/60, Arterial BP: 148/69    Temp   (!) 35.3 °C (95.5 °F)    Pulse   78   Resp   (!) 23    SpO2   95 %      Anesthesia Post Evaluation    Patient location during evaluation: ICU  Patient participation: complete - patient cannot participate  Level of consciousness: responsive to noxious stimuli  Pain score: 0    Airway patency: patent  Anesthetic complications: no  Cardiovascular status: adequate  Respiratory status: ETT and ventilator  Hydration status: acceptable    PONV: none          No notable events documented.     Nurse Pain Score: 3 (NPRS)

## 2023-07-21 NOTE — PROGRESS NOTES
"    Postop day 1 after abdominal closure  No acute changes  No further signs of bleeding    /53   Pulse 70   Temp (!) 35.3 °C (95.5 °F) (Bladder)   Resp (!) 22   Ht 1.779 m (5' 10.04\")   Wt (!) 155 kg (341 lb 7.9 oz)   LMP 06/29/2017 (Approximate)   SpO2 99%   BMI 48.94 kg/m²     Sedated on vent  Abdomen soft  Midline wound packed with turbid bloody drainage    Recent Labs     07/19/23  0803 07/19/23  0950 07/19/23  1355 07/19/23  1848 07/20/23  0529 07/20/23  1140 07/20/23  1934 07/20/23  2347 07/21/23  0535 07/21/23  1250   WBC 36.5* 19.5* 23.1*   < > 23.6* 22.4*  --   --  20.6*  --    RBC 2.55* 3.49* 3.98*   < > 3.60* 3.71*  --   --  3.02*  --    HEMOGLOBIN 8.0* 10.8* 12.1   < > 11.1* 11.3*   < > 11.4* 10.1* 9.2*   HEMATOCRIT 25.5* 35.1* 36.1*   < > 31.3* 31.4*   < > 30.8* 28.0* 25.6*   .0* 100.6* 90.7   < > 86.9 84.6  --   --  92.7  --    MCH 31.4 30.9 30.4   < > 30.8 30.5  --   --  33.4*  --    RDW 54.5* 53.9* 49.0   < > 49.1 47.7  --   --  51.7*  --    PLATELETCT 84* 49* 68*   < > 60* 59*  --   --  64*  --    MPV 10.9 10.7 10.9   < > 12.5 12.3  --   --  12.2  --    NEUTSPOLYS 58.20 69.50 88.10*  --   --   --   --   --   --   --    LYMPHOCYTES 9.90* 9.90* 4.20*  --   --   --   --   --   --   --    MONOCYTES 0.80 0.00 4.20  --   --   --   --   --   --   --    EOSINOPHILS 0.80 0.00 0.00  --   --   --   --   --   --   --    BASOPHILS 0.00 0.80 0.90  --   --   --   --   --   --   --    RBCMORPHOLO Present Present Present  --   --   --   --   --   --   --     < > = values in this interval not displayed.     Recent Labs     07/20/23  1140 07/20/23  1934 07/21/23  0535   SODIUM 140 141 140   POTASSIUM 4.3 4.4 4.2   CHLORIDE 100 103 102   CO2 22 21 21   GLUCOSE 132* 89 160*   BUN 28* 33* 39*   CREATININE 2.34* 2.58* 3.25*       Assessment plan: Complicated course after laparotomy colectomy and cholecystectomy with postoperative hemorrhage, open abdomen management and now subsequent closure.  Wound " care evaluation for open wound.  Will likely need to be managed with VAC, possibly vera flow  General management per ICU service

## 2023-07-22 PROBLEM — D69.6 THROMBOCYTOPENIA (HCC): Status: ACTIVE | Noted: 2023-01-01

## 2023-07-22 NOTE — CARE PLAN
The patient is Unstable - High likelihood or risk of patient condition declining or worsening    Shift Goals  Clinical Goals: hemodynamic stability, perfuse kidneys, CRRT  Patient Goals: SAMIA  Family Goals: care conference    Progress made toward(s) clinical / shift goals:  n/a    Patient is not progressing towards the following goals:      Problem: Pain - Standard  Goal: Alleviation of pain or a reduction in pain to the patient’s comfort goal  Outcome: Progressing     Problem: Respiratory  Goal: Patient will achieve/maintain optimum respiratory ventilation and gas exchange  Outcome: Progressing     Problem: Urinary Elimination  Goal: Establish and maintain regular urinary output  Outcome: Not Progressing     Problem: Urinary Elimination  Goal: Establish and maintain regular urinary output  Outcome: Not Progressing

## 2023-07-22 NOTE — PROGRESS NOTES
"     Off pressors  Remains on Vent  HD this AM    /54   Pulse 83   Temp (!) 35.3 °C (95.5 °F) (Bladder)   Resp 14   Ht 1.779 m (5' 10.04\")   Wt (!) 156 kg (343 lb 14.7 oz)   LMP 06/29/2017 (Approximate)   SpO2 97%   BMI 49.29 kg/m²     Sedated/ventilated  Abd obese, soft  Wound with turbid drainage on dressing  Ostomy with some fluid    Recent Labs     07/19/23  0950 07/19/23  1355 07/19/23  1848 07/20/23  1140 07/20/23  1934 07/21/23  0535 07/21/23  1250 07/21/23  1815 07/21/23  2330 07/22/23  0530   WBC 19.5* 23.1*   < > 22.4*  --  20.6*  --   --   --  18.5*   RBC 3.49* 3.98*   < > 3.71*  --  3.02*  --   --   --  2.81*   HEMOGLOBIN 10.8* 12.1   < > 11.3*   < > 10.1*   < > 9.8* 9.5* 9.6*   HEMATOCRIT 35.1* 36.1*   < > 31.4*   < > 28.0*   < > 28.0* 26.5* 26.6*   .6* 90.7   < > 84.6  --  92.7  --   --   --  94.7   MCH 30.9 30.4   < > 30.5  --  33.4*  --   --   --  34.2*   RDW 53.9* 49.0   < > 47.7  --  51.7*  --   --   --  51.9*   PLATELETCT 49* 68*   < > 59*  --  64*  --   --   --  41*   MPV 10.7 10.9   < > 12.3  --  12.2  --   --   --  13.3*   NEUTSPOLYS 69.50 88.10*  --   --   --   --   --   --   --   --    LYMPHOCYTES 9.90* 4.20*  --   --   --   --   --   --   --   --    MONOCYTES 0.00 4.20  --   --   --   --   --   --   --   --    EOSINOPHILS 0.00 0.00  --   --   --   --   --   --   --   --    BASOPHILS 0.80 0.90  --   --   --   --   --   --   --   --    RBCMORPHOLO Present Present  --   --   --   --   --   --   --   --     < > = values in this interval not displayed.     Recent Labs     07/21/23  0535 07/21/23  1817 07/22/23  0530   SODIUM 140 140 137   POTASSIUM 4.2 3.9 4.0   CHLORIDE 102 104 105   CO2 21 22 23   GLUCOSE 160* 130* 150*   BUN 39* 37* 23*   CREATININE 3.25* 2.74* 1.61*     A/P  S/p abd closure with open abd wall wound.  Wound care consult  Off pressors - start trickle feeding.  Gen care per SICU svc  "

## 2023-07-22 NOTE — PROGRESS NOTES
"Critical Care Progress Note    Date of admission  7/12/2023    Chief Complaint  \"63 y.o. female who presented 7/12/2023 with acute DVT, acute saddle PE and new pneumoperitoneum due to perforated viscus for what she was admitted and treated with thrombectomy, heparin ggt and IVC filter placement. She also underwent exploratory laparatomy (today day 5 post op) with left colectomy with end colostomy and s/p cholecystectomy with a wound vac in place.   I was called at the bedside due to hypotension and tachycardia.\" H+P 7/12    Hospital Course  7/13 s/p bilateral thrombectomy for saddle PE and IVC filter placement. Successful.   7/13 s/p left colectomy and end-colostomy, and cholecystectomy. Intraop found perforated colon at splenic flexure with associated mass. Concerning of malignancy. Remains intubated post op.   7/19: Admitted to ICU is hemorrhagic shock, MTP, OR for intrabdominal bleed, liver lac, returns to ICU in shock and intubated  7/20: Hb stable, anuric, back to OR for removal of packing, colon resection, revision colostomy (to RUQ), vasoplegic with increasing pressors overnight  7/21: CRRT started  7/22: weaned of pressors, following, increase UF to 100    Interval Problem Update  Reviewed last 24 hour events:  Off pressors this AM  No events overnight, tolerating CRRT  Weaned pressors, some arterial high pressure alarms    Patient is interacting with me this AM on SAT, nods, responds appropriately, denies pain    Review of Systems  Review of Systems   Unable to perform ROS: Critical illness        Vital Signs for last 24 hours   Pulse:  [] 99  Resp:  [14-35] 22  BP: ()/(44-77) 94/44  SpO2:  [93 %-100 %] 100 %    Hemodynamic parameters for last 24 hours       Respiratory Information for the last 24 hours  Vent Mode: Spont  Rate (breaths/min): 22  Vt Target (mL): 420  PEEP/CPAP: 8  P Support: 5  MAP: 10  Control VTE (exp VT): 416    Physical Exam   Physical Exam  Constitutional:       General: She " is not in acute distress.     Appearance: She is ill-appearing.   HENT:      Head: Normocephalic.      Nose: Nose normal.      Mouth/Throat:      Mouth: Mucous membranes are dry.   Eyes:      Extraocular Movements: Extraocular movements intact.      Pupils: Pupils are equal, round, and reactive to light.   Cardiovascular:      Rate and Rhythm: Normal rate and regular rhythm.   Pulmonary:      Effort: Pulmonary effort is normal.      Breath sounds: Rhonchi and rales present.      Comments: Mechanical BS  Abdominal:      General: There is distension.      Palpations: Abdomen is soft.   Musculoskeletal:      Cervical back: Neck supple.      Right lower leg: Edema present.      Left lower leg: Edema present.   Skin:     General: Skin is warm.      Capillary Refill: Capillary refill takes 2 to 3 seconds.   Neurological:      Comments: Follows in all four, nods head to questions, BTT present bilateral, tracks makes eye contacts, weak and deconditioned         Medications  Current Facility-Administered Medications   Medication Dose Route Frequency Provider Last Rate Last Admin    [START ON 7/23/2023] famotidine (Pepcid) tablet 20 mg  20 mg Enteral Tube DAILY Cristian Peña M.D.        Or    [START ON 7/23/2023] famotidine (Pepcid) injection 20 mg  20 mg Intravenous DAILY Cristian Peña M.D.        oxyCODONE immediate-release (Roxicodone) tablet 5 mg  5 mg Enteral Tube Q4HRS PRN Cristian Peña M.D.        promethazine (Phenergan) tablet 12.5-25 mg  12.5-25 mg Enteral Tube Q4HRS PRN Cristian Peña M.D.        heparin injection 2,800 Units  2,800 Units Intracatheter PRN Marita Buenrostro M.D.        insulin regular (HumuLIN R,NovoLIN R) injection  2-9 Units Subcutaneous Q6HRS Kerrie Montana   2 Units at 07/21/23 0544    And    dextrose 50% (D50W) injection 25 g  25 g Intravenous Q15 MIN PRN Kerrie LEE Latona   25 g at 07/21/23 0225    piperacillin-tazobactam (Zosyn) 4.5 g in  mL IVPB  4.5 g Intravenous  Q8HRS SARAI Barboza   Stopped at 07/22/23 0903    norepinephrine (Levophed) 8 mg in 250 mL NS infusion (premix)  0-1 mcg/kg/min (Ideal) Intravenous Continuous Lam Louis M.D.   Stopped at 07/22/23 0642    vasopressin (Vasostrict) 20 Units in  mL Infusion  0.03 Units/min Intravenous Continuous Lam Louis M.D.   Stopped at 07/22/23 0644    dexmedetomidine (PRECEDEX) 400 mcg/100mL NS premix infusion  0-1.5 mcg/kg/hr (Ideal) Intravenous Continuous Lam Louis M.D.   Dose not Required at 07/21/23 2000    Respiratory Therapy Consult   Nebulization Continuous RT Cristian Peña M.D.        senna-docusate (Pericolace Or Senokot S) 8.6-50 MG per tablet 2 Tablet  2 Tablet Enteral Tube BID Cristian Peña M.D.   2 Tablet at 07/22/23 0500    And    polyethylene glycol/lytes (Miralax) PACKET 1 Packet  1 Packet Enteral Tube QDAY PRN Cristian Peña M.D.        And    magnesium hydroxide (Milk Of Magnesia) suspension 30 mL  30 mL Enteral Tube QDAY PRN Cristian Peña M.D.        And    bisacodyl (Dulcolax) suppository 10 mg  10 mg Rectal QDAY PRN Cristian Peña M.D. MD Alert...ICU Electrolyte Replacement per Pharmacy   Other PHARMACY TO DOSE Cristian Peña M.D.        lidocaine (Xylocaine) 1 % injection 2 mL  2 mL Tracheal Tube Q30 MIN PRN Cristian Peña M.D.        fentaNYL (Sublimaze) injection 100 mcg  100 mcg Intravenous Q15 MIN PRN Cristian Peña M.D.        And    fentaNYL (Sublimaze) injection 200 mcg  200 mcg Intravenous Q15 MIN PRN Cristian Peña M.D.        And    fentaNYL (SUBLIMAZE) 50 mcg/mL in 50mL (Continuous Infusion)   Intravenous Continuous Cristian Peña M.D.   Stopped at 07/22/23 0545    levothyroxine (Synthroid) tablet 225 mcg  225 mcg Oral AM ES Dagoberto Ohara D.O.   225 mcg at 07/22/23 0500    ondansetron (Zofran) syringe/vial injection 4 mg  4 mg Intravenous Q4HRS PRN Ana Lilia Joy M.D.        promethazine (Phenergan)  suppository 12.5-25 mg  12.5-25 mg Rectal Q4HRS PRN Ana Lilia Joy M.D.        prochlorperazine (Compazine) injection 5-10 mg  5-10 mg Intravenous Q4HRS PRN Ana Lilia Joy M.D.        Nozin nasal  swab  1 Applicator Each Nostril BID Dagoberto Ohara D.O.   1 Applicator at 07/22/23 0500       Fluids    Intake/Output Summary (Last 24 hours) at 7/22/2023 1145  Last data filed at 7/22/2023 1100  Gross per 24 hour   Intake 3434.31 ml   Output 3171 ml   Net 263.31 ml       Laboratory  Recent Labs     07/19/23  1525 07/20/23  0436 07/21/23  0431   ISTATAPH 7.214* 7.384* 7.401   ISTATAPCO2 38.3* 37.4* 35.0   ISTATAPO2 102* 70 80   ISTATATCO2 17* 23 23   [id number] 96 94 96   ISTATARTHCO3 15.5* 22.3 21.7   ISTATARTBE -12* -2 -3   ISTATTEMP 98.2 F 100.2 F 100.2 F   ISTATFIO2 70 50 50   ISTATSPEC Arterial Arterial Arterial   ISTATAPHTC 7.217* 7.371* 7.388*   [id number] 100* 74 84         Recent Labs     07/20/23  0529 07/20/23  1140 07/21/23  0535 07/21/23  1817 07/22/23  0530   SODIUM 139   < > 140 140 137   POTASSIUM 4.4   < > 4.2 3.9 4.0   CHLORIDE 99   < > 102 104 105   CO2 22   < > 21 22 23   BUN 24*   < > 39* 37* 23*   CREATININE 1.90*   < > 3.25* 2.74* 1.61*   MAGNESIUM 1.9  --  2.1 1.9  --    PHOSPHORUS 6.0*  --  5.7* 4.0  --    CALCIUM 7.0*   < > 6.7* 7.1* 7.2*    < > = values in this interval not displayed.     Recent Labs     07/19/23  1355 07/20/23  0025 07/20/23  1140 07/20/23  1934 07/21/23  0535 07/21/23  1817 07/22/23  0530   ALTSGPT 2158*   < > 3389*  --  2613*  --  1624*   ASTSGOT 4030*   < > >7000*  --  4617*  --  1209*   ALKPHOSPHAT 163*   < > 227*  --  268*  --  265*   TBILIRUBIN 2.5*   < > 4.5*  --  4.5*  --  5.0*   DBILIRUBIN 2.0*  --   --   --   --   --   --    GLUCOSE 178*   < > 132*   < > 160* 130* 150*    < > = values in this interval not displayed.     Recent Labs     07/19/23  1355 07/19/23  1848 07/20/23  1140 07/21/23  0535 07/22/23  0530   WBC 23.1*   < > 22.4* 20.6* 18.5*    NEUTSPOLYS 88.10*  --   --   --   --    LYMPHOCYTES 4.20*  --   --   --   --    MONOCYTES 4.20  --   --   --   --    EOSINOPHILS 0.00  --   --   --   --    BASOPHILS 0.90  --   --   --   --    ASTSGOT 4030*   < > >7000* 4617* 1209*   ALTSGPT 2158*   < > 3389* 2613* 1624*   ALKPHOSPHAT 163*   < > 227* 268* 265*   TBILIRUBIN 2.5*   < > 4.5* 4.5* 5.0*    < > = values in this interval not displayed.     Recent Labs     07/19/23  1355 07/19/23  1848 07/20/23  1140 07/20/23  1934 07/21/23  0535 07/21/23  1250 07/21/23  1815 07/21/23  2330 07/22/23  0530   RBC 3.98*   < > 3.71*  --  3.02*  --   --   --  2.81*   HEMOGLOBIN 12.1   < > 11.3*   < > 10.1*   < > 9.8* 9.5* 9.6*   HEMATOCRIT 36.1*   < > 31.4*   < > 28.0*   < > 28.0* 26.5* 26.6*   PLATELETCT 68*   < > 59*  --  64*  --   --   --  41*   PROTHROMBTM 31.0*  --   --   --   --   --   --   --   --    APTT 59.6*  --   --   --   --   --   --   --   --    INR 3.11*  --   --   --   --   --   --   --   --     < > = values in this interval not displayed.       Imaging  X-Ray:  I have personally reviewed the images and compared with prior images.    Assessment/Plan  Hemorrhagic shock (HCC)- (present on admission)  Assessment & Plan  7/19: Overnight with worsening hypotension, obtundation and vasopressor requirement.  Bedside POCUS with underfilled hyperdynamic LV and FF in RUQ.  MTP intiated and patient taken to OR with ACGS. Found to have copious intrabdominal hemmorhage with a liver laceration near falciform ligament.  Packed left open  7/20: OR re opened, packing removed, fascia closed  7/21: no recurrent bleeding as of now    MTP initiated in ICU and carried on into OR    Serial H/H, transfuse for Hb <7 unless unstable  Correct and monitor coagulopathy  Hold heparin  Follow up TEG        Acute hypoxemic respiratory failure (HCC)- (present on admission)  Assessment & Plan  Initially 7/13 Intubated for OR and 7/15 extubated    7/19:  reintubated in the setting of  recalcitrant and rapidly progressive shock of unknown etiology felt to be hemorrhagic versus septic.  The patient was not protecting her airway with vasopressor requirements and obtunded.   status post massive transfusion and operative intervention to include ex lap intra-abdominal hemorrhage washout liver packing open abdomen.  At risk of ARDS  7/22: passed SBT, on Fi02 0.3, think too weak and VOL to extubate today, perhaps try tomorrow    LPV  RT/O2 protocols  Daily and PRN VBG/ABGs  Titration of ventilator to optimize lung protection, gas exchange and acid-base status   Sedation as tolerated/indicated  Daily CXR  HOB >30 degrees and chlorhexidine for VAP prevention  Pepcid for GI prophylaxis  SAT/SBT when able (ABCDEF Bundle)  Early mobility              Acute kidney injury (HCC)  Assessment & Plan  Worsening renal function post shock, ATN likely  Remains anuric    7/21: Initiated CRRT for volume removal/ ultrafiltration after failed diuretic challenge    Markedly hypervolemic and VOL post massive rescucitation  Q 12 BMPs    Renal dose meds, avoid nephrotoxins  Strict I/Os  Follow renal function        Thrombocytopenia (HCC)  Assessment & Plan  Platelets trending down on the basis of shock critical illness and likely contributing CRRT circuit with drop today    Off VTE ppx, 2/t intraabdominal bleed post-op  Will resume VTE ppx when able  Trend daily cbc  At risk of rebleed transfuse PLTS when <20 or any signs of bleeding    Acute hepatic necrosis  Assessment & Plan  Ischemic hepatopathy due to shock, evidenced by markedly elevated transaminases, elevated Tbili, hypoalbunemia, elevated lactate, and coagulopathy consistent with hepatic failure and decrease synthetic function    Improving LFTs    Ammonia WNL today, will clear with CRRT, follow ammonia levels     Follow synthetic function  Avoid hepatotoxins as able      Metabolic acidosis  Assessment & Plan  On the basis of hypoperfusion and lactic acid production  in the setting of hemorrhagic shock, vasoplegia and acute hepatic injury    Improving  Continue resuscitation blood product driven  Follow markers of perfusion, trend lactate      Acute blood loss anemia  Assessment & Plan  Intraabdominal bleed    S/P MTP and operative control  Follow H/H  Transfuse if H/H is <7/21  Check TEG, will replace with blood products accordingly.    Acute saddle pulmonary embolism (HCC)- (present on admission)  Assessment & Plan  Originating from a right leg DVT. Pt is not a candidate for thrombolytics given her pneumoperitoneum  7/13: S/p emergent thrombectomy of saddle PE with IVC placement.     7/19: Repeat TTE with normal LV function, poor RV visualization but likely dilated with reduced function, RAP low    Continue to hold heparin as high risk for rebleed until surgically cleared    Acute deep venous thrombosis (DVT) determined by ultrasound (HCC)- (present on admission)  Assessment & Plan  Holding heparin in the setting of massive intraabdominal hemorrhage and liver laceration    S/p IVC filter placed this admission  Will need IVC filter retrieval in time, as soon as feasible    Perforated viscus- (present on admission)  Assessment & Plan  - 7/13 S/p ex lap with left hemicolectomy and end colostomy by Dr. Boateng  - Found perforated colon at splenic fixture, associated with mass. Path neg for malignancy  - Fecal peritonitis          Morbid obesity with BMI of 50.0-59.9, adult (HCC)- (present on admission)  Assessment & Plan  BMI 50, place her in high risk for increase morbidity during this admission         VTE:  Contraindicated  Ulcer: H2 Antagonist  Lines: Central Line  Ongoing indication addressed, Arterial Line  Ongoing indication addressed, and Elizalde Catheter  Ongoing indication addressed    I have performed a physical exam and reviewed and updated ROS and Plan today (7/22/2023). In review of yesterday's note (7/21/2023), there are no changes except as documented above.      Discussed patient condition and risk of morbidity and/or mortality with Family, RN, RT, Therapies, Pharmacy, and Patient  The patient remains critically ill.  Critical care time = 80 minutes in directly providing and coordinating critical care and extensive data review.  No time overlap and excludes procedures.

## 2023-07-22 NOTE — PROGRESS NOTES
Dr. Schafer assessed patient and discussed POC with RN. Plan for wound team to evaluate for vac placement on abd wound, Dr. Nagel OK to start trickle feeding today. Will update primary care team MD.

## 2023-07-22 NOTE — CONSULTS
Nephrology Consultation    Date of Service  7/21/2023    Referring Physician  Cristian Peña M.D.    Consulting Physician  Marita Buenrostro M.D.    Reason for Consultation  Acute kidney injury    History of Presenting Illness  63 y.o. female who presented 7/12/2023 with hypothyroidism, morbid obesity who presents to the emergency room with right lower extremity pain.    Patient presented to emergency room for further evaluation and ultimately found to have a DVT.  A CTA demonstrated acute saddle pulmonary embolism pneumoperitoneum.  Ultimately underwent thrombectomy as well is IVC placement.  Patient underwent cholecystectomy, exploratory laparotomy with left colectomy with end colostomy.    Hospital course replicated by shock due to intra-abdominal bleed while on anticoagulation.  General surgery continues to manage postoperative hemorrhage with open abdomen care and abdominal closure following.    Hospital course complicated by nonoliguric acute kidney injury.  Nephrology consulted for further evaluation.    Review of Systems  ROS    Past Medical History   Hypothyroidism  Morbid obesity      Surgical History  Past Surgical History:   Procedure Laterality Date    TX EXPLORATORY OF ABDOMEN N/A 7/20/2023    Procedure: REOPENING OF RECENT LAPAROTOMY, REMOVAL OF LIVER PACKING, COLON RESECTION, PARTIAL COLECTOMY, ABDOMINAL WALL CLOSURE, REVISION OF COLOSTOMY;  Surgeon: Taylor Bocanegra M.D.;  Location: [facility];  Service: General    WOUND CLOSURE NEURO N/A 7/20/2023    Procedure: CLOSURE, WOUND;  Surgeon: Taylor Bocanegra M.D.;  Location: [facility];  Service: General    TX EXPLORATORY OF ABDOMEN N/A 7/19/2023    Procedure: LAPAROTOMY, EXPLORATORY FOR CONTROL OF BLEEDING, LIVER PACKING, PLACEMENT OF ABTHERA, TEMPORARY ABDOMINAL CLOSURE;  Surgeon: Taylor Bocanegra M.D.;  Location: [facility];  Service: General    TX EXPLORATORY OF ABDOMEN N/A 7/13/2023    Procedure:  LAPAROTOMY, EXPLORATORY;  Surgeon: Angel Luis Boateng M.D.;  Location: [facility];  Service: General    NE COLOSTOMY  7/13/2023    Procedure: CREATION, COLOSTOMY;  Surgeon: Angel Luis Boateng M.D.;  Location: SURGERY [facility];  Service: General    CHOLECYSTECTOMY N/A 7/13/2023    Procedure: CHOLECYSTECTOMY;  Surgeon: Angel Luis Boateng M.D.;  Location: SURGERY [facility];  Service: General    LOW ANTERIOR RESECTION N/A 7/13/2023    Procedure: RESECTION, RECTUM, LOW ANTERIOR;  Surgeon: Angel Luis Boateng M.D.;  Location: SURGERY [facility];  Service: General    KNEE ARTHROPLASTY TOTAL Left 9/12/2017    Procedure: KNEE ARTHROPLASTY TOTAL;  Surgeon: Bull James M.D.;  Location: [facility];  Service: Orthopedics       Family History  History reviewed. No pertinent family history.    Social History   Obtained per chart review,denies smoking, alcohol and illicit drug use    Medications  Current Facility-Administered Medications   Medication Dose Route Frequency Provider Last Rate Last Admin    D10%-0.45% NaCl infusion   Intravenous Continuous Kerrie L. Latona 75 mL/hr at 07/21/23 1600 Rate Verify at 07/21/23 1600    heparin injection 2,800 Units  2,800 Units Intracatheter PRN Marita Buenrostro M.D.        insulin regular (HumuLIN R,NovoLIN R) injection  2-9 Units Subcutaneous Q6HRS Kerrie L. Latona   2 Units at 07/21/23 0544    And    dextrose 50% (D50W) injection 25 g  25 g Intravenous Q15 MIN PRN Kerrie L. Latona   25 g at 07/21/23 0225    piperacillin-tazobactam (Zosyn) 4.5 g in  mL IVPB  4.5 g Intravenous Q8HRS CARISSA BarbozaPTHANIA   Stopped at 07/21/23 1628    norepinephrine (Levophed) 8 mg in 250 mL NS infusion (premix)  0-1 mcg/kg/min (Ideal) Intravenous Continuous Lam Louis M.D. 28.3 mL/hr at 07/21/23 1600 0.22 mcg/kg/min at 07/21/23 1600    vasopressin (Vasostrict) 20 Units in  mL Infusion  0.03 Units/min Intravenous Continuous Lam Louis M.D. 9 mL/hr at  07/21/23 1506 0.03 Units/min at 07/21/23 1506    dexmedetomidine (PRECEDEX) 400 mcg/100mL NS premix infusion  0-1.5 mcg/kg/hr (Ideal) Intravenous Continuous Lam Louis M.D. 3.4 mL/hr at 07/19/23 2006 0.2 mcg/kg/hr at 07/19/23 2006    Respiratory Therapy Consult   Nebulization Continuous RT Cristian Peña M.D.        famotidine (Pepcid) tablet 20 mg  20 mg Enteral Tube Q12HRS Cristian Peña M.D.        Or    famotidine (Pepcid) injection 20 mg  20 mg Intravenous Q12HRS Cristian Peña M.D.   20 mg at 07/21/23 0523    senna-docusate (Pericolace Or Senokot S) 8.6-50 MG per tablet 2 Tablet  2 Tablet Enteral Tube BID Cristian Peña M.D.   2 Tablet at 07/19/23 1156    And    polyethylene glycol/lytes (Miralax) PACKET 1 Packet  1 Packet Enteral Tube QDAY PRN Cristian Peña M.D.        And    magnesium hydroxide (Milk Of Magnesia) suspension 30 mL  30 mL Enteral Tube QDAY PRN Cristian Peña M.D.        And    bisacodyl (Dulcolax) suppository 10 mg  10 mg Rectal QDAY PRN Cristian Peña M.D. MD Alert...ICU Electrolyte Replacement per Pharmacy   Other PHARMACY TO DOSE Cristian Peña M.D.        lidocaine (Xylocaine) 1 % injection 2 mL  2 mL Tracheal Tube Q30 MIN PRN Cristian Peña M.D.        fentaNYL (Sublimaze) injection 100 mcg  100 mcg Intravenous Q15 MIN PRN Cristian Peña M.D.        And    fentaNYL (Sublimaze) injection 200 mcg  200 mcg Intravenous Q15 MIN PRN Cristian Peña M.D.        And    fentaNYL (SUBLIMAZE) 50 mcg/mL in 50mL (Continuous Infusion)   Intravenous Continuous Cristian Peña M.D. 4 mL/hr at 07/21/23 1225 200 mcg/hr at 07/21/23 1225    oxyCODONE immediate-release (Roxicodone) tablet 5 mg  5 mg Oral Q4HRS PRN Cristian Peña M.D.   5 mg at 07/18/23 1749    HYDROmorphone (Dilaudid) injection 0.5-1 mg  0.5-1 mg Intravenous Q2HRS PRN Cristian Peña M.D.   0.5 mg at 07/21/23 1035    levothyroxine (Synthroid) tablet 225 mcg  225 mcg Oral AM  ES Dagoberto Ohara D.O.   225 mcg at 07/18/23 0544    ondansetron (Zofran) syringe/vial injection 4 mg  4 mg Intravenous Q4HRS PRN Ana Lilia Joy M.D.        promethazine (Phenergan) tablet 12.5-25 mg  12.5-25 mg Oral Q4HRS PRN Ana Lilia Joy M.D.        promethazine (Phenergan) suppository 12.5-25 mg  12.5-25 mg Rectal Q4HRS PRN Ana Lilia Joy M.D.        prochlorperazine (Compazine) injection 5-10 mg  5-10 mg Intravenous Q4HRS PRN Ana Lilia Joy M.D.        Nozin nasal  swab  1 Applicator Each Nostril BID Dagoberto Ohara D.O.   1 Applicator at 07/21/23 0523       Allergies  Allergies   Allergen Reactions    Nkda [No Known Drug Allergy]        Physical Exam  Pulse:  [] 70  Resp:  [17-30] 22  BP: ()/(46-84) 110/53  SpO2:  [86 %-100 %] 99 %    Physical Exam  - deferred as patient is in procedure.         Fluids  Date 07/21/23 0700 - 07/22/23 0659   Shift 7649-5016 0414-0906 4706-5936 24 Hour Total   INTAKE   I.V. 1411.6 232.1  1643.7   IV Piggyback 166.6 50  216.6   Shift Total 1578.2 282.1  1860.3   OUTPUT   Urine 0 0  0   Dialysis 59   59   Shift Total 59 0  59   Weight (kg) 154.9 154.9 154.9 154.9       Laboratory  Labs reviewed, pertinent labs below.  Recent Labs     07/20/23  0529 07/20/23  1140 07/20/23  1934 07/20/23  2347 07/21/23  0535 07/21/23  1250   WBC 23.6* 22.4*  --   --  20.6*  --    RBC 3.60* 3.71*  --   --  3.02*  --    HEMOGLOBIN 11.1* 11.3*   < > 11.4* 10.1* 9.2*   HEMATOCRIT 31.3* 31.4*   < > 30.8* 28.0* 25.6*   MCV 86.9 84.6  --   --  92.7  --    MCH 30.8 30.5  --   --  33.4*  --    MCHC 35.5 36.0*  --   --  36.1*  --    RDW 49.1 47.7  --   --  51.7*  --    PLATELETCT 60* 59*  --   --  64*  --    MPV 12.5 12.3  --   --  12.2  --     < > = values in this interval not displayed.     Recent Labs     07/20/23  1140 07/20/23  1934 07/21/23  0535   SODIUM 140 141 140   POTASSIUM 4.3 4.4 4.2   CHLORIDE 100 103 102   CO2 22 21 21   GLUCOSE 132* 89 160*   BUN 28* 33* 39*    CREATININE 2.34* 2.58* 3.25*   CALCIUM 6.7* 6.4* 6.7*     Recent Labs     07/19/23  0803 07/19/23  0950 07/19/23  1355   APTT 112.1* 95.6* 59.6*   INR 3.80* 3.03* 3.11*            URINALYSIS:  Lab Results   Component Value Date/Time    COLORURINE Yellow 08/12/2017 0717    CLARITY Clear 08/12/2017 0717    SPECGRAVITY 1.030 08/12/2017 0717    PHURINE 6.0 08/12/2017 0717    KETONES Negative 08/12/2017 0717    PROTEINURIN Negative 08/12/2017 0717    BILIRUBINUR Negative 08/12/2017 0717    UROBILU 0.2 08/12/2017 0717    NITRITE Negative 08/12/2017 0717    LEUKESTERAS Moderate (A) 08/12/2017 0717    OCCULTBLOOD Negative 08/12/2017 0717     UPC  No results found for: TOTPROTUR No results found for: CREATININEU    Imaging interpreted by radiologist. Imaging reports reviewed with pertinent findings below  DX-CHEST-LIMITED (1 VIEW)   Final Result      1.  Interval placement of a left-sided temporary dialysis catheter which terminates with the tip projecting over the expected location of the distal SVC.   2.  Stable mild interstitial pulmonary edema and bilateral basilar atelectasis and/or consolidation.      DX-CHEST-PORTABLE (1 VIEW)   Final Result         1.  Pulmonary edema and/or infiltrates are identified, which are stable since the prior exam.   2.  Trace bilateral pleural effusions      [id number] VIEW   Final Result      Severely suboptimal exam related to patient body habitus. No unexpected radiopaque foreign body seen.      DX-CHEST-PORTABLE (1 VIEW)   Final Result         1.  Pulmonary edema and/or infiltrates are identified, which are stable since the prior exam.   2.  Trace bilateral pleural effusions      EC-ECHOCARDIOGRAM COMPLETE W/ CONT   Final Result      DX-CHEST-PORTABLE (1 VIEW)   Final Result      1.  Pulmonary edema, worse than prior exam   2.  Slight increased inflation from prior.   3.  Supportive tubing as described above.      DX-CHEST-PORTABLE (1 VIEW)   Final Result         1.  Bibasilar  atelectasis versus infiltrates.      CT-ABDOMEN-PELVIS WITH   Final Result         1.  Scattered moderate abdominal ascites.   2.  Trace bilateral pleural effusions, left greater than right   3.  Linear densities the bilateral lung bases, greater on the left, appearance suggests atelectasis, component of infiltrate not excluded.   4.  DVT in the right external iliac vein extending into the common femoral vein, compatible with reported history of DVT.   5.  Diverticulosis   6.  Hepatomegaly   7.  Diffuse hepatic steatosis      DX-CHEST-PORTABLE (1 VIEW)   Final Result         1.  Bibasilar atelectasis or evolving infiltrates.      IR-MIDLINE CATHETER INSERTION WO GUIDANCE > AGE 3   Final Result                  Ultrasound-guided midline placement performed by qualified nursing staff    as above.          IR-US GUIDED PIV   Final Result    Ultrasound-guided PERIPHERAL IV INSERTION performed by    qualified nursing staff as above.      DX-CHEST-PORTABLE (1 VIEW)   Final Result      Stable thoracic underinflation. Right IJ line adequate. Stable bibasilar pleural-parenchymal opacification      DX-CHEST-PORTABLE (1 VIEW)   Final Result      Stable thoracic underinflation. Right IJ line and ETT appear adequate. Slightly improved bibasilar pleural-parenchymal opacification      DX-CHEST-PORTABLE (1 VIEW)   Final Result      Stable bibasilar pleural-parenchymal opacification and low lung volumes. Lines/tubes adequate.      DX-CHEST-LIMITED (1 VIEW)   Final Result         Endotracheal tube with tip projecting over the mid thoracic trachea.   Gastric drainage tube courses below diaphragm, tip is not seen.   Right central venous catheter with tip projecting over the expected area of the lower SVC.            EC-NAEEM W/O CONT   Final Result      DX-CHEST-LIMITED (1 VIEW)   Final Result      Bibasilar underinflation atelectasis which could obscure an additional process. This is similar to the prior study. Lung volumes are very  low.      IR-INSERT IVC FILTER WITH IG & SI   Final Result      1.  Ultrasound guided access LEFT common femoral vein.   2.  BILATERAL selective pulmonary arteriogram demonstrating extensive bilateral pulmonary emboli.   3.  Successful BILATERAL pulmonary thrombectomy   4.  Large IVC at the level of the renal veins   5.  Successful placement of infrarenal IVC filter   6.  Pursestring suture should be removed in 6-12 hours.         Please note that this study required greater than 50% more than the usual procedure time due to the patient's anatomy and large habitus.            IR-THROMBO MECHANICAL ARTERY,INIT   Final Result      1.  Ultrasound guided access LEFT common femoral vein.   2.  BILATERAL selective pulmonary arteriogram demonstrating extensive bilateral pulmonary emboli.   3.  Successful BILATERAL pulmonary thrombectomy   4.  Large IVC at the level of the renal veins   5.  Successful placement of infrarenal IVC filter   6.  Pursestring suture should be removed in 6-12 hours.         Please note that this study required greater than 50% more than the usual procedure time due to the patient's anatomy and large habitus.            EC-ECHOCARDIOGRAM COMPLETE W/ CONT   Final Result      CT-ABDOMEN-PELVIS WITH   Final Result      1.  Pneumoperitoneum secondary to perforated viscus. Splenic flexure colonic diverticulum may be the perforation site. Gastroduodenal ulcer ulcer is considered less likely.   2.  Hepatic steatosis.   3.  Moderate gallbladder distention and pericholecystic fat stranding.   4.  Small volume hyperdense ascites.   5.  Partially visualized right lower extremity DVT.      Findings were communicated to SALINA STOVER via [employer] system on 7/12/2023 11:03 PM.      CT-CTA CHEST PULMONARY ARTERY W/ RECONS   Final Result      1.  Saddle pulmonary embolus with associated right heart strain.   2.  Posterior pneumomediastinum secondary to pneumoperitoneum. Abdomen will be dictated separately.       Findings were communicated to SALINA STOVER via [employer] system on 7/12/2023 10:57 PM.      DX-CHEST-PORTABLE (1 VIEW)   Final Result      Mild atelectasis in the right lower lung. No focal consolidation. No effusions.               Assessment/Plan  Ms. English is a 63-year-old woman with hypothyroidism, morbid obesity who presents with DVT, PE.  Patient underwent cholecystectomy for which hospital course complicated by postoperative abdominal hemorrhage.  Nephrology consulted for  DUNG evaluation.        Oliguric Acute Kidney Injury -likely related to ATN in the setting of hemorrhagic shock, prerenal.     -Obtain repeat urinalysis. (Ordered).   - CT abdomen pelvis on 7/19/2023 negative for hydronephrosis.  - Dose all medications for patient level of renal function  - Avoid nephrotoxic agents including contrast and NSAIDs  - Encourage adequate hydration.  - Continue to monitor renal function.  Obtain BMP, urine studies including urine protein quantification.  -Recommend initiation of renal replacement therapy for anticipated clearance and volume needs.  We will employ CRRT modality given instability.  - maintain MAP>65 with pressor support as appropriate.   -Remainder of infectious work-up.      2.  Hemorrhagic shock -received 15 units of packed red blood cells, 12 units of FFP, 3 units of platelets.  Continue Levophed and vasopressin as appropriate.  IV fluid bolus 500 cc on 7/21/2023.  Continue empiric Zosyn.  Follow remainder of infectious work-up.    3.  Saddle pulmonary embolism -discontinued anticoagulation on 07/19 given postoperative hemorrhage.    4.  Perforated viscus, liver laceration-completed left hemicolectomy and end colostomy, cholecystectomy on 7/13/2023.  Routine postoperative care per general surgery recommendations.        Plan of care discussed with Dr. Cristian Buenrostro MD  Nephrology  [facility]

## 2023-07-22 NOTE — WOUND TEAM
[facility]  Inpatient Services  New Ostomy Follow-up Management & Teaching    HPI:  Reviewed  PMH: Reviewed   SH: Reviewed    Past Surgical History:   Procedure Laterality Date    IN EXPLORATORY OF ABDOMEN N/A 7/20/2023    Procedure: REOPENING OF RECENT LAPAROTOMY, REMOVAL OF LIVER PACKING, COLON RESECTION, PARTIAL COLECTOMY, ABDOMINAL WALL CLOSURE, REVISION OF COLOSTOMY;  Surgeon: Taylor Bocanegra M.D.;  Location: [facility];  Service: General    WOUND CLOSURE NEURO N/A 7/20/2023    Procedure: CLOSURE, WOUND;  Surgeon: Taylor Bocanegra M.D.;  Location: [facility];  Service: General    IN EXPLORATORY OF ABDOMEN N/A 7/19/2023    Procedure: LAPAROTOMY, EXPLORATORY FOR CONTROL OF BLEEDING, LIVER PACKING, PLACEMENT OF ABTHERA, TEMPORARY ABDOMINAL CLOSURE;  Surgeon: Taylor Bocanegra M.D.;  Location: [facility];  Service: General    IN EXPLORATORY OF ABDOMEN N/A 7/13/2023    Procedure: LAPAROTOMY, EXPLORATORY;  Surgeon: Angel Luis Boateng M.D.;  Location: [facility];  Service: General    IN COLOSTOMY  7/13/2023    Procedure: CREATION, COLOSTOMY;  Surgeon: Angel Luis Boateng M.D.;  Location: [facility];  Service: General    CHOLECYSTECTOMY N/A 7/13/2023    Procedure: CHOLECYSTECTOMY;  Surgeon: Angel Luis Boateng M.D.;  Location: [facility];  Service: General    LOW ANTERIOR RESECTION N/A 7/13/2023    Procedure: RESECTION, RECTUM, LOW ANTERIOR;  Surgeon: Angel Luis Boateng M.D.;  Location: [facility];  Service: General    KNEE ARTHROPLASTY TOTAL Left 9/12/2017    Procedure: KNEE ARTHROPLASTY TOTAL;  Surgeon: Bull James M.D.;  Location: [facility];  Service: Orthopedics       Surgery Date: 7/13/23, 7/20/23 revision    Surgeon(s):  ROM Mosley M.D. Alvaro H Devia, M.D.    Procedure(s):  LAPAROTOMY, EXPLORATORY - REMOVAL OF PACKING, POSSIBLE WOUND CLOSURE  CLOSURE, WOUND     Permanence:  "Unknown    Pertinent History:     \"63-year-old woman who presented with a perforated viscus.  She was also noted to have a saddle embolus in her pulmonary arteries and underwent an emergent thrombectomy.  Based on the fact that she had a perforated viscus an emergent laparotomy was indicated subsequent to the thrombectomy\"    7/13 - Left hemicolectomy with end colostomy/Delvis's procedure, cholecystectomy  7/19 - Repoening of previous Exploratory laparotomy.  Abdominal washout, hepatorrhaphy, packing of the abdomen, and temporary abdominal closure with negative pressure wound therapy device greater than 50 cm²  7/20 - Reopening of recent laparotomy with removal of liver packs, colon resection, and revision of colostomy                       Colostomy 07/20/23 RUQ (Active)   Wound Image   07/22/23 1300   Stomal Appliance Assessment Changed 07/22/23 1300   Stoma Assessment Black;Brown 07/22/23 1300   Stoma Shape Budded Less Than One Inch;Round 07/22/23 1300   Stoma Size (in) 2.75 07/22/23 1300   Peristomal Assessment Clean;Dry;Intact 07/22/23 1300   Mucocutaneous Junction Intact 07/22/23 1300   Treatment Appliance Changed 07/22/23 1300   Peristomal Protectant Paste Ring 07/22/23 1300   Stomal Appliance Paste Ring, 2\";2 3/4\" (70mm) CTF 07/22/23 1300   Output (mL) 75 mL 07/22/23 0600                                                       Colostomy Interventions:  Removed appliance (using push pull method) - By Ostomy RN  Cleansed sara-stomal skin with moist warm washcloth - By Ostomy RN  Created/Checked template fit - By Ostomy RN  Traced Shape to back of barrier - By Ostomy RN  Confirmed fit - By Ostomy RN  Removed plastic backing and \"Dog Eared\" paper edges - By Ostomy RN  Stretched paste ring to fit barrier opening - By Ostomy RN  Applied paste ring to back of barrier - By Ostomy RN  Applied barrier to skin and adhered with friction - By Ostomy RN  Attached pouch - By Ostomy RN  Closed Pouch end - By Ostomy " RN    Patient Education: Ostomy RN to follow-up daily for education     Date:  07/22/23    Patient not medically stable, no education provided    Needs for next visit:         Evaluation:      Date:  07/22/23    Patient stoma mostly black. No stool yet, only small SS drainage noted in pouch.    Date: 7/17/23  Patient with loose watery stools at this time still, continue high output pouch. Patient hopefully transferring out of ICU soon, but will be difficult for patient education due to close proximity to wound VAC dressing and due to patient having difficult visualizing stoma.      Date:  07/13/23    Patient with very high amounts of stool output already. Attached to high output pouch at this time. Patient stoma almost flush to skin and dusky in areas. Wound team to continue to follow        Flatus: Not Present  Stool Output:  (serosanguinous)  Urine Output:    Mobility: Not Mobilizing     DIET ORDERS (From admission to next 24h)       Start     Ordered    07/22/23 1426  Diet: Diet Tube Feed; Formula: Peptamen Intense VHP; Goal Rate (mL/Hour): 70; Duration: 24 HR; Tube Feed Time Unit: Hours/Day  ALL MEALS        Comments: Start at 10 mL/hr and advance per MD   Question Answer Comment   Diet Diet Tube Feed    Formula: Peptamen Intense VHP    Goal Rate (mL/Hour) 70    Duration 24 HR    Tube Feed Time Unit Hours/Day        07/22/23 1426    07/19/23 1009  Diet NPO Restrict to: Strict (okay to receive meds through the NG/OG tube)  ALL MEALS        Question Answer Comment   Type: Now    Diet NPO Restrict to: Strict okay to receive meds through the NG/OG tube       07/19/23 1008                    Plan: Ostomy nurses to continue to follow for ostomy needs and teaching until patient independent with care or discharge.  Ostomy Nurse follow-up frequency:   With Wound Vac changes    Secure Start Signed:  Not Medically Stable  Outpatient Referral Placed:  Not Medically Stable  5 Sets of appliances in Ostomy bag for discharge:   "Not Medically Stable    INSURANCE OPTIONS: \"Miscellaneous\"    TBD    Anticipated Discharge Plans:  TBD    Ostomy Supplies for DC:  To be determined in 4 to 6 weeks once stomal edema has fully resolved  "

## 2023-07-22 NOTE — PROCEDURES
Central Line Insertion    Date/Time: 7/21/2023 6:51 PM    Performed by: Cristian Peña M.D.  Authorized by: Cristian Peña M.D.    Consent:     Consent obtained:  Emergent situation    Alternatives discussed:  No treatment, delayed treatment and alternative treatment  Pre-procedure details:     Skin preparation:  2% chlorhexidine    Skin preparation agent: Skin preparation agent completely dried prior to procedure    Anesthesia:     Anesthesia method:  Local infiltration    Local anesthetic:  Lidocaine 1% w/o epi  Procedure details:     Location:  L internal jugular    Site selection rationale:  RIJ occupied    Patient position:  Flat    Procedural supplies:  Triple lumen    Catheter size: 13fr Trilaysis.    Ultrasound guidance: yes      Sterile ultrasound techniques: Sterile gel and sterile probe covers were used      Number of attempts:  2    Successful placement: yes    Post-procedure details:     Post-procedure:  Dressing applied and line sutured    Guidewire: guidewire removal confirmed      Assessment:  Blood return through all ports and no pneumothorax on x-ray    Patient tolerance of procedure:  Tolerated well, no immediate complications  Comments:      Line to place, likely related to vasoplegia and flattening of IJ.  Initial access obtained unable to pass wire, suspect traveling down subclavian, aborted first attempt.  Second access obtained using a 20-gauge Angiocath, with passage of the Angiocath into the vessel easily.  Confirmed venous placement using water column.  Wire then passed easily to appropriate depth, dilation performed and catheter inserted without further complication.

## 2023-07-22 NOTE — DIETARY
"Nutrition Support Assessment   Day 9 of admit.  Ashley English is a 63 y.o. female with admitting DX of pneumoperitoneum and perforated abdominal viscus.      Current problem list:  Thrombocytopenia   Acute hepatic necrosis   Acute blood loss anemia   Hemorrhagic shock   Metabolic acidosis   Liver laceration   DUNG  Perforated visus   Acute hypoxemic respiratory failure   Acute DVT determined by ultrasound  Acute saddle pulmonary embolism   Morbid obesity      Assessment:  Estimated Nutritional Needs: based on:   Height: 177.9 cm (5' 10.04\")  Weight: (!) 156 kg (343 lb 14.7 oz) via bed scale  Weight to Use in Calculations: 156 kg (343 lb 14.7 oz)  Ideal Body Weight: 68.2 kg (150 lb 5.7 oz)  Percent Ideal Body Weight: 228.7  Body mass index is 49.29 kg/m²., BMI classification: Obese    Calculation/Equation:   REE per BFQ=6000 kcals/day (x1.9=7328 kcals/day) (x0.65-0.6=0489-9664 kcals/day per [employer] critically ill obese guidelines)   RMR per PSU (VE: 9.6 L/min and Tmax: 37.5 C)=2272 kcals/day (x0.65-0.1=6543-4744 kcals/day per [employer] critically ill obese guidelines)   Total Calories / day: 1716 - 2184 (Calories / kg(ABW): 11 - 14)  Total Calories / day: 1500 - 1705 (Calories / kg(IBW): 22 - 25)  Total Grams Protein / day: 125 - 156 (Grams Protein / kg(ABW): 0.8 - 1)  Total Grams Protein / day: 136 (Grams Protein / kg(IBW): 2.0)     Evaluation:   Consult received for tube feeding assessment. Pt currently NPO x4. Okay for trickle feeds per surgery. Pt has been on and off NPO d/t multiple surgeries over the last 9 days. D/t prolonged NPO status pt may be at risk for refeeding. Monitor lytes and replete as indicated.   Pt with OG tube placed 7/19  Current clinical picture and MD progress notes reviewed. Pt with multiple surgeries during admit including cholecystectomy, hemicolectomy with colostomy now s/p colon resection and revision of colostomy on 7/20. Pt s/p abd closure with open abd wall wound. Off pressors, " remains on vent   Pt with BMI >40, will hypo calorically feed per guidelines.   Labs and Meds reviewed   Skin: 3+ general edema, 2+ weeping abdominal edema, 2+ back, flank and scaral edema with 3+ bilateral upper and lower extremity edema noted.   +BM 7/22  Peptamen Intense VHP is an appropriate specialized formula to meet estimated needs while in critical care.       Malnutrition Risk: N/A     Recommendations/Plan:  Initiate Peptamen Intense VHP @ 10 mL/hr and advance per MD to goal rate of 70 mL/hr providing 1680 kcals, 155 grams protein and 1411 mL free water.   Fluids per MD   Monitor weights and lytes   Diet advancements as medically feasible per MD     RD following

## 2023-07-22 NOTE — CARE PLAN
Problem: Ventilation  Goal: Ability to achieve and maintain unassisted ventilation or tolerate decreased levels of ventilator support  Description: Target End Date:  4 days     Document on Vent flowsheet    1.  Support and monitor invasive and noninvasive mechanical ventilation  2.  Monitor ventilator weaning response  3.  Perform ventilator associated pneumonia prevention interventions  4.  Manage ventilation therapy by monitoring diagnostic test results  Outcome: Progressing                                           Ventilator Daily Summary     Vent Day #4  8.0@24  ApvCMV 20/420/+8/40%  Weaning trials: on SBT 5/+8 for 10 hours, not ready to extubate- tolerated well       Plan: Continue current ventilator settings and wean mechanical ventilation as tolerated per physician orders

## 2023-07-22 NOTE — CARE PLAN
Problem: Ventilation  Goal: Ability to achieve and maintain unassisted ventilation or tolerate decreased levels of ventilator support  Description: Target End Date:  4 days     Document on Vent flowsheet    1.  Support and monitor invasive and noninvasive mechanical ventilation  2.  Monitor ventilator weaning response  3.  Perform ventilator associated pneumonia prevention interventions  4.  Manage ventilation therapy by monitoring diagnostic test results  Outcome: Not Met   Vent day 4  ETT: 8 at 24  Ventilator settings: APVCMV R22 420 8 40%  Plan: continue current ventilator settings and wean mechanical ventilation as tolerated per physician orders

## 2023-07-22 NOTE — WOUND TEAM
[facility]  Inpatient Services  Wound and Skin Care Follow-up    Admission Date: 7/12/2023     Last order of IP CONSULT TO WOUND CARE was found on 7/22/2023 from Hospital Encounter on 7/12/2023     HPI, PMH, SH: Reviewed    Past Surgical History:   Procedure Laterality Date    KS EXPLORATORY OF ABDOMEN N/A 7/20/2023    Procedure: REOPENING OF RECENT LAPAROTOMY, REMOVAL OF LIVER PACKING, COLON RESECTION, PARTIAL COLECTOMY, ABDOMINAL WALL CLOSURE, REVISION OF COLOSTOMY;  Surgeon: Taylor Bocanegra M.D.;  Location: [facility];  Service: General    WOUND CLOSURE NEURO N/A 7/20/2023    Procedure: CLOSURE, WOUND;  Surgeon: Taylor Bocanegra M.D.;  Location: [facility];  Service: General    KS EXPLORATORY OF ABDOMEN N/A 7/19/2023    Procedure: LAPAROTOMY, EXPLORATORY FOR CONTROL OF BLEEDING, LIVER PACKING, PLACEMENT OF ABTHERA, TEMPORARY ABDOMINAL CLOSURE;  Surgeon: Taylor Bocanegra M.D.;  Location: [facility];  Service: General    KS EXPLORATORY OF ABDOMEN N/A 7/13/2023    Procedure: LAPAROTOMY, EXPLORATORY;  Surgeon: Angel Luis Boateng M.D.;  Location: [facility];  Service: General    KS COLOSTOMY  7/13/2023    Procedure: CREATION, COLOSTOMY;  Surgeon: Angel Luis Boateng M.D.;  Location: [facility];  Service: General    CHOLECYSTECTOMY N/A 7/13/2023    Procedure: CHOLECYSTECTOMY;  Surgeon: Angel Luis Boateng M.D.;  Location: [facility];  Service: General    LOW ANTERIOR RESECTION N/A 7/13/2023    Procedure: RESECTION, RECTUM, LOW ANTERIOR;  Surgeon: Angel Luis Boateng M.D.;  Location: [facility];  Service: General    KNEE ARTHROPLASTY TOTAL Left 9/12/2017    Procedure: KNEE ARTHROPLASTY TOTAL;  Surgeon: Bull James M.D.;  Location: [facility];  Service: Orthopedics     Social History     Tobacco Use    Smoking status: Never    Smokeless tobacco: Never   Substance Use Topics    Alcohol use: No     Chief Complaint   Patient  "presents with    Abdominal Pain     Patient to triage via wheelchair for abdominal pain, scheduled for gallbladder surgery but received outpatient US today and was instructed to come to ED for blood clot to right leg. Patient denies use of blood thinners or history of blood clot     Leg Pain     Diagnosis: Pneumoperitoneum [K66.8]  Perforated abdominal viscus [R19.8]    Unit where seen by Wound Team: T930/01     WOUND FOLLOW UP RELATED TO:  Abdomen       WOUND HISTORY:     \"63-year-old woman who presented with a perforated viscus.  She was also noted to have a saddle embolus in her pulmonary arteries and underwent an emergent thrombectomy.  Based on the fact that she had a perforated viscus an emergent laparotomy was indicated subsequent to the thrombectomy\"    7/13 - Left hemicolectomy with end colostomy/Delvis's procedure, cholecystectomy  7/19 - Repoening of previous Exploratory laparotomy.  Abdominal washout, hepatorrhaphy, packing of the abdomen, and temporary abdominal closure with negative pressure wound therapy device greater than 50 cm²  7/20 - Reopening of recent laparotomy with removal of liver packs, colon resection, and revision of colostomy        WOUND ASSESSMENT/LDA    Negative Pressure Wound Therapy 07/22/23 Surgical Abdomen Midline Left (Active)   Wound Image    07/22/23 1300   Vacuum Serial Number [id number] 07/22/23 1300   NPWT Pump Mode / Pressure Setting Ulta;Continuous;125 mmHg 07/22/23 1300   Dressing Type Medium;Black Foam (Veraflo) 07/22/23 1300   Number of Foam Pieces Used 3 07/22/23 1300   Canister Changed No 07/22/23 1300   NEXT Dressing Change/Treatment Date 07/25/23 07/22/23 1300   VAC VeraFlo Irrigant Normal Saline 07/22/23 1300   VAC VeraFlo Soak Time (mins) 10 07/22/23 1300   VAC VeraFlo Instill Volume (ml) 34 07/22/23 1300   VAC VeraFlo - Therapy Time (hrs) 2 07/22/23 1300   VAC VeraFlo Pressure (mm/Hg) Continuous;125 mmHg 07/22/23 1300           Wound 07/13/23 Incision Abdomen " Open Surgical full thickness (Active)   Wound Image    07/22/23 1300   Site Assessment Red;Pink;Tan;White;Tunneling 07/22/23 1300   Periwound Assessment Clean;Dry;Intact 07/22/23 1300   Margins Defined edges;Unattached edges 07/22/23 1300   Closure Secondary intention 07/22/23 1300   Drainage Amount Moderate 07/22/23 1300   Drainage Description Serosanguineous 07/22/23 1300   Treatments Cleansed;Nonselective debridement;Site care 07/22/23 1300   Wound Cleansing Approved Wound Cleanser 07/22/23 1300   Periwound Protectant No-sting Skin Prep;Paste Ring;Drape 07/22/23 1300   Dressing Status Clean;Dry;Intact 07/22/23 1300   Dressing Changed Changed 07/22/23 1300   Dressing Cleansing/Solutions Normal Saline 07/22/23 1300   Dressing Options Wound Vac 07/22/23 1300   Dressing Change/Treatment Frequency Tuesday, Thursday, Saturday, and As Needed 07/22/23 1300   NEXT Dressing Change/Treatment Date 07/25/23 07/22/23 1300   NEXT Weekly Photo (Inpatient Only) 07/29/23 07/22/23 1300   Wound Team Following 3x Weekly 07/22/23 1300   Non-staged Wound Description Full thickness 07/22/23 1300   Wound Length (cm) 23 cm 07/22/23 1300   Wound Width (cm) 8 cm 07/22/23 1300   Wound Depth (cm) 7.7 cm 07/22/23 1300   Wound Surface Area (cm^2) 184 cm^2 07/22/23 1300   Wound Volume (cm^3) 1416.8 cm^3 07/22/23 1300   Wound Healing % -46 07/22/23 1300   Tunneling (cm) 5 cm 07/22/23 1300   Tunneling Clock Position of Wound 12 07/22/23 1300   Shape Oval 07/22/23 1300   Wound Odor Mild 07/22/23 1300   Exposed Structures Adipose;Sutures 07/22/23 1300   WOUND NURSE ONLY - Time Spent with Patient (mins) 75 07/22/23 1300       Wound 07/22/23 Full Thickness Wound Abdomen Lateral Left previous ostomy site (Active)   Wound Image   07/22/23 1300   Site Assessment Red;Yellow;Black 07/22/23 1300   Periwound Assessment Clean;Dry;Intact 07/22/23 1300   Margins Attached edges;Defined edges 07/22/23 1300   Closure Secondary intention 07/22/23 1300   Drainage  Amount Small 07/22/23 1300   Drainage Description Serosanguineous 07/22/23 1300   Treatments Cleansed;Site care 07/22/23 1300   Wound Cleansing Approved Wound Cleanser 07/22/23 1300   Periwound Protectant No-sting Skin Prep;Paste Ring;Drape 07/22/23 1300   Dressing Status Clean;Dry;Intact 07/22/23 1300   Dressing Changed Changed 07/22/23 1300   Dressing Cleansing/Solutions Not Applicable 07/22/23 1300   Dressing Options Wound Vac 07/22/23 1300   Dressing Change/Treatment Frequency Tuesday, Thursday, Saturday, and As Needed 07/22/23 1300   NEXT Dressing Change/Treatment Date 07/25/23 07/22/23 1300   NEXT Weekly Photo (Inpatient Only) 07/29/23 07/22/23 1300   Wound Team Following 3x Weekly 07/22/23 1300   Non-staged Wound Description Full thickness 07/22/23 1300   Wound Length (cm) 2.2 cm 07/22/23 1300   Wound Width (cm) 5.7 cm 07/22/23 1300   Wound Depth (cm) 2.5 cm 07/22/23 1300   Wound Surface Area (cm^2) 12.54 cm^2 07/22/23 1300   Wound Volume (cm^3) 31.35 cm^3 07/22/23 1300   Tunneling (cm) 5.6 cm 07/22/23 1300   Tunneling Clock Position of Wound 9 07/22/23 1300   Shape Oval 07/22/23 1300   Wound Odor Mild 07/22/23 1300   Exposed Structures Adipose 07/22/23 1300         Vascular:    AIDA:   No results found.    Lab Values:    Lab Results   Component Value Date/Time    WBC 18.5 (H) 07/22/2023 05:30 AM    RBC 2.81 (L) 07/22/2023 05:30 AM    HEMOGLOBIN 9.6 (L) 07/22/2023 12:00 PM    HEMATOCRIT 27.5 (L) 07/22/2023 12:00 PM    HBA1C 5.7 (H) 08/12/2017 07:17 AM         Culture Results show:  No results found for this or any previous visit (from the past 720 hour(s)).    Pain Level/Medicated:   Patient intubated, no S/S discomfort        INTERVENTIONS BY WOUND TEAM:  Chart and images reviewed. Discussed with bedside RN. All areas of concern (based on picture review, LDA review and discussion with bedside RN) have been thoroughly assessed. Documentation of areas based on significant findings. This RN in to assess patient.  Performed standard wound care which includes appropriate positioning, dressing removal and non-selective debridement. Pictures and measurements obtained weekly if/when required.    Wound:  Mid Abdomen  Preparation for Dressing removal: Removed without difficulty  Cleansed/Non-selectively Debrided with:  Wound cleanser and Gauze  Natacha wound: Cleansed with Wound cleanser and Gauze, Prepped with No Sting, Paste Rings, Drape, and Hydrocolloid  Primary Dressing:  mepitel one over sutures, 2 pieces VF foam filled onto wound bed. Foam sealed with drape  Secondary (Outer) Dressing: Hole cut, VF tracpad applied, VF NPWT initiated. No leaks noted. See settings in LDA. Tubing Y-connected with Left lateral abdomen NPWT     Wound:  Left Lateral Abdomen  Preparation for Dressing removal: Removed without difficulty  Cleansed/Non-selectively Debrided with:  Wound cleanser and Gauze  Natacha wound: Cleansed with Wound cleanser and Gauze, Prepped with No Sting, Paste Rings, and Drape  Primary Dressin piece of spiral black foam tucked into wound bed and continued as button  Secondary (Outer) Dressing: foam sealed with regular tracpad and drap. Tubing Y-connected with MLI NPWT   Superficial sutures removed per MD Schafer    Advanced Wound Care Discharge Planning  Number of Clinicians necessary to complete wound care: 1, easier with 2  Is patient requiring IV pain medications for dressing changes:  No    Length of time for dressing change 45 min. (This does not include chart review, pre-medication time, set up, clean up or time spent charting.)    Interdisciplinary consultation: Patient, Bedside RN (Aysha), Elsi HAIDER (Wound RN) and MD Schafer    EVALUATION / RATIONALE FOR TREATMENT:     Date:  23  Wound Status:  Initial evaluation    Mid abdominal wound with pale pink tissue and dull adipose. Visible sutures noted to wound base. Fascia intact per MD Schafer, ok to initiate VF NPWT. VF NPWT to assist with mechanical debridement,  manage bioburden and exudate, and increase granulation tissue production. NPWT also applied to previous ostomy site to left lateral abdomen. Edges with dark nonviable tissue. Expect this wound to be  on next change.    Date: 7/17/23  Wound Status: No changes  Patient abdomen with adipose and one suture still present to base, more yellow tissue noted than previous assessment. Fascia appears intact. Continued adaptic at this time to assure protection over suture. Continue Regular VAC at this time.         Date:  07/13/23  Wound Status:  Initial evaluation     Patient mid abdomen with full thickness open surgical incision. Adipose and scant suture visible to base of wound but fascia appears intact and adipose appears viable. Some tunneling at 1200 and protected exposed suture with adaptic non-adherent. Patient will benefit from NPWT to assist with wound closure by secondary intention, management of bio-burden and exudate through mechanical debridement, and increase oxygenation and granulation tissue production to wound bed.             Goals: Steady decrease in wound area and depth weekly.    WOUND TEAM PLAN OF CARE - Frequency of Follow-up:   Nursing to follow dressing orders written for wound care. Contact wound team if area fails to progress, deteriorates or with any questions/concerns if something comes up before next scheduled follow up (See below as to whether wound is following and frequency of wound follow up)  Dressing changes by wound team:                   NPWT change 3 times weekly - MLE, Left lateral abdomen    NURSING PLAN OF CARE ORDERS:  Dressing changes: See Dressing Care orders    NUTRITION RECOMMENDATIONS   Wound Team Recommendations:  N/A     DIET ORDERS (From admission to next 24h)       Start     Ordered    07/22/23 1426  Diet: Diet Tube Feed; Formula: Peptamen Intense VHP; Goal Rate (mL/Hour): 70; Duration: 24 HR; Tube Feed Time Unit: Hours/Day  ALL MEALS        Comments: Start at 10  mL/hr and advance per MD   Question Answer Comment   Diet Diet Tube Feed    Formula: Peptamen Intense VHP    Goal Rate (mL/Hour) 70    Duration 24 HR    Tube Feed Time Unit Hours/Day        07/22/23 1426    07/19/23 1009  Diet NPO Restrict to: Strict (okay to receive meds through the NG/OG tube)  ALL MEALS        Question Answer Comment   Type: Now    Diet NPO Restrict to: Strict okay to receive meds through the NG/OG tube       07/19/23 1008                    PREVENTATIVE INTERVENTIONS:   Q shift Alessandro - performed per nursing policy  Q shift pressure point assessments - performed per nursing policy    Surface/Positioning  ICU Low Airloss - Currently in Place  Reposition q 2 hours - Currently in Place    Offloading/Redistribution  Float Heels off Bed with Pillows - Currently in Place           Respiratory  Anchorfast - Currently in Place    Containment/Moisture Prevention    Fecal ostomy - Currently in Place  Elizalde Catheter - Currently in Place    Mobilization      Not Mobilizing      Anticipated discharge plans:  TBD        Vac Discharge Needs:  Vac Discharge plan is purely a recommendation from wound team and not a requirement for discharge unless otherwise stated by physician.  Veraflo Vac while inpatient, ok to transition to Regular Vac on discharge

## 2023-07-22 NOTE — PROGRESS NOTES
Nephrology Consultation    Date of Service  7/22/2023       Subjective- started on CRRT yesterday with UF 50 mL/hr as tolerated. Off of pressors.      Physical Exam  Pulse:  [] 94  Resp:  [14-35] 25  BP: ()/(44-77) 103/51  SpO2:  [93 %-100 %] 99 %    Physical Exam  GEN: intubated. Sedated. Morbidly obese.  HEENT: ET tube in place.   CV:RRR, normal s1 s2  PULM: consistent with sounds of mechanical ventilation.   ABD: soft non tender. Obese.   EXT: warm well perfused, pitting lower extremity edema  Wound vacuum in place.  Abdominal wound in with dressing in place.      Fluids  Date 07/22/23 0700 - 07/23/23 0659   Shift 4254-9302 5408-8343 9795-9623 24 Hour Total   INTAKE   P.O. 0   0   I.V. 176.9   176.9   Dialysis 200   200   IV Piggyback 76.3   76.3   Shift Total 453.2   453.2   OUTPUT   Urine 0   0   Dialysis 851   851   Stool 75   75   Shift Total 926   926   Weight (kg) 156 156 156 156       Laboratory  Labs reviewed, pertinent labs below.  Recent Labs     07/20/23  1140 07/20/23  1934 07/21/23  0535 07/21/23  1250 07/21/23  2330 07/22/23  0530 07/22/23  1200   WBC 22.4*  --  20.6*  --   --  18.5*  --    RBC 3.71*  --  3.02*  --   --  2.81*  --    HEMOGLOBIN 11.3*   < > 10.1*   < > 9.5* 9.6* 9.6*   HEMATOCRIT 31.4*   < > 28.0*   < > 26.5* 26.6* 27.5*   MCV 84.6  --  92.7  --   --  94.7  --    MCH 30.5  --  33.4*  --   --  34.2*  --    MCHC 36.0*  --  36.1*  --   --  36.1*  --    RDW 47.7  --  51.7*  --   --  51.9*  --    PLATELETCT 59*  --  64*  --   --  41*  --    MPV 12.3  --  12.2  --   --  13.3*  --     < > = values in this interval not displayed.     Recent Labs     07/21/23  0535 07/21/23  1817 07/22/23  0530 07/22/23  1200   SODIUM 140 140 137  --    POTASSIUM 4.2 3.9 4.0 4.0   CHLORIDE 102 104 105  --    CO2 21 22 23  --    GLUCOSE 160* 130* 150*  --    BUN 39* 37* 23*  --    CREATININE 3.25* 2.74* 1.61*  --    CALCIUM 6.7* 7.1* 7.2*  --                 URINALYSIS:  Lab Results   Component  Value Date/Time    COLORURINE Yellow 08/12/2017 0717    CLARITY Clear 08/12/2017 0717    SPECGRAVITY 1.030 08/12/2017 0717    PHURINE 6.0 08/12/2017 0717    KETONES Negative 08/12/2017 0717    PROTEINURIN Negative 08/12/2017 0717    BILIRUBINUR Negative 08/12/2017 0717    UROBILU 0.2 08/12/2017 0717    NITRITE Negative 08/12/2017 0717    LEUKESTERAS Moderate (A) 08/12/2017 0717    OCCULTBLOOD Negative 08/12/2017 0717     UPC  No results found for: TOTPROTUR No results found for: CREATININEU    Imaging interpreted by radiologist. Imaging reports reviewed with pertinent findings below  DX-CHEST-LIMITED (1 VIEW)   Final Result      1.  Interval placement of a left-sided temporary dialysis catheter which terminates with the tip projecting over the expected location of the distal SVC.   2.  Stable mild interstitial pulmonary edema and bilateral basilar atelectasis and/or consolidation.      DX-CHEST-PORTABLE (1 VIEW)   Final Result         1.  Pulmonary edema and/or infiltrates are identified, which are stable since the prior exam.   2.  Trace bilateral pleural effusions      [id number] VIEW   Final Result      Severely suboptimal exam related to patient body habitus. No unexpected radiopaque foreign body seen.      DX-CHEST-PORTABLE (1 VIEW)   Final Result         1.  Pulmonary edema and/or infiltrates are identified, which are stable since the prior exam.   2.  Trace bilateral pleural effusions      EC-ECHOCARDIOGRAM COMPLETE W/ CONT   Final Result      DX-CHEST-PORTABLE (1 VIEW)   Final Result      1.  Pulmonary edema, worse than prior exam   2.  Slight increased inflation from prior.   3.  Supportive tubing as described above.      DX-CHEST-PORTABLE (1 VIEW)   Final Result         1.  Bibasilar atelectasis versus infiltrates.      CT-ABDOMEN-PELVIS WITH   Final Result         1.  Scattered moderate abdominal ascites.   2.  Trace bilateral pleural effusions, left greater than right   3.  Linear densities the  bilateral lung bases, greater on the left, appearance suggests atelectasis, component of infiltrate not excluded.   4.  DVT in the right external iliac vein extending into the common femoral vein, compatible with reported history of DVT.   5.  Diverticulosis   6.  Hepatomegaly   7.  Diffuse hepatic steatosis      DX-CHEST-PORTABLE (1 VIEW)   Final Result         1.  Bibasilar atelectasis or evolving infiltrates.      IR-MIDLINE CATHETER INSERTION WO GUIDANCE > AGE 3   Final Result                  Ultrasound-guided midline placement performed by qualified nursing staff    as above.          IR-US GUIDED PIV   Final Result    Ultrasound-guided PERIPHERAL IV INSERTION performed by    qualified nursing staff as above.      DX-CHEST-PORTABLE (1 VIEW)   Final Result      Stable thoracic underinflation. Right IJ line adequate. Stable bibasilar pleural-parenchymal opacification      DX-CHEST-PORTABLE (1 VIEW)   Final Result      Stable thoracic underinflation. Right IJ line and ETT appear adequate. Slightly improved bibasilar pleural-parenchymal opacification      DX-CHEST-PORTABLE (1 VIEW)   Final Result      Stable bibasilar pleural-parenchymal opacification and low lung volumes. Lines/tubes adequate.      DX-CHEST-LIMITED (1 VIEW)   Final Result         Endotracheal tube with tip projecting over the mid thoracic trachea.   Gastric drainage tube courses below diaphragm, tip is not seen.   Right central venous catheter with tip projecting over the expected area of the lower SVC.            EC-NAEEM W/O CONT   Final Result      DX-CHEST-LIMITED (1 VIEW)   Final Result      Bibasilar underinflation atelectasis which could obscure an additional process. This is similar to the prior study. Lung volumes are very low.      IR-INSERT IVC FILTER WITH IG & SI   Final Result      1.  Ultrasound guided access LEFT common femoral vein.   2.  BILATERAL selective pulmonary arteriogram demonstrating extensive bilateral pulmonary emboli.    3.  Successful BILATERAL pulmonary thrombectomy   4.  Large IVC at the level of the renal veins   5.  Successful placement of infrarenal IVC filter   6.  Pursestring suture should be removed in 6-12 hours.         Please note that this study required greater than 50% more than the usual procedure time due to the patient's anatomy and large habitus.            IR-THROMBO MECHANICAL ARTERY,INIT   Final Result      1.  Ultrasound guided access LEFT common femoral vein.   2.  BILATERAL selective pulmonary arteriogram demonstrating extensive bilateral pulmonary emboli.   3.  Successful BILATERAL pulmonary thrombectomy   4.  Large IVC at the level of the renal veins   5.  Successful placement of infrarenal IVC filter   6.  Pursestring suture should be removed in 6-12 hours.         Please note that this study required greater than 50% more than the usual procedure time due to the patient's anatomy and large habitus.            EC-ECHOCARDIOGRAM COMPLETE W/ CONT   Final Result      CT-ABDOMEN-PELVIS WITH   Final Result      1.  Pneumoperitoneum secondary to perforated viscus. Splenic flexure colonic diverticulum may be the perforation site. Gastroduodenal ulcer ulcer is considered less likely.   2.  Hepatic steatosis.   3.  Moderate gallbladder distention and pericholecystic fat stranding.   4.  Small volume hyperdense ascites.   5.  Partially visualized right lower extremity DVT.      Findings were communicated to SALINA STOVER via Vo[employer] messaging system on 7/12/2023 11:03 PM.      CT-CTA CHEST PULMONARY ARTERY W/ RECONS   Final Result      1.  Saddle pulmonary embolus with associated right heart strain.   2.  Posterior pneumomediastinum secondary to pneumoperitoneum. Abdomen will be dictated separately.      Findings were communicated to SALINA STOVER via Vo[employer] messaging system on 7/12/2023 10:57 PM.      DX-CHEST-PORTABLE (1 VIEW)   Final Result      Mild atelectasis in the right lower lung. No focal consolidation. No  effusions.               Assessment/Plan        Oliguric Acute Kidney Injury -likely related to ATN in the setting of hemorrhagic shock, prerenal.     -Obtain repeat urinalysis. (Ordered).   - CT abdomen pelvis on 7/19/2023 negative for hydronephrosis.  - Dose all medications for patient level of renal function  - Avoid nephrotoxic agents including contrast and NSAIDs  - Encourage adequate hydration.  - Continue to monitor renal function.  Obtain BMP, urine studies including urine protein quantification.  -Recommend contiued renal replacement therapy for anticipated clearance and volume needs. Will continue CRRT modality utnil change of shift today.  - Will plan on HD tomorrow with UF as tolerated.   - maintain MAP>65 with pressor support as appropriate.   -Remainder of infectious work-up.        2.  Hemorrhagic shock -received 15 units of packed red blood cells, 12 units of FFP, 3 units of platelets.  Continue Levophed and vasopressin as appropriate.  IV fluid bolus 500 cc on 7/21/2023.  Continue empiric Zosyn.  Follow remainder of infectious work-up.     3.  Saddle pulmonary embolism -discontinued anticoagulation on 07/19 given postoperative hemorrhage.     4.  Perforated viscus, liver laceration-completed left hemicolectomy and end colostomy, cholecystectomy on 7/13/2023.  Routine postoperative care per general surgery recommendations.           Plan of care discussed with Dr. Cristian Peña MD

## 2023-07-22 NOTE — PROGRESS NOTES
[facility] Services Progress Note     CRRT/CVVHD treatment ordered by Dr Buenrostro.     Total UF from 0278-6431 : 354 ml     Treatment paused due to the air post filter, tried to aspirate but was not successful, clots were seen in the arterial line and arterial side of the header. New cartridge placed and treatment restarted at 1837.     Patient tolerating treatment with x 2 pressors.      CVC access pressures WNL and on current ordered BFR.  Lines are very positional, secured placement with tape.      In service given to LAURA Hennessy RN.     Call [facility]/On Call at [phone] for further assistance.

## 2023-07-22 NOTE — CARE PLAN
The patient is Watcher - Medium risk of patient condition declining or worsening    Shift Goals  Clinical Goals: MAP >65, wean vasopressors as tolerated, CRRT maintenance  Patient Goals: unable to assess  Family Goals: patient comfort    Progress made toward(s) clinical / shift goals:    Problem: Pain - Standard  Goal: Alleviation of pain or a reduction in pain to the patient’s comfort goal  Outcome: Progressing     Problem: Knowledge Deficit - Standard  Goal: Patient and family/care givers will demonstrate understanding of plan of care, disease process/condition, diagnostic tests and medications  Outcome: Progressing     Problem: Safety - Medical Restraint  Goal: Remains free of injury from restraints (Restraint for Interference with Medical Device)  Outcome: Progressing  Goal: Free from restraint(s) (Restraint for Interference with Medical Device)  Outcome: Progressing

## 2023-07-22 NOTE — ASSESSMENT & PLAN NOTE
Improving platelet count no recent transfusion  Conservative transfusion protocol  Hold VTE ppx for platelets < 50  Trend

## 2023-07-22 NOTE — PROGRESS NOTES
[facility] Progress Note      Continuous CVVHD treatment ordered by Dr. Buenrostro. Pt tolerating tx well without any issues. NET UF changed to 50 - 100 mL/hr as tolerated and BFR decreased to 200 - 250 per Neph MD. Pt off all pressor support this morning and tolerating fluid removal.     Total UF (2000 - 0714): 1779 mL. See flow sheet for details.     Circuit assessed for clotting with increased balance chamber pressures and clots noted at head of dialyzer. Cartridge changed and tx re started without issues.     In-service given to CARISSA Dang RN. Please call Dialysis Room [phone] for questions or machine troubleshooting.

## 2023-07-22 NOTE — CARE PLAN
Problem: Pain - Standard  Goal: Alleviation of pain or a reduction in pain to the patient’s comfort goal  Note: CPOT pain scale being utilized. Assessing and documenting patient's pain per hospital policy and prn and medicating patient for pain per MAR, as well as utilizing non-pharmacological pain management techniques.       Problem: Safety - Medical Restraint  Goal: Remains free of injury from restraints (Restraint for Interference with Medical Device)  Flowsheets (Taken 7/22/2023 0030)  Addressed this shift: Remains free of injury from restraints (restraint for interference with medical device):   Determine that other, less restrictive measures have been tried or would not be effective before applying the restraint   Evaluate the patient's condition at the time of restraint application   Inform patient/family regarding the reason for restraint   Every 2 hours: Monitor safety, psychosocial status, comfort, nutrition and hydration

## 2023-07-23 NOTE — PROGRESS NOTES
"Critical Care Progress Note    Date of admission  7/12/2023    Chief Complaint  \"63 y.o. female who presented 7/12/2023 with acute DVT, acute saddle PE and new pneumoperitoneum due to perforated viscus for what she was admitted and treated with thrombectomy, heparin ggt and IVC filter placement. She also underwent exploratory laparatomy (today day 5 post op) with left colectomy with end colostomy and s/p cholecystectomy with a wound vac in place.   I was called at the bedside due to hypotension and tachycardia.\" H+P 7/12    Hospital Course  7/13 s/p bilateral thrombectomy for saddle PE and IVC filter placement. Successful.   7/13 s/p left colectomy and end-colostomy, and cholecystectomy. Intraop found perforated colon at splenic flexure with associated mass. Concerning of malignancy. Remains intubated post op.   7/19: Admitted to ICU is hemorrhagic shock, MTP, OR for intrabdominal bleed, liver lac, returns to ICU in shock and intubated  7/20: VD 2-Hb stable, anuric, back to OR for removal of packing, colon resection, revision colostomy (to RUQ), vasoplegic with increasing pressors overnight  7/21: CRRT started  7/22: VD 4-weaned of pressors, following, increase UF to 100-->150 overnight  7/23: 2.5 L off yesterday, off pressors, very weak today, hypophos needs aggressive replacement, switch CRRT to iHD, mobilize, SBT/SAT tomorrow    Interval Problem Update  Reviewed last 24 hour events:  UF to 150 overnight. No acute events, off sedation, calm and comfortable appearing, following    Neuro:   RASS -1-0 off sedation    CV:  HR 70s-80s  SBP 90s-120  NE @ 0.02 --->Off now    Resp:   APVCMV  20/420/8/0.4  CXR stable pulmonary edema    GI: Trickle feeds @ 20  Ostomy output 165  Wound vac 225    I/O: -2300  UOP: 0    Tmax: AF  Heme: WBC 18.5  Abx: P/T d5  Micro: NGTD    Endo: BG WTR, ISS    LDA: RIJ CVC, LIJ HD catheter, LRAL, LUE midline, Elizalde, NGT, Wound VAC  SUP: H2RA  VTE: Held  Diet: TFs @ 20        Review of " Systems  Review of Systems   Unable to perform ROS: Critical illness        Vital Signs for last 24 hours   Pulse:  [77-96] 85  Resp:  [14-29] 25  BP: ()/(48-61) 86/50  SpO2:  [95 %-100 %] 96 %    Hemodynamic parameters for last 24 hours       Respiratory Information for the last 24 hours  Vent Mode: APVCMV  Rate (breaths/min): 20  Vt Target (mL): 420  PEEP/CPAP: 8  P Support: 8  MAP: 12  Length of Weaning Trial (Hours): 2  Control VTE (exp VT): 489    Physical Exam   Physical Exam  Constitutional:       General: She is not in acute distress.     Appearance: She is ill-appearing.   HENT:      Head: Normocephalic.      Nose: Nose normal.      Mouth/Throat:      Mouth: Mucous membranes are dry.   Eyes:      Extraocular Movements: Extraocular movements intact.      Pupils: Pupils are equal, round, and reactive to light.   Cardiovascular:      Rate and Rhythm: Normal rate and regular rhythm.   Pulmonary:      Effort: Pulmonary effort is normal.      Breath sounds: Rhonchi and rales present.      Comments: Mechanical BS  Abdominal:      General: There is distension.      Palpations: Abdomen is soft.   Musculoskeletal:      Cervical back: Neck supple.      Right lower leg: Edema present.      Left lower leg: Edema present.   Skin:     General: Skin is warm.      Capillary Refill: Capillary refill takes 2 to 3 seconds.   Neurological:      Comments: Follows in all four, nods head to questions, BTT present bilateral, tracks makes eye contacts, weak and deconditioned         Medications  Current Facility-Administered Medications   Medication Dose Route Frequency Provider Last Rate Last Admin    lidocaine (Xylocaine) 1 % injection 1 mL  1 mL Intradermal PRN Marita Buenrostro M.D.        HEPARIN SODIUM (PORCINE) 1000 UNIT/ML INJ SOLN             famotidine (Pepcid) tablet 20 mg  20 mg Enteral Tube DAILY Cristian Peña M.D.   20 mg at 07/23/23 0511    oxyCODONE immediate-release (Roxicodone) tablet 5 mg  5 mg Enteral  Tube Q4HRS PRN Cristian Peña M.D.        promethazine (Phenergan) tablet 12.5-25 mg  12.5-25 mg Enteral Tube Q4HRS PRN Cristian Peña M.D.        levothyroxine (Synthroid) tablet 225 mcg  225 mcg Enteral Tube AM ES Cristian Peña M.D.   225 mcg at 07/23/23 0511    heparin injection 2,800 Units  2,800 Units Intracatheter PRN Marita Buenrostro M.D.   2,800 Units at 07/23/23 1408    insulin regular (HumuLIN R,NovoLIN R) injection  2-9 Units Subcutaneous Q6HRS Kerrie LNik Latona   2 Units at 07/21/23 0544    And    dextrose 50% (D50W) injection 25 g  25 g Intravenous Q15 MIN PRN Kerrie LEE Latona   25 g at 07/21/23 0225    piperacillin-tazobactam (Zosyn) 4.5 g in  mL IVPB  4.5 g Intravenous Q8HRS Elizabet Muir A.P.R.NNik 25 mL/hr at 07/23/23 1333 4.5 g at 07/23/23 1333    norepinephrine (Levophed) 8 mg in 250 mL NS infusion (premix)  0-1 mcg/kg/min (Ideal) Intravenous Continuous Lam Louis M.D.   Stopped at 07/23/23 0643    Respiratory Therapy Consult   Nebulization Continuous RT Cristian Peña M.D.        senna-docusate (Pericolace Or Senokot S) 8.6-50 MG per tablet 2 Tablet  2 Tablet Enteral Tube BID Cristian Peña M.D.   2 Tablet at 07/23/23 0511    And    polyethylene glycol/lytes (Miralax) PACKET 1 Packet  1 Packet Enteral Tube QDAY PRN Cristian Peña M.D.        And    magnesium hydroxide (Milk Of Magnesia) suspension 30 mL  30 mL Enteral Tube QDAY PRN Cristian Peña M.D.        And    bisacodyl (Dulcolax) suppository 10 mg  10 mg Rectal QDAY PRN Cristian Peña M.D. MD Alert...ICU Electrolyte Replacement per Pharmacy   Other PHARMACY TO DOSE Cristian Peña M.D.        lidocaine (Xylocaine) 1 % injection 2 mL  2 mL Tracheal Tube Q30 MIN PRN Cristian Peña M.D.        fentaNYL (Sublimaze) injection 100 mcg  100 mcg Intravenous Q15 MIN PRN Cristian Peña M.D.        And    fentaNYL (Sublimaze) injection 200 mcg  200 mcg Intravenous Q15 MIN PRN Cristian SHAW  ROM Peña        ondansetron (Zofran) syringe/vial injection 4 mg  4 mg Intravenous Q4HRS PRN Ana Lilia Joy M.D.        promethazine (Phenergan) suppository 12.5-25 mg  12.5-25 mg Rectal Q4HRS PRN Ana Lilia Joy M.D.        prochlorperazine (Compazine) injection 5-10 mg  5-10 mg Intravenous Q4HRS PRN Ana Lilia Joy M.D.        Nozin nasal  swab  1 Applicator Each Nostril BID Dagoberto Ohara D.O.   1 Applicator at 07/23/23 0511       Fluids    Intake/Output Summary (Last 24 hours) at 7/23/2023 1546  Last data filed at 7/23/2023 1400  Gross per 24 hour   Intake 2526.7 ml   Output 4201 ml   Net -1674.3 ml       Laboratory  Recent Labs     07/21/23  0431 07/23/23  0726   ISTATAPH 7.401 7.505*   ISTATAPCO2 35.0 32.8   ISTATAPO2 80 84   ISTATATCO2 23 27   [id number] 96 97   ISTATARTHCO3 21.7 25.9*   ISTATARTBE -3 3   ISTATTEMP 100.2 F 97.0 F   ISTATFIO2 50 40   ISTATSPEC Arterial Arterial   ISTATAPHTC 7.388* 7.519*   [id number] 84 80         Recent Labs     07/21/23  1817 07/22/23  0530 07/22/23  1750 07/23/23  0530 07/23/23  1152   SODIUM 140   < > 139 136 137   POTASSIUM 3.9   < > 4.1 4.0 3.9   CHLORIDE 104   < > 105 102 105   CO2 22   < > 23 25 25   BUN 37*   < > 19 14 14   CREATININE 2.74*   < > 1.24 0.99 1.00   MAGNESIUM 1.9  --  1.6 1.5  --    PHOSPHORUS 4.0  --  1.6* 1.7* 2.4*   CALCIUM 7.1*   < > 7.5* 7.4* 7.4*    < > = values in this interval not displayed.     Recent Labs     07/21/23  0535 07/21/23  1817 07/22/23  0530 07/22/23  1750 07/23/23  0530 07/23/23  1152   ALTSGPT 2613*  --  1624*  --   --  808*   ASTSGOT 4617*  --  1209*  --   --  336*   ALKPHOSPHAT 268*  --  265*  --   --  220*   TBILIRUBIN 4.5*  --  5.0*  --   --  5.0*   GLUCOSE 160*   < > 150* 83 93 120*    < > = values in this interval not displayed.     Recent Labs     07/21/23  0535 07/22/23  0530 07/23/23  1152   WBC 20.6* 18.5*  --    ASTSGOT 4617* 1209* 336*   ALTSGPT 2613* 1624* 808*   ALKPHOSPHAT 268* 265* 220*    TBILIRUBIN 4.5* 5.0* 5.0*     Recent Labs     07/21/23  0535 07/21/23  1250 07/22/23  0530 07/22/23  1200 07/22/23  1750 07/23/23  0530   RBC 3.02*  --  2.81*  --   --   --    HEMOGLOBIN 10.1*   < > 9.6* 9.6* 9.4* 8.9*   HEMATOCRIT 28.0*   < > 26.6* 27.5* 26.8* 24.1*   PLATELETCT 64*  --  41*  --   --   --     < > = values in this interval not displayed.       Imaging  X-Ray:  I have personally reviewed the images and compared with prior images.    Assessment/Plan  Hemorrhagic shock (HCC)- (present on admission)  Assessment & Plan  7/19: Overnight with worsening hypotension, obtundation and vasopressor requirement.  Bedside POCUS with underfilled hyperdynamic LV and FF in RUQ.  MTP intiated and patient taken to OR with ACGS. Found to have copious intrabdominal hemmorhage with a liver laceration near falciform ligament.  Packed left open  7/20: OR re opened, packing removed, fascia closed  7/21: no recurrent bleeding as of now    MTP initiated in ICU and carried on into OR    Serial H/H, transfuse for Hb <7 unless unstable  Correct and monitor coagulopathy  Hold heparin  Follow up TEG        Acute hypoxemic respiratory failure (HCC)- (present on admission)  Assessment & Plan  Initially 7/13 Intubated for OR and 7/15 extubated    7/19:  reintubated in the setting of recalcitrant and rapidly progressive shock of unknown etiology felt to be hemorrhagic versus septic.  The patient was not protecting her airway with vasopressor requirements and obtunded.   status post massive transfusion and operative intervention to include ex lap intra-abdominal hemorrhage washout liver packing open abdomen.  At risk of ARDS  7/22: passed SBT, on Fi02 0.3, think too weak and VOL to extubate today, perhaps try tomorrow  7/22: Able to spont but very weak inspiratory effort, minimal Fi02 or PS    LPV  RT/O2 protocols  Daily and PRN VBG/ABGs  Titration of ventilator to optimize lung protection, gas exchange and acid-base status   Sedation  as tolerated/indicated  Daily CXR  HOB >30 degrees and chlorhexidine for VAP prevention  Pepcid for GI prophylaxis  SAT/SBT when able (ABCDEF Bundle)  Early mobility              Acute kidney injury (HCC)  Assessment & Plan  Worsening renal function post shock, ATN likely  Remains anuric    7/21: Initiated CRRT for volume removal/ ultrafiltration after failed diuretic challenge  7/23: Switch CRRT to iHD to facilitate mobility and PT, still anuric    Markedly hypervolemic and VOL post massive rescucitation  Q 12 BMPs    Renal dose meds, avoid nephrotoxins  Strict I/Os  Follow renal function        Thrombocytopenia (HCC)  Assessment & Plan  Platelets trending down on the basis of shock critical illness and likely contributing CRRT circuit with drop today    Off VTE ppx, 2/t intraabdominal bleed post-op  Will resume VTE ppx when able  Trend daily cbc  At risk of rebleed transfuse PLTS when <20 or any signs of bleeding    Acute hepatic necrosis  Assessment & Plan  Ischemic hepatopathy due to shock, evidenced by markedly elevated transaminases, elevated Tbili, hypoalbunemia, elevated lactate, and coagulopathy consistent with hepatic failure and decrease synthetic function    Improving LFTs    Ammonia WNL today, will clear with CRRT, follow ammonia levels     Follow synthetic function  Avoid hepatotoxins as able      Metabolic acidosis  Assessment & Plan  On the basis of hypoperfusion and lactic acid production in the setting of hemorrhagic shock, vasoplegia and acute hepatic injury    Improving  Continue resuscitation blood product driven  Follow markers of perfusion, trend lactate      Acute blood loss anemia  Assessment & Plan  Intraabdominal bleed    S/P MTP and operative control  Follow H/H  Transfuse if H/H is <7/21  Check TEG, will replace with blood products accordingly.    Acute saddle pulmonary embolism (HCC)- (present on admission)  Assessment & Plan  Originating from a right leg DVT. Pt is not a candidate for  thrombolytics given her pneumoperitoneum  7/13: S/p emergent thrombectomy of saddle PE with IVC placement.     7/19: Repeat TTE with normal LV function, poor RV visualization but likely dilated with reduced function, RAP low    Continue to hold heparin as high risk for rebleed until surgically cleared    Acute deep venous thrombosis (DVT) determined by ultrasound ([facility])- (present on admission)  Assessment & Plan  Holding heparin in the setting of massive intraabdominal hemorrhage and liver laceration    S/p IVC filter placed this admission  Will need IVC filter retrieval in time, as soon as feasible    Perforated viscus- (present on admission)  Assessment & Plan  - 7/13 S/p ex lap with left hemicolectomy and end colostomy by Dr. Boateng  - Found perforated colon at splenic fixture, associated with mass. Path neg for malignancy  - Fecal peritonitis          Morbid obesity with BMI of 50.0-59.9, adult ([facility])- (present on admission)  Assessment & Plan  BMI 50, place her in high risk for increase morbidity during this admission         VTE:  Contraindicated  Ulcer: H2 Antagonist  Lines: Central Line  Ongoing indication addressed, Arterial Line  Ongoing indication addressed, and Elizalde Catheter  Ongoing indication addressed    I have performed a physical exam and reviewed and updated ROS and Plan today (7/23/2023). In review of yesterday's note (7/22/2023), there are no changes except as documented above.     Discussed patient condition and risk of morbidity and/or mortality with Family, RN, RT, Therapies, Pharmacy, and Patient  The patient remains critically ill.  Critical care time = 50 minutes in directly providing and coordinating critical care and extensive data review.  No time overlap and excludes procedures.

## 2023-07-23 NOTE — PROGRESS NOTES
Nephrology Consultation    Date of Service  7/23/2023       Subjective- started on CRRT yesterday with UF 50 mL/hr as tolerated. Off of pressors.     07/23/23 Remained on CRRT overnight due to soft blood pressures.   Currently on minimal dosing of levophed     Physical Exam  Pulse:  [77-98] 83  Resp:  [14-27] 22  BP: ()/(44-61) 109/55  SpO2:  [95 %-100 %] 100 %    Physical Exam  GEN: intubated. Sedated. Morbidly obese.  HEENT: ET tube in place.   CV:RRR, normal s1 s2  PULM: consistent with sounds of mechanical ventilation.   ABD: soft non tender. Obese.   EXT: warm well perfused, pitting lower extremity edema  Wound vacuum in place.  Abdominal wound in with dressing in place.      Fluids  Date 07/22/23 0700 - 07/23/23 0659   Shift 5029-8420 1738-7183 2236-8811 24 Hour Total   INTAKE   P.O. 0   0   I.V. 176.9   176.9   Dialysis 200   200   IV Piggyback 76.3   76.3   Shift Total 453.2   453.2   OUTPUT   Urine 0   0   Dialysis 851   851   Stool 75   75   Shift Total 926   926   Weight (kg) 156 156 156 156       Laboratory  Labs reviewed, pertinent labs below.  Recent Labs     07/21/23  0535 07/21/23  1250 07/22/23  0530 07/22/23  1200 07/22/23  1750 07/23/23  0530   WBC 20.6*  --  18.5*  --   --   --    RBC 3.02*  --  2.81*  --   --   --    HEMOGLOBIN 10.1*   < > 9.6* 9.6* 9.4* 8.9*   HEMATOCRIT 28.0*   < > 26.6* 27.5* 26.8* 24.1*   MCV 92.7  --  94.7  --   --   --    MCH 33.4*  --  34.2*  --   --   --    MCHC 36.1*  --  36.1*  --   --   --    RDW 51.7*  --  51.9*  --   --   --    PLATELETCT 64*  --  41*  --   --   --    MPV 12.2  --  13.3*  --   --   --     < > = values in this interval not displayed.       Recent Labs     07/22/23  1750 07/23/23  0530 07/23/23  1152   SODIUM 139 136 137   POTASSIUM 4.1 4.0 3.9   CHLORIDE 105 102 105   CO2 23 25 25   GLUCOSE 83 93 120*   BUN 19 14 14   CREATININE 1.24 0.99 1.00   CALCIUM 7.5* 7.4* 7.4*                  URINALYSIS:  Lab Results   Component Value Date/Time     COLORURINE Yellow 08/12/2017 0717    CLARITY Clear 08/12/2017 0717    SPECGRAVITY 1.030 08/12/2017 0717    PHURINE 6.0 08/12/2017 0717    KETONES Negative 08/12/2017 0717    PROTEINURIN Negative 08/12/2017 0717    BILIRUBINUR Negative 08/12/2017 0717    UROBILU 0.2 08/12/2017 0717    NITRITE Negative 08/12/2017 0717    LEUKESTERAS Moderate (A) 08/12/2017 0717    OCCULTBLOOD Negative 08/12/2017 0717     UPC  No results found for: TOTPROTUR No results found for: CREATININEU    Imaging interpreted by radiologist. Imaging reports reviewed with pertinent findings below  DX-CHEST-PORTABLE (1 VIEW)   Final Result      No significant change      DX-ABDOMEN FOR TUBE PLACEMENT   Final Result      Enteric tube has been placed and the tip projects over the stomach.      DX-CHEST-LIMITED (1 VIEW)   Final Result      1.  Interval placement of a left-sided temporary dialysis catheter which terminates with the tip projecting over the expected location of the distal SVC.   2.  Stable mild interstitial pulmonary edema and bilateral basilar atelectasis and/or consolidation.      DX-CHEST-PORTABLE (1 VIEW)   Final Result         1.  Pulmonary edema and/or infiltrates are identified, which are stable since the prior exam.   2.  Trace bilateral pleural effusions      [id number] VIEW   Final Result      Severely suboptimal exam related to patient body habitus. No unexpected radiopaque foreign body seen.      DX-CHEST-PORTABLE (1 VIEW)   Final Result         1.  Pulmonary edema and/or infiltrates are identified, which are stable since the prior exam.   2.  Trace bilateral pleural effusions      EC-ECHOCARDIOGRAM COMPLETE W/ CONT   Final Result      DX-CHEST-PORTABLE (1 VIEW)   Final Result      1.  Pulmonary edema, worse than prior exam   2.  Slight increased inflation from prior.   3.  Supportive tubing as described above.      DX-CHEST-PORTABLE (1 VIEW)   Final Result         1.  Bibasilar atelectasis versus infiltrates.       CT-ABDOMEN-PELVIS WITH   Final Result         1.  Scattered moderate abdominal ascites.   2.  Trace bilateral pleural effusions, left greater than right   3.  Linear densities the bilateral lung bases, greater on the left, appearance suggests atelectasis, component of infiltrate not excluded.   4.  DVT in the right external iliac vein extending into the common femoral vein, compatible with reported history of DVT.   5.  Diverticulosis   6.  Hepatomegaly   7.  Diffuse hepatic steatosis      DX-CHEST-PORTABLE (1 VIEW)   Final Result         1.  Bibasilar atelectasis or evolving infiltrates.      IR-MIDLINE CATHETER INSERTION WO GUIDANCE > AGE 3   Final Result                  Ultrasound-guided midline placement performed by qualified nursing staff    as above.          IR-US GUIDED PIV   Final Result    Ultrasound-guided PERIPHERAL IV INSERTION performed by    qualified nursing staff as above.      DX-CHEST-PORTABLE (1 VIEW)   Final Result      Stable thoracic underinflation. Right IJ line adequate. Stable bibasilar pleural-parenchymal opacification      DX-CHEST-PORTABLE (1 VIEW)   Final Result      Stable thoracic underinflation. Right IJ line and ETT appear adequate. Slightly improved bibasilar pleural-parenchymal opacification      DX-CHEST-PORTABLE (1 VIEW)   Final Result      Stable bibasilar pleural-parenchymal opacification and low lung volumes. Lines/tubes adequate.      DX-CHEST-LIMITED (1 VIEW)   Final Result         Endotracheal tube with tip projecting over the mid thoracic trachea.   Gastric drainage tube courses below diaphragm, tip is not seen.   Right central venous catheter with tip projecting over the expected area of the lower SVC.            EC-NAEEM W/O CONT   Final Result      DX-CHEST-LIMITED (1 VIEW)   Final Result      Bibasilar underinflation atelectasis which could obscure an additional process. This is similar to the prior study. Lung volumes are very low.      IR-INSERT IVC FILTER WITH IG  & SI   Final Result      1.  Ultrasound guided access LEFT common femoral vein.   2.  BILATERAL selective pulmonary arteriogram demonstrating extensive bilateral pulmonary emboli.   3.  Successful BILATERAL pulmonary thrombectomy   4.  Large IVC at the level of the renal veins   5.  Successful placement of infrarenal IVC filter   6.  Pursestring suture should be removed in 6-12 hours.         Please note that this study required greater than 50% more than the usual procedure time due to the patient's anatomy and large habitus.            IR-THROMBO MECHANICAL ARTERY,INIT   Final Result      1.  Ultrasound guided access LEFT common femoral vein.   2.  BILATERAL selective pulmonary arteriogram demonstrating extensive bilateral pulmonary emboli.   3.  Successful BILATERAL pulmonary thrombectomy   4.  Large IVC at the level of the renal veins   5.  Successful placement of infrarenal IVC filter   6.  Pursestring suture should be removed in 6-12 hours.         Please note that this study required greater than 50% more than the usual procedure time due to the patient's anatomy and large habitus.            EC-ECHOCARDIOGRAM COMPLETE W/ CONT   Final Result      CT-ABDOMEN-PELVIS WITH   Final Result      1.  Pneumoperitoneum secondary to perforated viscus. Splenic flexure colonic diverticulum may be the perforation site. Gastroduodenal ulcer ulcer is considered less likely.   2.  Hepatic steatosis.   3.  Moderate gallbladder distention and pericholecystic fat stranding.   4.  Small volume hyperdense ascites.   5.  Partially visualized right lower extremity DVT.      Findings were communicated to SALINA STOVER via [employer] system on 7/12/2023 11:03 PM.      CT-CTA CHEST PULMONARY ARTERY W/ RECONS   Final Result      1.  Saddle pulmonary embolus with associated right heart strain.   2.  Posterior pneumomediastinum secondary to pneumoperitoneum. Abdomen will be dictated separately.      Findings were communicated to SALINA  VALERIA STOVER via [employer] system on 7/12/2023 10:57 PM.      DX-CHEST-PORTABLE (1 VIEW)   Final Result      Mild atelectasis in the right lower lung. No focal consolidation. No effusions.               Assessment/Plan        Oliguric Acute Kidney Injury -likely related to ATN in the setting of hemorrhagic shock, prerenal.     -Obtain repeat urinalysis. (Ordered).   - CT abdomen pelvis on 7/19/2023 negative for hydronephrosis.  - Dose all medications for patient level of renal function  - Avoid nephrotoxic agents including contrast and NSAIDs  - Encourage adequate hydration.  - Continue to monitor renal function.  Obtain BMP, urine studies including urine protein quantification.  -Recommend to discontinue CRRT modality given improvement in stablity.   - Will plan on HD tomorrow with UF as tolerated.   - maintain MAP>65 with pressor support as appropriate.   -Remainder of infectious work-up.        2.  Hemorrhagic shock -received 15 units of packed red blood cells, 12 units of FFP, 3 units of platelets.  Continue Levophed and vasopressin as appropriate.  IV fluid bolus 500 cc on 7/21/2023.  Continue empiric Zosyn.  Follow remainder of infectious work-up.     3.  Saddle pulmonary embolism -discontinued anticoagulation on 07/19 given postoperative hemorrhage.     4.  Perforated viscus, liver laceration-completed left hemicolectomy and end colostomy, cholecystectomy on 7/13/2023.  Routine postoperative care per general surgery recommendations.           Plan of care discussed with Dr. Cristian Peña MD

## 2023-07-23 NOTE — CARE PLAN
Problem: Pain - Standard  Goal: Alleviation of pain or a reduction in pain to the patient’s comfort goal  Outcome: Progressing     Problem: Knowledge Deficit - Standard  Goal: Patient and family/care givers will demonstrate understanding of plan of care, disease process/condition, diagnostic tests and medications  Outcome: Not Progressing     Problem: Respiratory  Goal: Patient will achieve/maintain optimum respiratory ventilation and gas exchange  Outcome: Progressing     Problem: Safety - Medical Restraint  Goal: Remains free of injury from restraints (Restraint for Interference with Medical Device)  Outcome: Progressing   The patient is Unstable - High likelihood or risk of patient condition declining or worsening    Shift Goals  Clinical Goals: MAP >65, wean vasopressors as tolerated, CRRT maintenance  Patient Goals: unable to assess  Family Goals: patient comfort    Progress made toward(s) clinical / shift goals:      Patient is not progressing towards the following goals:      Problem: Knowledge Deficit - Standard  Goal: Patient and family/care givers will demonstrate understanding of plan of care, disease process/condition, diagnostic tests and medications  Outcome: Not Progressing

## 2023-07-23 NOTE — ACP (ADVANCE CARE PLANNING)
Advanced Care Planing and Goals of Care       Date/time: 3:47 PM      Medical decision maker: Rani, sister (brother also present)    Present: Rani and Brother    Narrative of discussion:   Been discussing goals of care and values and plan of care with limb throughout the week on a day-to-day basis.  Rani has been sharing with me her challenge between knowing how far to go in terms of the hyper invasive therapies that are currently being initiated and delivered and simultaneously knowing that her Sister Ashley would not want any sort of heroic measures attempt attempted if there was no real meaningful expected outcome or quality of life.  In addition both Rominanand her brother agree that Ashley would not want to be dependent, would not want to be in a nursing facility would not be happy being debilitated or functionally dependent, and would not want to have a prolonged rehab following a prolonged ICU care he would not want prolonged hyper invasive intensive care.    We have discussed through this day by day and in the setting of hemorrhagic shock felt that surgery and hemorrhage control was warranted and then postoperative shock and hypervolemia with anuria felt that we could control volume a little bit better with HD/CRRT and potentially optimize her to at least trial and extubation.    That said, in spite of the amazing progress that Ashley has made over the course of the last few days, weaning off of pressors, now with minimal vent support resolved ARDS, she is still profoundly weak, vasoplegic, with third spacing and massively volume overloaded which we are addressing with renal replacement.    I discussed with Rani and her brother that we are very close to a trial extubation but there are many potential complications that could occur.    We have agreed for a few days now and continue to agree that we will continue to treat aggressively to try to get Ashley to a point where we think that she is optimized for a trial of  extubation.  In my mind this is another day of electrolyte replacement, another session of HD and senna some volume removal possibly in 2 days.    Currently she is very weak but is following, off of pressors, and is tolerating aggressive volume removal with renal replacement therapy.    All that said Rani, her brother and I have all come to an agreement that in the event that Ashley were to fail extubation and be unable to breathe on her own or have hemodynamic collapse following liberation from mechanical ventilation that we would transition to comfort care and not reintubate and not resuscitate and to transition her to comfort care to allow her to be comfortable and not commit her to a more prolonged course of life support.    Summary: Continue DNR/I okay for now, when optimized attempt trial of liberation from mechanical ventilation and if unable to support her own breathing on her home transition to comfort care.    Time statement:  I discussed advance care planning with the patient's family and patient's DPOA for at least 35 minutes, including diagnosis, prognosis, plan of care, risks and benefits of any therapies that could be offered, as well as alternatives including palliation and hospice, as appropriate, exclusive of evaluation and management or other separately billable procedures.

## 2023-07-23 NOTE — PROGRESS NOTES
[facility] Services Progress Note     CRRT (CVVHD) treatment on going, rounding and in-service visit done after change of shift with [facility] PCN.    Treatment was initiated on 7/21/23 at 1357.   NET UF : 912 mL     BFR: 250 mL/min  TFR: 25 L/Hr   Dialysate:   4 K, Na 140, 3Ca, 35 HCO3    CVC lines checked, dressing is CDI and no signs and symptoms of infections. Patient tolerating tx well, VS remained within the normal limits. Labs were all drawn at 1750 pm today and results were reviewed with Dr. Buenrostro. Low Phos reported and order to report to ICU MD to manage phos with pharmacy received and noted and PCN notified.    PCN reported no issues and concerns with CRRT treatment. CRRT system and parameters checked.   Encountered high balance chamber pressure and increased webbing noted on the top of dialyzer. PCN performed a return demo of emergency blood return and treatment was paused at 1920. New CRRT cartridge set-up done and CRRT treatment resumed at 2020, IV NA phos started as well by PCN.     Nocturnal shift nurse in-services given, instructions and return demonstration done and PCN questions were all answered.    If patient not tolerating therapy/impending clotting of circuit---emergency return blood.     Flush CVC with saline then lock with heparin 1000 units/mL per designated amount in each wing.     Aspirate heparin prior to next CVC use.     PCN encouraged to call for any issues unable to resolve or if patient not tolerating therapy.     [facility] [phone].

## 2023-07-23 NOTE — PROGRESS NOTES
Received call from ICU RN to stop CRRT from MDs order. Blood returned. Pt stable, no distressed.     Net UF from 0818 to 1400=  960 mL (UF:  1160 mL - NS: 200 mL rinse back )

## 2023-07-23 NOTE — PROGRESS NOTES
[facility] Services Progress Note      [facility] Progress Note         Rounding visit:      CRRT(CVVHD) treatment in progress as ordered by Dr. Buenrostro.   Orders and labs reviewed, nephrologist updated with new orders carried out, therapy program settings verified on machine.  Patient sedated and intubated currently just off vasopressors this early AM per PCN (See MAR)  Patient tolerating treatment with UF goal of 100-150 mL/hr.   Blood pressures/Arterial bloop pressures/MAP within parameters    Access: Left IJ non tunneled CVC access patent, meets BFR, clean dry intact, lines reversed  Therapy fluid: 4K/ Na 140/Ca 3/HCO3 35.   Therapy flluid rate: 20 mL/kg/hr (20 x 145 pt's weight) = 2.9 L/hr  UFR/UF Goal:  mL/hr   BFR: 200-250 mL/min    Circuit assessed for clotting/clogging, access/chamber pressures slighlty elevated, graph showing upward pressure trend indicative of possible clogging/clotting to circuit/dialyzer. 100 NS flush administered       Net UF from 2020 to 0700=  1651 mL (UF: 1822 mL - NS: 171 mL prime)    Treatment paused at 0725 with planned blood return.  Treatment restarted at 0818 with new cartridge set up.     Access pressures within normal limits, blood lines visible, Left IJ triple lumen non-tunneled CVC with good patency and flow, blood lines not reversed upon restarting/resuming therapy.    PCN to monitor CMP, Mag and Phos every 12 hours  while on CRRT.      See CRRT flowsheet by ICU RN for CRRT I & O details     Modality in service provided to primary care nurse CARISSA Dang RN including treatment goals, troubleshooting alarms and demonstration on emergency return of blood. Hourly rounding checklist provided.     Please contact nephrologist (contact info on flowsheet)/dialysis unit at [phone] for any questions/assistance related to treatment as needed

## 2023-07-23 NOTE — PROGRESS NOTES
1150: Dr. Buenrostro at bedside, assessed patient. Will be discussing POC with Dr. Peña and making plan for patients HD needs.     1152: Dr. Schafer at bedside, assessed patient and discussed POC with RN. Orders to advance TF per protocol to goal.

## 2023-07-23 NOTE — CARE PLAN
Problem: Pain - Standard  Goal: Alleviation of pain or a reduction in pain to the patient’s comfort goal  Note: CPOT pain scale in use. Assessing and documenting patient's pain per hospital policy and prn and medicating patient for pain per MAR, as well as utilizing non-pharmacologic pain management techniques.       Problem: Safety - Medical Restraint  Goal: Remains free of injury from restraints (Restraint for Interference with Medical Device)  Flowsheets (Taken 7/22/2023 8115)  Addressed this shift: Remains free of injury from restraints (restraint for interference with medical device):   Determine that other, less restrictive measures have been tried or would not be effective before applying the restraint   Evaluate the patient's condition at the time of restraint application   Inform patient/family regarding the reason for restraint   Every 2 hours: Monitor safety, psychosocial status, comfort, nutrition and hydration

## 2023-07-23 NOTE — CARE PLAN
Problem: Ventilation  Goal: Ability to achieve and maintain unassisted ventilation or tolerate decreased levels of ventilator support  Description: Target End Date:  4 days     Document on Vent flowsheet    1.  Support and monitor invasive and noninvasive mechanical ventilation  2.  Monitor ventilator weaning response  3.  Perform ventilator associated pneumonia prevention interventions  4.  Manage ventilation therapy by monitoring diagnostic test results  Outcome: Progressing                                       Ventilator Daily Summary     Vent Day #5   8@24  APVcmv 20/420/+8/40%     Weaning trials: Spont PS 8/+8 40% x 2hours        Plan: Continue current ventilator settings and wean mechanical ventilation as tolerated per physician orders

## 2023-07-24 NOTE — CARE PLAN
The patient is Watcher - Medium risk of patient condition declining or worsening    Shift Goals  Clinical Goals: Wean vasopressors per MAR, mobility  Patient Goals: unable to assess  Family Goals: no family present at this time    Progress made toward(s) clinical / shift goals:  Vasopressors titrated per MAR. Pt to sit edge of bed with staff assistance.       Problem: Pain - Standard  Goal: Alleviation of pain or a reduction in pain to the patient’s comfort goal  Outcome: Progressing     Problem: Skin Integrity  Goal: Skin integrity is maintained or improved  Outcome: Progressing       Patient is not progressing towards the following goals:    Pt has low to no urine output and receiving dialysis.    Problem: Urinary Elimination  Goal: Establish and maintain regular urinary output  Outcome: Not Met

## 2023-07-24 NOTE — CARE PLAN
Problem: Ventilation  Goal: Ability to achieve and maintain unassisted ventilation or tolerate decreased levels of ventilator support  Description: Target End Date:  4 days     Document on Vent flowsheet    1.  Support and monitor invasive and noninvasive mechanical ventilation  2.  Monitor ventilator weaning response  3.  Perform ventilator associated pneumonia prevention interventions  4.  Manage ventilation therapy by monitoring diagnostic test results  Outcome: Progressing                                       Ventilator Daily Summary     Vent Day #6   8@24  APVCMV 20/420/+10/40%  Weaning trials: Spont on ps 8/+10 X5 hours, switched back due to increased RR & RSBI     Plan: Continue current ventilator settings and wean mechanical ventilation as tolerated per physician orders

## 2023-07-24 NOTE — PROGRESS NOTES
"  DATE: 7/24/2023    INTERVAL EVENTS:  Vasopressors weaned.  Improvement in respiratory parameters.    PHYSICAL EXAMINATION:  Vital Signs: /68   Pulse 85   Temp (!) 35.3 °C (95.5 °F) (Bladder)   Resp (!) 25   Ht 1.779 m (5' 10.04\")   Wt (!) 155 kg (341 lb 14.9 oz)   SpO2 95%     Mechanically ventilated.  Abdomen is soft and obese.  Left ostomy is dark, but functioning.  Midline VAC.    LABORATORY VALUES:   Recent Labs     07/22/23  0530 07/22/23  1200 07/22/23  1750 07/23/23  0530 07/24/23  0500   WBC 18.5*  --   --   --  11.5*   RBC 2.81*  --   --   --  2.65*   HEMOGLOBIN 9.6*   < > 9.4* 8.9* 8.6*   HEMATOCRIT 26.6*   < > 26.8* 24.1* 25.2*   MCV 94.7  --   --   --  95.1   MCH 34.2*  --   --   --  32.5   MCHC 36.1*  --   --   --  34.1   RDW 51.9*  --   --   --  51.8*   PLATELETCT 41*  --   --   --  47*   MPV 13.3*  --   --   --  12.9    < > = values in this interval not displayed.     Recent Labs     07/23/23  0530 07/23/23  1152 07/23/23  1712 07/24/23  0500   SODIUM 136 137  --  137   POTASSIUM 4.0 3.9 4.0 4.0   CHLORIDE 102 105  --  103   CO2 25 25  --  22   GLUCOSE 93 120*  --  135*   BUN 14 14  --  24*   CREATININE 0.99 1.00  --  1.70*   CALCIUM 7.4* 7.4*  --  7.7*     Recent Labs     07/22/23  0530 07/23/23  0530 07/23/23  1152 07/24/23  0500   ASTSGOT 1209*  --  336* 200*   ALTSGPT 1624*  --  808* 583*   TBILIRUBIN 5.0*  --  5.0* 5.8*   ALKPHOSPHAT 265*  --  220* 220*   GLOBULIN 2.3  --  2.2 2.4   AMMONIA 27 25  --  23        ASSESSMENT AND PLAN:  Remains critically ill.  No acute surgical interventions or changes in management needed at this time.    Appreciate ICU and consulting physician care.       ____________________________________     Brendan Irving M.D.    DD: 7/24/2023  7:18 AM    "

## 2023-07-24 NOTE — CARE PLAN
Problem: Ventilation  Goal: Ability to achieve and maintain unassisted ventilation or tolerate decreased levels of ventilator support  Description: Target End Date:  4 days     Document on Vent flowsheet    1.  Support and monitor invasive and noninvasive mechanical ventilation  2.  Monitor ventilator weaning response  3.  Perform ventilator associated pneumonia prevention interventions  4.  Manage ventilation therapy by monitoring diagnostic test results  Outcome: Progressing  Vent day 6  ETT 8 at 24  APVCMV 20/420/8/40  Plan: Continue ventilator settings and wean mechanical ventilation as tolerated per physician orders

## 2023-07-24 NOTE — PROGRESS NOTES
[facility] Services Progress Note     Hemodialysis treatment #1 ordered today per Dr. Brenda Kinney x 3 hours.   Pt with spontaneous eye opening; follows simple commands; on vasopressor x 1; generalized edema ; Bilateral Upper and lower edema 3+ noted; anuric; consent for tx signed by physician Dr.J. Kinney    Treatment initiated at 1015 completed at 1315.      Patient tolerated treatment; see electronic flow sheet for details.      Net UF 3000 mL.   Vasopressor titrated accordingly by primary RN during tx     Post tx, CVC flushed with saline then locked with heparin 1000 units/mL per designated amount in each wing then clamped and capped. Aspirate heparin prior to next CVC use.     Report given to Primary RN Yuko Todd

## 2023-07-24 NOTE — PROGRESS NOTES
"Critical Care Progress Note    Date of admission  7/12/2023    Chief Complaint  \"63 y.o. female who presented 7/12/2023 with acute DVT, acute saddle PE and new pneumoperitoneum due to perforated viscus for what she was admitted and treated with thrombectomy, heparin ggt and IVC filter placement. She also underwent exploratory laparatomy (today day 5 post op) with left colectomy with end colostomy and s/p cholecystectomy with a wound vac in place.   I was called at the bedside due to hypotension and tachycardia.\" H+P 7/12    Hospital Course  7/13 s/p bilateral thrombectomy for saddle PE and IVC filter placement. Successful.   7/13 s/p left colectomy and end-colostomy, and cholecystectomy. Intraop found perforated colon at splenic flexure with associated mass. Concerning of malignancy. Remains intubated post op.   7/19: Admitted to ICU is hemorrhagic shock, MTP, OR for intrabdominal bleed, liver lac, returns to ICU in shock and intubated  7/20: VD 2-Hb stable, anuric, back to OR for removal of packing, colon resection, revision colostomy (to [facility]), vasoplegic with increasing pressors overnight  7/21: CRRT started  7/22: VD 4-weaned of pressors, following, increase UF to 100-->150 overnight  7/23: 2.5 L off yesterday, off pressors, very weak today, hypophos needs aggressive replacement, switch CRRT to iHD, mobilize, SBT/SAT tomorrow  7/24 -HD/UF today, tolerating SBT, high risk for failing extubation.  Discussed with family today regarding GOC as previously documented    Interval Problem Update  Reviewed last 24 hour events:  Transferred after liver lac 7/19  Overnight: CRRT stopped   Neuro: opens eyes, not following consistently, somnonlent, weak  Resp: VD6, SBT 8/8, 40%, min secretions  CXR: low vol, ETT in place   CV: SR 70, NE 0.07  I/O: no UOP  Heme: Hemoglobin stable 8.9; platelets 47 K, stable  Tmax: 99.9  Abx: zosyn 7/12-current d#13  Micro: All cultures negative, 7/19 BC x2 negative, 7/21 BC x2 " negative  GI/Nut: TF at goal, albumin 1.8  Endo:   Wound vac midline  Sister Rani, ? One way ext next 1-2 days  lytes reviewed, HD today    LDA: art, TLC, HD  SUP: pepcid  VTE: Contraindicated after massive transfusion protocol for liver bleeding        Review of Systems  Review of Systems   Unable to perform ROS: Critical illness        Vital Signs for last 24 hours   Pulse:  [] 85  Resp:  [16-31] 25  BP: ()/(48-79) 133/68  SpO2:  [93 %-100 %] 95 %    Hemodynamic parameters for last 24 hours       Respiratory Information for the last 24 hours  Vent Mode: Spont  Rate (breaths/min): 20  Vt Target (mL): 420  PEEP/CPAP: 8  P Support: 8  MAP: 12  Length of Weaning Trial (Hours): 2  Control VTE (exp VT): 433    Physical Exam   Physical Exam  Constitutional:       General: She is not in acute distress.     Appearance: She is ill-appearing.   HENT:      Head: Normocephalic.      Nose: Nose normal.      Mouth/Throat:      Mouth: Mucous membranes are dry.   Eyes:      Extraocular Movements: Extraocular movements intact.      Pupils: Pupils are equal, round, and reactive to light.   Cardiovascular:      Rate and Rhythm: Normal rate and regular rhythm.   Pulmonary:      Effort: Pulmonary effort is normal.      Breath sounds: Rhonchi and rales present.      Comments: Mechanical BS  Abdominal:      General: There is distension.      Palpations: Abdomen is soft.   Musculoskeletal:      Cervical back: Neck supple.      Right lower leg: Edema present.      Left lower leg: Edema present.   Skin:     General: Skin is warm.      Capillary Refill: Capillary refill takes 2 to 3 seconds.   Neurological:      Comments: Follows in all four, nods head to questions, BTT present bilateral, tracks makes eye contacts, weak and deconditioned         Medications  Current Facility-Administered Medications   Medication Dose Route Frequency Provider Last Rate Last Admin    lidocaine (Xylocaine) 1 % injection 1 mL  1 mL Intradermal PRN  Marita Buenrostro M.D.        famotidine (Pepcid) tablet 20 mg  20 mg Enteral Tube DAILY Cristian Peña M.D.   20 mg at 07/24/23 0459    oxyCODONE immediate-release (Roxicodone) tablet 5 mg  5 mg Enteral Tube Q4HRS PRN Cristian Peña M.D.        promethazine (Phenergan) tablet 12.5-25 mg  12.5-25 mg Enteral Tube Q4HRS PRN Cristian Peña M.D.        levothyroxine (Synthroid) tablet 225 mcg  225 mcg Enteral Tube AM ES Cristian Peña M.D.   225 mcg at 07/24/23 0459    heparin injection 2,800 Units  2,800 Units Intracatheter PRN Marita Buenrostro M.D.   2,800 Units at 07/23/23 1408    insulin regular (HumuLIN R,NovoLIN R) injection  2-9 Units Subcutaneous Q6HRS Kerrie LEE Latona   2 Units at 07/21/23 0544    And    dextrose 50% (D50W) injection 25 g  25 g Intravenous Q15 MIN PRN Kerrie LEE Latona   25 g at 07/21/23 0225    piperacillin-tazobactam (Zosyn) 4.5 g in  mL IVPB  4.5 g Intravenous Q8HRS Elizabet Muir A.P.R.NNik 25 mL/hr at 07/24/23 0457 4.5 g at 07/24/23 0457    norepinephrine (Levophed) 8 mg in 250 mL NS infusion (premix)  0-1 mcg/kg/min (Ideal) Intravenous Continuous Lam Louis M.D. 11.6 mL/hr at 07/24/23 0713 0.09 mcg/kg/min at 07/24/23 0713    Respiratory Therapy Consult   Nebulization Continuous RT Cristian Peña M.D.        senna-docusate (Pericolace Or Senokot S) 8.6-50 MG per tablet 2 Tablet  2 Tablet Enteral Tube BID Cristian Peña M.D.   2 Tablet at 07/23/23 0511    And    polyethylene glycol/lytes (Miralax) PACKET 1 Packet  1 Packet Enteral Tube QDAY PRN Cristian Peña M.D.        And    magnesium hydroxide (Milk Of Magnesia) suspension 30 mL  30 mL Enteral Tube QDAY PRN Cristian Peña M.D.        And    bisacodyl (Dulcolax) suppository 10 mg  10 mg Rectal QDAY PRN Cristian Peña M.D.        MD Alert...ICU Electrolyte Replacement per Pharmacy   Other PHARMACY TO DOSE Cristian Peña M.D.        lidocaine (Xylocaine) 1 % injection 2 mL  2 mL Tracheal Tube  Q30 MIN PRN Cristian Peña M.D.        fentaNYL (Sublimaze) injection 100 mcg  100 mcg Intravenous Q15 MIN PRN Cristian Peña M.D.        And    fentaNYL (Sublimaze) injection 200 mcg  200 mcg Intravenous Q15 MIN PRN Cristian Peña M.D.        ondansetron (Zofran) syringe/vial injection 4 mg  4 mg Intravenous Q4HRS PRN Ana Lilia Joy M.D.        promethazine (Phenergan) suppository 12.5-25 mg  12.5-25 mg Rectal Q4HRS PRN Ana Lilia Joy M.D.        prochlorperazine (Compazine) injection 5-10 mg  5-10 mg Intravenous Q4HRS PRN Ana Lilia Joy M.D.        Nozin nasal  swab  1 Applicator Each Nostril BID Dagoberto Ohara D.O.   1 Applicator at 07/24/23 0459       Fluids    Intake/Output Summary (Last 24 hours) at 7/24/2023 0739  Last data filed at 7/24/2023 0500  Gross per 24 hour   Intake 2238.37 ml   Output 2374 ml   Net -135.63 ml         Laboratory  Recent Labs     07/23/23  0726   ISTATAPH 7.505*   ISTATAPCO2 32.8   ISTATAPO2 84   ISTATATCO2 27   FOOYFRR4DIM 97   ISTATARTHCO3 25.9*   ISTATARTBE 3   ISTATTEMP 97.0 F   ISTATFIO2 40   ISTATSPEC Arterial   ISTATAPHTC 7.519*   AZISTNPI4MR 80           Recent Labs     07/23/23  0530 07/23/23  1152 07/23/23  1712 07/24/23  0500   SODIUM 136 137  --  137   POTASSIUM 4.0 3.9 4.0 4.0   CHLORIDE 102 105  --  103   CO2 25 25  --  22   BUN 14 14  --  24*   CREATININE 0.99 1.00  --  1.70*   MAGNESIUM 1.5  --  2.1 2.2   PHOSPHORUS 1.7* 2.4* 2.2* 1.9*   CALCIUM 7.4* 7.4*  --  7.7*       Recent Labs     07/22/23  0530 07/22/23  1750 07/23/23  0530 07/23/23  1152 07/24/23  0500   ALTSGPT 1624*  --   --  808* 583*   ASTSGOT 1209*  --   --  336* 200*   ALKPHOSPHAT 265*  --   --  220* 220*   TBILIRUBIN 5.0*  --   --  5.0* 5.8*   GLUCOSE 150*   < > 93 120* 135*    < > = values in this interval not displayed.       Recent Labs     07/22/23  0530 07/23/23  1152 07/24/23  0500   WBC 18.5*  --  11.5*   ASTSGOT 1209* 336* 200*   ALTSGPT 1624* 808* 583*   ALKPHOSPHAT  265* 220* 220*   TBILIRUBIN 5.0* 5.0* 5.8*       Recent Labs     07/22/23  0530 07/22/23  1200 07/22/23  1750 07/23/23  0530 07/24/23  0500   RBC 2.81*  --   --   --  2.65*   HEMOGLOBIN 9.6*   < > 9.4* 8.9* 8.6*   HEMATOCRIT 26.6*   < > 26.8* 24.1* 25.2*   PLATELETCT 41*  --   --   --  47*    < > = values in this interval not displayed.         Imaging  X-Ray:  I have personally reviewed the images and compared with prior images.    Assessment/Plan  * Acute hypoxemic respiratory failure (HCC)- (present on admission)  Assessment & Plan  Initially 7/13 Intubated for OR and 7/15 extubated  7/19:  reintubated in the setting of recalcitrant and rapidly progressive shock of unknown etiology felt to be hemorrhagic versus septic.  The patient was not protecting her airway with vasopressor requirements and obtunded.   status post massive transfusion and operative intervention to include ex lap intra-abdominal hemorrhage washout liver packing open abdomen.  At risk of ARDS  7/22: passed SBT, on Fi02 0.3, think too weak and VOL to extubate today, perhaps try tomorrow  7/22: Able to spont but very weak inspiratory effort, minimal Fi02 or PS  7/24 PT, high risk for failure after extubation, family conference again today regarding GOC and plan for eventual extubation next 1-2 days  Continue full ventilator support, LTV/LPS, A-F bundle    Perforated viscus- (present on admission)  Assessment & Plan  7/13 - ex lap with left hemicolectomy and end colostomy by Dr. Boateng  Found perforated colon at splenic fixture, associated with mass. Path neg for malignancy  Fecal peritonitis, remains on Zosyn per surgery    Acute kidney injury (HCC)  Assessment & Plan  Worsening renal function post shock, ATN likely, anuric  7/21: Initiated CRRT for volume removal/ ultrafiltration after failed diuretic challenge  7/23: Switch CRRT to iHD to facilitate mobility and PT, still anuric  7/24 iHD with maximal UF today  Renal dose meds, avoid  nephrotoxins  Strict I/Os  Follow renal function    Acute saddle pulmonary embolism (HCC)- (present on admission)  Assessment & Plan  Originating from a right leg DVT. Pt is not a candidate for thrombolytics given her pneumoperitoneum  7/13: S/p emergent thrombectomy of saddle PE with IVC placement.   7/19: Repeat TTE with normal LV function, poor RV visualization but likely dilated with reduced function, RAP low  Continue to hold heparin as high risk for rebleed until surgically cleared    Thrombocytopenia (HCC)  Assessment & Plan  Platelets trending down on the basis of shock critical illness and likely contributing CRRT  Off VTE ppx, for intraabdominal bleed post-op  Will resume VTE ppx when able  Trend daily cbc  At risk of rebleed transfuse PLTS when <20 or any signs of bleeding    Acute hepatic necrosis  Assessment & Plan  Ischemic hepatopathy due to shock, evidenced by markedly elevated transaminases, elevated Tbili, hypoalbunemia, elevated lactate, and coagulopathy consistent with hepatic failure and decrease synthetic function  Follow LFTs, trending down  Ammonia clearing  Avoid hepatotoxins as able    Hemorrhagic shock (HCC)- (present on admission)  Assessment & Plan  7/19: Overnight with worsening hypotension, obtundation and vasopressor requirement.  Bedside POCUS with underfilled hyperdynamic LV and FF in RUQ.  MTP intiated and patient taken to OR with ACGS. Found to have copious intrabdominal hemmorhage with a liver laceration near falciform ligament.  Packed left open  7/20: OR re opened, packing removed, fascia closed  7/21: no recurrent bleeding as of now  Laying heparin until cleared by surgery, follow H&H    Acute blood loss anemia  Assessment & Plan  Intraabdominal bleed  S/P MTP and operative control  Follow H/H  Transfuse if H/H is <7/21    Acute deep venous thrombosis (DVT) determined by ultrasound (HCC)- (present on admission)  Assessment & Plan  Holding heparin in the setting of massive  intraabdominal hemorrhage and liver laceration  S/p IVC filter placed this admission  Follow-up with vascular clinic for IVC filter retrieval when appropriate  Resume anticoagulation when cleared by surgery    Morbid obesity with BMI of 50.0-59.9, adult (HCC)- (present on admission)  Assessment & Plan  BMI 50, place her in high risk for increase morbidity during this admission         VTE:  Contraindicated  Ulcer: H2 Antagonist  Lines: Central Line  Ongoing indication addressed, Arterial Line  Ongoing indication addressed, and Elizalde Catheter  Ongoing indication addressed    I have performed a physical exam and reviewed and updated ROS and Plan today (7/24/2023). In review of yesterday's note (7/23/2023), there are no changes except as documented above.     Discussed patient condition and risk of morbidity and/or mortality with Family, RN, RT, Therapies, Pharmacy, and Patient  The patient remains critically ill.  Critical care time = 45 minutes in directly providing and coordinating critical care and extensive data review.  No time overlap and excludes procedures.

## 2023-07-25 PROBLEM — Z71.89 GOALS OF CARE, COUNSELING/DISCUSSION: Status: ACTIVE | Noted: 2023-01-01

## 2023-07-25 NOTE — CARE PLAN
Problem: Ventilation  Goal: Ability to achieve and maintain unassisted ventilation or tolerate decreased levels of ventilator support  Description: Target End Date:  4 days     Document on Vent flowsheet    1.  Support and monitor invasive and noninvasive mechanical ventilation  2.  Monitor ventilator weaning response  3.  Perform ventilator associated pneumonia prevention interventions  4.  Manage ventilation therapy by monitoring diagnostic test results  Outcome: Not Met     Ventilator Daily Summary    Vent Day #7    ETT: 8@24    Ventilator settings: 20/420+10@40%    Weaning trials: none    Respiratory Procedures: none    Plan: Continue current ventilator settings and wean mechanical ventilation as tolerated per physician orders.

## 2023-07-25 NOTE — PROGRESS NOTES
"Critical Care Progress Note    Date of admission  7/12/2023    Chief Complaint  \"63 y.o. female who presented 7/12/2023 with acute DVT, acute saddle PE and new pneumoperitoneum due to perforated viscus for what she was admitted and treated with thrombectomy, heparin ggt and IVC filter placement. She also underwent exploratory laparatomy (today day 5 post op) with left colectomy with end colostomy and s/p cholecystectomy with a wound vac in place.   I was called at the bedside due to hypotension and tachycardia.\" H+P 7/12    Hospital Course  7/13 s/p bilateral thrombectomy for saddle PE and IVC filter placement. Successful.   7/13 s/p left colectomy and end-colostomy, and cholecystectomy. Intraop found perforated colon at splenic flexure with associated mass. Concerning of malignancy. Remains intubated post op.   7/19: Admitted to ICU is hemorrhagic shock, MTP, OR for intrabdominal bleed, liver lac, returns to ICU in shock and intubated  7/20: VD 2-Hb stable, anuric, back to OR for removal of packing, colon resection, revision colostomy (to RUQ), vasoplegic with increasing pressors overnight  7/21: CRRT started  7/22: VD 4-weaned of pressors, following, increase UF to 100-->150 overnight  7/23: 2.5 L off yesterday, off pressors, very weak today, hypophos needs aggressive replacement, switch CRRT to iHD, mobilize, SBT/SAT tomorrow  7/24: HD/UF today, tolerating SBT, high risk for failing extubation.  Discussed with family today regarding GOC as previously documented  7/25: PUF again today; VD#7, awake and follows very weakly t/o.    Interval Problem Update  Reviewed last 24 hour events:  Overnight: No acute overnight events  Neuro: very weak, weakly squeezes hands and wiggles toes to commands, no antigravity upper or lower extremities  CV: SR,  Resp: VD #7: APV/CMV, 20/420/10/40%  CXR: ETT okay, improved lung volumes, pulmonary edema and effusion  I/O: 2777/6450 (anuric, 3500 off with HD yesterday, 1700 VAC, 1250 " stool)  CRRT stopped 7/23, creatinine 1.0-1.7-2.26  Heme: Hemoglobin 8.9, platelets 38 K  Tmax: 100.4  Abx: zosyn 7/12-current d#14/21  Micro: All cultures negative, 7/19 BC x2 negative, 7/21 BC x2 negative  GI/Nut: Rated  Albumin 1.6  LFTs trending down  Endo: Glucose 155  Lytes: replace phos  Drips:     LDA: art, TLC, HD  SUP: pepcid  VTE: Contraindicated after massive transfusion protocol for liver bleeding      Yesterday:  Transferred after liver lac 7/19  Overnight: CRRT stopped   Neuro: opens eyes, not following consistently, somnonlent, weak  Resp: VD6, SBT 8/8, 40%, min secretions  CXR: low vol, ETT in place   CV: SR 70, NE 0.07  I/O: no UOP  Heme: Hemoglobin stable 8.9; platelets 47 K, stable  Tmax: 99.9  Abx: zosyn 7/12-current d#13  Micro: All cultures negative, 7/19 BC x2 negative, 7/21 BC x2 negative  GI/Nut: TF at goal, albumin 1.8  Endo:   Wound vac midline  Sister Rani, ? One way ext next 1-2 days  lytes reviewed, HD today      Review of Systems  Review of Systems   Unable to perform ROS: Critical illness        Vital Signs for last 24 hours   Temp:  [37.1 °C (98.8 °F)] 37.1 °C (98.8 °F)  Pulse:  [] 89  Resp:  [18-36] 23  BP: (111-142)/(55-89) 114/64  SpO2:  [90 %-98 %] 90 %    Hemodynamic parameters for last 24 hours       Respiratory Information for the last 24 hours  Vent Mode: Spont  Rate (breaths/min): 20  Vt Target (mL): 420  PEEP/CPAP: 10  P Support: 8  MAP: 14  Length of Weaning Trial (Hours): 6  Control VTE (exp VT): 437    Physical Exam   Physical Exam  Constitutional:       General: She is not in acute distress.     Appearance: She is ill-appearing.   HENT:      Head: Normocephalic.      Nose: Nose normal.      Mouth/Throat:      Mouth: Mucous membranes are dry.   Eyes:      Extraocular Movements: Extraocular movements intact.      Pupils: Pupils are equal, round, and reactive to light.   Cardiovascular:      Rate and Rhythm: Normal rate and regular rhythm.   Pulmonary:      Effort:  Pulmonary effort is normal.      Breath sounds: Rhonchi and rales present.      Comments: Mechanical BS  Abdominal:      General: There is distension.      Palpations: Abdomen is soft.   Musculoskeletal:      Cervical back: Neck supple.      Right lower leg: Edema present.      Left lower leg: Edema present.   Skin:     General: Skin is warm.      Capillary Refill: Capillary refill takes 2 to 3 seconds.   Neurological:      Comments: Follows in all four, nods head to questions, BTT present bilateral, tracks makes eye contacts, weak and deconditioned         Medications  Current Facility-Administered Medications   Medication Dose Route Frequency Provider Last Rate Last Admin    lidocaine (Xylocaine) 1 % injection 1 mL  1 mL Intradermal PRN Marita Buenrostro M.D.        famotidine (Pepcid) tablet 20 mg  20 mg Enteral Tube DAILY Cristian Peña M.D.   20 mg at 07/25/23 0529    oxyCODONE immediate-release (Roxicodone) tablet 5 mg  5 mg Enteral Tube Q4HRS PRN Cristian Peña M.D.        promethazine (Phenergan) tablet 12.5-25 mg  12.5-25 mg Enteral Tube Q4HRS PRN Cristian Peña M.D.        levothyroxine (Synthroid) tablet 225 mcg  225 mcg Enteral Tube AM ES Cristian Peña M.D.   225 mcg at 07/25/23 0527    heparin injection 2,800 Units  2,800 Units Intracatheter PRN Marita Buenrostro M.D.   2,800 Units at 07/24/23 1340    insulin regular (HumuLIN R,NovoLIN R) injection  2-9 Units Subcutaneous Q6HRS Kerrie Montana   2 Units at 07/25/23 0607    And    dextrose 50% (D50W) injection 25 g  25 g Intravenous Q15 MIN PRN Kerrie LEE Latona   25 g at 07/21/23 0225    piperacillin-tazobactam (Zosyn) 4.5 g in  mL IVPB  4.5 g Intravenous Q8HRS Elizabet Muir A.P.R.NNik 25 mL/hr at 07/25/23 0530 4.5 g at 07/25/23 0530    norepinephrine (Levophed) 8 mg in 250 mL NS infusion (premix)  0-1 mcg/kg/min (Ideal) Intravenous Continuous Lam Louis M.D. 9 mL/hr at 07/24/23 1800 0.07 mcg/kg/min at 07/24/23 1800     Respiratory Therapy Consult   Nebulization Continuous RT Cristian Peña M.D.        senna-docusate (Pericolace Or Senokot S) 8.6-50 MG per tablet 2 Tablet  2 Tablet Enteral Tube BID Cristian Peña M.D.   2 Tablet at 07/23/23 0511    And    polyethylene glycol/lytes (Miralax) PACKET 1 Packet  1 Packet Enteral Tube QDAY PRN Cristian Peña M.D.        And    magnesium hydroxide (Milk Of Magnesia) suspension 30 mL  30 mL Enteral Tube QDAY PRN Cristian Peña M.D.        And    bisacodyl (Dulcolax) suppository 10 mg  10 mg Rectal QDAY PRN Cristian Peña M.D.        MD Alert...ICU Electrolyte Replacement per Pharmacy   Other PHARMACY TO DOSE Cristian Peña M.D.        lidocaine (Xylocaine) 1 % injection 2 mL  2 mL Tracheal Tube Q30 MIN PRN Cristian Peña M.D.        fentaNYL (Sublimaze) injection 100 mcg  100 mcg Intravenous Q15 MIN PRN Cristian Peña M.D.        And    fentaNYL (Sublimaze) injection 200 mcg  200 mcg Intravenous Q15 MIN PRN Cristian Peña M.D.        ondansetron (Zofran) syringe/vial injection 4 mg  4 mg Intravenous Q4HRS PRN Ana Lilia Joy M.D.        promethazine (Phenergan) suppository 12.5-25 mg  12.5-25 mg Rectal Q4HRS PRN Ana Lilia Joy M.D.        prochlorperazine (Compazine) injection 5-10 mg  5-10 mg Intravenous Q4HRS PRN Ana Lilia Joy M.D.        Nozin nasal  swab  1 Applicator Each Nostril BID Dagoberto Ohara D.O.   1 Applicator at 07/25/23 0607       Fluids    Intake/Output Summary (Last 24 hours) at 7/25/2023 0749  Last data filed at 7/25/2023 0600  Gross per 24 hour   Intake 2777.84 ml   Output 6450 ml   Net -3672.16 ml         Laboratory  Recent Labs     07/23/23  0726   ISTATAPH 7.505*   ISTATAPCO2 32.8   ISTATAPO2 84   ISTATATCO2 27   ZWLGTCH5OFS 97   ISTATARTHCO3 25.9*   ISTATARTBE 3   ISTATTEMP 97.0 F   ISTATFIO2 40   ISTATSPEC Arterial   ISTATAPHTC 7.519*   FFVLPGHH3BF 80           Recent Labs     07/23/23  1152 07/23/23  8142  07/24/23  0500 07/25/23  0439   SODIUM 137  --  137 136   POTASSIUM 3.9 4.0 4.0 4.0   CHLORIDE 105  --  103 104   CO2 25 -- 22 23   BUN 14  --  24* 30*   CREATININE 1.00  --  1.70* 2.26*   MAGNESIUM  --  2.1 2.2 2.1   PHOSPHORUS 2.4* 2.2* 1.9* 2.2*   CALCIUM 7.4*  --  7.7* 7.4*       Recent Labs     07/23/23  1152 07/24/23  0500 07/25/23  0439   ALTSGPT 808* 583* 387*   ASTSGOT 336* 200* 151*   ALKPHOSPHAT 220* 220* 202*   TBILIRUBIN 5.0* 5.8* 7.1*   GLUCOSE 120* 135* 155*       Recent Labs     07/23/23  1152 07/24/23  0500 07/25/23  0439   WBC  --  11.5* 9.9   NEUTSPOLYS  --   --  88.00*   LYMPHOCYTES  --   --  6.80*   MONOCYTES  --   --  2.60   EOSINOPHILS  --   --  0.90   BASOPHILS  --   --  0.00   ASTSGOT 336* 200* 151*   ALTSGPT 808* 583* 387*   ALKPHOSPHAT 220* 220* 202*   TBILIRUBIN 5.0* 5.8* 7.1*       Recent Labs     07/23/23  0530 07/24/23  0500 07/25/23  0439   RBC  --  2.65* 2.86*   HEMOGLOBIN 8.9* 8.6* 8.9*   HEMATOCRIT 24.1* 25.2* 27.4*   PLATELETCT  --  47* 38*         Imaging  X-Ray:  I have personally reviewed the images and compared with prior images.      Assessment/Plan  * Acute hypoxemic respiratory failure (HCC)- (present on admission)  Assessment & Plan  Initially 7/13 Intubated for OR and 7/15 extubated  7/19:  reintubated in the setting of recalcitrant and rapidly progressive shock of unknown etiology felt to be hemorrhagic versus septic.  The patient was not protecting her airway with vasopressor requirements and obtunded.   status post massive transfusion and operative intervention to include ex lap intra-abdominal hemorrhage washout liver packing open abdomen.  At risk of ARDS  7/22: passed SBT, on Fi02 0.3, think too weak and VOL to extubate today, perhaps try tomorrow  7/22: Able to spont but very weak inspiratory effort, minimal Fi02 or PS  7/24 PT, high risk for failure after extubation, family conference again today regarding GOC and plan for eventual extubation next 1-2  days  Continue full ventilator support, LTV/LPS, A-F bundle    Perforated viscus- (present on admission)  Assessment & Plan  7/13 - ex lap with left hemicolectomy and end colostomy by Dr. Boateng  Found perforated colon at splenic fixture, associated with mass. Path neg for malignancy  Fecal peritonitis, remains on Zosyn per surgery    Acute kidney injury (HCC)  Assessment & Plan  Worsening renal function post shock, ATN likely, anuric  7/21: Initiated CRRT for volume removal/ ultrafiltration after failed diuretic challenge  7/23: Switch CRRT to iHD to facilitate mobility and PT, still anuric  7/24 iHD with maximal UF today  Renal dose meds, avoid nephrotoxins  Strict I/Os  Follow renal function    Acute saddle pulmonary embolism (HCC)- (present on admission)  Assessment & Plan  Originating from a right leg DVT. Pt is not a candidate for thrombolytics given her pneumoperitoneum  7/13: S/p emergent thrombectomy of saddle PE with IVC placement.   7/19: Repeat TTE with normal LV function, poor RV visualization but likely dilated with reduced function, RAP low  Continue to hold heparin as high risk for rebleed until surgically cleared    Thrombocytopenia (HCC)  Assessment & Plan  Platelets trending down on the basis of shock critical illness and likely contributing CRRT  Off VTE ppx, for intraabdominal bleed post-op  Will resume VTE ppx when able  Trend daily cbc  At risk of rebleed transfuse PLTS when <20 or any signs of bleeding    Acute hepatic necrosis  Assessment & Plan  Ischemic hepatopathy due to shock, evidenced by markedly elevated transaminases, elevated Tbili, hypoalbunemia, elevated lactate, and coagulopathy consistent with hepatic failure and decrease synthetic function  Follow LFTs, trending down  Ammonia clearing  Avoid hepatotoxins as able    Hemorrhagic shock (HCC)- (present on admission)  Assessment & Plan  7/19: Overnight with worsening hypotension, obtundation and vasopressor requirement.  Bedside  POCUS with underfilled hyperdynamic LV and FF in RUQ.  MTP intiated and patient taken to OR with ACGS. Found to have copious intrabdominal hemmorhage with a liver laceration near falciform ligament.  Packed left open  7/20: OR re opened, packing removed, fascia closed  7/21: no recurrent bleeding as of now  Laying heparin until cleared by surgery, follow H&H    Acute blood loss anemia  Assessment & Plan  Intraabdominal bleed  S/P MTP and operative control  Follow H/H  Transfuse if H/H is <7/21    Acute deep venous thrombosis (DVT) determined by ultrasound ([facility])- (present on admission)  Assessment & Plan  Holding heparin in the setting of massive intraabdominal hemorrhage and liver laceration  S/p IVC filter placed this admission  Follow-up with vascular clinic for IVC filter retrieval when appropriate  Resume anticoagulation when cleared by surgery    Goals of care, counseling/discussion  Assessment & Plan  Extensive discussion with patient's very caring and involved Sister Rani again on 7/24  Again confirmed, DNR status, on was a very vibrant and would be much more concerned with quality than quantity of life as previously documented.  Planning on repeat P UF today and likely extubate tomorrow.  DNR/DNI status.  If she does not do well with extubation, transition to comfort focused care for significant dyspnea or discomfort consistent with patient's prior stated wishes.    Morbid obesity with BMI of 50.0-59.9, adult ([facility])- (present on admission)  Assessment & Plan  BMI 50, place her in high risk for increase morbidity during this admission         VTE:  Contraindicated  Ulcer: H2 Antagonist  Lines: Central Line  Ongoing indication addressed, Arterial Line  Ongoing indication addressed, and Elizalde Catheter  Ongoing indication addressed    I have performed a physical exam and reviewed and updated ROS and Plan today (7/25/2023). In review of yesterday's note (7/24/2023), there are no changes except as documented above.      Discussed patient condition and risk of morbidity and/or mortality with Family, RN, RT, Therapies, Pharmacy, and Patient  The patient remains critically ill.  Critical care time = 42 minutes in directly providing and coordinating critical care and extensive data review.  No time overlap and excludes procedures.

## 2023-07-25 NOTE — CARE PLAN
12 hour chart check    Shift Goals  Clinical Goals: wean vasopressors, improve neuro exam  Patient Goals: enrico  Family Goals: enrico    Progress made toward(s) clinical / shift goals:    Problem: Pain - Standard  Goal: Alleviation of pain or a reduction in pain to the patient’s comfort goal  Outcome: Progressing     Problem: Safety - Medical Restraint  Goal: Remains free of injury from restraints (Restraint for Interference with Medical Device)  Outcome: Met  Goal: Free from restraint(s) (Restraint for Interference with Medical Device)  Outcome: Met       Patient is not progressing towards the following goals:    Monitor Summary  Sinus Rhythm 70-90

## 2023-07-25 NOTE — ASSESSMENT & PLAN NOTE
Ongoing discussions, last of which 8/1 with palliative medicine, bedside nurse, and Rani. Ultimately Rani feels her sister would be unaccepting of this quality of life and there are discussions to move toward comfort care.

## 2023-07-25 NOTE — PROGRESS NOTES
[facility] Progress Note      PUF today x 2.5 hours per Dr. Kinney.   Initiated at 0952 and ended at 1222.     Assessment:    Patient awake, eyes open on verbal commands but unable to determine orientation. Intubated. On 1 pressor for BP support, On TF. Generalized edema noted.      Access used: LIJ non tunneled catheter, CDI. Patent and intact. Positional.        Net Fluid Removed: 3,000 mL as ordered.      Procedure Events/ Complications:   Patient tolerated the UF goal of 3L. BP between SBP 's systolic once treatment started. On 1 pressor running for BP support.    CVC positional. Reversing lines and NS flushing each ports several times.        Post Access Care:   Blood returned. Flushed with NS.  CVC locked with Heparin 1000 units/ mL   Arterial port:  1.4 mL   Venous port: 1.4 mL  Clamped, capped and secured. Labeled   Dressing change: Due on 7/29, PRN        Report given to Primary SONG Todd RN.

## 2023-07-25 NOTE — PROCEDURES
Nephrology/Hemodialysis note    Patient admitted with perforated viscus, s/p ex lap, hemorrhagic shock, DUNG due to ATN, anuric -started on CRRT -now on IHD  Seen and examined during dialysis  tolerates well  VS stable  UF 2-3 L  Lab results reviewed  Please see dialysis flow sheet for details

## 2023-07-25 NOTE — DIETARY
Nutrition Services Brief Update:    Problem: Nutritional:  Goal: Nutrition support tolerated and meeting greater than 85% of estimated needs  Outcome: Goal met. Peptamen Intense VHP at goal rate of 70 ml/hour.    RD following

## 2023-07-25 NOTE — THERAPY
Physical Therapy Contact Note    Patient Name: Ashley English  Age:  63 y.o., Sex:  female  Medical Record #: [id number]  Today's Date: 7/25/2023 07/25/23 0800   Interdisciplinary Plan of Care Collaboration   Collaboration Comments Attempted to see for follow up PT treatment session. Patient not medically appropriate for PT at this time

## 2023-07-25 NOTE — CARE PLAN
The patient is Watcher - Medium risk of patient condition declining or worsening    Shift Goals  Clinical Goals: wean vasopressors, increase alertness, mobility, wean ventilator  Patient Goals: unable to assess  Family Goals: no family present    Progress made toward(s) clinical / shift goals:  vasopressors titrated per MAR. Pt more alert today than yesterday, lights on during the day and encouragint pt to participate. SBT per RT.   Problem: Pain - Standard  Goal: Alleviation of pain or a reduction in pain to the patient’s comfort goal  Outcome: Progressing     Problem: Knowledge Deficit - Standard  Goal: Patient and family/care givers will demonstrate understanding of plan of care, disease process/condition, diagnostic tests and medications  Outcome: Progressing   Family conference with MD yesterday. Family understands plan of care.

## 2023-07-25 NOTE — CARE PLAN
Problem: Ventilation  Goal: Ability to achieve and maintain unassisted ventilation or tolerate decreased levels of ventilator support  Description: Target End Date:  4 days     Document on Vent flowsheet    1.  Support and monitor invasive and noninvasive mechanical ventilation  2.  Monitor ventilator weaning response  3.  Perform ventilator associated pneumonia prevention interventions  4.  Manage ventilation therapy by monitoring diagnostic test results  Outcome: Not Met     Ventilator Daily Summary    Vent Day #7    ETT: 8@24    Ventilator settings: 20/420/+8/40%    Weaning trials: SBT x 1 (5 hours)    Respiratory Procedures: No    Plan: Continue current ventilator settings and wean mechanical ventilation as tolerated per physician orders.

## 2023-07-25 NOTE — PROGRESS NOTES
"  DATE: 7/25/2023    INTERVAL EVENTS:  Renal replacement therapy.    PHYSICAL EXAMINATION:  Vital Signs: /64   Pulse 89   Temp 37.1 °C (98.8 °F) (Temporal)   Resp (!) 23   Ht 1.779 m (5' 10.04\")   Wt (!) 155 kg (341 lb 0.8 oz)   SpO2 90%     The abdomen is soft, and obese.  Right subcostal ostomy remains dark but functioning appropriately.  Midline VAC dressing functioning.    LABORATORY VALUES:   Recent Labs     07/23/23  0530 07/24/23  0500 07/25/23  0439   WBC  --  11.5* 9.9   RBC  --  2.65* 2.86*   HEMOGLOBIN 8.9* 8.6* 8.9*   HEMATOCRIT 24.1* 25.2* 27.4*   MCV  --  95.1 95.8   MCH  --  32.5 31.1   MCHC  --  34.1 32.5   RDW  --  51.8* 55.0*   PLATELETCT  --  47* 38*   MPV  --  12.9 13.4*     Recent Labs     07/23/23  1152 07/23/23  1712 07/24/23  0500 07/25/23  0439   SODIUM 137  --  137 136   POTASSIUM 3.9 4.0 4.0 4.0   CHLORIDE 105  --  103 104   CO2 25 -- 22 23   GLUCOSE 120*  --  135* 155*   BUN 14  --  24* 30*   CREATININE 1.00  --  1.70* 2.26*   CALCIUM 7.4*  --  7.7* 7.4*     Recent Labs     07/23/23  0530 07/23/23  1152 07/24/23  0500 07/25/23  0439   ASTSGOT  --  336* 200* 151*   ALTSGPT  --  808* 583* 387*   TBILIRUBIN  --  5.0* 5.8* 7.1*   ALKPHOSPHAT  --  220* 220* 202*   GLOBULIN  --  2.2 2.4 2.8   AMMONIA 25 --  23  --        ASSESSMENT AND PLAN:  The patient remains critically ill.  No additional surgical interventions warranted.  Ongoing discussions regarding ultimate goals of care.    Appreciate ICU and consulting physician care.       ____________________________________     Brendan Irving M.D.    DD: 7/25/2023  7:37 AM    "

## 2023-07-25 NOTE — DISCHARGE PLANNING
Discussed pt during IDT rounds. Pt on vent and levo. Pt has pending SNF referrals, no accepting facilities. CM RN to follow up with referrals once pt has been extubated and SNF referrals still appropriate.

## 2023-07-26 NOTE — PROCEDURES
Nephrology/Hemodialysis note    Patient admitted with perforated viscus, s/p ex lap, hemorrhagic shock, DUNG/ATN/anuric volume overloaded, initially on CRRT - now IHD  Seen and examined during PUF treatment for better volume control  Tolerates well  VS stable  UF goal 2-3 L  Please see procedure floe sheet for details  HD tomorrow

## 2023-07-26 NOTE — CONSULTS
MRN: [id number]  Date of initial palliative care consult: 2023  Reason for palliative medicine consultation/visit: GOC/ACP   Referring provider: Dr. Guillen  Location of consult: TICU  Additional consulting services: Surgery, nephrology    HPI:   Ashley English is a 63 y.o. female who presented to the ED on 2023 with complaints of right leg pain x3 days, previously seen at urgent care for same complaint and was noted to have DVT on RLQ ultrasound.  CTA of the chest revealed acute saddle pulmonary embolus as well as pneumoperitoneum.  She is status post exploratory laparotomy with left hemicolectomy and end colostomy as well as cholecystectomy on 2023.  Intraoperatively, a perforated colon at splenic flexure with associated mass was identified, concerning for malignancy; however, pathology was negative.  Patient returned to ICU intubated postoperatively and was able to be extubated and transferred to the floor. Unfortunately,  2023 patient was found to be in hemorrhagic shock from intra-abdominal bleed and liver laceration, patient was reintubated and returned to ICU.  2023 patient returned to the OR for revision of colostomy and colon resection.  Started on CRRT 2023.  Patient now tolerating HD/UF.    Medication Allergy/Sensitivities:  Allergies   Allergen Reactions    Nkda [No Known Drug Allergy]        ROS:    Review of Systems   Reason unable to perform ROS: intubated.       PE:   Recent vital signs  BMI: Body mass index is 48.85 kg/m².    No data recorded.  Monitored Temp: 37.3 °C (99.1 °F)  Pulse  Av.1  Min: 32  Max: 138   Blood Pressure: 106/64, Arterial BP: 111/63      Physical Exam  Constitutional:       Appearance: She is ill-appearing.   Cardiovascular:      Rate and Rhythm: Bradycardia present.      Heart sounds: Normal heart sounds.   Pulmonary:      Effort: Pulmonary effort is normal.      Breath sounds: Decreased breath sounds and rhonchi present.   Abdominal:       General: Bowel sounds are normal.      Comments: RUQ colostomy and midline wound vac   Neurological:      Mental Status: She is alert.       Recent Labs     07/24/23  0500 07/25/23  0439 07/26/23 0428   SODIUM 137 136 135   POTASSIUM 4.0 4.0 3.9   CHLORIDE 103 104 102   CO2 22 23 19*   GLUCOSE 135* 155* 122*   BUN 24* 30* 46*   CREATININE 1.70* 2.26* 2.95*   CALCIUM 7.7* 7.4* 7.4*     Recent Labs     07/24/23  0500 07/25/23  0439 07/26/23  0428   WBC 11.5* 9.9 11.1*   RBC 2.65* 2.86* 2.81*   HEMOGLOBIN 8.6* 8.9* 8.9*   HEMATOCRIT 25.2* 27.4* 27.0*   MCV 95.1 95.8 96.1   MCH 32.5 31.1 31.7   MCHC 34.1 32.5 33.0   RDW 51.8* 55.0* 56.6*   PLATELETCT 47* 38* 40*   MPV 12.9 13.4* 13.1*       ASSESSMENT/PLAN WITH SHARED DECISION MAKING:   Medications reviewed. Labs Reviewed.     Pertinent imaging reviewed.    PHYSICAL ASPECTS OF CARE  Palliative Performance Scale: 10%    # Acute hypoxic respiratory failure/saddle PE  # Perforated viscus s/p   # DUNG  # Hemorrhagic Shock  # Morbid Obesity    SOCIAL ASPECTS OF CARE  Patient is single and has no children.  She has 2 siblings.  She enjoys watching [employer] baseball and softball, traveling, and spending time with family. She retired 4 years ago and was fully independent in ADLs and IADLs prior to this admission.    SPIRITUAL ASPECTS OF CARE   Deferred during this encounter.    GOALS OF CARE/SERIOUS ILLNESS CONVERSATION  Consult received and EMR reviewed. Case discussed with ICU MD, updates appreciated.    PC JADON met with patient's sister, Rani at bedside.  Introduced self and role of palliative care.  Rani agreeable to goals of care discussion/consultation at this time.  Brought to conference room per Rani's preference.     Rani provided update on the social and medical history. Assessed Rani's understanding of current medical status, overall health picture, and options for future care. Demonstrates a good understanding of the current clinical picture.    Explored patient's  "expressed values, beliefs, and preferences in order to identify GOC. Rani states that patient greatly values her independence and quality of life.  Rani notes that patient has told her in the past that she would not want to be \"kept alive on machines.\"  Rani states that she broached the subject of tracheostomy with patient yesterday when she was much more alert and patient indicated that she would not want to proceed with tracheostomy.    Rani is hopeful for one-way extubation tomorrow with plan to discuss goals of care directly with patient.  PC APRN provided anticipatory guidance and discussed that while we remain hopeful that patient will be able to participate in goals of care discussion, she may not be able to do so.  Rani verbalized understanding and states that she and her brother, Surendra, are willing and able to continue acting as default healthcare surrogate's should patient be unable to participate in goals of care discussion.     Discussed that patient has many comorbidities making it likely that she will require reintubation.  Rani is very clear that both she and Surendra do not feel that patient would want reintubated and would want to be transition to comfort care at this time.    Queried if pt has ever discussed healthcare wishes and values or completed an Advance Directive in the past.  Does not have an advanced directive.    Active listening, reflection, reminiscing, validation & normalization, and empathic support utilized throughout this encounter.  All questions answered and contact information provided.   Outcome: Plan for one-way extubation tomorrow with transition to CC if needed.    Code Status: DNAR, I OK    ACP Documents: AD    20 minutes spent discussing advanced care planning, this time excludes any other billed services.    I spent a total of 60 minutes reviewing medical records, direct face-to-face time with the patient and/or family, documentation and coordination of care.This is " separate from the time spent on advance care planning, which is documented above.    JADON Sotelo  Palliative Care Nurse Practitioner   [phone]

## 2023-07-26 NOTE — PROGRESS NOTES
[facility] Progress Note     Hemodialysis treatment x3 hours completed as ordered per Dr Kinney.   Treatment performed at bedside started at 0933 and ended at 1233.      Net UF Removed: 2900 mL     Patient tolerated treatment well. UF goal reached as tolerated.  L-side IJ non-tunneled CVC access with good patency and flow x 2  Patient stable during and post treatment.   See Acute HD flow sheets on clinical data notes under media for details.      Post tx access: L-side IJ non-tunneled HD catheter flushed with saline then locked with heparin 1000 units/ml per designated amount in each lumen (see MAR) then clamped, capped aseptically and labeled properly. CVC dressings clean, dry and intact. No s/s of infection to HD catheter site. Heparin lock to be aspirated prior to next dialysis/CVC use by dialysis RN only. Please do not flush or draw from ports.     Please notify Nephrologist/Dialysis for follow-up.     Report given to primary care nurse .

## 2023-07-26 NOTE — CARE PLAN
The patient is Watcher - Medium risk of patient condition declining or worsening    Shift Goals  Clinical Goals: WEAN VASOPRESSORS, MOBILITY  Patient Goals: unable to assess  Family Goals: no family present    Progress made toward(s) clinical / shift goals:    Problem: Pain - Standard  Goal: Alleviation of pain or a reduction in pain to the patient’s comfort goal  Outcome: Progressing     Problem: Knowledge Deficit - Standard  Goal: Patient and family/care givers will demonstrate understanding of plan of care, disease process/condition, diagnostic tests and medications  Outcome: Progressing     Problem: Skin Care - Ostomy  Goal: Skin remains free from irritation  Outcome: Progressing       SR 65-90

## 2023-07-26 NOTE — CARE PLAN
Problem: Ventilation  Goal: Ability to achieve and maintain unassisted ventilation or tolerate decreased levels of ventilator support  Description: Target End Date:  4 days     Document on Vent flowsheet    1.  Support and monitor invasive and noninvasive mechanical ventilation  2.  Monitor ventilator weaning response  3.  Perform ventilator associated pneumonia prevention interventions  4.  Manage ventilation therapy by monitoring diagnostic test results  Outcome: Progressing   Vent Day # 8     Ventilator settings changed this shift:    APVcmv 20, 420 +8, 40%  Weaning trials:   Spont trial for two hours this morning but not in the afternoon per RN  Respiratory Procedures:    none  Plan: Continue current ventilator settings and wean mechanical ventilation as tolerated per physician orders.

## 2023-07-26 NOTE — PROGRESS NOTES
"Critical Care Progress Note    Date of admission  7/12/2023    Chief Complaint  \"63 y.o. female who presented 7/12/2023 with acute DVT, acute saddle PE and new pneumoperitoneum due to perforated viscus for what she was admitted and treated with thrombectomy, heparin ggt and IVC filter placement. She also underwent exploratory laparatomy (today day 5 post op) with left colectomy with end colostomy and s/p cholecystectomy with a wound vac in place.   I was called at the bedside due to hypotension and tachycardia.\" H+P 7/12    Hospital Course  7/13 s/p bilateral thrombectomy for saddle PE and IVC filter placement. Successful.   7/13 s/p left colectomy and end-colostomy, and cholecystectomy. Intraop found perforated colon at splenic flexure with associated mass. Concerning of malignancy. Remains intubated post op.   7/19: Admitted to ICU is hemorrhagic shock, MTP, OR for intrabdominal bleed, liver lac, returns to ICU in shock and intubated  7/20: VD 2-Hb stable, anuric, back to OR for removal of packing, colon resection, revision colostomy (to RUQ), vasoplegic with increasing pressors overnight  7/21: CRRT started  7/22: VD 4-weaned of pressors, following, increase UF to 100-->150 overnight  7/23: 2.5 L off yesterday, off pressors, very weak today, hypophos needs aggressive replacement, switch CRRT to iHD, mobilize, SBT/SAT tomorrow  7/24: HD/UF today, tolerating SBT, high risk for failing extubation.  Discussed with family today regarding GOC as previously documented  7/25: PUF again today; VD#7, awake and follows very weakly t/o.  7/26: plan for HD/UF today, awake and follows, moving arms off bed not; palliative consulted to assist with GOC d/w pt/family, plan      Interval Problem Update  Reviewed last 24 hour events:  Overnight: NAOE  Neuro: awake, follows, now lifts arm off bed  CV: NE .06  3+ edema  Resp: VD#8: 20/420/8/40%, terri SBT  CXR: low vols, edema,   I/O: 2578/6245  Heme: WBC 11.1, Plts stable 40K  Tmax: " 100.4  Abx: zosyn 7/12 -> current d#15/21  Micro: reviewed  GI/Nut: Wound sites poorly perfused,black, terri TF  Endo: glu 122  Lytes: OK  Drips: NE  LDA: Cvc, art, mid, HD cath  SUP: pepcid  VTE: No vte ppx with low platelts, pepcid    Yesterday:  Overnight: No acute overnight events  Neuro: very weak, weakly squeezes hands and wiggles toes to commands, no antigravity upper or lower extremities  CV: SR,  Resp: VD #7: APV/CMV, 20/420/10/40%  CXR: ETT okay, improved lung volumes, pulmonary edema and effusion  I/O: 2777/6450 (anuric, 3500 off with HD yesterday, 1700 VAC, 1250 stool)  CRRT stopped 7/23, creatinine 1.0-1.7-2.26  Heme: Hemoglobin 8.9, platelets 38 K  Tmax: 100.4  Abx: zosyn 7/12-current d#14/21  Micro: All cultures negative, 7/19 BC x2 negative, 7/21 BC x2 negative  GI/Nut: Rated  Albumin 1.6  LFTs trending down  Endo: Glucose 155  Lytes: replace phos  Drips:   LDA: art, TLC, HD  SUP: pepcid  VTE: Contraindicated after massive transfusion protocol for liver bleeding    Review of Systems  Review of Systems   Unable to perform ROS: Critical illness        Vital Signs for last 24 hours   Pulse:  [32-90] 67  Resp:  [15-41] 25  BP: (100-130)/(54-69) 102/61  SpO2:  [91 %-98 %] 95 %    Hemodynamic parameters for last 24 hours       Respiratory Information for the last 24 hours  Vent Mode: Spont  Rate (breaths/min): 20  Vt Target (mL): 420  PEEP/CPAP: 8  P Support: 5  MAP: 11  Length of Weaning Trial (Hours): 5  Control VTE (exp VT): 411    Physical Exam   Physical Exam  Constitutional:       General: She is not in acute distress.     Appearance: She is ill-appearing.   HENT:      Head: Normocephalic.      Nose: Nose normal.      Mouth/Throat:      Mouth: Mucous membranes are dry.   Eyes:      Extraocular Movements: Extraocular movements intact.      Pupils: Pupils are equal, round, and reactive to light.   Cardiovascular:      Rate and Rhythm: Normal rate and regular rhythm.   Pulmonary:      Effort: Pulmonary  effort is normal.      Breath sounds: Rhonchi and rales present.      Comments: Mechanical BS  Abdominal:      General: There is distension.      Palpations: Abdomen is soft.   Musculoskeletal:      Cervical back: Neck supple.      Right lower leg: Edema present.      Left lower leg: Edema present.   Skin:     General: Skin is warm.      Capillary Refill: Capillary refill takes 2 to 3 seconds.   Neurological:      Comments: Follows in all four, nods head to questions, BTT present bilateral, tracks makes eye contacts, weak and deconditioned         Medications  Current Facility-Administered Medications   Medication Dose Route Frequency Provider Last Rate Last Admin    lidocaine (Xylocaine) 1 % injection 1 mL  1 mL Intradermal PRN Marita Buenrostro M.D.        famotidine (Pepcid) tablet 20 mg  20 mg Enteral Tube DAILY Cristian Peña M.D.   20 mg at 07/26/23 0558    oxyCODONE immediate-release (Roxicodone) tablet 5 mg  5 mg Enteral Tube Q4HRS PRN Cristian Peña M.D.        promethazine (Phenergan) tablet 12.5-25 mg  12.5-25 mg Enteral Tube Q4HRS PRN Cristian Peña M.D.        levothyroxine (Synthroid) tablet 225 mcg  225 mcg Enteral Tube AM ES Cristian Peña M.D.   225 mcg at 07/26/23 0558    heparin injection 2,800 Units  2,800 Units Intracatheter PRN Marita Buenrostro M.D.   2,800 Units at 07/25/23 1225    insulin regular (HumuLIN R,NovoLIN R) injection  2-9 Units Subcutaneous Q6HRS Kerrie LEE Latona   2 Units at 07/25/23 1219    And    dextrose 50% (D50W) injection 25 g  25 g Intravenous Q15 MIN PRN Kerrie LNik Latona   25 g at 07/21/23 0225    norepinephrine (Levophed) 8 mg in 250 mL NS infusion (premix)  0-1 mcg/kg/min (Ideal) Intravenous Continuous Lam Louis M.D. 5.1 mL/hr at 07/26/23 0600 0.04 mcg/kg/min at 07/26/23 0600    Respiratory Therapy Consult   Nebulization Continuous RT Cristian Peña M.D.        senna-docusate (Pericolace Or Senokot S) 8.6-50 MG per tablet 2 Tablet  2 Tablet  Enteral Tube BID Cristian Peña M.D.   2 Tablet at 07/23/23 0511    And    polyethylene glycol/lytes (Miralax) PACKET 1 Packet  1 Packet Enteral Tube QDAY PRN Cristian Peña M.D.        And    magnesium hydroxide (Milk Of Magnesia) suspension 30 mL  30 mL Enteral Tube QDAY PRN Cristian Peña M.D.        And    bisacodyl (Dulcolax) suppository 10 mg  10 mg Rectal QDAY PRN Cristian Peña M.D. MD Alert...ICU Electrolyte Replacement per Pharmacy   Other PHARMACY TO DOSE Cristian Peña M.D.        lidocaine (Xylocaine) 1 % injection 2 mL  2 mL Tracheal Tube Q30 MIN PRN Cristian Peña M.D.        fentaNYL (Sublimaze) injection 100 mcg  100 mcg Intravenous Q15 MIN PRN Cristian Peña M.D.        And    fentaNYL (Sublimaze) injection 200 mcg  200 mcg Intravenous Q15 MIN PRN Cristian Peña M.D.        ondansetron (Zofran) syringe/vial injection 4 mg  4 mg Intravenous Q4HRS PRN Ana Lilia Joy M.D.        promethazine (Phenergan) suppository 12.5-25 mg  12.5-25 mg Rectal Q4HRS PRN Ana Lilia Joy M.D.        prochlorperazine (Compazine) injection 5-10 mg  5-10 mg Intravenous Q4HRS PRN Ana Lilia Joy M.D.        Nozin nasal  swab  1 Applicator Each Nostril BID Dagoberto Ohara D.O.   1 Applicator at 07/26/23 0607       Fluids    Intake/Output Summary (Last 24 hours) at 7/26/2023 0726  Last data filed at 7/26/2023 0600  Gross per 24 hour   Intake 2578.71 ml   Output 6245 ml   Net -3666.29 ml         Laboratory            Recent Labs     07/24/23  0500 07/25/23  0439 07/26/23  0428   SODIUM 137 136 135   POTASSIUM 4.0 4.0 3.9   CHLORIDE 103 104 102   CO2 22 23 19*   BUN 24* 30* 46*   CREATININE 1.70* 2.26* 2.95*   MAGNESIUM 2.2 2.1 2.2   PHOSPHORUS 1.9* 2.2* 3.2   CALCIUM 7.7* 7.4* 7.4*       Recent Labs     07/24/23  0500 07/25/23  0439 07/26/23  0428   ALTSGPT 583* 387* 251*   ASTSGOT 200* 151* 104*   ALKPHOSPHAT 220* 202* 187*   TBILIRUBIN 5.8* 7.1* 7.8*   GLUCOSE 135* 155*  122*       Recent Labs     07/24/23  0500 07/25/23  0439 07/26/23  0428   WBC 11.5* 9.9  --    NEUTSPOLYS  --  88.00*  --    LYMPHOCYTES  --  6.80*  --    MONOCYTES  --  2.60  --    EOSINOPHILS  --  0.90  --    BASOPHILS  --  0.00  --    ASTSGOT 200* 151* 104*   ALTSGPT 583* 387* 251*   ALKPHOSPHAT 220* 202* 187*   TBILIRUBIN 5.8* 7.1* 7.8*       Recent Labs     07/24/23  0500 07/25/23  0439   RBC 2.65* 2.86*   HEMOGLOBIN 8.6* 8.9*   HEMATOCRIT 25.2* 27.4*   PLATELETCT 47* 38*         Imaging  X-Ray:  I have personally reviewed the images and compared with prior images.      Assessment/Plan  * Acute hypoxemic respiratory failure (HCC)- (present on admission)  Assessment & Plan  Initially 7/13 Intubated for OR and 7/15 extubated  7/19:  reintubated in the setting of recalcitrant and rapidly progressive shock of unknown etiology felt to be hemorrhagic versus septic.  The patient was not protecting her airway with vasopressor requirements and obtunded.   status post massive transfusion and operative intervention to include ex lap intra-abdominal hemorrhage washout liver packing open abdomen.  At risk of ARDS  7/22: passed SBT, on Fi02 0.3, think too weak and VOL to extubate today, perhaps try tomorrow  7/22: Able to spont but very weak inspiratory effort, minimal Fi02 or PS  7/24 PT, high risk for failure after extubation, family conference again today regarding GOC and plan for eventual extubation next 1-2 days  Continue full ventilator support, LTV/LPS, A-F bundle    Perforated viscus- (present on admission)  Assessment & Plan  7/13 - ex lap with left hemicolectomy and end colostomy by Dr. Boateng  Found perforated colon at splenic fixture, associated with mass. Path neg for malignancy  Fecal peritonitis, remains on Zosyn per surgery    Acute kidney injury (HCC)  Assessment & Plan  Worsening renal function post shock, ATN likely, anuric  7/21: Initiated CRRT for volume removal/ ultrafiltration after failed diuretic  challenge  7/23: Switch CRRT to iHD to facilitate mobility and PT, still anuric  7/24 iHD with maximal UF today  Renal dose meds, avoid nephrotoxins  Strict I/Os  Follow renal function    Acute saddle pulmonary embolism (HCC)- (present on admission)  Assessment & Plan  Originating from a right leg DVT. Pt is not a candidate for thrombolytics given her pneumoperitoneum  7/13: S/p emergent thrombectomy of saddle PE with IVC placement.   7/19: Repeat TTE with normal LV function, poor RV visualization but likely dilated with reduced function, RAP low  Continue to hold heparin as high risk for rebleed until surgically cleared    Thrombocytopenia (HCC)  Assessment & Plan  Platelets trending down on the basis of shock critical illness and likely contributing CRRT  Off VTE ppx, for intraabdominal bleed post-op  Will resume VTE ppx when able  Trend daily cbc  At risk of rebleed transfuse PLTS when <20 or any signs of bleeding    Acute hepatic necrosis  Assessment & Plan  Ischemic hepatopathy due to shock, evidenced by markedly elevated transaminases, elevated Tbili, hypoalbunemia, elevated lactate, and coagulopathy consistent with hepatic failure and decrease synthetic function  Follow LFTs, trending down  Ammonia clearing  Avoid hepatotoxins as able    Hemorrhagic shock (HCC)- (present on admission)  Assessment & Plan  7/19: Overnight with worsening hypotension, obtundation and vasopressor requirement.  Bedside POCUS with underfilled hyperdynamic LV and FF in RUQ.  MTP intiated and patient taken to OR with ACGS. Found to have copious intrabdominal hemmorhage with a liver laceration near falciform ligament.  Packed left open  7/20: OR re opened, packing removed, fascia closed  No recurrent bleeding, HH stable  Holding heparin until cleared by surgery, follow H&H    Acute blood loss anemia  Assessment & Plan  Intraabdominal bleed  S/P MTP and operative control  Follow H/H  Transfuse if H/H is <7/21    Acute deep venous  thrombosis (DVT) determined by ultrasound ([facility])- (present on admission)  Assessment & Plan  Holding heparin in the setting of massive intraabdominal hemorrhage and liver laceration  S/p IVC filter placed this admission  Follow-up with vascular clinic for IVC filter retrieval when appropriate  Resume anticoagulation when cleared by surgery    Goals of care, counseling/discussion  Assessment & Plan  Extensive discussion with patient's very caring and involved Sister Rani again on 7/24  Again confirmed, DNR status, on was a very vibrant and would be much more concerned with quality than quantity of life as previously documented.  Planning on repeat P UF today and likely extubate tomorrow.  DNR/DNI status.  If she does not do well with extubation, transition to comfort focused care for significant dyspnea or discomfort consistent with patient's prior stated wishes.  I have spoken with Silva extensively but there appears to be some differing opinion with other family members.  Palliative care consulted and will attempt to arrange family conference for further clarification of goals of care and discussed with patient as able.    Morbid obesity with BMI of 50.0-59.9, adult ([facility])- (present on admission)  Assessment & Plan  BMI 50, place her in high risk for increase morbidity during this admission         VTE:  Contraindicated  Ulcer: H2 Antagonist  Lines: Central Line  Ongoing indication addressed, Arterial Line  Ongoing indication addressed, and Elizalde Catheter  Ongoing indication addressed    I have performed a physical exam and reviewed and updated ROS and Plan today (7/26/2023). In review of yesterday's note (7/25/2023), there are no changes except as documented above.     Discussed patient condition and risk of morbidity and/or mortality with Family, RN, RT, Therapies, Pharmacy, and Patient  The patient remains critically ill.  Critical care time = 45 minutes in directly providing and coordinating critical care and  extensive data review.  No time overlap and excludes procedures.

## 2023-07-26 NOTE — THERAPY
Occupational Therapy  Daily Treatment     Patient Name: Ashley English  Age:  63 y.o., Sex:  female  Medical Record #: [id number]  Today's Date: 7/26/2023     Precautions  Precautions: Fall Risk  Comments: wound vac to abdomen, colostomy    Assessment    Pt currently limited by decreased functional mobility, activity tolerance, cognition, sensation, strength, AROM, coordination, balance, and pain which are affecting pt's ability to complete ADLs/IADLs at baseline. Pt would benefit from OT services in the acute care setting to maximize functional recovery.      Plan    Treatment Plan Status: (P) Continue Current Treatment Plan  Type of Treatment: Self Care / Activities of Daily Living, Adaptive Equipment, Neuro Re-Education / Balance, Therapeutic Exercises, Therapeutic Activity  Treatment Frequency: 4 Times per Week  Treatment Duration: Until Therapy Goals Met    DC Equipment Recommendations: Unable to determine at this time  Discharge Recommendations: (P) Recommend post-acute placement for additional occupational therapy services prior to discharge home         07/26/23 0748   Activities of Daily Living   Grooming Total Assist   Upper Body Dressing Total Assist   Lower Body Dressing Total Assist   Toileting Total Assist   Functional Mobility   Sit to Stand Unable to Participate   Bed, Chair, Wheelchair Transfer Unable to Participate   Short Term Goals   Short Term Goal # 1 Pt will sit unsupported >15 min EOB to participate in ADL   Goal Outcome # 1 Progressing slower than expected   Short Term Goal # 2 Pt will scoot laterally along EOB with CGA in preparation for transfers   Goal Outcome # 2 Progressing slower than expected   Short Term Goal # 3 Pt will complete seated grooming with supv EOB   Goal Outcome # 3 Progressing slower than expected   Occupational Therapy Treatment Plan    O.T. Treatment Plan Continue Current Treatment Plan   Anticipated Discharge Equipment and Recommendations   Discharge  Recommendations Recommend post-acute placement for additional occupational therapy services prior to discharge home

## 2023-07-26 NOTE — PROGRESS NOTES
Pt had bradycardic episode when blood was being returned by dialysis RN. Pt was still able to nod her head to me during this episode, but looked more pale. Blood pressure was high - norepinephrine titrated per MAR.    EKG Stat ordered.  Dr. Guillen notified of this episode.

## 2023-07-26 NOTE — FLOWSHEET NOTE
07/26/23 0834   Spontaneous Breathing Trial (SBT)   Length of Weaning Trial (Hours) 2   Weaning Parameters   RR (bpm) 27   $ FVC / Vital Capacity (liters)  0.4   NIF (cm H2O)  -8   Rapid Shallow Breathing Index (RR/VT) 67   Spontaneous VE 10.2   Spontaneous

## 2023-07-26 NOTE — WOUND TEAM
[facility]  Inpatient Services  Wound and Skin Care Follow-up    Admission Date: 7/12/2023     Last order of IP CONSULT TO WOUND CARE was found on 7/24/2023 from Hospital Encounter on 7/12/2023     HPI, PMH, SH: Reviewed    Past Surgical History:   Procedure Laterality Date    NH EXPLORATORY OF ABDOMEN N/A 7/20/2023    Procedure: REOPENING OF RECENT LAPAROTOMY, REMOVAL OF LIVER PACKING, COLON RESECTION, PARTIAL COLECTOMY, ABDOMINAL WALL CLOSURE, REVISION OF COLOSTOMY;  Surgeon: Taylor Bocanegra M.D.;  Location: [facility];  Service: General    WOUND CLOSURE NEURO N/A 7/20/2023    Procedure: CLOSURE, WOUND;  Surgeon: Taylor Bocanegra M.D.;  Location: [facility];  Service: General    NH EXPLORATORY OF ABDOMEN N/A 7/19/2023    Procedure: LAPAROTOMY, EXPLORATORY FOR CONTROL OF BLEEDING, LIVER PACKING, PLACEMENT OF ABTHERA, TEMPORARY ABDOMINAL CLOSURE;  Surgeon: Taylor Bocanegra M.D.;  Location: [facility];  Service: General    NH EXPLORATORY OF ABDOMEN N/A 7/13/2023    Procedure: LAPAROTOMY, EXPLORATORY;  Surgeon: Angel Luis Boateng M.D.;  Location: [facility];  Service: General    NH COLOSTOMY  7/13/2023    Procedure: CREATION, COLOSTOMY;  Surgeon: Angel Luis Boateng M.D.;  Location: [facility];  Service: General    CHOLECYSTECTOMY N/A 7/13/2023    Procedure: CHOLECYSTECTOMY;  Surgeon: Angel Luis Boateng M.D.;  Location: SURGERY [facility];  Service: General    LOW ANTERIOR RESECTION N/A 7/13/2023    Procedure: RESECTION, RECTUM, LOW ANTERIOR;  Surgeon: Angel Luis Boateng M.D.;  Location: SURGERY [facility];  Service: General    KNEE ARTHROPLASTY TOTAL Left 9/12/2017    Procedure: KNEE ARTHROPLASTY TOTAL;  Surgeon: Bull James M.D.;  Location: [facility];  Service: Orthopedics     Social History     Tobacco Use    Smoking status: Never    Smokeless tobacco: Never   Substance Use Topics    Alcohol use: No     Chief Complaint   Patient  "presents with    Abdominal Pain     Patient to triage via wheelchair for abdominal pain, scheduled for gallbladder surgery but received outpatient US today and was instructed to come to ED for blood clot to right leg. Patient denies use of blood thinners or history of blood clot     Leg Pain     Diagnosis: Pneumoperitoneum [K66.8]  Perforated abdominal viscus [R19.8]    Unit where seen by Wound Team: T908/[id number]     WOUND FOLLOW UP RELATED TO:  Abdominal NPWT and L. Lower lip and ostomy       WOUND HISTORY:       \"63-year-old woman who presented with a perforated viscus.  She was also noted to have a saddle embolus in her pulmonary arteries and underwent an emergent thrombectomy.  Based on the fact that she had a perforated viscus an emergent laparotomy was indicated subsequent to the thrombectomy\"    7/13 - Left hemicolectomy with end colostomy/Delvis's procedure, cholecystectomy  7/19 - Repoening of previous Exploratory laparotomy.  Abdominal washout, hepatorrhaphy, packing of the abdomen, and temporary abdominal closure with negative pressure wound therapy device greater than 50 cm²  7/20 - Reopening of recent laparotomy with removal of liver packs, colon resection, and revision of colostomy        WOUND ASSESSMENT/LDA  Wound 07/13/23 Incision Abdomen Open Surgical full thickness (Active)   Date First Assessed/Time First Assessed: 07/13/23 1429   Primary Wound Type: Incision  Location: Abdomen  Wound Description (Comments): Open Surgical full thickness      Assessments 7/25/2023  2:00 PM   Wound Image      Site Assessment Black;Brown;Pink;Yellow   Periwound Assessment Pink;Red   Margins Defined edges;Unattached edges   Closure Secondary intention   Drainage Amount Moderate   Drainage Description Serosanguineous   Treatments Cleansed;Site care   Wound Cleansing Approved Wound Cleanser   Periwound Protectant Paste Ring;No-sting Skin Prep;Drape   Dressing Status Clean;Intact;Dry   Dressing Changed Changed   Dressing " Cleansing/Solutions Normal Saline   Dressing Options Wound Vac   Dressing Change/Treatment Frequency Tuesday, Thursday, Saturday, and As Needed   NEXT Dressing Change/Treatment Date 07/27/23   NEXT Weekly Photo (Inpatient Only) 07/29/23   Wound Team Following 3x Weekly   Non-staged Wound Description Full thickness   Shape irregular   Wound Odor Mild   Exposed Structures Sutures;Adipose;Connective tissue   WOUND NURSE ONLY - Time Spent with Patient (mins) 90       Negative Pressure Wound Therapy 07/22/23 Surgical Abdomen Midline Left (Active)   Placement Date/Time: 07/22/23 1300   Present on Original Admission: No  Hand Hygiene Completed: Yes  Wound Type: Surgical  Location: Abdomen  Wound Orientation: Midline  Laterality: Left      Assessments 7/25/2023  2:00 PM   NPWT Pump Mode / Pressure Setting Continuous;125 mmHg   Dressing Type Medium;Black Foam (Veraflo)   Number of Foam Pieces Used 4   Canister Changed No   Output (mL) 0 mL   NEXT Dressing Change/Treatment Date 07/27/23   VAC VeraFlo Irrigant Normal Saline   VAC VeraFlo Soak Time (mins) 5   VAC VeraFlo Instill Volume (ml) 34   VAC VeraFlo - Therapy Time (hrs) 2   VAC VeraFlo Pressure (mm/Hg) Continuous;125 mmHg        Vascular:    AIDA:   No results found.    Lab Values:    Lab Results   Component Value Date/Time    WBC 9.9 07/25/2023 04:39 AM    RBC 2.86 (L) 07/25/2023 04:39 AM    HEMOGLOBIN 8.9 (L) 07/25/2023 04:39 AM    HEMATOCRIT 27.4 (L) 07/25/2023 04:39 AM    CREACTPROT 12.98 (H) 07/25/2023 04:39 AM    HBA1C 5.7 (H) 08/12/2017 07:17 AM         Culture Results show:  No results found for this or any previous visit (from the past 720 hour(s)).    Pain Level/Medicated:  None, Tolerated without pain medication       INTERVENTIONS BY WOUND TEAM:  Chart and images reviewed. Discussed with bedside RN. All areas of concern (based on picture review, LDA review and discussion with bedside RN) have been thoroughly assessed. Documentation of areas based on  significant findings. This RN in to assess patient. Performed standard wound care which includes appropriate positioning, dressing removal and non-selective debridement. Pictures and measurements obtained weekly if/when required.    Wound:  Midline abdomen  Preparation for Dressing removal: Dressing soaked with saline  Cleansed/Non-selectively Debrided with:  Wound cleanser  Natacha wound: Cleansed with Wound cleanser, Prepped with No Sting, Paste Rings, and Drape  Primary Dressing:  VF bloack foam tucked into wound bed and secured with drape.       Wound:  L. Lateral abdomen  Preparation for Dressing removal: Dressing soaked with saline  Cleansed/Non-selectively Debrided with:  Wound cleanser  Natacha wound: Cleansed with Wound cleanser, Prepped with No Sting, Paste Rings, and Drape  Primary Dressing:  Black foam tucked into wound bed and secured with drape     Wound:  L. Lower lip  Left open to air    Advanced Wound Care Discharge Planning  Number of Clinicians necessary to complete wound care: 1  Is patient requiring IV pain medications for dressing changes:  No   Length of time for dressing change 55 min. (This does not include chart review, pre-medication time, set up, clean up or time spent charting.)    Interdisciplinary consultation: Patient, Bedside RN (Yuko), Glendy (Wound Tech), updated Dr. Guillen,     EVALUATION / RATIONALE FOR TREATMENT:     Date:  07/25/23  Wound Status:  Wound deteriorating    Wound beds of abdomen are black and brown with odor, wound beds are not .  Discussed with critical care MD and falguni to replace wound vac.  Stoma is also black but producing large amount of stool. L. Lower lip has discoloration most likely not from pressure but possible from disease process.  Okay to keep open to air and apply lip moisturizer  Date:  07/22/23  Wound Status:  Initial evaluation    Mid abdominal wound with pale pink tissue and dull adipose. Visible sutures noted to wound base. Fascia intact per MD  prem Schafer to initiate VF NPWT. VF NPWT to assist with mechanical debridement, manage bioburden and exudate, and increase granulation tissue production. NPWT also applied to previous ostomy site to left lateral abdomen. Edges with dark nonviable tissue. Expect this wound to be  on next change.    Date: 7/17/23  Wound Status: No changes  Patient abdomen with adipose and one suture still present to base, more yellow tissue noted than previous assessment. Fascia appears intact. Continued adaptic at this time to assure protection over suture. Continue Regular VAC at this time.         Date:  07/13/23  Wound Status:  Initial evaluation     Patient mid abdomen with full thickness open surgical incision. Adipose and scant suture visible to base of wound but fascia appears intact and adipose appears viable. Some tunneling at 1200 and protected exposed suture with adaptic non-adherent. Patient will benefit from NPWT to assist with wound closure by secondary intention, management of bio-burden and exudate through mechanical debridement, and increase oxygenation and granulation tissue production to wound bed.             Goals: Steady decrease in wound area and depth weekly.    WOUND TEAM PLAN OF CARE - Frequency of Follow-up:   Nursing to follow dressing orders written for wound care. Contact wound team if area fails to progress, deteriorates or with any questions/concerns if something comes up before next scheduled follow up (See below as to whether wound is following and frequency of wound follow up)  Dressing changes by wound team:                   NPWT change 3 times weekly - Abdomen    NURSING PLAN OF CARE ORDERS:  No new orders this visit    NUTRITION RECOMMENDATIONS   Wound Team Recommendations:  N/A     DIET ORDERS (From admission to next 24h)       Start     Ordered    07/23/23 1208  Diet: Diet Tube Feed; Formula: Peptamen Intense VHP; Goal Rate (mL/Hour): 70; Duration: 24 HR; Tube Feed Time Unit: Hours/Day   ALL MEALS        Comments: Advance to goal per protocol   Question Answer Comment   Diet Diet Tube Feed    Formula: Peptamen Intense VHP    Goal Rate (mL/Hour) 70    Duration 24 HR    Tube Feed Time Unit Hours/Day        07/23/23 1208    07/19/23 1009  Diet NPO Restrict to: Strict (okay to receive meds through the NG/OG tube)  ALL MEALS        Question Answer Comment   Type: Now    Diet NPO Restrict to: Strict okay to receive meds through the NG/OG tube       07/19/23 1008                    PREVENTATIVE INTERVENTIONS:   Q shift Alessandro - performed per nursing policy  Q shift pressure point assessments - performed per nursing policy    Surface/Positioning  ICU Low Airloss - Currently in Place  Reposition q 2 hours - Currently in Place  Move and Transfer System (MATs) - Currently in Place    Offloading/Redistribution  Heel offloading dressing (Silicone dressing) - Currently in Place      Containment/Moisture Prevention    Elizalde Catheter - Currently in Place    Mobilization      Not Mobilizing      Anticipated discharge plans:  TBD        Vac Discharge Needs:  Vac Discharge plan is purely a recommendation from wound team and not a requirement for discharge unless otherwise stated by physician.  Not Applicable Pt not on a wound vac  Veraflo Vac while inpatient, ok to transition to Regular Vac on discharge

## 2023-07-26 NOTE — CARE PLAN
The patient is Watcher - Medium risk of patient condition declining or worsening    Shift Goals  Clinical Goals: wean vasopressors, mobility, care conference  Patient Goals: unable to assess  Family Goals: no family present at this time    Progress made toward(s) clinical / shift goals: Weaning norepi per MAR. Pt to sit of bed. Collab with MD regarding care conference to establish plan of care.    Patient is not progressing towards the following goals:      Problem: Urinary Elimination  Goal: Establish and maintain regular urinary output  Outcome: Not Progressing   Pt still making low to no urine. Dialysis patient.

## 2023-07-26 NOTE — PROGRESS NOTES
"  DATE: 7/26/2023    PHYSICAL EXAMINATION:  Vital Signs: /61   Pulse 67   Temp 37.1 °C (98.8 °F) (Temporal)   Resp (!) 25   Ht 1.779 m (5' 10.04\")   Wt (!) 155 kg (340 lb 13.3 oz)   SpO2 95%     Critically ill appearing.  Mechanically ventilated.  Soft obese abdomen. Dark stoma.  Necrotic appearing wound tissues beneath VAC dressing.    LABORATORY VALUES:   Recent Labs     07/24/23  0500 07/25/23 0439   WBC 11.5* 9.9   RBC 2.65* 2.86*   HEMOGLOBIN 8.6* 8.9*   HEMATOCRIT 25.2* 27.4*   MCV 95.1 95.8   MCH 32.5 31.1   MCHC 34.1 32.5   RDW 51.8* 55.0*   PLATELETCT 47* 38*   MPV 12.9 13.4*     Recent Labs     07/24/23  0500 07/25/23  0439 07/26/23  0428   SODIUM 137 136 135   POTASSIUM 4.0 4.0 3.9   CHLORIDE 103 104 102   CO2 22 23 19*   GLUCOSE 135* 155* 122*   BUN 24* 30* 46*   CREATININE 1.70* 2.26* 2.95*   CALCIUM 7.7* 7.4* 7.4*     Recent Labs     07/24/23  0500 07/25/23  0439 07/26/23  0428   ASTSGOT 200* 151* 104*   ALTSGPT 583* 387* 251*   TBILIRUBIN 5.8* 7.1* 7.8*   ALKPHOSPHAT 220* 202* 187*   GLOBULIN 2.4 2.8 2.8   AMMONIA 23  --   --        ASSESSMENT AND PLAN:  Remains critically ill.  May benefit from limited wound debridement predicated on overall goals of care discussion.  Appreciate ICU and consulting physician care.       ____________________________________     Brendan Irving M.D.    DD: 7/26/2023  7:50 AM    "

## 2023-07-26 NOTE — PROCEDURES
Nephrology/Hemodialysis note    Patient admitted with perforated viscus, s/p ex-rigoberto, hemorrhagic shock, DUNG/ATN/anuric on RRT  Seen and examined during dialysis  Tolerates procedure well  VS stable  UF 2-3 L  Lab results reviewed  Please see dialysis flow sheet for details

## 2023-07-27 NOTE — PROGRESS NOTES
DATE: 7/27/2023    INTERVAL EVENTS:  Events noted  Extubated. Examination unchanged.  No additional surgical recommendations at this time.  [facility] will continue to follow.    Appreciate ICU and consulting physician care.       ____________________________________     Brendan Irving M.D.    DD: 7/27/2023  10:37 AM

## 2023-07-27 NOTE — PROGRESS NOTES
Inpatient Palliative Care     Location: [facility]     HPI:     Ashley English is a 63 y.o. female who presented to the ED on 7/12/2023 with complaints of right leg pain x3 days, previously seen at urgent care for same complaint and was noted to have DVT on RLQ ultrasound.  CTA of the chest revealed acute saddle pulmonary embolus as well as pneumoperitoneum.  She is status post exploratory laparotomy with left hemicolectomy and end colostomy as well as cholecystectomy on 7/13/2023.  Intraoperatively, a perforated colon at splenic flexure with associated mass was identified, concerning for malignancy; however, pathology was negative.  Patient returned to ICU intubated postoperatively and was able to be extubated and transferred to the floor. Unfortunately,  7/19/2023 patient was found to be in hemorrhagic shock from intra-abdominal bleed and liver laceration, patient was reintubated and returned to ICU.  7/20/2023 patient returned to the OR for revision of colostomy and colon resection.  Started on CRRT 7/21/2023.  Patient now tolerating HD/UF.    Summary:     0930: Attended ICU rounds, updates appreciated. Met with patient's family in conference room including sister (Rani), brother (Surendra), and niece (Leni). Discussed plan of care for trial of extubation with attempt to discuss GOC directly with patient if able. Family is all in agreement. Again discussed the possibility that patient may not be able to fully comprehend the complexity of her condition at this time to participate in medical decision making. Family verbalized understanding and Surendra states that he would like to defer decision making to patient's sister, Rani, in this event. Rani is willing to accept this responsibility.     Patient extubated by RT and currently resting her voice. PC APRN  will return to bedside later this morning to attempt GOC discussion with patient.     1200: PC APRN returned to bedside.  Family requests that goals of  "care are discussed with patient without them present as they do not want to sway her responses.  Patient is alert and oriented x4 but lacks insight into details of her current clinical picture. She is able to state \"I'm sick.\"  She is agreeable to goals of care discussion at this time.  Dr. Guillen, provided clinical update to Ashley regarding her hospital course and current clinical picture.      Along with Dr. Guillen, discussed the issue of reintubation should patient require this in the future.  Patient shrugged and asked for guidance. Reviewed previous GOC discussion with Ashley's family, in which they felt that patient would not want to be placed back on mechanical ventilator a third time and would prefer a transition to CC should respiratory distress develop. Patient nodded in understanding. Discussed updating code status to DNAR/DNI with continued medical management at this time. Ashley is agreeable to this plan of care at this time.     Update provided to patient's sister, Rani, and niece, Leni. Rani is disappointed that patient was unable to participate more fully in GOC discussions but continues to feel very strongly that if Ashley were more aware of the current situation she would not want reintubated. Agreeable to DNAR/DNI at this time with continued GOC discussions as Ashley is able.    Active listening, reflection, reminiscing, validation & normalization, and empathic support utilized throughout this encounter.  All questions answered and contact information provided, encouraged to call with any questions or concerns.      Plan:     1) Update code status to DNAR/DNI  2) Continued GOC discussions with pt/family as able     Thank you for allowing me the opportunity to participate in the care of Ms. English.     20 minutes spent discussing advanced care planning, this time excludes any other billed services.    I spent a total of 50 minutes reviewing medical records, direct face-to-face time with the patient " and/or family, coordination of care, and documentation. This is separate from the time spent on advance care planning, which is documented above.     Elsi Dukes, JADON  Palliative Care Nurse Practitioner   [phone]

## 2023-07-27 NOTE — PROCEDURES
Nephrology/Hemodialysis note    Patient admitted with perforated viscus treated surgically, hospital course complicated with hemorrhagic shock, resp failure int/vent,  DUNG/ATN/anuria  -on RRT  Seen and examined during dialysis  Doing better  Extubated  Tolerates dialysis well  VS stable  Lab results reviewed  Please see dialysis flow sheet for details

## 2023-07-27 NOTE — THERAPY
Physical Therapy Contact Note    PT treatment attempted. On first attempt patient just mobilized to EOB with RN staff. On second attempt patient undergoing HD. Will re attempt as able and appropriate.    Marga Faulkner, PT, DPT  [phone]

## 2023-07-27 NOTE — PROGRESS NOTES
"Critical Care Progress Note    Date of admission  7/12/2023    Chief Complaint  \"63 y.o. female who presented 7/12/2023 with acute DVT, acute saddle PE and new pneumoperitoneum due to perforated viscus for what she was admitted and treated with thrombectomy, heparin ggt and IVC filter placement. She also underwent exploratory laparatomy (today day 5 post op) with left colectomy with end colostomy and s/p cholecystectomy with a wound vac in place.   I was called at the bedside due to hypotension and tachycardia.\" H+P 7/12    Hospital Course  7/13 s/p bilateral thrombectomy for saddle PE and IVC filter placement. Successful.   7/13 s/p left colectomy and end-colostomy, and cholecystectomy. Intraop found perforated colon at splenic flexure with associated mass. Concerning of malignancy. Remains intubated post op.   7/19: Admitted to ICU is hemorrhagic shock, MTP, OR for intrabdominal bleed, liver lac, returns to ICU in shock and intubated  7/20: VD 2-Hb stable, anuric, back to OR for removal of packing, colon resection, revision colostomy (to RUQ), vasoplegic with increasing pressors overnight  7/21: CRRT started  7/22: VD 4-weaned of pressors, following, increase UF to 100-->150 overnight  7/23: 2.5 L off yesterday, off pressors, very weak today, hypophos needs aggressive replacement, switch CRRT to iHD, mobilize, SBT/SAT tomorrow  7/24: HD/UF today, tolerating SBT, high risk for failing extubation.  Discussed with family today regarding GOC as previously documented  7/25: PUF again today; VD#7, awake and follows very weakly t/o.  7/26: plan for HD/UF today, awake and follows, moving arms off bed not; palliative consulted to assist with GOC d/w pt/family, plan  7/27: Extubated today; further discussion regarding goals of care      Interval Problem Update  Reviewed last 24 hour events:  Awake, follows, weak, antigaravity uppers  NE .05  3 + edmea  Tm 99  OGT, TF at goal  950+ o/p ostomy, no blood  I/O = 2.6/6.2  VAC " 350 o/p  VD#9, terri SBT  Pepcid  No vte ppx  Zosyn   Lytes OK     Yesterday:  Overnight: NAOE  Neuro: awake, follows, now lifts arm off bed  CV: NE .06  3+ edema  Resp: VD#8: 20/420/8/40%, terri SBT  CXR: low vols, edema,   I/O: 2578/6245  Heme: WBC 11.1, Plts stable 40K  Tmax: 100.4  Abx: zosyn 7/12 -> current d#15/21  Micro: reviewed  GI/Nut: Wound sites poorly perfused,black, terri TF  Endo: glu 122  Lytes: OK  Drips: NE  LDA: Cvc, art, mid, HD cath  SUP: pepcid  VTE: No vte ppx with low platelts, pepcid    Review of Systems  Review of Systems   Unable to perform ROS: Critical illness        Vital Signs for last 24 hours   Temp:  [36.8 °C (98.2 °F)-37.2 °C (98.9 °F)] 36.8 °C (98.2 °F)  Pulse:  [38-98] 71  Resp:  [16-36] 29  BP: ()/(47-87) 106/56  SpO2:  [93 %-99 %] 99 %    Hemodynamic parameters for last 24 hours       Respiratory Information for the last 24 hours  Vent Mode: Spont  Rate (breaths/min): 20  Vt Target (mL): 420  PEEP/CPAP: 8  P Support: 5  MAP: 11  Length of Weaning Trial (Hours): 2  Control VTE (exp VT): 434    Physical Exam   Physical Exam  Constitutional:       General: She is not in acute distress.     Appearance: She is ill-appearing.   HENT:      Head: Normocephalic.      Nose: Nose normal.      Mouth/Throat:      Mouth: Mucous membranes are dry.   Eyes:      Extraocular Movements: Extraocular movements intact.      Pupils: Pupils are equal, round, and reactive to light.   Cardiovascular:      Rate and Rhythm: Normal rate and regular rhythm.   Pulmonary:      Effort: Pulmonary effort is normal.      Breath sounds: Rhonchi and rales present.      Comments: Mechanical BS  Abdominal:      General: There is distension.      Palpations: Abdomen is soft.   Musculoskeletal:      Cervical back: Neck supple.      Right lower leg: Edema present.      Left lower leg: Edema present.   Skin:     General: Skin is warm.      Capillary Refill: Capillary refill takes 2 to 3 seconds.   Neurological:       Comments: Follows in all four, nods head to questions, BTT present bilateral, tracks makes eye contacts, weak and deconditioned         Medications  Current Facility-Administered Medications   Medication Dose Route Frequency Provider Last Rate Last Admin    piperacillin-tazobactam (Zosyn) 4.5 g in  mL IVPB  4.5 g Intravenous Q12HRS Hardeep Guillen M.D.   Stopped at 07/27/23 0146    lidocaine (Xylocaine) 1 % injection 1 mL  1 mL Intradermal PRN Marita Buenrostro M.D.        famotidine (Pepcid) tablet 20 mg  20 mg Enteral Tube DAILY Cristian Peña M.D.   20 mg at 07/27/23 0528    oxyCODONE immediate-release (Roxicodone) tablet 5 mg  5 mg Enteral Tube Q4HRS PRN Cristian Peña M.D.   5 mg at 07/26/23 0838    promethazine (Phenergan) tablet 12.5-25 mg  12.5-25 mg Enteral Tube Q4HRS PRN Cristian Peña M.D.        levothyroxine (Synthroid) tablet 225 mcg  225 mcg Enteral Tube AM ES Cristian Peña M.D.   225 mcg at 07/27/23 0528    heparin injection 2,800 Units  2,800 Units Intracatheter PRN Marita Buenrostro M.D.   2,800 Units at 07/26/23 1233    insulin regular (HumuLIN R,NovoLIN R) injection  2-9 Units Subcutaneous Q6HRS Kerrie Montana   2 Units at 07/27/23 0528    And    dextrose 50% (D50W) injection 25 g  25 g Intravenous Q15 MIN PRN Kerrie LEE Latona   25 g at 07/21/23 0225    norepinephrine (Levophed) 8 mg in 250 mL NS infusion (premix)  0-1 mcg/kg/min (Ideal) Intravenous Continuous Lam Louis M.D. 6.4 mL/hr at 07/27/23 0700 0.05 mcg/kg/min at 07/27/23 0700    Respiratory Therapy Consult   Nebulization Continuous RT Cristian Peña M.D.        senna-docusate (Pericolace Or Senokot S) 8.6-50 MG per tablet 2 Tablet  2 Tablet Enteral Tube BID Cristian Peña M.D.   2 Tablet at 07/23/23 0511    And    polyethylene glycol/lytes (Miralax) PACKET 1 Packet  1 Packet Enteral Tube QDAY PRN Cristian Peña M.D.        And    magnesium hydroxide (Milk Of Magnesia) suspension 30 mL  30 mL  Enteral Tube QDAY PRN Cristian Peña M.D.        And    bisacodyl (Dulcolax) suppository 10 mg  10 mg Rectal QDAY PRN Cristian Peña M.D.        MD Alert...ICU Electrolyte Replacement per Pharmacy   Other PHARMACY TO DOSE Cristian ePña M.D.        lidocaine (Xylocaine) 1 % injection 2 mL  2 mL Tracheal Tube Q30 MIN PRN Cristian Peña M.D.        fentaNYL (Sublimaze) injection 100 mcg  100 mcg Intravenous Q15 MIN PRN Cristian Peña M.D.        And    fentaNYL (Sublimaze) injection 200 mcg  200 mcg Intravenous Q15 MIN PRN Cristian Peña M.D.        ondansetron (Zofran) syringe/vial injection 4 mg  4 mg Intravenous Q4HRS PRN Ana Lilia Joy M.D.        promethazine (Phenergan) suppository 12.5-25 mg  12.5-25 mg Rectal Q4HRS PRN Ana Lilia Joy M.D.        prochlorperazine (Compazine) injection 5-10 mg  5-10 mg Intravenous Q4HRS PRN Ana Lilia Joy M.D.        Nozin nasal  swab  1 Applicator Each Nostril BID Dagoberto Ohara D.O.   1 Applicator at 07/27/23 0530       Fluids    Intake/Output Summary (Last 24 hours) at 7/27/2023 0756  Last data filed at 7/27/2023 0700  Gross per 24 hour   Intake 2677.54 ml   Output 6258 ml   Net -3580.46 ml         Laboratory            Recent Labs     07/25/23  0439 07/26/23 0428 07/27/23  0425   SODIUM 136 135 136   POTASSIUM 4.0 3.9 4.0   CHLORIDE 104 102 103   CO2 23 19* 19*   BUN 30* 46* 44*   CREATININE 2.26* 2.95* 2.80*   MAGNESIUM 2.1 2.2 2.1   PHOSPHORUS 2.2* 3.2 3.2   CALCIUM 7.4* 7.4* 7.4*       Recent Labs     07/25/23  0439 07/26/23  0428 07/27/23  0425   ALTSGPT 387* 251* 188*   ASTSGOT 151* 104* 109*   ALKPHOSPHAT 202* 187* 183*   TBILIRUBIN 7.1* 7.8* 8.9*   GLUCOSE 155* 122* 158*       Recent Labs     07/25/23  0439 07/26/23  0428 07/27/23 0425   WBC 9.9 11.1* 12.8*   NEUTSPOLYS 88.00* 86.00* 82.90*   LYMPHOCYTES 6.80* 5.30* 7.70*   MONOCYTES 2.60 2.60 2.60   EOSINOPHILS 0.90 2.60 2.60   BASOPHILS 0.00 0.00 0.80   ASTSGOT 151* 104* 109*    ALTSGPT 387* 251* 188*   ALKPHOSPHAT 202* 187* 183*   TBILIRUBIN 7.1* 7.8* 8.9*       Recent Labs     07/25/23  0439 07/26/23  0428 07/27/23  0425   RBC 2.86* 2.81* 2.99*   HEMOGLOBIN 8.9* 8.9* 9.4*   HEMATOCRIT 27.4* 27.0* 28.7*   PLATELETCT 38* 40* 51*         Imaging  X-Ray:  I have personally reviewed the images and compared with prior images.      Assessment/Plan  * Acute hypoxemic respiratory failure (HCC)- (present on admission)  Assessment & Plan  Initially 7/13 Intubated for OR and 7/15 extubated  7/19:  reintubated in the setting of recalcitrant and rapidly progressive shock of unknown etiology felt to be hemorrhagic versus septic.  The patient was not protecting her airway with vasopressor requirements and obtunded.   status post massive transfusion and operative intervention to include ex lap intra-abdominal hemorrhage washout liver packing open abdomen.  At risk of ARDS  7/22: passed SBT, on Fi02 0.3, think too weak and VOL to extubate today, perhaps try tomorrow  7/22: Able to spont but very weak inspiratory effort, minimal Fi02 or PS  7/24 PT, high risk for failure after extubation, family conference again today regarding GOC and plan for eventual extubation next 1-2 days  Continue full ventilator support, LTV/LPS, A-F bundle    Perforated viscus- (present on admission)  Assessment & Plan  7/13 - ex lap with left hemicolectomy and end colostomy by Dr. Boateng  Found perforated colon at splenic fixture, associated with mass. Path neg for malignancy  Fecal peritonitis, remains on Zosyn per surgery    Acute kidney injury (HCC)  Assessment & Plan  Worsening renal function post shock, ATN likely, anuric  7/21: Initiated CRRT for volume removal/ ultrafiltration after failed diuretic challenge  7/23: Switch CRRT to iHD to facilitate mobility and PT, still anuric  7/24 iHD with maximal UF today  Renal dose meds, avoid nephrotoxins  Strict I/Os  Follow renal function    Acute saddle pulmonary embolism (HCC)-  (present on admission)  Assessment & Plan  Originating from a right leg DVT. Pt is not a candidate for thrombolytics given her pneumoperitoneum  7/13: S/p emergent thrombectomy of saddle PE with IVC placement.   7/19: Repeat TTE with normal LV function, poor RV visualization but likely dilated with reduced function, RAP low  Continue to hold heparin as high risk for rebleed until surgically cleared    Thrombocytopenia (HCC)  Assessment & Plan  Platelets trending down on the basis of shock critical illness and likely contributing CRRT  Off VTE ppx, for intraabdominal bleed post-op  Will resume VTE ppx when able  Trend daily cbc  At risk of rebleed transfuse PLTS when <20 or any signs of bleeding    Acute hepatic necrosis  Assessment & Plan  Ischemic hepatopathy due to shock, evidenced by markedly elevated transaminases, elevated Tbili, hypoalbunemia, elevated lactate, and coagulopathy consistent with hepatic failure and decrease synthetic function  Follow LFTs, trending down  Ammonia clearing  Avoid hepatotoxins as able    Hemorrhagic shock (HCC)- (present on admission)  Assessment & Plan  7/19: Overnight with worsening hypotension, obtundation and vasopressor requirement.  Bedside POCUS with underfilled hyperdynamic LV and FF in RUQ.  MTP intiated and patient taken to OR with ACGS. Found to have copious intrabdominal hemmorhage with a liver laceration near falciform ligament.  Packed left open  7/20: OR re opened, packing removed, fascia closed  No recurrent bleeding, HH stable  Holding heparin until cleared by surgery, follow H&H    Acute blood loss anemia  Assessment & Plan  Intraabdominal bleed  S/P MTP and operative control  Follow H/H  Transfuse if H/H is <7/21    Acute deep venous thrombosis (DVT) determined by ultrasound (HCC)- (present on admission)  Assessment & Plan  Holding heparin in the setting of massive intraabdominal hemorrhage and liver laceration  S/p IVC filter placed this admission  Follow-up  with vascular clinic for IVC filter retrieval when appropriate  Resume anticoagulation when cleared by surgery    Goals of care, counseling/discussion  Assessment & Plan  Extensive discussion with patient's very caring and involved Sister Rani again on 7/24  Again confirmed, DNR status, on was a very vibrant and would be much more concerned with quality than quantity of life as previously documented.  Planning on repeat P UF today and likely extubate tomorrow.  DNR/DNI status.  If she does not do well with extubation, transition to comfort focused care for significant dyspnea or discomfort consistent with patient's prior stated wishes.  I have spoken with Silva extensively but there appears to be some differing opinion with other family members.  Palliative care consulted and will attempt to arrange family conference for further clarification of goals of care and discussed with patient as able.    Morbid obesity with BMI of 50.0-59.9, adult (HCC)- (present on admission)  Assessment & Plan  BMI 50, place her in high risk for increase morbidity during this admission       Updated plan:  Extubated today, so far doing well  Overall guarded prognosis.  Palliative care following, further family discussion later today regarding goals of care and CODE STATUS with regard to reintubation if progressive respiratory decline.  Surgery following for wound care, will likely require further debridement of midline wound and possibly ostomy  HD held this morning in anticipation of further goals of care discussion    VTE:  Contraindicated  Ulcer: H2 Antagonist  Lines: Central Line  Ongoing indication addressed, Arterial Line  Ongoing indication addressed, and Elizalde Catheter  Ongoing indication addressed    I have performed a physical exam and reviewed and updated ROS and Plan today (7/27/2023). In review of yesterday's note (7/26/2023), there are no changes except as documented above.     Discussed patient condition and risk of  morbidity and/or mortality with Family, RN, RT, Therapies, Pharmacy, and Patient  The patient remains critically ill.  Critical care time = 50 minutes in directly providing and coordinating critical care and extensive data review.  No time overlap and excludes procedures.

## 2023-07-27 NOTE — CARE PLAN
Shift Goals  Clinical Goals: wean vasopressors, mobility, care conference  Patient Goals: unable to assess  Family Goals: no family present at this time    Progress made toward(s) clinical / shift goals:    Problem: Pain - Standard  Goal: Alleviation of pain or a reduction in pain to the patient’s comfort goal  Outcome: Progressing     Problem: Knowledge Deficit - Standard  Goal: Patient and family/care givers will demonstrate understanding of plan of care, disease process/condition, diagnostic tests and medications  Outcome: Progressing     Problem: Respiratory  Goal: Patient will achieve/maintain optimum respiratory ventilation and gas exchange  Outcome: Progressing     Problem: Skin Care - Ostomy  Goal: Skin remains free from irritation  Outcome: Progressing        Monitor summary  SR 60-80 with rare PVC

## 2023-07-27 NOTE — WOUND TEAM
[facility]  Inpatient Services  Wound and Skin Care Follow-up    Admission Date: 7/12/2023     Last order of IP CONSULT TO WOUND CARE was found on 7/24/2023 from Hospital Encounter on 7/12/2023     HPI, PMH, SH: Reviewed    Past Surgical History:   Procedure Laterality Date    NC EXPLORATORY OF ABDOMEN N/A 7/20/2023    Procedure: REOPENING OF RECENT LAPAROTOMY, REMOVAL OF LIVER PACKING, COLON RESECTION, PARTIAL COLECTOMY, ABDOMINAL WALL CLOSURE, REVISION OF COLOSTOMY;  Surgeon: Taylor Bocanegra M.D.;  Location: [facility];  Service: General    WOUND CLOSURE NEURO N/A 7/20/2023    Procedure: CLOSURE, WOUND;  Surgeon: Taylor Bocanegra M.D.;  Location: [facility];  Service: General    NC EXPLORATORY OF ABDOMEN N/A 7/19/2023    Procedure: LAPAROTOMY, EXPLORATORY FOR CONTROL OF BLEEDING, LIVER PACKING, PLACEMENT OF ABTHERA, TEMPORARY ABDOMINAL CLOSURE;  Surgeon: Taylor Bocanegra M.D.;  Location: [facility];  Service: General    NC EXPLORATORY OF ABDOMEN N/A 7/13/2023    Procedure: LAPAROTOMY, EXPLORATORY;  Surgeon: Angel Luis Boateng M.D.;  Location: [facility];  Service: General    NC COLOSTOMY  7/13/2023    Procedure: CREATION, COLOSTOMY;  Surgeon: Angel Luis Boateng M.D.;  Location: [facility];  Service: General    CHOLECYSTECTOMY N/A 7/13/2023    Procedure: CHOLECYSTECTOMY;  Surgeon: Angel Luis Boateng M.D.;  Location: SUR[facility]ERY [facility];  Service: General    LOW ANTERIOR RESECTION N/A 7/13/2023    Procedure: RESECTION, RECTUM, LOW ANTERIOR;  Surgeon: Angel Luis Boateng M.D.;  Location: SURGERY [facility];  Service: General    KNEE ARTHROPLASTY TOTAL Left 9/12/2017    Procedure: KNEE ARTHROPLASTY TOTAL;  Surgeon: Bull James M.D.;  Location: [facility];  Service: Orthopedics     Social History     Tobacco Use    Smoking status: Never    Smokeless tobacco: Never   Substance Use Topics    Alcohol use: No     Chief Complaint   Patient  "presents with    Abdominal Pain     Patient to triage via wheelchair for abdominal pain, scheduled for gallbladder surgery but received outpatient US today and was instructed to come to ED for blood clot to right leg. Patient denies use of blood thinners or history of blood clot     Leg Pain     Diagnosis: Pneumoperitoneum [K66.8]  Perforated abdominal viscus [R19.8]    Unit where seen by Wound Team: T908/01     WOUND FOLLOW UP RELATED TO:  VAC and ostomy change     WOUND HISTORY:       \"63-year-old woman who presented with a perforated viscus.  She was also noted to have a saddle embolus in her pulmonary arteries and underwent an emergent thrombectomy.  Based on the fact that she had a perforated viscus an emergent laparotomy was indicated subsequent to the thrombectomy\"    7/13 - Left hemicolectomy with end colostomy/Delvis's procedure, cholecystectomy  7/19 - Repoening of previous Exploratory laparotomy.  Abdominal washout, hepatorrhaphy, packing of the abdomen, and temporary abdominal closure with negative pressure wound therapy device greater than 50 cm²  7/20 - Reopening of recent laparotomy with removal of liver packs, colon resection, and revision of colostomy          WOUND ASSESSMENT/LDA    Wound 07/13/23 Incision Abdomen Open Surgical full thickness (Active)   Date First Assessed/Time First Assessed: 07/13/23 1429   Primary Wound Type: Incision  Location: Abdomen  Wound Description (Comments): Open Surgical full thickness      Assessments 7/27/2023 11:00 AM   Wound Image      Site Assessment Grey;Yellow;Brown;Black   Periwound Assessment Fragile;Edema   Margins Defined edges;Unattached edges   Closure Secondary intention   Drainage Amount Moderate   Drainage Description Serosanguineous   Treatments Cleansed;Site care   Wound Cleansing Approved Wound Cleanser   Periwound Protectant Paste Ring;No-sting Skin Prep   Dressing Status Clean;Dry;Intact   Dressing Changed Changed   Dressing Cleansing/Solutions " Normal Saline   Dressing Options Wound Vac   Dressing Change/Treatment Frequency Tuesday, Thursday, Saturday, and As Needed   NEXT Dressing Change/Treatment Date 07/29/23   NEXT Weekly Photo (Inpatient Only) 08/03/23   Wound Team Following 3x Weekly   Non-staged Wound Description Full thickness   Wound Length (cm) 22.7 cm   Wound Width (cm) 9.6 cm   Wound Depth (cm) 8 cm   Wound Surface Area (cm^2) 217.92 cm^2   Wound Volume (cm^3) 1743.36 cm^3   Wound Healing % -79   Shape large, oval   Wound Odor Foul;Strong   Exposed Structures Sutures;Adipose   WOUND NURSE ONLY - Time Spent with Patient (mins) 60       Wound 07/22/23 Full Thickness Wound Abdomen Lateral Left previous ostomy site (Active)   Date First Assessed/Time First Assessed: 07/22/23 1330   Hand Hygiene Completed: Yes  Primary Wound Type: Full Thickness Wound  Location: Abdomen  Wound Orientation: Lateral  Laterality: Left  Wound Description (Comments): previous ostomy site      Assessments 7/27/2023 11:00 AM   Wound Image      Site Assessment Black;Brown;[place]   Periwound Assessment Edema;Fragile   Margins Defined edges;Unattached edges   Closure Secondary intention   Drainage Amount Small   Drainage Description Serosanguineous   Treatments Cleansed;Site care   Wound Cleansing Approved Wound Cleanser   Periwound Protectant Skin Protectant Wipes to Periwound;Paste Ring   Dressing Status Clean;Dry;Intact   Dressing Changed Changed   Dressing Cleansing/Solutions Normal Saline   Dressing Options Wound Vac   Dressing Change/Treatment Frequency Tuesday, Thursday, Saturday, and As Needed   NEXT Dressing Change/Treatment Date 07/29/23   NEXT Weekly Photo (Inpatient Only) 08/03/23   Wound Team Following 3x Weekly   Non-staged Wound Description Full thickness   Wound Length (cm) 2.6 cm   Wound Width (cm) 5.3 cm   Wound Depth (cm) 3.2 cm   Wound Surface Area (cm^2) 13.78 cm^2   Wound Volume (cm^3) 44.096 cm^3   Wound Healing % -41   Shape circular   Wound Odor  Foul;Strong   Exposed Structures Adipose   WOUND NURSE ONLY - Time Spent with Patient (mins) 60       Negative Pressure Wound Therapy 07/22/23 Surgical Abdomen Midline Left (Active)   Placement Date/Time: 07/22/23 1300   Present on Original Admission: No  Hand Hygiene Completed: Yes  Wound Type: Surgical  Location: Abdomen  Wound Orientation: Midline  Laterality: Left      Assessments 7/27/2023 11:00 AM   NPWT Pump Mode / Pressure Setting Intermittent;[place];125 mmHg   Dressing Type Medium;Black Foam (Veraflo)   Number of Foam Pieces Used 3   Canister Changed No   NEXT Dressing Change/Treatment Date 07/29/23   VAC VeraFlo Irrigant Normal Saline   VAC VeraFlo Soak Time (mins) 5   VAC VeraFlo Instill Volume (ml) 34   VAC VeraFlo - Therapy Time (hrs) 2   VAC VeraFlo Pressure (mm/Hg) Intermittent;125 mmHg   WOUND NURSE ONLY - Time Spent with Patient (mins) 60        Vascular:    AIDA:   No results found.    Lab Values:    Lab Results   Component Value Date/Time    WBC 12.8 (H) 07/27/2023 04:25 AM    RBC 2.99 (L) 07/27/2023 04:25 AM    HEMOGLOBIN 9.4 (L) 07/27/2023 04:25 AM    HEMATOCRIT 28.7 (L) 07/27/2023 04:25 AM    CREACTPROT 12.98 (H) 07/25/2023 04:39 AM    HBA1C 5.7 (H) 08/12/2017 07:17 AM         Culture Results show:  No results found for this or any previous visit (from the past 720 hour(s)).    Pain Level/Medicated:  IV pain medications administered by bedside RN   prior (Pt educated that IV medication will not be available on outpatient basis)       INTERVENTIONS BY WOUND TEAM:  Chart and images reviewed. Discussed with bedside RN. All areas of concern (based on picture review, LDA review and discussion with bedside RN) have been thoroughly assessed. Documentation of areas based on significant findings. This RN in to assess patient. Performed standard wound care which includes appropriate positioning, dressing removal and non-selective debridement. Pictures and measurements obtained weekly if/when  required.    Wound:  Midline abdomen and LQ abdominal wound   Preparation for Dressing removal: Removed without difficulty  Cleansed/Non-selectively Debrided with:  Wound cleanser  Natacha wound: Cleansed with Wound cleanser, Prepped with No Sting and Paste Rings  Primary Dressin VF black foam tucked onto LQ abdominal wound then sealed with drape. 2 spirals of VF foam placed onto midline abdominal wound then sealed with drape. Hole cut along proximal aspect of wound.   Secondary (Outer) Dressing: VF trac pad to midline wound, 2nd trac pad to LQ abdominal wound. Trac pads y-sited together.     Advanced Wound Care Discharge Planning  Number of Clinicians necessary to complete wound care: 1  Is patient requiring IV pain medications for dressing changes:  No   Length of time for dressing change 45 min. (This does not include chart review, pre-medication time, set up, clean up or time spent charting.)    Interdisciplinary consultation: Patient, Bedside RN, Trish FERRER (Wound RN)    EVALUATION / RATIONALE FOR TREATMENT:     Date:  23  Wound Status:  Wound deteriorating    Midline and left quadrant abdominal wound with dark necrotic tissue. Sutures remain visible. Wound bed foul smelling. Very minimal changes compared to previous wound team assessment. Resumed VF.     Date:  23  Wound Status:  Wound deteriorating    Wound beds of abdomen are black and brown with odor, wound beds are not .  Discussed with critical care MD and falguni to replace wound vac.  Stoma is also black but producing large amount of stool. L. Lower lip has discoloration most likely not from pressure but possible from disease process.  Falguni to keep open to air and apply lip moisturizer  Date:  23  Wound Status:  Initial evaluation    Mid abdominal wound with pale pink tissue and dull adipose. Visible sutures noted to wound base. Fascia intact per prem Russell to initiate VF NPWT. VF NPWT to assist with mechanical debridement,  manage bioburden and exudate, and increase granulation tissue production. NPWT also applied to previous ostomy site to left lateral abdomen. Edges with dark nonviable tissue. Expect this wound to be  on next change.    Date: 7/17/23  Wound Status: No changes  Patient abdomen with adipose and one suture still present to base, more yellow tissue noted than previous assessment. Fascia appears intact. Continued adaptic at this time to assure protection over suture. Continue Regular VAC at this time.         Date:  07/13/23  Wound Status:  Initial evaluation     Patient mid abdomen with full thickness open surgical incision. Adipose and scant suture visible to base of wound but fascia appears intact and adipose appears viable. Some tunneling at 1200 and protected exposed suture with adaptic non-adherent. Patient will benefit from NPWT to assist with wound closure by secondary intention, management of bio-burden and exudate through mechanical debridement, and increase oxygenation and granulation tissue production to wound bed.               Goals: Steady decrease in wound area and depth weekly.    WOUND TEAM PLAN OF CARE - Frequency of Follow-up:   Nursing to follow dressing orders written for wound care. Contact wound team if area fails to progress, deteriorates or with any questions/concerns if something comes up before next scheduled follow up (See below as to whether wound is following and frequency of wound follow up)  Dressing changes by wound team:                   NPWT change 3 times weekly - abdomen     NURSING PLAN OF CARE ORDERS:  Dressing changes: See Dressing Care orders  Skin care: See Skin Care orders  RN Prevention Protocol    NUTRITION RECOMMENDATIONS   Wound Team Recommendations:  High protein diet     DIET ORDERS (From admission to next 24h)       Start     Ordered    07/23/23 1208  Diet: Diet Tube Feed; Formula: Peptamen Intense VHP; Goal Rate (mL/Hour): 70; Duration: 24 HR; Tube Feed Time  Unit: Hours/Day  ALL MEALS        Comments: Advance to goal per protocol   Question Answer Comment   Diet Diet Tube Feed    Formula: Peptamen Intense VHP    Goal Rate (mL/Hour) 70    Duration 24 HR    Tube Feed Time Unit Hours/Day        07/23/23 1208    07/19/23 1009  Diet NPO Restrict to: Strict (okay to receive meds through the NG/OG tube)  ALL MEALS        Question Answer Comment   Type: Now    Diet NPO Restrict to: Strict okay to receive meds through the NG/OG tube       07/19/23 1008                    PREVENTATIVE INTERVENTIONS:   Q shift Alessandro - performed per nursing policy  Q shift pressure point assessments - performed per nursing policy    Surface/Positioning  ICU Low Airloss - Currently in Place  Reposition q 2 hours - Currently in Place  TAPs Turning system - Currently in Place    Offloading/Redistribution  Heel float boots (Prevalon boot) - Currently in Place      Containment/Moisture Prevention    Fecal ostomy - Currently in Place  Elizalde Catheter - Currently in Place    Mobilization      Not Mobilizing      Anticipated discharge plans:  TBD        Vac Discharge Needs:  Vac Discharge plan is purely a recommendation from wound team and not a requirement for discharge unless otherwise stated by physician.  Not Applicable Pt not on a wound vac

## 2023-07-27 NOTE — CARE PLAN
Problem: Ventilation  Goal: Ability to achieve and maintain unassisted ventilation or tolerate decreased levels of ventilator support  Description: Target End Date:  4 days     Document on Vent flowsheet    1.  Support and monitor invasive and noninvasive mechanical ventilation  2.  Monitor ventilator weaning response  3.  Perform ventilator associated pneumonia prevention interventions  4.  Manage ventilation therapy by monitoring diagnostic test results  Outcome: Progressing         Ventilator Daily Summary    Vent Day # 9    ETT:  8.0 @ 24    Ventilator settings:  apvcmv: 20/420/8/35    Weaning trials: none    Respiratory Procedures: none    Plan: Continue current ventilator settings and wean mechanical ventilation as tolerated per physician orders.

## 2023-07-27 NOTE — CARE PLAN
Problem: Pain - Standard  Goal: Alleviation of pain or a reduction in pain to the patient’s comfort goal  7/27/2023 1046 by Martha Ghosh, RNikN.  Outcome: Progressing  7/27/2023 1045 by Martha Ghosh, R.N.  Outcome: Progressing     Problem: Knowledge Deficit - Standard  Goal: Patient and family/care givers will demonstrate understanding of plan of care, disease process/condition, diagnostic tests and medications  7/27/2023 1046 by Martha Ghosh R.N.  Outcome: Progressing  7/27/2023 1045 by Martha Ghosh R.N.  Outcome: Progressing     Problem: Respiratory  Goal: Patient will achieve/maintain optimum respiratory ventilation and gas exchange  7/27/2023 1046 by Martha Ghosh, R.N.  Outcome: Progressing  7/27/2023 1045 by Martha Ghosh, R.N.  Outcome: Progressing     Problem: Skin Integrity  Goal: Skin integrity is maintained or improved  7/27/2023 1046 by Martha Ghosh, R.N.  Outcome: Progressing  7/27/2023 1045 by Martha Ghosh, R.N.  Outcome: Progressing   The patient is Watcher - Medium risk of patient condition declining or worsening    Shift Goals  Clinical Goals: encourage pulmonary hygiene, mobilize, encourage IS, Wean vasopresor  Patient Goals: comfort, participate in care  Family Goals: extubate, consider comfort care with decline, include patient in plan once extubated    Progress made toward(s) clinical / shift goals:  met    Patient is not progressing towards the following goals:

## 2023-07-27 NOTE — WOUND TEAM
[facility]  Inpatient Services  New Ostomy Follow-up Management & Teaching    HPI:  Reviewed  PMH: Reviewed   SH: Reviewed    Past Surgical History:   Procedure Laterality Date    [place] EXPLORATORY OF ABDOMEN N/A 7/20/2023    Procedure: REOPENING OF RECENT LAPAROTOMY, REMOVAL OF LIVER PACKING, COLON RESECTION, PARTIAL COLECTOMY, ABDOMINAL WALL CLOSURE, REVISION OF COLOSTOMY;  Surgeon: Taylor Bocanegra M.D.;  Location: [facility];  Service: General    WOUND CLOSURE NEURO N/A 7/20/2023    Procedure: CLOSURE, WOUND;  Surgeon: Taylor Bocanegra M.D.;  Location: [facility];  Service: General    [place]J EXPLORATORY OF ABDOMEN N/A 7/19/2023    Procedure: LAPAROTOMY, EXPLORATORY FOR CONTROL OF BLEEDING, LIVER PACKING, PLACEMENT OF ABTHERA, TEMPORARY ABDOMINAL CLOSURE;  Surgeon: Taylor Bocanegra M.D.;  Location: [facility];  Service: General    [place]J EXPLORATORY OF ABDOMEN N/A 7/13/2023    Procedure: LAPAROTOMY, EXPLORATORY;  Surgeon: Angel Luis Boateng M.D.;  Location: [facility];  Service: General    NJ COLOSTOMY  7/13/2023    Procedure: CREATION, COLOSTOMY;  Surgeon: Angel Luis Boateng M.D.;  Location: [facility];  Service: General    CHOLECYSTECTOMY N/A 7/13/2023    Procedure: CHOLECYSTECTOMY;  Surgeon: Angel Luis Boateng M.D.;  Location: [facility];  Service: General    LOW ANTERIOR RESECTION N/A 7/13/2023    Procedure: RESECTION, RECTUM, LOW ANTERIOR;  Surgeon: Angel Luis Boateng M.D.;  Location: [facility];  Service: General    KNEE ARTHROPLASTY TOTAL Left 9/12/2017    Procedure: KNEE ARTHROPLASTY TOTAL;  Surgeon: Bull James M.D.;  Location: [facility];  Service: Orthopedics       Surgery Date: 7/13/23    Surgeon(s):  ROM Mosley M.D. Alvaro H Devia, M.D.    Procedure(s):  LAPAROTOMY, EXPLORATORY - REMOVAL OF PACKING, POSSIBLE WOUND CLOSURE  CLOSURE, WOUND     Permanence: To be  "determined    Pertinent History:     \"63-year-old woman who presented with a perforated viscus.  She was also noted to have a saddle embolus in her pulmonary arteries and underwent an emergent thrombectomy.  Based on the fact that she had a perforated viscus an emergent laparotomy was indicated subsequent to the thrombectomy\"    7/13 - Left hemicolectomy with end colostomy/Delvis's procedure, cholecystectomy  7/19 - Repoening of previous Exploratory laparotomy.  Abdominal washout, hepatorrhaphy, packing of the abdomen, and temporary abdominal closure with negative pressure wound therapy device greater than 50 cm²  7/20 - Reopening of recent laparotomy with removal of liver packs, colon resection, and revision of colostomy            Colostomy 07/20/23 RUQ (Active)   Wound Image   07/27/23 1100   Stomal Appliance Assessment Changed 07/27/23 1100   Stoma Assessment Brown;Dusky;[place] 07/27/23 1100   Stoma Shape Budded Greater Than One Inch;Round 07/27/23 1100   Stoma Size (in) 2.25 07/27/23 1100   Peristomal Assessment Intact 07/27/23 1100   Mucocutaneous Junction Intact 07/27/23 1100   Treatment Appliance Changed;Cleansed with water/washcloth;Site care 07/27/23 1100   Peristomal Protectant No Sting Skin Prep;Paste Ring 07/27/23 1100   Stomal Appliance Paste Ring, 2\";2 3/4\" (70mm) [facility] 07/27/23 1100   Output (mL) 200 mL 07/27/23 1100   Output Color Yellow 07/27/23 1100   WOUND RN ONLY - Stomal Appliance  2 Piece;Paste Ring, 2\";2 3/4\" (70mm) [facility] 07/27/23 1100   Appliance Brand [place] 07/27/23 1100   Secure Start completed Not Medically Stable 07/27/23 1100   WOUND NURSE ONLY - Time Spent with Patient (mins) 60 07/27/23 1100       Colostomy Interventions:  Removed appliance (using push pull method) - By Ostomy RN  Cleansed sara-stomal skin with moist warm washcloth - By Ostomy RN  Created/Checked template fit - By Ostomy RN  Traced Shape to back of barrier - By Ostomy RN  Confirmed fit - By Ostomy RN  Removed " "plastic backing and \"Dog Eared\" paper edges - By Ostomy RN  Stretched paste ring to fit barrier opening - By Ostomy RN  Applied paste ring to back of barrier - By Ostomy RN  Applied barrier to skin and adhered with friction - By Ostomy RN  Attached pouch - By Ostomy RN  Closed Pouch end - By Ostomy RN    Patient Education: Ostomy RN to follow-up daily for education     Date:  07/27/23    Patient not medically stable, no education provided    Needs for next visit:         Evaluation:      Date:  07/27/23      Pt still with necrotic stoma, however stoma with moderate to large amount of stool output. WT to continue to follow.         Flatus: Not Present  Stool Output: Yellow  Urine Output:    Mobility: Not Mobilizing     DIET ORDERS (From admission to next 24h)       Start     Ordered    07/23/23 1208  Diet: Diet Tube Feed; Formula: Peptamen Intense VHP; Goal Rate (mL/Hour): 70; Duration: 24 HR; Tube Feed Time Unit: Hours/Day  ALL MEALS        Comments: Advance to goal per protocol   Question Answer Comment   Diet Diet Tube Feed    Formula: Peptamen Intense VHP    Goal Rate (mL/Hour) 70    Duration 24 HR    Tube Feed Time Unit Hours/Day        07/23/23 1208    07/19/23 1009  Diet NPO Restrict to: Strict (okay to receive meds through the NG/OG tube)  ALL MEALS        Question Answer Comment   Type: Now    Diet NPO Restrict to: Strict okay to receive meds through the NG/OG tube       07/19/23 1008                    Plan: Ostomy nurses to continue to follow for ostomy needs and teaching until patient independent with care or discharge.  Ostomy Nurse follow-up frequency:  Every other day    Secure Start Signed:  Not Medically Stable  Outpatient Referral Placed:  Not Medically Stable  5 Sets of appliances in Ostomy bag for discharge:  Yes    INSURANCE OPTIONS:     Med[employer]/MEDICAID & All other Private Insurance companies (Prism Form)     Anticipated Discharge Plans:  TBD    Ostomy Supplies for DC:  To be determined in 4 to " 6 weeks once stomal edema has fully resolved

## 2023-07-27 NOTE — PROGRESS NOTES
Monitor summary:  SR 60-80  Isolated transient event of 2nd degree type 2 when fluid returned from dialysis.

## 2023-07-27 NOTE — WOUND TEAM
Assisted Meaghan STORM (Wound RN), with wound care, non-selective debridement performed using wound cleanser/NS and gauze. Please see Meaghan STORM (Wound RN) wound note for further wound care details.

## 2023-07-27 NOTE — RESPIRATORY CARE
Extubation    Cuff leak noted yes  Stridor present no     FiO2%: (P) 35 % (07/27/23 0800)  O2 (LPM): 4 (07/27/23 0855)     Patient toleration terri. well    Events/Summary/Plan: pt extubated (07/27/23 0855)

## 2023-07-27 NOTE — PROGRESS NOTES
[facility] Progress Note     HD treatment x 3 hours today ordered by Dr. Brenda Kinney.  HD tx initiated at 1404 and ended at 1706.    Received pt awake on bed at T908 with 2 caregivers present at the bedside. Pt is aphasic, extubated, on pressors, not in acute distress at this time. Left IJ non-tunneled catheter with clean, dry and intact dressing. No signs of infection noted. Aspirated 3mL on both catheter ports and saline flushed, both patent. S/E by Dr. Kinney during HD, for HD or PUF tomorrow depending on the lab values. Labile BP, unable to achieve UF goal. HD treatment completed and tolerated well.    UF Net: 2.3 L    Post tx: Left IJ non-tunneled catheter with clean, dry and intact dressing. No signs of infection. Both catheter ports saline flushed, heparin locked as prescribed, clamped and capped.    Report given to primary RN ROMEL Ghosh. See electronic flowsheets for additional information.

## 2023-07-28 NOTE — CARE PLAN
The patient is Watcher - Medium risk of patient condition declining or worsening    Shift Goals  Clinical Goals: wean vasopressor, encourage pulmonary hygiene, mobilization  Patient Goals: comfort  Family Goals: SAMIA, no family present    Progress made toward(s) clinical / shift goals:    Problem: Pain - Standard  Goal: Alleviation of pain or a reduction in pain to the patient’s comfort goal  Outcome: Progressing     Problem: Knowledge Deficit - Standard  Goal: Patient and family/care givers will demonstrate understanding of plan of care, disease process/condition, diagnostic tests and medications  Outcome: Progressing     Problem: Respiratory  Goal: Patient will achieve/maintain optimum respiratory ventilation and gas exchange  Outcome: Progressing     Problem: Skin Integrity  Goal: Skin integrity is maintained or improved  Outcome: Progressing     Problem: Fall Risk  Goal: Patient will remain free from falls  Outcome: Progressing       Patient is not progressing towards the following goals:

## 2023-07-28 NOTE — PROGRESS NOTES
[facility] Progress Note      Ultrafiltration treatment ordered today per Dr. Kinney x 2 hours. Treatment initiated at 0948 and ended at 1148.    Pt fatigued, dozing on and off during treatment, soft BP noted 90's/50-60's , on 1 pressor for BP support, no signs of any distress. ; see e-flow sheets for details.    Net UF 3,000 mL    Post tx, CVC flushed with saline then locked with heparin 1000 units/mL per designated amount in each wing then clamped and capped. Aspirate heparin prior to next CVC use.    Report given to Primary RN.

## 2023-07-28 NOTE — PROGRESS NOTES
"  DATE: 7/28/2023        INTERVAL EVENTS:  Critically ill appearing.  extubated  Soft obese abdomen. Dark stoma.  VAC dressings.    PHYSICAL EXAMINATION:  Vital Signs: /55   Pulse 82   Temp 36.3 °C (97.3 °F) (Temporal)   Resp (!) 23   Ht 1.779 m (5' 10.04\")   Wt (!) 155 kg (340 lb 13.3 oz)   SpO2 94%     Critically ill  Confused not agitated  Appears comfortable  Ostomy dark stool in bag  Vacs in place    LABORATORY VALUES:   Recent Labs     07/26/23 0428 07/27/23 0425 07/28/23 0446   WBC 11.1* 12.8* 12.5*   RBC 2.81* 2.99* 3.14*   HEMOGLOBIN 8.9* 9.4* 9.7*   HEMATOCRIT 27.0* 28.7* 29.5*   MCV 96.1 96.0 93.9   MCH 31.7 31.4 30.9   MCHC 33.0 32.8 32.9   RDW 56.6* 58.9* 59.0*   PLATELETCT 40* 51* 60*   MPV 13.1* 12.6 12.9     Recent Labs     07/26/23 0428 07/27/23 0425 07/28/23  0446   SODIUM 135 136 137   POTASSIUM 3.9 4.0 4.2   CHLORIDE 102 103 102   CO2 19* 19* 22   GLUCOSE 122* 158* 98   BUN 46* 44* 40*   CREATININE 2.95* 2.80* 2.75*   CALCIUM 7.4* 7.4* 8.0*     Recent Labs     07/26/23 0428 07/27/23 0425 07/28/23  0446   ASTSGOT 104* 109* 96*   ALTSGPT 251* 188* 156*   TBILIRUBIN 7.8* 8.9* 10.9*   ALKPHOSPHAT 187* 183* 212*   GLOBULIN 2.8 2.5 3.4            IMAGING:   DX-CHEST-PORTABLE (1 VIEW)   Final Result         1.  Pulmonary edema and/or infiltrates are identified, which are stable since the prior exam.   2.  Trace bilateral pleural effusions      DX-CHEST-PORTABLE (1 VIEW)   Final Result         1.  Pulmonary edema and/or infiltrates are identified, which are stable since the prior exam.      DX-CHEST-PORTABLE (1 VIEW)   Final Result         1.  Mild edema and/or infiltrates.      DX-CHEST-PORTABLE (1 VIEW)   Final Result         1.  Pulmonary edema and/or infiltrates are identified, which are stable since the prior exam.   2.  Trace bilateral pleural effusions, stable      DX-CHEST-PORTABLE (1 VIEW)   Final Result      Stable examination.      DX-CHEST-PORTABLE (1 VIEW)   Final Result "      No significant change      DX-ABDOMEN FOR TUBE PLACEMENT   Final Result      Enteric tube has been placed and the tip projects over the stomach.      DX-CHEST-LIMITED (1 VIEW)   Final Result      1.  Interval placement of a left-sided temporary dialysis catheter which terminates with the tip projecting over the expected location of the distal SVC.   2.  Stable mild interstitial pulmonary edema and bilateral basilar atelectasis and/or consolidation.      DX-CHEST-PORTABLE (1 VIEW)   Final Result         1.  Pulmonary edema and/or infiltrates are identified, which are stable since the prior exam.   2.  Trace bilateral pleural effusions      ID-MSELTMB-2 VIEW   Final Result      Severely suboptimal exam related to patient body habitus. No unexpected radiopaque foreign body seen.      DX-CHEST-PORTABLE (1 VIEW)   Final Result         1.  Pulmonary edema and/or infiltrates are identified, which are stable since the prior exam.   2.  Trace bilateral pleural effusions      EC-ECHOCARDIOGRAM COMPLETE W/ CONT   Final Result      DX-CHEST-PORTABLE (1 VIEW)   Final Result      1.  Pulmonary edema, worse than prior exam   2.  Slight increased inflation from prior.   3.  Supportive tubing as described above.      DX-CHEST-PORTABLE (1 VIEW)   Final Result         1.  Bibasilar atelectasis versus infiltrates.      CT-ABDOMEN-PELVIS WITH   Final Result         1.  Scattered moderate abdominal ascites.   2.  Trace bilateral pleural effusions, left greater than right   3.  Linear densities the bilateral lung bases, greater on the left, appearance suggests atelectasis, component of infiltrate not excluded.   4.  DVT in the right external iliac vein extending into the common femoral vein, compatible with reported history of DVT.   5.  Diverticulosis   6.  Hepatomegaly   7.  Diffuse hepatic steatosis      DX-CHEST-PORTABLE (1 VIEW)   Final Result         1.  Bibasilar atelectasis or evolving infiltrates.      IR-MIDLINE CATHETER  INSERTION WO GUIDANCE > AGE 3   Final Result                  Ultrasound-guided midline placement performed by qualified nursing staff    as above.          IR-US GUIDED PIV   Final Result    Ultrasound-guided PERIPHERAL IV INSERTION performed by    qualified nursing staff as above.      DX-CHEST-PORTABLE (1 VIEW)   Final Result      Stable thoracic underinflation. Right IJ line adequate. Stable bibasilar pleural-parenchymal opacification      DX-CHEST-PORTABLE (1 VIEW)   Final Result      Stable thoracic underinflation. Right IJ line and ETT appear adequate. Slightly improved bibasilar pleural-parenchymal opacification      DX-CHEST-PORTABLE (1 VIEW)   Final Result      Stable bibasilar pleural-parenchymal opacification and low lung volumes. Lines/tubes adequate.      DX-CHEST-LIMITED (1 VIEW)   Final Result         Endotracheal tube with tip projecting over the mid thoracic trachea.   Gastric drainage tube courses below diaphragm, tip is not seen.   Right central venous catheter with tip projecting over the expected area of the lower SVC.            EC-NAEEM W/O CONT   Final Result      DX-CHEST-LIMITED (1 VIEW)   Final Result      Bibasilar underinflation atelectasis which could obscure an additional process. This is similar to the prior study. Lung volumes are very low.      IR-INSERT IVC FILTER WITH IG & SI   Final Result      1.  Ultrasound guided access LEFT common femoral vein.   2.  BILATERAL selective pulmonary arteriogram demonstrating extensive bilateral pulmonary emboli.   3.  Successful BILATERAL pulmonary thrombectomy   4.  Large IVC at the level of the renal veins   5.  Successful placement of infrarenal IVC filter   6.  Pursestring suture should be removed in 6-12 hours.         Please note that this study required greater than 50% more than the usual procedure time due to the patient's anatomy and large habitus.            IR-THROMBO MECHANICAL ARTERY,INIT   Final Result      1.  Ultrasound guided  access LEFT common femoral vein.   2.  BILATERAL selective pulmonary arteriogram demonstrating extensive bilateral pulmonary emboli.   3.  Successful BILATERAL pulmonary thrombectomy   4.  Large IVC at the level of the renal veins   5.  Successful placement of infrarenal IVC filter   6.  Pursestring suture should be removed in 6-12 hours.         Please note that this study required greater than 50% more than the usual procedure time due to the patient's anatomy and large habitus.            EC-ECHOCARDIOGRAM COMPLETE W/ CONT   Final Result      CT-ABDOMEN-PELVIS WITH   Final Result      1.  Pneumoperitoneum secondary to perforated viscus. Splenic flexure colonic diverticulum may be the perforation site. Gastroduodenal ulcer ulcer is considered less likely.   2.  Hepatic steatosis.   3.  Moderate gallbladder distention and pericholecystic fat stranding.   4.  Small volume hyperdense ascites.   5.  Partially visualized right lower extremity DVT.      Findings were communicated to SALINA STOVER via [employer] messaging system on 7/12/2023 11:03 PM.      CT-CTA CHEST PULMONARY ARTERY W/ RECONS   Final Result      1.  Saddle pulmonary embolus with associated right heart strain.   2.  Posterior pneumomediastinum secondary to pneumoperitoneum. Abdomen will be dictated separately.      Findings were communicated to SALINA STOVER via [employer] system on 7/12/2023 10:57 PM.      DX-CHEST-PORTABLE (1 VIEW)   Final Result      Mild atelectasis in the right lower lung. No focal consolidation. No effusions.             ASSESSMENT AND PLAN:  Critically ill  ICU team discussing goals of care  Recommend ongoing wound care.  ACS Ziegler continue to follow  Appreciate ICU and consulting physician care.       ____________________________________     Surendra Adames M.D.    DD: 7/28/2023  10:35 AM

## 2023-07-28 NOTE — DISCHARGE PLANNING
Case Management Discharge Planning    Admission Date: 7/12/2023  GMLOS: 9.6  ALOS: 15    6-Clicks ADL Score: 6  6-Clicks Mobility Score: 6  PT and/or OT Eval ordered: Yes  PT/OT: Recommend post-acute placement   Post-acute Referrals Ordered: Yes - SNF  Post-acute Choice Obtained: Yes - blanket referrals sent on 7/18/23  Has referral(s) been sent to post-acute provider:  Yes  No accepting facilities to date; Pending Palliative discussions, HCM to request LTAC referral     Anticipated Discharge Dispo: Discharge Disposition: Disch to a long term care facility (63)  Per chart review pt resides in [place], [place]  Family support:  Rani Webb Sister   [phone]   Nicolas Webb Relative   [phone]   Surendra English Brother   [phone]       DME Needed: No    Action(s) Taken: chart reviewed    Escalations Completed: None    Medically Clear: No    Next Steps: f/u with pt and medical team to discuss dc needs and barriers.    Barriers to Discharge: Medical clearance /Specialty Clearance    Is the patient up for discharge tomorrow: No

## 2023-07-28 NOTE — CARE PLAN
The patient is Watcher - Medium risk of patient condition declining or worsening    Shift Goals  Clinical Goals: encourage pulmonary hygiene, mobilize, encourage IS, Wean vasopresor  Patient Goals: comfort, participate in care  Family Goals: extubate, consider comfort care with decline, include patient in plan once extubated    Progress made toward(s) clinical / shift goals:    Problem: Pain - Standard  Goal: Alleviation of pain or a reduction in pain to the patient’s comfort goal  Outcome: Progressing     Problem: Knowledge Deficit - Standard  Goal: Patient and family/care givers will demonstrate understanding of plan of care, disease process/condition, diagnostic tests and medications  Outcome: Progressing     Problem: Psychosocial  Goal: Patient's level of anxiety will decrease  Outcome: Progressing  Goal: Patient's ability to verbalize feelings about condition will improve  Outcome: Progressing  Goal: Patient's ability to re-evaluate and adapt role responsibilities will improve  Outcome: Progressing  Goal: Patient and family will demonstrate ability to cope with life altering diagnosis and/or procedure  Outcome: Progressing  Goal: Spiritual and cultural needs incorporated into hospitalization  Outcome: Progressing     Problem: Respiratory  Goal: Patient will achieve/maintain optimum respiratory ventilation and gas exchange  Outcome: Progressing     Problem: Venous Thromboembolism (VTE) Prevention  Goal: The patient will remain free from venous thromboembolism (VTE)  Outcome: Progressing     Problem: Nutrition  Goal: Patient's nutritional and fluid intake will be adequate or improve  Outcome: Progressing  Goal: Enteral nutrition will be maintained or improve  Outcome: Progressing  Goal: Enteral nutrition will be maintained or improve  Outcome: Progressing     Problem: Urinary Elimination  Goal: Establish and maintain regular urinary output  Outcome: Progressing     Problem: Skin Integrity  Goal: Skin integrity is  maintained or improved  Outcome: Progressing     Problem: Skin Care - Ostomy  Goal: Skin remains free from irritation  Outcome: Progressing     Problem: Knowledge Deficit - Ostomy  Goal: Patient will demonstrate ability to manage and maintain ostomy  Outcome: Progressing     Problem: Discharge Barriers/Self-Care - Ostomy  Goal: Ostomy patient's continuum of care needs will be met  Outcome: Progressing     Problem: Fall Risk  Goal: Patient will remain free from falls  Outcome: Progressing     Problem: Optimal Care of the Patient with VTE  Goal: Peripheral tissue perfusion will improve  Outcome: Progressing  Goal: Complications related to the disease process, condition or treatment will be avoided or minimized  Outcome: Progressing  Goal: Symptoms of redness, swelling, and pain at the site of impaired tissue perfusion will improve  Outcome: Progressing     Problem: Knowledge Deficit - VTE  Goal: Patient and family/care givers will demonstrate understanding of plan of care, disease process/condition, diagnostic tests and medications  Outcome: Progressing     Problem: Discharge Barriers/Planning  Goal: Patient's continuum of care needs are met  Outcome: Progressing       Patient is not progressing towards the following goals:

## 2023-07-28 NOTE — PROGRESS NOTES
"Critical Care Progress Note    Date of admission  7/12/2023    Chief Complaint  \"63 y.o. female who presented 7/12/2023 with acute DVT, acute saddle PE and new pneumoperitoneum due to perforated viscus for what she was admitted and treated with thrombectomy, heparin ggt and IVC filter placement. She also underwent exploratory laparatomy (today day 5 post op) with left colectomy with end colostomy and s/p cholecystectomy with a wound vac in place.   I was called at the bedside due to hypotension and tachycardia.\" H+P 7/12    Hospital Course  7/13 s/p bilateral thrombectomy for saddle PE and IVC filter placement. Successful.   7/13 s/p left colectomy and end-colostomy, and cholecystectomy. Intraop found perforated colon at splenic flexure with associated mass. Concerning of malignancy. Remains intubated post op.   7/19: Admitted to ICU is hemorrhagic shock, MTP, OR for intrabdominal bleed, liver lac, returns to ICU in shock and intubated  7/20: VD 2-Hb stable, anuric, back to OR for removal of packing, colon resection, revision colostomy (to RUQ), vasoplegic with increasing pressors overnight  7/21: CRRT started  7/22: VD 4-weaned of pressors, following, increase UF to 100-->150 overnight  7/23: 2.5 L off yesterday, off pressors, very weak today, hypophos needs aggressive replacement, switch CRRT to iHD, mobilize, SBT/SAT tomorrow  7/24: HD/UF today, tolerating SBT, high risk for failing extubation.  Discussed with family today regarding GOC as previously documented  7/25: PUF again today; VD#7, awake and follows very weakly t/o.  7/26: plan for HD/UF today, awake and follows, moving arms off bed not; palliative consulted to assist with GOC d/w pt/family, plan  7/27: Extubated today; further discussion regarding goals of care      Interval Problem Update  Reviewed last 24 hour events:  Neuro: aox3-4 follows commands  HR: 70-90s nsr  SBP: 's levophed gtt 0.6  Tmax: afebrile  GI: NPO, ostomy 260ml green bile, " [id number] [facility]                                                                                          Hospitalist Progress Note  Wolf Chavez MD  Answering service: [phone] or 36 from in house phone        Date of Service:  2021  NAME:  Tarun Tyler  :  1939  MRN:  [id number]      Admission Summary:   30-year-old woman with a past medical history significant for chronic systolic congestive heart failure; nonischemic cardiomyopathy; chronic atrial fibrillation, on Eliquis for anticoagulation; dyslipidemia; hypertension; hypothyroidism, was in her usual state of health until about 2 weeks ago when the patient developed shortness of breath which is progressive and getting worse. The shortness of breath is associated with fatigue    Interval history / Subjective:     Patient seen and examined    States that she has dyspnea on exertion; plan for thoracentesis today and pacemaker/ablation soon. Assessment & Plan:     #Left pleural effusion:  -labs pending for eval -transudate/exudative  -s/p thoracentesis-1.5 lt drained  -thoracic surgery following    #Non ischemic cardiomyopathy  #Acute on chronic systolic heart failure:  HTN  -received V bumex this morning, hold evening dose -BP soft  -on beta blocker,entresto,spironolactone  Cytology pending    #Atrial fib:  -plan for ablation and pacemaker placement tomorrow  -hold elliquis for procedure  -amiodarone discontinued per cards as causing hypothyroid     #Hypothyroidism: synthroid  -TSH high,check T3 and T 4,  As we stopped amio now, recheck TSH again 1 month. # small subpleural nodular densities in the right upper lobe with the largest  measuring 0.8 cm.  Follow-up CT is recommended 4 months to assess for interval  Change    #In the abdomen, there is suspicion of isodense mass projected inferiorly off  the right lobe of the liver may represent an area of focal nodular hyperplasia    -Need OP follow ostomy necrotic  UOP: 10ml overnight  Lines: ayah hernandez, right triple lumen, left dialysis cath, upper line midline  Resp: 2l n/c , tried IS  Vte: contra  PPI/H2:pepcid  Antibx: zosyn for necrotic wound    -3.8L net negative 8.4 for hospital stay  CXR with bilateral low lung volumes/atlectasis  T Bili increasing  Speech eval and highly likely place feeding tube  Midodrine 5mg Q8  Remove ayah, central line, hernandez  Bladder scan  Edge of bed tolerated  6min  Check cortisol  Place IRIS and start nutrition continue with speech eval    Review of Systems  Review of Systems   Unable to perform ROS: Critical illness        Vital Signs for last 24 hours   Temp:  [36.1 °C (97 °F)-36.3 °C (97.3 °F)] 36.3 °C (97.3 °F)  Pulse:  [58-86] 82  Resp:  [12-36] 23  BP: ()/(51-69) 111/55  SpO2:  [92 %-98 %] 94 %    Hemodynamic parameters for last 24 hours       Respiratory Information for the last 24 hours       Physical Exam   Physical Exam  Vitals and nursing note reviewed.   Constitutional:       General: She is not in acute distress.     Appearance: She is obese. She is ill-appearing.      Comments: Ill appearing sitting upright in bed   HENT:      Head: Normocephalic.      Nose: Nose normal.      Mouth/Throat:      Mouth: Mucous membranes are dry.   Eyes:      Extraocular Movements: Extraocular movements intact.      Pupils: Pupils are equal, round, and reactive to light.   Neck:      Comments: Right IJ central line, left IJ dialysis cath  Cardiovascular:      Rate and Rhythm: Normal rate and regular rhythm.   Pulmonary:      Effort: Pulmonary effort is normal.      Breath sounds: No rhonchi or rales.      Comments: Diminished bilateral, moderate effort cough  Abdominal:      General: There is no distension.      Palpations: Abdomen is soft.      Tenderness: There is no abdominal tenderness. There is no guarding.      Comments: Stoma that is dark and black, midline wound vac device, no rebound or guarding   Musculoskeletal:  up            Code status: full  DVT prophylaxis: scd    Care Plan discussed with: Patient/Family and Nurse  Anticipated Disposition: Home w/Family  Anticipated Discharge: Greater than 48 hours     Hospital Problems  Date Reviewed: 7/22/2021        Codes Class Noted POA    * (Principal) Acute on chronic systolic (congestive) heart failure (HCC) ICD-10-CM: I50.23  ICD-9-CM: 428.23, 428.0  7/21/2021 Yes                Review of Systems:   Pertinent items are noted in HPI. Vital Signs:    Last 24hrs VS reviewed since prior progress note. Most recent are:  Visit Vitals  BP (!) 94/59   Pulse 72   Temp 98.1 °F (36.7 °C)   Resp 20   Ht 5' 6\" (1.676 m)   Wt 81.6 kg (180 lb)   SpO2 94%   BMI 29.05 kg/m²         Intake/Output Summary (Last 24 hours) at 7/22/2021 1745  Last data filed at 7/22/2021 1243  Gross per 24 hour   Intake    Output 1500 ml   Net -1500 ml        Physical Examination:     I had a face to face encounter with this patient and independently examined them on 7/22/2021 as outlined below:          Constitutional:  No acute distress, cooperative, pleasant    ENT:  Oral mucosa moist, EOMI,anicteric sclera,normal conjunctiva. Resp:  decreased breath sounds left side, No wheezing/rhonchi/rales. No accessory muscle use   CV:  irregular rhythm, normal rate, S1,S 2 wnl    GI:  Soft, non distended, non tender. normoactive bowel sounds, no hepatosplenomegaly     Musculoskeletal:  No edema, warm, 2+ pulses throughout    Neurologic:  Moves all extremities.   AAOx3, CN II-XII reviewed            Data Review:    Review and/or order of clinical lab test  Review and/or order of tests in the radiology section of CPT  Review and/or order of tests in the medicine section of CPT      Labs:     Recent Labs     07/22/21  0501 07/21/21  1434   WBC 11.4* 14.6*   HGB 10.4* 11.5   HCT 32.6* 35.1    283     Recent Labs     07/22/21  0501 07/21/21  1434    138   K 3.8 4.4    104   CO2 27 28   BUN 17 17   CREA      Cervical back: Neck supple.      Right lower leg: Edema present.      Left lower leg: Edema present.   Skin:     General: Skin is warm.      Capillary Refill: Capillary refill takes 2 to 3 seconds.   Neurological:      Comments: She follows commands, she moves all ext, weak voice and cough, no pain         Medications  Current Facility-Administered Medications   Medication Dose Route Frequency Provider Last Rate Last Admin    piperacillin-tazobactam (Zosyn) 4.5 g in  mL IVPB  4.5 g Intravenous Q12HRS Hardeep Guillen M.D.   Stopped at 07/28/23 0134    lidocaine (Xylocaine) 1 % injection 1 mL  1 mL Intradermal PRN Marita Buenrostro M.D.        famotidine (Pepcid) tablet 20 mg  20 mg Enteral Tube DAILY Cristian Peña M.D.   20 mg at 07/27/23 0528    oxyCODONE immediate-release (Roxicodone) tablet 5 mg  5 mg Enteral Tube Q4HRS PRN Cristian Peña M.D.   5 mg at 07/26/23 0838    promethazine (Phenergan) tablet 12.5-25 mg  12.5-25 mg Enteral Tube Q4HRS PRN Cristian Peña M.D.        levothyroxine (Synthroid) tablet 225 mcg  225 mcg Enteral Tube AM ES Cristian Peña M.D.   225 mcg at 07/27/23 0528    heparin injection 2,800 Units  2,800 Units Intracatheter PRN Marita Buenrostro M.D.   2,800 Units at 07/27/23 1654    insulin regular (HumuLIN R,NovoLIN R) injection  2-9 Units Subcutaneous Q6HRS Kerrie Montana   2 Units at 07/27/23 0528    And    dextrose 50% (D50W) injection 25 g  25 g Intravenous Q15 MIN PRN Kerrie LNik Latona   25 g at 07/21/23 0225    norepinephrine (Levophed) 8 mg in 250 mL NS infusion (premix)  0-1 mcg/kg/min (Ideal) Intravenous Continuous Lam Louis M.D. 7.7 mL/hr at 07/28/23 0533 0.06 mcg/kg/min at 07/28/23 0533    Respiratory Therapy Consult   Nebulization Continuous RT Cristian Peña M.D.        senna-docusate (Pericolace Or Senokot S) 8.6-50 MG per tablet 2 Tablet  2 Tablet Enteral Tube BID Cristian Peña M.D.   2 Tablet at 07/23/23 0511    And    polyethylene  0. 72 0.94   * 159*   CA 8.4* 8.6   MG 2.4 2.6*   PHOS 2.8  --      Recent Labs     07/22/21  0501 07/21/21  1434   ALT 31 36   AP 76 83   TBILI 0.7 0.6   TP 5.4* 6.2*   ALB 2.6* 2.9*   GLOB 2.8 3.3     No results for input(s): INR, PTP, APTT, INREXT in the last 72 hours. No results for input(s): FE, TIBC, PSAT, FERR in the last 72 hours. No results found for: FOL, RBCF   No results for input(s): PH, PCO2, PO2 in the last 72 hours.   Recent Labs     07/22/21  0501 07/21/21  1434   TROIQ <0.05 <0.05     Lab Results   Component Value Date/Time    Cholesterol, total 142 03/28/2018 04:00 AM    HDL Cholesterol 76 03/28/2018 04:00 AM    LDL, calculated 55.2 03/28/2018 04:00 AM    Triglyceride 54 03/28/2018 04:00 AM    CHOL/HDL Ratio 1.9 03/28/2018 04:00 AM     Lab Results   Component Value Date/Time    Glucose (POC) 160 (H) 06/24/2021 03:06 AM    Glucose (POC) 175 (H) 06/23/2021 06:57 PM    Glucose (POC) 136 (H) 06/23/2021 11:10 AM    Glucose (POC) 123 (H) 06/23/2021 08:16 AM    Glucose (POC) 183 (H) 06/22/2021 09:34 PM     Lab Results   Component Value Date/Time    Color DARK YELLOW 07/22/2021 10:49 AM    Appearance CLEAR 07/22/2021 10:49 AM    Specific gravity 1.022 07/22/2021 10:49 AM    Specific gravity 1.025 06/22/2021 09:51 AM    pH (UA) 5.5 07/22/2021 10:49 AM    Protein 30 (A) 07/22/2021 10:49 AM    Glucose Negative 07/22/2021 10:49 AM    Ketone Negative 07/22/2021 10:49 AM    Bilirubin Negative 07/22/2021 10:49 AM    Urobilinogen 0.2 07/22/2021 10:49 AM    Nitrites Negative 07/22/2021 10:49 AM    Leukocyte Esterase TRACE (A) 07/22/2021 10:49 AM    Epithelial cells MODERATE (A) 07/22/2021 10:49 AM    Bacteria Negative 07/22/2021 10:49 AM    WBC 0-4 07/22/2021 10:49 AM    RBC 0-5 07/22/2021 10:49 AM         Medications Reviewed:     Current Facility-Administered Medications   Medication Dose Route Frequency    albuterol-ipratropium (DUO-NEB) 2.5 MG-0.5 MG/3 ML  3 mL Nebulization Q6H PRN    amiodarone (CORDARONE) tablet 200 mg  200 mg Oral DAILY    [Held by provider] apixaban (ELIQUIS) tablet 2.5 mg  2.5 mg Oral BID    atorvastatin (LIPITOR) tablet 20 mg  20 mg Oral QPM    bumetanide (BUMEX) injection 1 mg  1 mg IntraVENous BID    cetirizine (ZYRTEC) tablet 10 mg  10 mg Oral QPM    ferrous sulfate tablet 325 mg  325 mg Oral DAILY    levothyroxine (SYNTHROID) tablet 25 mcg  25 mcg Oral ACB    metoprolol succinate (TOPROL-XL) XL tablet 25 mg  25 mg Oral DAILY    montelukast (SINGULAIR) tablet 10 mg  10 mg Oral QPM    potassium chloride SR (KLOR-CON 10) tablet 20 mEq  20 mEq Oral DAILY    raloxifene (EVISTA) tablet 60 mg  60 mg Oral DAILY    sacubitriL-valsartan (ENTRESTO) 24-26 mg tablet 1 Tablet  1 Tablet Oral BID    spironolactone (ALDACTONE) tablet 25 mg  25 mg Oral DAILY    sodium chloride (NS) flush 5-40 mL  5-40 mL IntraVENous Q8H    sodium chloride (NS) flush 5-40 mL  5-40 mL IntraVENous PRN    acetaminophen (TYLENOL) tablet 650 mg  650 mg Oral Q6H PRN    Or    acetaminophen (TYLENOL) suppository 650 mg  650 mg Rectal Q6H PRN    polyethylene glycol (MIRALAX) packet 17 g  17 g Oral DAILY PRN    ondansetron (ZOFRAN ODT) tablet 4 mg  4 mg Oral Q8H PRN    Or    ondansetron (ZOFRAN) injection 4 mg  4 mg IntraVENous Q6H PRN    lidocaine (PF) (XYLOCAINE) 10 mg/mL (1 %) injection 10 mL  10 mL SubCUTAneous RAD ONCE     ______________________________________________________________________  EXPECTED LENGTH OF STAY: 3d 2h  ACTUAL LENGTH OF STAY:          1                 Stephen Bundy MD glycol/lytes (Miralax) PACKET 1 Packet  1 Packet Enteral Tube QDAY PRN Cristian Peña M.D.        And    magnesium hydroxide (Milk Of Magnesia) suspension 30 mL  30 mL Enteral Tube QDAY PRN Cristian Peña M.D.        And    bisacodyl (Dulcolax) suppository 10 mg  10 mg Rectal QDAY PRN Cristian Peña M.D.        MD Alert...ICU Electrolyte Replacement per Pharmacy   Other PHARMACY TO DOSE Cristian Peña M.D.        lidocaine (Xylocaine) 1 % injection 2 mL  2 mL Tracheal Tube Q30 MIN PRN Cristian Peña M.D.        fentaNYL (Sublimaze) injection 100 mcg  100 mcg Intravenous Q15 MIN PRN Cristian Peña M.D.        And    fentaNYL (Sublimaze) injection 200 mcg  200 mcg Intravenous Q15 MIN PRN Cristian Peña M.D.        ondansetron (Zofran) syringe/vial injection 4 mg  4 mg Intravenous Q4HRS PRN Ana Lilia Joy M.D.        promethazine (Phenergan) suppository 12.5-25 mg  12.5-25 mg Rectal Q4HRS PRN Ana Lilia Joy M.D.        prochlorperazine (Compazine) injection 5-10 mg  5-10 mg Intravenous Q4HRS PRN Ana Lilia Joy M.D.        Nozin nasal  swab  1 Applicator Each Nostril BID Dagoberto Ohara D.O.   1 Applicator at 07/28/23 0600       Fluids    Intake/Output Summary (Last 24 hours) at 7/28/2023 0840  Last data filed at 7/28/2023 0134  Gross per 24 hour   Intake 1036.5 ml   Output 3883 ml   Net -2846.5 ml       Laboratory            Recent Labs     07/26/23  0428 07/27/23  0425 07/28/23  0446   SODIUM 135 136 137   POTASSIUM 3.9 4.0 4.2   CHLORIDE 102 103 102   CO2 19* 19* 22   BUN 46* 44* 40*   CREATININE 2.95* 2.80* 2.75*   MAGNESIUM 2.2 2.1 2.1   PHOSPHORUS 3.2 3.2 3.4   CALCIUM 7.4* 7.4* 8.0*     Recent Labs     07/26/23 0428 07/27/23  0425 07/28/23  0446   ALTSGPT 251* 188* 156*   ASTSGOT 104* 109* 96*   ALKPHOSPHAT 187* 183* 212*   TBILIRUBIN 7.8* 8.9* 10.9*   GLUCOSE 122* 158* 98     Recent Labs     07/26/23 0428 07/27/23  0425 07/28/23  0446   WBC 11.1* 12.8* 12.5*    NEUTSPOLYS 86.00* 82.90* 93.20*   LYMPHOCYTES 5.30* 7.70* 1.70*   MONOCYTES 2.60 2.60 2.50   EOSINOPHILS 2.60 2.60 0.00   BASOPHILS 0.00 0.80 0.00   ASTSGOT 104* 109* 96*   ALTSGPT 251* 188* 156*   ALKPHOSPHAT 187* 183* 212*   TBILIRUBIN 7.8* 8.9* 10.9*     Recent Labs     07/26/23  0428 07/27/23  0425 07/28/23  0446   RBC 2.81* 2.99* 3.14*   HEMOGLOBIN 8.9* 9.4* 9.7*   HEMATOCRIT 27.0* 28.7* 29.5*   PLATELETCT 40* 51* 60*       Imaging  X-Ray:  I have personally reviewed the images and compared with prior images.      Assessment/Plan  * Acute hypoxemic respiratory failure (HCC)- (present on admission)  Assessment & Plan  Initially 7/13 Intubated for OR and 7/15 extubated, re-intubated 7/19 extubated 7/27  High risk of worsening respiratory status but is DNR/DNI  Mobilize dry fluid volume with iHD  IS when able  Aspiration precautions    Goals of care, counseling/discussion  Assessment & Plan  Extensive discussion with patient's very caring and involved Sister Rani again on 7/24  Again confirmed, DNR status, on was a very vibrant and would be much more concerned with quality than quantity of life as previously documented.  Planning on repeat P UF today and likely extubate tomorrow.  DNR/DNI status.  If she does not do well with extubation, transition to comfort focused care for significant dyspnea or discomfort consistent with patient's prior stated wishes.  I have spoken with Silva extensively but there appears to be some differing opinion with other family members.  Palliative care consulted and will attempt to arrange family conference for further clarification of goals of care and discussed with patient as able.    Thrombocytopenia (HCC)  Assessment & Plan  Platelets trending down on the basis of shock critical illness and likely contributing CRRT  Off VTE ppx, for intraabdominal bleed post-op  Will resume VTE ppx when able  Trend daily cbc  At risk of rebleed transfuse PLTS when <20 or any signs of  bleeding    Acute hepatic necrosis  Assessment & Plan  Liver laceration and Ischemic hepatopathy due to shock, evidenced by markedly elevated transaminases, elevated Tbili, hypoalbunemia  Avoid hepatotoxins      Hemorrhagic shock (HCC)- (present on admission)  Assessment & Plan  7/19: Overnight with worsening hypotension, obtundation and vasopressor requirement.  Bedside POCUS with underfilled hyperdynamic LV and FF in RUQ.  MTP intiated and patient taken to OR with ACGS. Found to have copious intrabdominal hemmorhage with a liver laceration near falciform ligament.  Packed left open  7/20: OR re opened, packing removed, fascia closed  No recurrent bleeding, HH stable  Holding heparin until cleared by surgery, follow H&H    Acute blood loss anemia  Assessment & Plan  Intraabdominal bleed  S/P MTP and operative control  Follow H/H  Transfuse if Hg < 7    Perforated viscus- (present on admission)  Assessment & Plan  7/13 - ex lap with left hemicolectomy and end colostomy by Dr. Boateng  Found perforated colon at splenic fixture, associated with mass. Path neg for malignancy  Fecal peritonitis, remains on Zosyn per surgery    Acute kidney injury (HCC)  Assessment & Plan  Worsening renal function post shock, ATN likely, anuric  7/21: Initiated CRRT for volume removal/ ultrafiltration after failed diuretic challenge  7/23: Switch CRRT to iHD to facilitate mobility and PT, still anuric  7/24 iHD  Renal dose meds, avoid nephrotoxins  Remove hernandez and Q shift bladder scan    Morbid obesity with BMI of 50.0-59.9, adult (HCC)- (present on admission)  Assessment & Plan  BMI 50, place her in high risk for increase morbidity during this admission    Acute saddle pulmonary embolism (HCC)- (present on admission)  Assessment & Plan  Originating from a right leg DVT. Pt is not a candidate for thrombolytics given her pneumoperitoneum  7/13: S/p emergent thrombectomy of saddle PE with IVC placement.   7/19: Repeat TTE with normal LV  function, poor RV visualization but likely dilated with reduced function, RAP low  Continue to hold heparin as high risk for rebleed until surgically cleared    Acute deep venous thrombosis (DVT) determined by ultrasound (HCC)- (present on admission)  Assessment & Plan  Holding heparin in the setting of massive intraabdominal hemorrhage, liver laceration and thrombocytopenia  S/p IVC filter placed this admission  Follow-up with vascular clinic for IVC filter retrieval when appropriate  Resume anticoagulation when cleared by surgery and safe       VTE:  Contraindicated  Ulcer: Not Indicated  Lines: Central Line  Ongoing indication addressed    I have performed a physical exam and reviewed and updated ROS and Plan today (7/28/2023). In review of yesterday's note (7/27/2023), there are no changes except as documented above.     Discussed patient condition and risk of morbidity and/or mortality with RN, RT, Pharmacy, Code status disscussed, Charge nurse / hot rounds, and Patient    The patient remains critically ill with shock and titration of norepinephrine gtt.  Critical care time = 78 minutes in directly providing and coordinating critical care and extensive data review.  No time overlap and excludes procedures.

## 2023-07-28 NOTE — PROCEDURES
Nephrology/PUF note    Patient admitted with perforated viscus, s/p ex lap, shock, DUNG/ATN  Still anuric, with severe edema  Seen and examined during PUF procedure  UF goal 2-3 L  Tolerates well  VS stable  Please see procedure flow sheet for details  Hemodialysis scheduled for tomorrow

## 2023-07-28 NOTE — DIETARY
Nutrition support weekly update:  Day 15 of admit.  Ashley English is a 63 y.o. female with admitting DX of Pneumoperitoneum, Perforated abdominal viscus.  Tube feeding initiated on 7/21. Currently, TF is off. Pt was extubated and feeding tube removed.    Assessment:  Weight 7/26: 154.6 kg via bed scale. Weight has varied up and down since admit. Current weight is consistent with first bed scale weight.    Estimated needs with weight 154.6 kg:  Calculation/Equation:   REE per SPC=3610 kcal  Total Calories / day: 1705 - 2170 (Calories / kg(ABW): 11 - 14)  Total Grams Protein / day: 125 - 156 (Grams Protein / kg(ABW): 0.8 - 1)  Total Grams Protein / day: 136 (Grams Protein / kg(IBW): 2.0)    Evaluation:   SLP evaluation pending, likely tomorrow per nursing report.  Iris feeding tube to be placed.  Labs 7/28 include Na 137, K+ 4.2, Glu 98, BUN 40 (H), Creat 2.75 (H), Alb 1.7 (L), Phos 3.4, Mg 2.1  Medications include Levophed at 0.07 mcg/kg/min, Pepcid, SSI, Zosyn, Senna  LBM 7/28  HD planned for tomorrow.  As pt now off the vent, adjust tube feed formula to Impact Peptide 1.5 to meet nutrition needs.    Malnutrition risk: No new criteria noted.    Recommendations/Plan:  Change tube feed to Impact Peptide 1.5 goal rate of 60 ml/hour to provide 2160 kcal, 135 gm protein, and 1108 ml free water in 24 hours.  Fluids per MD.  Monitor weight.  Diet initiation and advancement per SLP.     RD following

## 2023-07-29 NOTE — PROGRESS NOTES
Bladder scan: 45mL   Foreign body in esophagus, initial encounter Foreign body in esophagus, initial encounter

## 2023-07-29 NOTE — THERAPY
"Speech Language Pathology   Clinical Swallow Evaluation     Patient Name: Ashley English  AGE:  63 y.o., SEX:  female  Medical Record #: [id number]  Date of Service: 7/29/2023      History of Present Illness  62 YO female admitted 7/12 2/2 DVT, saddle PE, and pneumoperitoneum due to perforated viscus.    PMHx: morbid obesity, disorder of thyroid. No SLP hx at [facility].     CMHx: acute hypoxemic respiratory failure, bilateral thrombocytopenia, acute haptic necrosis, hemorrhagic shock, acute blood loss anemia, perforated viscus, DUNG, morbid obesity, acute saddle pulmonary embolism, acute DVT.    CXR 7/29 \"Pulmonary edema and/or infiltrates are identified, which are stable since the prior exam.\"    Intubated 7/13-7/15 and 7/18-7/27.      General Information:  Vitals  O2 (LPM): 2  O2 Delivery Device: Silicone Nasal Cannula  Level of Consciousness: Awake  Patient Behaviors: Withdrawn, Lethargic  Orientation: Self  Follows Directives: Yes - simple commands only (benefitted from reduction of distractions and visual cues to follow directions)      Prior Living Situation & Level of Function:  Prior Services: None, Home-Independent  Housing / Facility: [address]  Steps Into Home: 1  Steps In Home: 0  Bathroom Set up: Bathtub / Shower Combination  Equipment Owned: Single Point Cane  Lives with - Patient's Self Care Capacity: Alone and Able to Care For Self  Comments: Per OT: \"Pt reports she has local family, but they are all busy with their own lives including already caregiving for others.\"     Communication: WFL  Swallowing: WFL       Oral Mechanism Evaluation:  Dentition: Fair   Facial Symmetry: Equal  Labial Observations: WFL   Lingual Observations: Midline  Motor Speech: WFL            Laryngeal Function:  Secretion Management: Adequate  Voice Quality: Breathy continuous, Hoarse  Cough: Perceptually weak       Subjective  Pt awake, delayed responses to questions/directions, limited eye contact, required reduced of " environmental distractions to follow basic directions, required verbal and tactile cues to assist with direction following. Family at bedside and very supportive.      Assessment  Current Method of Nutrition: NGT  Positioning: Castañeda's (60-90 degrees)  Bolus Administration: SLP  O2 (LPM): 2 O2 Delivery Device: Silicone Nasal Cannula  Factor(s) Affecting Performance: Impaired endurance, Impaired mental status, Impaired command following  Tracheostomy : No       Swallowing Trials:  Swallowing Trials  Ice: WFL  Thin Liquid (TN0): Impaired  Liquidised (LQ3): Impaired      Comments: Pt required consistent redirection to task and max cues to initiate timely swallow. Congested and productive cough appreciated throughout evaluation with removal of secretions/mucous from oral cavity via Yankauer. Oral care provided prior to PO trials of ice, thins by tsp and cup, and liquidized. Inconsistently timely vs delayed swallow evaluation, presumed weak hyolaryngeal elevation to palpation appreciated. Low vocal intensity with vocal quality progressing from hoarse/congested to clear by end of PO trials. Wet vocal quality with intermittent coughing appreciated with thins, which can be concerning for airway invasion. Pt endorsed globus sensation with trials of liquidized, which can be concerning for pharyngeal inefficiency.       Clinical Impressions  Given prolonged endotracheal intubation (total of 11 days), prolonged NPO status, impaired attention/direction following, and overt s/sx of aspiration, recommend continuation of NPO/TF at this time. Recommend a few ice chips per hour after oral care with nursing or trained family to reduce xerostomia and maintain the integrity of the swallow musculature. Pt would benefit from diagnostic swallow study prior to initiation of a PO diet to objectively assess oropharyngeal swallow function and determine least restrictive diet texture.     Recommendations  Diet Consistency: NPO/TF, okay for  "single ice chips after oral care with HOB at 90* with trained family or nursing to reduce xerostomia and maintain the integrity of the swallow musculature.  Instrumentation: FEES  Medication: Non Oral  Supervision: 1:1 feeding with constant supervision  Positioning: Fully upright and midline during oral intake, Meals sitting upright in a chair, as tolerated  Risk Management : Small bites/sips, Slow rate of intake  Oral Care: Q2h         SLP Treatment Plan  Treatment Plan: Dysphagia Treatment, Patient/Family/Caregiver Training  SLP Frequency: 3x Per Week  Estimated Duration: Until Therapy Goals Met      Anticipated Discharge Needs  Discharge Recommendations: Recommend post-acute placement for additional speech therapy services prior to discharge home   Therapy Recommendations Upon DC: Dysphagia Training, Patient / Family / Caregiver Education, Community Re-Integration        Patient / Family Goals  Patient / Family Goal #1: \"yes\" when asked if thirsty  Short Term Goals  Short Term Goal # 1: Pt will participate in diagnostic swallow study to objectively assess oropharyngeal swallow function and determine least restrictive diet texture with min cues.      Jamarcus Ham, SLP   "

## 2023-07-29 NOTE — PROCEDURES
Iris Feeding tube placement    Tube Team verified patient name and medical record number prior to tube placement.  Iris feeding tube (43 inches, 12 Czech) placed at 70 cm in right nare.  Per Iris picture, tube appears to be in the stomach.    Nursing Instructions: Await KUB to confirm placement before use for medications or feeding. Stylet; may be removed, please place in labeled bag with insufflation bulb and save in patient medication drawer. Iris feeding tube is MRI conditional.

## 2023-07-29 NOTE — PROGRESS NOTES
[facility] Services Progress Note      HD today x 3 hours per Dr. Kinney.   Initiated at 0734 and ended at 1034.     Assessment:    Patient alert and oriented. Responds to voice commands. Nods when communicating. On O2 at 2Lpm via NC, on 1 pressor on a low dose for BP support. Generalized edema noted.      Access used: LIJ non tunneled catheter, patent and intact.         Net Fluid Removed: 3,000 mL      Procedure Events/ Complications:   Had low BP during treatment. Patient denies lightheadedness, dizziness. Tolerated treatment.       Post Access Care:   Blood returned. Flushed with NS.  CVC locked with Heparin 1000 units/ mL   Arterial port:  1.4 mL   Venous port: 1.4 mL  Clamped, capped and secured. Labeled   Dressing change: Yes / Due on         Report given to Primary LAURA Nayak RN.

## 2023-07-29 NOTE — CARE PLAN
The patient is Watcher - Medium risk of patient condition declining or worsening    Shift Goals  Clinical Goals: hemodynamic stability  Patient Goals: Comfort/rest  Family Goals: no family present    Progress made toward(s) clinical / shift goals:    Problem: Pain - Standard  Goal: Alleviation of pain or a reduction in pain to the patient’s comfort goal  Outcome: Progressing     Problem: Knowledge Deficit - Standard  Goal: Patient and family/care givers will demonstrate understanding of plan of care, disease process/condition, diagnostic tests and medications  Outcome: Progressing     Problem: Psychosocial  Goal: Patient's level of anxiety will decrease  Outcome: Progressing  Goal: Patient's ability to verbalize feelings about condition will improve  Outcome: Progressing  Goal: Patient's ability to re-evaluate and adapt role responsibilities will improve  Outcome: Progressing  Goal: Patient and family will demonstrate ability to cope with life altering diagnosis and/or procedure  Outcome: Progressing  Goal: Spiritual and cultural needs incorporated into hospitalization  Outcome: Progressing     Problem: Venous Thromboembolism (VTE) Prevention  Goal: The patient will remain free from venous thromboembolism (VTE)  Outcome: Progressing       Patient is not progressing towards the following goals:

## 2023-07-29 NOTE — PROGRESS NOTES
Family conference with Dr Wilder. TASIA discussed with patient's sister, brother and niece. Continue current plan of care at this time.

## 2023-07-29 NOTE — THERAPY
Speech Language Therapy Contact Note    Patient Name: Ashley English  Age:  63 y.o., Sex:  female  Medical Record #: [id number]  Today's Date: 7/29/2023 07/29/23 1351   Treatment Variance   Reason For Missed Therapy Non-Medical - Other (Please Comment)  (hold pending GOC per RN)   Total Treatment Time   SLP Time Spent Yes   SLP Missed Visit (Mins) 12   Interdisciplinary Plan of Care Collaboration   IDT Collaboration with  Nursing   Collaboration Comments Orders received for CSE. Pt is currently NPO/TF. Per RN, family requesting GOC discussion with MD, hold CSE at this time. SLP to hold CSE and check in and complete pending GOC conference.   Session Information   Date / Session Number note only 7/29   Priority   (CSE orders, NPO/TF ck GOC, may benefit from recommendation for safest diet despite risk)

## 2023-07-29 NOTE — PROCEDURES
Nephrology/Hemodialysis note    Patient admitted with perforated viscus, s/p ex lap, shock, multiorgan failure, DUNG/ATN -on RRT  Anuric, anasarca  Seen and examined during dialysis treatment  Tolerates well  VS stable  UF 2-3 L  Lab results reviewed  Please see dialysis flow sheet for details

## 2023-07-29 NOTE — CARE PLAN
The patient is Watcher - Medium risk of patient condition declining or worsening    Shift Goals  Clinical Goals: hemodynamic stability, nutrition  Patient Goals: Comfort/rest  Family Goals: no family present    Progress made toward(s) clinical / shift goals:    Problem: Pain - Standard  Goal: Alleviation of pain or a reduction in pain to the patient’s comfort goal  Outcome: Progressing     Problem: Knowledge Deficit - Standard  Goal: Patient and family/care givers will demonstrate understanding of plan of care, disease process/condition, diagnostic tests and medications  Outcome: Progressing     Problem: Respiratory  Goal: Patient will achieve/maintain optimum respiratory ventilation and gas exchange  Outcome: Progressing     Problem: Venous Thromboembolism (VTE) Prevention  Goal: The patient will remain free from venous thromboembolism (VTE)  Outcome: Progressing     Problem: Skin Integrity  Goal: Skin integrity is maintained or improved  Outcome: Progressing       Patient is not progressing towards the following goals:

## 2023-07-29 NOTE — PROGRESS NOTES
"  DATE: 7/29/2023        INTERVAL EVENTS:  Critically ill appearing.  She reports pain-free  Soft obese abdomen. Dark stoma.  VAC dressings.    PHYSICAL EXAMINATION:  Vital Signs: /52   Pulse 89   Temp 36.6 °C (97.9 °F) (Temporal)   Resp (!) 32   Ht 1.778 m (5' 10\")   Wt (!) 155 kg (340 lb 13.3 oz)   SpO2 91%     Critically ill  Confused not agitated  Appears comfortable  Ostomy dark stool in bag  Vacs in place    LABORATORY VALUES:   Recent Labs     07/27/23 0425 07/28/23  0446 07/29/23  0440   WBC 12.8* 12.5* 11.3*   RBC 2.99* 3.14* 3.10*   HEMOGLOBIN 9.4* 9.7* 9.3*   HEMATOCRIT 28.7* 29.5* 29.3*   MCV 96.0 93.9 94.5   MCH 31.4 30.9 30.0   MCHC 32.8 32.9 31.7*   RDW 58.9* 59.0* 59.9*   PLATELETCT 51* 60* 68*   MPV 12.6 12.9 11.8     Recent Labs     07/27/23  0425 07/28/23  0446 07/29/23  0440   SODIUM 136 137 138   POTASSIUM 4.0 4.2 4.4   CHLORIDE 103 102 102   CO2 19* 22 19*   GLUCOSE 158* 98 79   BUN 44* 40* 54*   CREATININE 2.80* 2.75* 3.51*   CALCIUM 7.4* 8.0* 8.0*     Recent Labs     07/27/23  0425 07/28/23  0446 07/29/23  0440 07/29/23  1154   ASTSGOT 109* 96* 97*  --    ALTSGPT 188* 156* 127*  --    TBILIRUBIN 8.9* 10.9* 11.5*  --    IBILIRUBIN  --   --  1.5*  --    DBILIRUBIN  --   --  10.0*  --    ALKPHOSPHAT 183* 212* 241*  --    GLOBULIN 2.5 3.4 3.3  --    INR  --   --   --  2.50*     Recent Labs     07/29/23  1154   INR 2.50*        IMAGING:   US-RUQ   Final Result      1.  Limited exam due to open abdominal wound.   2.  Heterogeneous echogenic liver most consistent with fatty infiltration.   3.  No biliary dilatation.      DX-ABDOMEN FOR TUBE PLACEMENT   Final Result      DHT tip is over the distal stomach or first portion of the duodenum.      DX-CHEST-PORTABLE (1 VIEW)   Final Result         1.  Pulmonary edema and/or infiltrates are identified, which are stable since the prior exam.      DX-CHEST-PORTABLE (1 VIEW)   Final Result         1.  Pulmonary edema and/or infiltrates are " identified, which are stable since the prior exam.   2.  Trace bilateral pleural effusions      DX-CHEST-PORTABLE (1 VIEW)   Final Result         1.  Pulmonary edema and/or infiltrates are identified, which are stable since the prior exam.      DX-CHEST-PORTABLE (1 VIEW)   Final Result         1.  Mild edema and/or infiltrates.      DX-CHEST-PORTABLE (1 VIEW)   Final Result         1.  Pulmonary edema and/or infiltrates are identified, which are stable since the prior exam.   2.  Trace bilateral pleural effusions, stable      DX-CHEST-PORTABLE (1 VIEW)   Final Result      Stable examination.      DX-CHEST-PORTABLE (1 VIEW)   Final Result      No significant change      DX-ABDOMEN FOR TUBE PLACEMENT   Final Result      Enteric tube has been placed and the tip projects over the stomach.      DX-CHEST-LIMITED (1 VIEW)   Final Result      1.  Interval placement of a left-sided temporary dialysis catheter which terminates with the tip projecting over the expected location of the distal SVC.   2.  Stable mild interstitial pulmonary edema and bilateral basilar atelectasis and/or consolidation.      DX-CHEST-PORTABLE (1 VIEW)   Final Result         1.  Pulmonary edema and/or infiltrates are identified, which are stable since the prior exam.   2.  Trace bilateral pleural effusions      [id number] VIEW   Final Result      Severely suboptimal exam related to patient body habitus. No unexpected radiopaque foreign body seen.      DX-CHEST-PORTABLE (1 VIEW)   Final Result         1.  Pulmonary edema and/or infiltrates are identified, which are stable since the prior exam.   2.  Trace bilateral pleural effusions      EC-ECHOCARDIOGRAM COMPLETE W/ CONT   Final Result      DX-CHEST-PORTABLE (1 VIEW)   Final Result      1.  Pulmonary edema, worse than prior exam   2.  Slight increased inflation from prior.   3.  Supportive tubing as described above.      DX-CHEST-PORTABLE (1 VIEW)   Final Result         1.  Bibasilar atelectasis  versus infiltrates.      CT-ABDOMEN-PELVIS WITH   Final Result         1.  Scattered moderate abdominal ascites.   2.  Trace bilateral pleural effusions, left greater than right   3.  Linear densities the bilateral lung bases, greater on the left, appearance suggests atelectasis, component of infiltrate not excluded.   4.  DVT in the right external iliac vein extending into the common femoral vein, compatible with reported history of DVT.   5.  Diverticulosis   6.  Hepatomegaly   7.  Diffuse hepatic steatosis      DX-CHEST-PORTABLE (1 VIEW)   Final Result         1.  Bibasilar atelectasis or evolving infiltrates.      IR-MIDLINE CATHETER INSERTION WO GUIDANCE > AGE 3   Final Result                  Ultrasound-guided midline placement performed by qualified nursing staff    as above.          IR-US GUIDED PIV   Final Result    Ultrasound-guided PERIPHERAL IV INSERTION performed by    qualified nursing staff as above.      DX-CHEST-PORTABLE (1 VIEW)   Final Result      Stable thoracic underinflation. Right IJ line adequate. Stable bibasilar pleural-parenchymal opacification      DX-CHEST-PORTABLE (1 VIEW)   Final Result      Stable thoracic underinflation. Right IJ line and ETT appear adequate. Slightly improved bibasilar pleural-parenchymal opacification      DX-CHEST-PORTABLE (1 VIEW)   Final Result      Stable bibasilar pleural-parenchymal opacification and low lung volumes. Lines/tubes adequate.      DX-CHEST-LIMITED (1 VIEW)   Final Result         Endotracheal tube with tip projecting over the mid thoracic trachea.   Gastric drainage tube courses below diaphragm, tip is not seen.   Right central venous catheter with tip projecting over the expected area of the lower SVC.            EC-NAEEM W/O CONT   Final Result      DX-CHEST-LIMITED (1 VIEW)   Final Result      Bibasilar underinflation atelectasis which could obscure an additional process. This is similar to the prior study. Lung volumes are very low.       IR-INSERT IVC FILTER WITH IG & SI   Final Result      1.  Ultrasound guided access LEFT common femoral vein.   2.  BILATERAL selective pulmonary arteriogram demonstrating extensive bilateral pulmonary emboli.   3.  Successful BILATERAL pulmonary thrombectomy   4.  Large IVC at the level of the renal veins   5.  Successful placement of infrarenal IVC filter   6.  Pursestring suture should be removed in 6-12 hours.         Please note that this study required greater than 50% more than the usual procedure time due to the patient's anatomy and large habitus.            IR-THROMBO MECHANICAL ARTERY,INIT   Final Result      1.  Ultrasound guided access LEFT common femoral vein.   2.  BILATERAL selective pulmonary arteriogram demonstrating extensive bilateral pulmonary emboli.   3.  Successful BILATERAL pulmonary thrombectomy   4.  Large IVC at the level of the renal veins   5.  Successful placement of infrarenal IVC filter   6.  Pursestring suture should be removed in 6-12 hours.         Please note that this study required greater than 50% more than the usual procedure time due to the patient's anatomy and large habitus.            EC-ECHOCARDIOGRAM COMPLETE W/ CONT   Final Result      CT-ABDOMEN-PELVIS WITH   Final Result      1.  Pneumoperitoneum secondary to perforated viscus. Splenic flexure colonic diverticulum may be the perforation site. Gastroduodenal ulcer ulcer is considered less likely.   2.  Hepatic steatosis.   3.  Moderate gallbladder distention and pericholecystic fat stranding.   4.  Small volume hyperdense ascites.   5.  Partially visualized right lower extremity DVT.      Findings were communicated to SALINA STOVER via [employer] system on 7/12/2023 11:03 PM.      CT-CTA CHEST PULMONARY ARTERY W/ RECONS   Final Result      1.  Saddle pulmonary embolus with associated right heart strain.   2.  Posterior pneumomediastinum secondary to pneumoperitoneum. Abdomen will be dictated separately.       Findings were communicated to SALINA STOVER via [employer] system on 7/12/2023 10:57 PM.      DX-CHEST-PORTABLE (1 VIEW)   Final Result      Mild atelectasis in the right lower lung. No focal consolidation. No effusions.         CT-ABDOMEN-PELVIS WITH    (Results Pending)       ASSESSMENT AND PLAN:    Critically ill    Recommend ongoing wound care.  Monitor ostomy  Follow abdominal exam  Close monitoring and support  ACS Ziegler continue to follow    Appreciate ICU and consulting physician care.    Discussed with ICU team     ____________________________________     Surendra Adames M.D.    DD: 7/29/2023  2:00 PM

## 2023-07-29 NOTE — PROGRESS NOTES
"Critical Care Progress Note    Date of admission  7/12/2023    Chief Complaint  \"63 y.o. female who presented 7/12/2023 with acute DVT, acute saddle PE and new pneumoperitoneum due to perforated viscus for what she was admitted and treated with thrombectomy, heparin ggt and IVC filter placement. She also underwent exploratory laparatomy (today day 5 post op) with left colectomy with end colostomy and s/p cholecystectomy with a wound vac in place.   I was called at the bedside due to hypotension and tachycardia.\" H+P 7/12    Hospital Course  7/13 s/p bilateral thrombectomy for saddle PE and IVC filter placement. Successful.   7/13 s/p left colectomy and end-colostomy, and cholecystectomy. Intraop found perforated colon at splenic flexure with associated mass. Concerning of malignancy. Remains intubated post op.   7/19: Admitted to ICU is hemorrhagic shock, MTP, OR for intrabdominal bleed, liver lac, returns to ICU in shock and intubated  7/20: VD 2-Hb stable, anuric, back to OR for removal of packing, colon resection, revision colostomy (to RUQ), vasoplegic with increasing pressors overnight  7/21: CRRT started  7/22: VD 4-weaned of pressors, following, increase UF to 100-->150 overnight  7/23: 2.5 L off yesterday, off pressors, very weak today, hypophos needs aggressive replacement, switch CRRT to iHD, mobilize, SBT/SAT tomorrow  7/24: HD/UF today, tolerating SBT, high risk for failing extubation.  Discussed with family today regarding GOC as previously documented  7/25: PUF again today; VD#7, awake and follows very weakly t/o.  7/26: plan for HD/UF today, awake and follows, moving arms off bed not; palliative consulted to assist with GOC d/w pt/family, plan  7/27: Extubated today; further discussion regarding goals of care  7/28 on low levels oxygen, low dose norepinephrine      Interval Problem Update  Reviewed last 24 hour events:  Neuro: moving all ext, intact, no pain  HR: 70-80's  SBP: map > 65 on levophed " 0.05  Tmax: afebrile  GI: NPO, colostomy 150ml  UOP: anuric  Lines: left dialysis cath, wound vac, midline  Resp: 2l n/c, IS 250ml   Vte: heparin  PPI/H2:n/a  Antibx: Zosyn on going    -3.4L net negative -20.9L  Heparin vte  IRIS and TF start  Pep and IS support from RT  RUQ u/s, check direct and total bili, ldh  Consider CT abdomen to further evaluate abdomen    Review of Systems  Review of Systems   Unable to perform ROS: Critical illness        Vital Signs for last 24 hours   Temp:  [36.3 °C (97.4 °F)-36.8 °C (98.2 °F)] 36.3 °C (97.4 °F)  Pulse:  [67-98] 95  Resp:  [16-34] 22  BP: ()/(50-73) 108/60  SpO2:  [91 %-97 %] 94 %    Hemodynamic parameters for last 24 hours       Respiratory Information for the last 24 hours       Physical Exam   Physical Exam  Vitals and nursing note reviewed.   Constitutional:       General: She is not in acute distress.     Appearance: She is obese. She is ill-appearing.      Comments: Ill appearing sitting upright in bed, jaundice worsening   HENT:      Head: Normocephalic.      Nose: Nose normal.      Mouth/Throat:      Mouth: Mucous membranes are dry.   Eyes:      Extraocular Movements: Extraocular movements intact.      Pupils: Pupils are equal, round, and reactive to light.   Neck:      Comments: Right IJ central line, left IJ dialysis cath  Cardiovascular:      Rate and Rhythm: Normal rate and regular rhythm.   Pulmonary:      Effort: Pulmonary effort is normal.      Breath sounds: No rhonchi or rales.      Comments: Diminished bilateral, moderate effort cough  Abdominal:      General: There is no distension.      Palpations: Abdomen is soft.      Tenderness: There is no abdominal tenderness. There is no guarding.      Comments: Stoma that is dark and black, midline wound vac device, no rebound or guarding   Musculoskeletal:      Cervical back: Neck supple.      Right lower leg: Edema present.      Left lower leg: Edema present.   Skin:     General: Skin is warm.       Capillary Refill: Capillary refill takes 2 to 3 seconds.   Neurological:      Comments: She follows commands, she moves all ext, weak voice and cough, no pain         Medications  Current Facility-Administered Medications   Medication Dose Route Frequency Provider Last Rate Last Admin    heparin injection 5,000 Units  5,000 Units Subcutaneous Q8HRS Kojo Wilder M.D.   5,000 Units at 07/29/23 0832    piperacillin-tazobactam (Zosyn) 4.5 g in  mL IVPB  4.5 g Intravenous Q12HRS Hardeep Guillen M.D.   Stopped at 07/29/23 0029    lidocaine (Xylocaine) 1 % injection 1 mL  1 mL Intradermal PRN Marita Buenrostro M.D.        famotidine (Pepcid) tablet 20 mg  20 mg Enteral Tube DAILY Cristian Peña M.D.   20 mg at 07/27/23 0528    oxyCODONE immediate-release (Roxicodone) tablet 5 mg  5 mg Enteral Tube Q4HRS PRN Cristian Peña M.D.   5 mg at 07/26/23 0838    promethazine (Phenergan) tablet 12.5-25 mg  12.5-25 mg Enteral Tube Q4HRS PRN Cristian Peña M.D.        levothyroxine (Synthroid) tablet 225 mcg  225 mcg Enteral Tube AM ES Cristian Peña M.D.   225 mcg at 07/27/23 0528    heparin injection 2,800 Units  2,800 Units Intracatheter PRN Marita Buenrostro M.D.   2,800 Units at 07/28/23 1155    insulin regular (HumuLIN R,NovoLIN R) injection  2-9 Units Subcutaneous Q6HRS Kerrie LEE Latap   2 Units at 07/27/23 0528    And    dextrose 50% (D50W) injection 25 g  25 g Intravenous Q15 MIN PRN Kerrie LNik Latona   25 g at 07/21/23 0225    norepinephrine (Levophed) 8 mg in 250 mL NS infusion (premix)  0-1 mcg/kg/min (Ideal) Intravenous Continuous Lam Louis M.D. 7.7 mL/hr at 07/29/23 0833 0.06 mcg/kg/min at 07/29/23 0833    Respiratory Therapy Consult   Nebulization Continuous RT Cristian Peña M.D.        senna-docusate (Pericolace Or Senokot S) 8.6-50 MG per tablet 2 Tablet  2 Tablet Enteral Tube BID Cristian Peña M.D.   2 Tablet at 07/23/23 0511    And    polyethylene glycol/lytes (Miralax)  PACKET 1 Packet  1 Packet Enteral Tube QDAY PRN Cristian Peña M.D.        And    magnesium hydroxide (Milk Of Magnesia) suspension 30 mL  30 mL Enteral Tube QDAY PRN Cristian Peña M.D.        And    bisacodyl (Dulcolax) suppository 10 mg  10 mg Rectal QDAY PRN Cristian Peña M.D.        lidocaine (Xylocaine) 1 % injection 2 mL  2 mL Tracheal Tube Q30 MIN PRN Cristian Peña M.D.        fentaNYL (Sublimaze) injection 100 mcg  100 mcg Intravenous Q15 MIN PRN Cristian Peña M.D.        And    fentaNYL (Sublimaze) injection 200 mcg  200 mcg Intravenous Q15 MIN PRN Cristian Peña M.D.        ondansetron (Zofran) syringe/vial injection 4 mg  4 mg Intravenous Q4HRS PRN Ana Lilia Joy M.D.        promethazine (Phenergan) suppository 12.5-25 mg  12.5-25 mg Rectal Q4HRS PRN Ana Lilia Joy M.D.        prochlorperazine (Compazine) injection 5-10 mg  5-10 mg Intravenous Q4HRS PRN Ana Lilia Joy M.D.        Nozin nasal  swab  1 Applicator Each Nostril BID Dagoberto Ohara D.O.   1 Applicator at 07/29/23 0628       Fluids    Intake/Output Summary (Last 24 hours) at 7/29/2023 0857  Last data filed at 7/29/2023 0854  Gross per 24 hour   Intake 900.76 ml   Output 4857 ml   Net -3956.24 ml       Laboratory            Recent Labs     07/27/23  0425 07/28/23  0446 07/29/23  0440   SODIUM 136 137 138   POTASSIUM 4.0 4.2 4.4   CHLORIDE 103 102 102   CO2 19* 22 19*   BUN 44* 40* 54*   CREATININE 2.80* 2.75* 3.51*   MAGNESIUM 2.1 2.1 2.2   PHOSPHORUS 3.2 3.4 4.7*   CALCIUM 7.4* 8.0* 8.0*     Recent Labs     07/27/23  0425 07/28/23  0446 07/29/23  0440   ALTSGPT 188* 156* 127*   ASTSGOT 109* 96* 97*   ALKPHOSPHAT 183* 212* 241*   TBILIRUBIN 8.9* 10.9* 11.5*   GLUCOSE 158* 98 79     Recent Labs     07/27/23  0425 07/28/23  0446 07/29/23  0440   WBC 12.8* 12.5* 11.3*   NEUTSPOLYS 82.90* 93.20* 86.90*   LYMPHOCYTES 7.70* 1.70* 8.70*   MONOCYTES 2.60 2.50 0.90   EOSINOPHILS 2.60 0.00 1.70   BASOPHILS 0.80  0.00 0.90   ASTSGOT 109* 96* 97*   ALTSGPT 188* 156* 127*   ALKPHOSPHAT 183* 212* 241*   TBILIRUBIN 8.9* 10.9* 11.5*     Recent Labs     07/27/23  0425 07/28/23  0446 07/29/23  0440   RBC 2.99* 3.14* 3.10*   HEMOGLOBIN 9.4* 9.7* 9.3*   HEMATOCRIT 28.7* 29.5* 29.3*   PLATELETCT 51* 60* 68*       Imaging  X-Ray:  I have personally reviewed the images and compared with prior images.      Assessment/Plan  * Acute hypoxemic respiratory failure (HCC)- (present on admission)  Assessment & Plan  Initially 7/13 Intubated for OR and 7/15 extubated, re-intubated 7/19 extubated 7/27  High risk of worsening respiratory status but is DNR/DNI  Mobilize dry fluid volume with iHD  IS poor 250ml continue with PEP therapy and mobilization  Aspiration precautions    Goals of care, counseling/discussion  Assessment & Plan  Extensive discussion with patient's very caring and involved Sister Rani again on 7/24  Again confirmed, DNR status, on was a very vibrant and would be much more concerned with quality than quantity of life as previously documented.  Planning on repeat P UF today and likely extubate tomorrow.  DNR/DNI status.  If she does not do well with extubation, transition to comfort focused care for significant dyspnea or discomfort consistent with patient's prior stated wishes.  I have spoken with Silva extensively but there appears to be some differing opinion with other family members.  Palliative care consulted and will attempt to arrange family conference for further clarification of goals of care and discussed with patient as able.    Thrombocytopenia (HCC)  Assessment & Plan  Platelets trending down on the basis of shock critical illness and likely contributing CRRT  Off VTE ppx, for intraabdominal bleed post-op  Will resume VTE ppx when able  Trend daily cbc  At risk of rebleed transfuse PLTS when <20 or any signs of bleeding    Acute hepatic necrosis  Assessment & Plan  Liver laceration and Ischemic hepatopathy due to  shock, evidenced by markedly elevated transaminases, elevated Tbili, hypoalbunemia  Avoid hepatotoxins    Check RUQ u/s and LDH, Direct and Total bili      Hemorrhagic shock (HCC)- (present on admission)  Assessment & Plan  7/19: Overnight with worsening hypotension, obtundation and vasopressor requirement.  Bedside POCUS with underfilled hyperdynamic LV and FF in RUQ.  MTP intiated and patient taken to OR with ACGS. Found to have copious intrabdominal hemmorhage with a liver laceration near falciform ligament.  Packed left open  7/20: OR re opened, packing removed, fascia closed  No recurrent bleeding, HH stable      Acute blood loss anemia  Assessment & Plan  Intraabdominal bleed  S/P MTP and operative control  Follow H/H  Transfuse if Hg < 7    Perforated viscus- (present on admission)  Assessment & Plan  7/13 - ex lap with left hemicolectomy and end colostomy by Dr. Boateng  Found perforated colon at splenic fixture, associated with mass. Path neg for malignancy  Fecal peritonitis, remains on Zosyn per surgery    Acute kidney injury (HCC)  Assessment & Plan  Worsening renal function post shock, ATN likely, anuric  7/21: Initiated CRRT for volume removal/ ultrafiltration after failed diuretic challenge  7/23: Switch CRRT to iHD to facilitate mobility and PT, still anuric  7/24 iHD  Renal dose meds, avoid nephrotoxins  Remove hernandez and Q shift bladder scan    Morbid obesity with BMI of 50.0-59.9, adult (HCC)- (present on admission)  Assessment & Plan  BMI 50, place her in high risk for increase morbidity during this admission    Acute saddle pulmonary embolism (HCC)- (present on admission)  Assessment & Plan  Originating from a right leg DVT. Pt is not a candidate for thrombolytics given her pneumoperitoneum  7/13: S/p emergent thrombectomy of saddle PE with IVC placement.   7/19: Repeat TTE with normal LV function, poor RV visualization but likely dilated with reduced function, RAP low      Challenge with heparin  vte prophy today 7/29    Acute deep venous thrombosis (DVT) determined by ultrasound (HCC)- (present on admission)  Assessment & Plan  Holding heparin in the setting of massive intraabdominal hemorrhage, liver laceration and thrombocytopenia  S/p IVC filter placed this admission  Follow-up with vascular clinic for IVC filter retrieval when appropriate  Resume anticoagulation when cleared by surgery and safe    Will challenge with heparin vte today 7/29 and monitor closely for any bleeding or shock       VTE:  Heparin  Ulcer: Not Indicated  Lines: Central Line  Ongoing indication addressed    I have performed a physical exam and reviewed and updated ROS and Plan today (7/29/2023). In review of yesterday's note (7/28/2023), there are no changes except as documented above.     Discussed patient condition and risk of morbidity and/or mortality with RN, RT, Pharmacy, Code status disscussed, Charge nurse / hot rounds, and Patient    The patient remains critically ill with shock and titration of norepinephrine gtt.  Critical care time = 55 minutes in directly providing and coordinating critical care and extensive data review.  No time overlap and excludes procedures.

## 2023-07-30 PROBLEM — K85.90 PANCREATITIS: Status: ACTIVE | Noted: 2023-01-01

## 2023-07-30 NOTE — CARE PLAN
The patient is Watcher - Medium risk of patient condition declining or worsening    Shift Goals  Clinical Goals: decreased vasopressor requirements, CT  Patient Goals: comfort/rest  Family Goals: no family present    Progress made toward(s) clinical / shift goals:    Problem: Pain - Standard  Goal: Alleviation of pain or a reduction in pain to the patient’s comfort goal  Outcome: Progressing     Problem: Knowledge Deficit - Standard  Goal: Patient and family/care givers will demonstrate understanding of plan of care, disease process/condition, diagnostic tests and medications  Outcome: Progressing     Problem: Respiratory  Goal: Patient will achieve/maintain optimum respiratory ventilation and gas exchange  Outcome: Progressing       Patient is not progressing towards the following goals:

## 2023-07-30 NOTE — PROGRESS NOTES
"  DATE: 7/30/2023      INTERVAL EVENTS:    Critically ill appearing.  Awake eyes open able to speak a few words  She reports pain-free  Soft obese abdomen. Dark stoma.  VAC dressings.  PHYSICAL EXAMINATION:  Vital Signs: /59   Pulse 92   Temp 36.8 °C (98.2 °F) (Temporal)   Resp (!) 21   Ht 1.778 m (5' 10\")   Wt (!) 155 kg (340 lb 13.3 oz)   SpO2 95%     Critically ill  Jaundice  Confused not agitated eyes open looks at speaker  Able to speak a few words  Appears comfortable  Ostomy dark stool in bag  Vacs in place    LABORATORY VALUES:   Recent Labs     07/28/23  0446 07/29/23  0440 07/30/23  0515   WBC 12.5* 11.3* 9.6   RBC 3.14* 3.10* 2.79*   HEMOGLOBIN 9.7* 9.3* 8.8*   HEMATOCRIT 29.5* 29.3* 26.6*   MCV 93.9 94.5 95.3   MCH 30.9 30.0 31.5   MCHC 32.9 31.7* 33.1   RDW 59.0* 59.9* 73.4*   PLATELETCT 60* 68* 77*   MPV 12.9 11.8 11.6     Recent Labs     07/28/23  0446 07/29/23  0440 07/30/23  0515   SODIUM 137 138 135   POTASSIUM 4.2 4.4 3.8   CHLORIDE 102 102 101   CO2 22 19* 21   GLUCOSE 98 79 158*   BUN 40* 54* 45*   CREATININE 2.75* 3.51* 3.43*   CALCIUM 8.0* 8.0* 7.6*     Recent Labs     07/28/23  0446 07/29/23  0440 07/29/23  1154 07/30/23  0515 07/30/23  0740   ASTSGOT 96* 97*  --  104*  --    ALTSGPT 156* 127*  --  104*  --    TBILIRUBIN 10.9* 11.5*  --  11.0*  --    IBILIRUBIN  --  1.5*  --   --   --    DBILIRUBIN  --  10.0*  --   --   --    ALKPHOSPHAT 212* 241*  --  241*  --    GLOBULIN 3.4 3.3  --  3.3  --    INR  --   --  2.50*  --  2.47*   AMMONIA  --   --   --   --  33     Recent Labs     07/29/23  1154 07/30/23  0740   INR 2.50* 2.47*        IMAGING:   DX-CHEST-PORTABLE (1 VIEW)   Final Result      Stable low lung volumes and bibasilar parenchymal opacification. Overall, no change.      CT-ABDOMEN-PELVIS WITH   Final Result      1.  New inflammatory stranding involving the pancreatic bed with pancreatic enlargement and small amount of fluid within the peripancreatic region suggesting " presence of pancreatitis. This is new from comparison.      2.  Interval decrease in amount of ascites.      3.  Worsening bibasilar atelectasis.      4.  Fatty liver.      5.  Again seen mild diffuse bowel wall thickening. No evidence of bowel obstruction.      US-RUQ   Final Result      1.  Limited exam due to open abdominal wound.   2.  Heterogeneous echogenic liver most consistent with fatty infiltration.   3.  No biliary dilatation.      DX-ABDOMEN FOR TUBE PLACEMENT   Final Result      DHT tip is over the distal stomach or first portion of the duodenum.      DX-CHEST-PORTABLE (1 VIEW)   Final Result         1.  Pulmonary edema and/or infiltrates are identified, which are stable since the prior exam.      DX-CHEST-PORTABLE (1 VIEW)   Final Result         1.  Pulmonary edema and/or infiltrates are identified, which are stable since the prior exam.   2.  Trace bilateral pleural effusions      DX-CHEST-PORTABLE (1 VIEW)   Final Result         1.  Pulmonary edema and/or infiltrates are identified, which are stable since the prior exam.      DX-CHEST-PORTABLE (1 VIEW)   Final Result         1.  Mild edema and/or infiltrates.      DX-CHEST-PORTABLE (1 VIEW)   Final Result         1.  Pulmonary edema and/or infiltrates are identified, which are stable since the prior exam.   2.  Trace bilateral pleural effusions, stable      DX-CHEST-PORTABLE (1 VIEW)   Final Result      Stable examination.      DX-CHEST-PORTABLE (1 VIEW)   Final Result      No significant change      DX-ABDOMEN FOR TUBE PLACEMENT   Final Result      Enteric tube has been placed and the tip projects over the stomach.      DX-CHEST-LIMITED (1 VIEW)   Final Result      1.  Interval placement of a left-sided temporary dialysis catheter which terminates with the tip projecting over the expected location of the distal SVC.   2.  Stable mild interstitial pulmonary edema and bilateral basilar atelectasis and/or consolidation.      DX-CHEST-PORTABLE (1 VIEW)    Final Result         1.  Pulmonary edema and/or infiltrates are identified, which are stable since the prior exam.   2.  Trace bilateral pleural effusions      [id number] VIEW   Final Result      Severely suboptimal exam related to patient body habitus. No unexpected radiopaque foreign body seen.      DX-CHEST-PORTABLE (1 VIEW)   Final Result         1.  Pulmonary edema and/or infiltrates are identified, which are stable since the prior exam.   2.  Trace bilateral pleural effusions      EC-ECHOCARDIOGRAM COMPLETE W/ CONT   Final Result      DX-CHEST-PORTABLE (1 VIEW)   Final Result      1.  Pulmonary edema, worse than prior exam   2.  Slight increased inflation from prior.   3.  Supportive tubing as described above.      DX-CHEST-PORTABLE (1 VIEW)   Final Result         1.  Bibasilar atelectasis versus infiltrates.      CT-ABDOMEN-PELVIS WITH   Final Result         1.  Scattered moderate abdominal ascites.   2.  Trace bilateral pleural effusions, left greater than right   3.  Linear densities the bilateral lung bases, greater on the left, appearance suggests atelectasis, component of infiltrate not excluded.   4.  DVT in the right external iliac vein extending into the common femoral vein, compatible with reported history of DVT.   5.  Diverticulosis   6.  Hepatomegaly   7.  Diffuse hepatic steatosis      DX-CHEST-PORTABLE (1 VIEW)   Final Result         1.  Bibasilar atelectasis or evolving infiltrates.      IR-MIDLINE CATHETER INSERTION WO GUIDANCE > AGE 3   Final Result                  Ultrasound-guided midline placement performed by qualified nursing staff    as above.          IR-US GUIDED PIV   Final Result    Ultrasound-guided PERIPHERAL IV INSERTION performed by    qualified nursing staff as above.      DX-CHEST-PORTABLE (1 VIEW)   Final Result      Stable thoracic underinflation. Right IJ line adequate. Stable bibasilar pleural-parenchymal opacification      DX-CHEST-PORTABLE (1 VIEW)   Final Result       Stable thoracic underinflation. Right IJ line and ETT appear adequate. Slightly improved bibasilar pleural-parenchymal opacification      DX-CHEST-PORTABLE (1 VIEW)   Final Result      Stable bibasilar pleural-parenchymal opacification and low lung volumes. Lines/tubes adequate.      DX-CHEST-LIMITED (1 VIEW)   Final Result         Endotracheal tube with tip projecting over the mid thoracic trachea.   Gastric drainage tube courses below diaphragm, tip is not seen.   Right central venous catheter with tip projecting over the expected area of the lower SVC.            EC-NAEEM W/O CONT   Final Result      DX-CHEST-LIMITED (1 VIEW)   Final Result      Bibasilar underinflation atelectasis which could obscure an additional process. This is similar to the prior study. Lung volumes are very low.      IR-INSERT IVC FILTER WITH IG & SI   Final Result      1.  Ultrasound guided access LEFT common femoral vein.   2.  BILATERAL selective pulmonary arteriogram demonstrating extensive bilateral pulmonary emboli.   3.  Successful BILATERAL pulmonary thrombectomy   4.  Large IVC at the level of the renal veins   5.  Successful placement of infrarenal IVC filter   6.  Pursestring suture should be removed in 6-12 hours.         Please note that this study required greater than 50% more than the usual procedure time due to the patient's anatomy and large habitus.            IR-THROMBO MECHANICAL ARTERY,INIT   Final Result      1.  Ultrasound guided access LEFT common femoral vein.   2.  BILATERAL selective pulmonary arteriogram demonstrating extensive bilateral pulmonary emboli.   3.  Successful BILATERAL pulmonary thrombectomy   4.  Large IVC at the level of the renal veins   5.  Successful placement of infrarenal IVC filter   6.  Pursestring suture should be removed in 6-12 hours.         Please note that this study required greater than 50% more than the usual procedure time due to the patient's anatomy and large habitus.             EC-ECHOCARDIOGRAM COMPLETE W/ CONT   Final Result      CT-ABDOMEN-PELVIS WITH   Final Result      1.  Pneumoperitoneum secondary to perforated viscus. Splenic flexure colonic diverticulum may be the perforation site. Gastroduodenal ulcer ulcer is considered less likely.   2.  Hepatic steatosis.   3.  Moderate gallbladder distention and pericholecystic fat stranding.   4.  Small volume hyperdense ascites.   5.  Partially visualized right lower extremity DVT.      Findings were communicated to SALINA STOVER via Voalte messaging system on 7/12/2023 11:03 PM.      CT-CTA CHEST PULMONARY ARTERY W/ RECONS   Final Result      1.  Saddle pulmonary embolus with associated right heart strain.   2.  Posterior pneumomediastinum secondary to pneumoperitoneum. Abdomen will be dictated separately.      Findings were communicated to SALINA STOVER via Voal[employer] messaging system on 7/12/2023 10:57 PM.      DX-CHEST-PORTABLE (1 VIEW)   Final Result      Mild atelectasis in the right lower lung. No focal consolidation. No effusions.             ASSESSMENT AND PLAN:  Critically ill     Recommend ongoing wound care.  Monitor ostomy  Follow abdominal exam  Close monitoring and support  ACS Ziegler continue to follow     Appreciate ICU and consulting physician care.     Discussed with ICU team       ____________________________________     Surendra Adames M.D.    DD: 7/30/2023  9:36 AM

## 2023-07-30 NOTE — PROGRESS NOTES
"Critical Care Progress Note    Date of admission  7/12/2023    Chief Complaint  \"63 y.o. female who presented 7/12/2023 with acute DVT, acute saddle PE and new pneumoperitoneum due to perforated viscus for what she was admitted and treated with thrombectomy, heparin ggt and IVC filter placement. She also underwent exploratory laparatomy (today day 5 post op) with left colectomy with end colostomy and s/p cholecystectomy with a wound vac in place.   I was called at the bedside due to hypotension and tachycardia.\" H+P 7/12    Hospital Course  7/13 s/p bilateral thrombectomy for saddle PE and IVC filter placement. Successful.   7/13 s/p left colectomy and end-colostomy, and cholecystectomy. Intraop found perforated colon at splenic flexure with associated mass. Concerning of malignancy. Remains intubated post op.   7/19: Admitted to ICU is hemorrhagic shock, MTP, OR for intrabdominal bleed, liver lac, returns to ICU in shock and intubated  7/20: VD 2-Hb stable, anuric, back to OR for removal of packing, colon resection, revision colostomy (to RUQ), vasoplegic with increasing pressors overnight  7/21: CRRT started  7/22: VD 4-weaned of pressors, following, increase UF to 100-->150 overnight  7/23: 2.5 L off yesterday, off pressors, very weak today, hypophos needs aggressive replacement, switch CRRT to iHD, mobilize, SBT/SAT tomorrow  7/24: HD/UF today, tolerating SBT, high risk for failing extubation.  Discussed with family today regarding GOC as previously documented  7/25: PUF again today; VD#7, awake and follows very weakly t/o.  7/26: plan for HD/UF today, awake and follows, moving arms off bed not; palliative consulted to assist with GOC d/w pt/family, plan  7/27: Extubated today; further discussion regarding goals of care  7/28 on low levels oxygen, low dose norepinephrine  7/29 continue on low levels of oxygen, low levels norepi, anuric getting iHD, increase mobility, CT scan of abdomen      Interval Problem " Update  Reviewed last 24 hour events:  Neuro: neuro intact moves all ext, no pain  HR: 70-90  SBP:  ne 0.03  Tmax: afebrile  GI: TF at goal, ostomy 750ml  UOP: anuric bladder scan 30ml  Lines: left IJ dialysis, picc, wound vac  Resp: 3l , pep occasional suction   Vte: heparin vte  PPI/H2:pepcid -> d/c  Antibx: zosyn  -295ml last 24hrs net - 22.9L for hospital stay  CT scan with some pancreatitis, check lipase level  INR, teg w/ mapping, Vit k oral for 3 days  Continue to IS/pep and increase mobility  Midodrine 5mg Q8  Discuss with surgery if okay to stop zosyn since no inflammation in CT other then pancrease  In next 24hrs depending on insulin coverage consider re-evaluating need   PT/OT and speech folowing    Review of Systems  Review of Systems   Constitutional:  Negative for fever, malaise/fatigue and weight loss.   Respiratory:  Negative for cough, sputum production and shortness of breath.    Cardiovascular:  Positive for leg swelling. Negative for chest pain and claudication.   Gastrointestinal:  Negative for abdominal pain, constipation, diarrhea, nausea and vomiting.   Genitourinary:  Negative for dysuria and hematuria.   Musculoskeletal:  Negative for back pain and joint pain.   Neurological:  Positive for weakness. Negative for sensory change, speech change and focal weakness.   Psychiatric/Behavioral:  Negative for depression and hallucinations.         Vital Signs for last 24 hours   Temp:  [36.3 °C (97.4 °F)-36.8 °C (98.3 °F)] 36.8 °C (98.2 °F)  Pulse:  [] 92  Resp:  [17-47] 21  BP: ()/(48-67) 104/59  SpO2:  [87 %-97 %] 95 %    Hemodynamic parameters for last 24 hours       Respiratory Information for the last 24 hours       Physical Exam   Physical Exam  Vitals and nursing note reviewed.   Constitutional:       General: She is not in acute distress.     Appearance: She is obese. She is ill-appearing.      Comments: Ill appearing sitting upright in bed, jaundice noticed   HENT:       Head: Normocephalic.      Nose: Nose normal.      Mouth/Throat:      Mouth: Mucous membranes are dry.   Eyes:      Extraocular Movements: Extraocular movements intact.      Pupils: Pupils are equal, round, and reactive to light.   Neck:      Comments: Right IJ central line, left IJ dialysis cath  Cardiovascular:      Rate and Rhythm: Normal rate and regular rhythm.   Pulmonary:      Breath sounds: No rhonchi or rales.      Comments: Diminished bilateral some tachypnea noted  Abdominal:      General: There is no distension.      Palpations: Abdomen is soft.      Tenderness: There is no abdominal tenderness. There is no guarding.      Comments: Stoma that is dark/dusky and discolored, midline wound vac device, no rebound or guarding   Musculoskeletal:      Cervical back: Neck supple.      Right lower leg: Edema present.      Left lower leg: Edema present.      Comments: Improving edema   Skin:     General: Skin is warm.      Capillary Refill: Capillary refill takes 2 to 3 seconds.      Coloration: Skin is jaundiced.   Neurological:      Mental Status: She is alert.      Comments: She is AOX4, she follows commands, she moves all ext, weak voice and cough, no pain   Psychiatric:         Mood and Affect: Mood normal.         Medications  Current Facility-Administered Medications   Medication Dose Route Frequency Provider Last Rate Last Admin    phytonadione (Mephyton) tablet 10 mg  10 mg Enteral Tube DAILY Kojo Wilder M.D.        midodrine (Proamatine) tablet 5 mg  5 mg Oral Q8HRS Kojo Wilder M.D.        heparin injection 5,000 Units  5,000 Units Subcutaneous Q8HRS Kojo Wilder M.D.   5,000 Units at 07/30/23 0515    piperacillin-tazobactam (Zosyn) 4.5 g in  mL IVPB  4.5 g Intravenous Q12HRS Hardeep Guillen M.D.   Stopped at 07/30/23 0136    lidocaine (Xylocaine) 1 % injection 1 mL  1 mL Intradermal PRN Marita Buenrostro M.D.        oxyCODONE immediate-release (Roxicodone) tablet 5 mg  5 mg  Enteral Tube Q4HRS PRN Cristian Peña M.D.   5 mg at 07/26/23 0838    promethazine (Phenergan) tablet 12.5-25 mg  12.5-25 mg Enteral Tube Q4HRS PRN Cristian Peña M.D.        levothyroxine (Synthroid) tablet 225 mcg  225 mcg Enteral Tube AM ES Cristian Peña M.D.   225 mcg at 07/30/23 0515    heparin injection 2,800 Units  2,800 Units Intracatheter PRN Marita Buenrostro M.D.   2,800 Units at 07/30/23 0830    insulin regular (HumuLIN R,NovoLIN R) injection  2-9 Units Subcutaneous Q6HRS Kerrie LNik Latona   2 Units at 07/30/23 0530    And    dextrose 50% (D50W) injection 25 g  25 g Intravenous Q15 MIN PRN Kerrie LEE Latona   25 g at 07/21/23 0225    norepinephrine (Levophed) 8 mg in 250 mL NS infusion (premix)  0-1 mcg/kg/min (Ideal) Intravenous Continuous Lam Louis M.D. 3.9 mL/hr at 07/30/23 0111 0.03 mcg/kg/min at 07/30/23 0111    Respiratory Therapy Consult   Nebulization Continuous RT Cristian Peña M.D.        senna-docusate (Pericolace Or Senokot S) 8.6-50 MG per tablet 2 Tablet  2 Tablet Enteral Tube BID Cristian Peña M.D.   2 Tablet at 07/30/23 0515    And    polyethylene glycol/lytes (Miralax) PACKET 1 Packet  1 Packet Enteral Tube QDAY PRN Cristian Peña M.D.        And    magnesium hydroxide (Milk Of Magnesia) suspension 30 mL  30 mL Enteral Tube QDAY PRN Cristian Peña M.D.        And    bisacodyl (Dulcolax) suppository 10 mg  10 mg Rectal QDAY PRN Cristian Peña M.D.        lidocaine (Xylocaine) 1 % injection 2 mL  2 mL Tracheal Tube Q30 MIN PRN Cristian Peña M.D.        ondansetron (Zofran) syringe/vial injection 4 mg  4 mg Intravenous Q4HRS PRN Ana Lilia Joy M.D.        promethazine (Phenergan) suppository 12.5-25 mg  12.5-25 mg Rectal Q4HRS PRN Ana Lilia Joy M.D.        prochlorperazine (Compazine) injection 5-10 mg  5-10 mg Intravenous Q4HRS PRN Ana Lilia Joy M.D.        Nozin nasal  swab  1 Applicator Each Nostril BID Dagoberto Ohara D.O.   1  Applicator at 07/30/23 0527       Fluids    Intake/Output Summary (Last 24 hours) at 7/30/2023 0811  Last data filed at 7/30/2023 0600  Gross per 24 hour   Intake 1264.51 ml   Output 1892 ml   Net -627.49 ml       Laboratory            Recent Labs     07/28/23 0446 07/29/23 0440 07/30/23  0515   SODIUM 137 138 135   POTASSIUM 4.2 4.4 3.8   CHLORIDE 102 102 101   CO2 22 19* 21   BUN 40* 54* 45*   CREATININE 2.75* 3.51* 3.43*   MAGNESIUM 2.1 2.2 2.1   PHOSPHORUS 3.4 4.7* 3.8   CALCIUM 8.0* 8.0* 7.6*     Recent Labs     07/28/23 0446 07/29/23  0440 07/30/23  0515   ALTSGPT 156* 127* 104*   ASTSGOT 96* 97* 104*   ALKPHOSPHAT 212* 241* 241*   TBILIRUBIN 10.9* 11.5* 11.0*   DBILIRUBIN  --  10.0*  --    GLUCOSE 98 79 158*     Recent Labs     07/28/23 0446 07/29/23  0440 07/30/23  0515   WBC 12.5* 11.3* 9.6   NEUTSPOLYS 93.20* 86.90* 83.30*   LYMPHOCYTES 1.70* 8.70* 8.80*   MONOCYTES 2.50 0.90 2.60   EOSINOPHILS 0.00 1.70 0.90   BASOPHILS 0.00 0.90 0.00   ASTSGOT 96* 97* 104*   ALTSGPT 156* 127* 104*   ALKPHOSPHAT 212* 241* 241*   TBILIRUBIN 10.9* 11.5* 11.0*     Recent Labs     07/28/23 0446 07/29/23 0440 07/29/23  1154 07/30/23  0515   RBC 3.14* 3.10*  --  2.79*   HEMOGLOBIN 9.7* 9.3*  --  8.8*   HEMATOCRIT 29.5* 29.3*  --  26.6*   PLATELETCT 60* 68*  --  77*   PROTHROMBTM  --   --  26.2*  --    INR  --   --  2.50*  --        Imaging  X-Ray:  I have personally reviewed the images and compared with prior images.      Assessment/Plan  * Acute hypoxemic respiratory failure (HCC)- (present on admission)  Assessment & Plan  Initially 7/13 Intubated for OR and 7/15 extubated, re-intubated 7/19 extubated 7/27  High risk of worsening respiratory status but is DNR/DNI  Mobilize dry fluid volume with iHD  IS poor 250ml continue with PEP therapy and mobilization  Aspiration precautions    Pancreatitis  Assessment & Plan  Seen on most recent CT scan no pain or food intolerance  Check Lipase  Serial monitor bowel exam  Distal TF  if possible    Goals of care, counseling/discussion  Assessment & Plan  Extensive discussion with patient's very caring and involved Sister Rani again on 7/24  Again confirmed, DNR status, on was a very vibrant and would be much more concerned with quality than quantity of life as previously documented.  Planning on repeat P UF today and likely extubate tomorrow.  DNR/DNI status.  If she does not do well with extubation, transition to comfort focused care for significant dyspnea or discomfort consistent with patient's prior stated wishes.  I have spoken with Silva extensively but there appears to be some differing opinion with other family members.  Palliative care consulted and will attempt to arrange family conference for further clarification of goals of care and discussed with patient as able.    Thrombocytopenia (HCC)  Assessment & Plan  Improving platelet count no recent transfusion  Will resume VTE ppx   Trend daily cbc      Acute hepatic necrosis  Assessment & Plan  Liver laceration and Ischemic hepatopathy due to shock, evidenced by markedly elevated transaminases, elevated Tbili, hypoalbunemia  Avoid hepatotoxins    Check RUQ u/s and LDH, Direct and Total bili -> hepatocellular/cholestatic pattern in etiology      Hemorrhagic shock (HCC)- (present on admission)  Assessment & Plan  7/19: Overnight with worsening hypotension, obtundation and vasopressor requirement.  Bedside POCUS with underfilled hyperdynamic LV and FF in RUQ.  MTP intiated and patient taken to OR with ACGS. Found to have copious intrabdominal hemmorhage with a liver laceration near falciform ligament.  Packed left open  7/20: OR re opened, packing removed, fascia closed  No recurrent bleeding, HH stable      Acute blood loss anemia  Assessment & Plan  Intraabdominal bleed  S/P MTP and operative control  Follow H/H  Transfuse if Hg < 7    Perforated viscus- (present on admission)  Assessment & Plan  7/13 - ex lap with left hemicolectomy and  end colostomy by Dr. Boateng  Found perforated colon at splenic fixture, associated with mass. Path neg for malignancy  Fecal peritonitis, remains on Zosyn per surgery -> will discuss today 7/30 to stop and monitor off antibiotics    Acute kidney injury (HCC)  Assessment & Plan  Worsening renal function post shock, ATN likely, anuric  7/21: Initiated CRRT for volume removal/ ultrafiltration after failed diuretic challenge  7/23: Switch CRRT to iHD to facilitate mobility and PT, still anuric  7/24 iHD  Renal dose meds, avoid nephrotoxins  Remove hernandez and Q shift bladder scan    Morbid obesity with BMI of 50.0-59.9, adult (HCC)- (present on admission)  Assessment & Plan  BMI 50, place her in high risk for increase morbidity during this admission    Acute saddle pulmonary embolism (HCC)- (present on admission)  Assessment & Plan  Originating from a right leg DVT. Pt is not a candidate for thrombolytics given her pneumoperitoneum  7/13: S/p emergent thrombectomy of saddle PE with IVC placement.   7/19: Repeat TTE with normal LV function, poor RV visualization but likely dilated with reduced function, RAP low      Challenge with heparin vte 7/29    Acute deep venous thrombosis (DVT) determined by ultrasound (HCC)- (present on admission)  Assessment & Plan  Holding heparin in the setting of massive intraabdominal hemorrhage, liver laceration and thrombocytopenia  S/p IVC filter placed this admission  Follow-up with vascular clinic for IVC filter retrieval when appropriate  Resume anticoagulation when cleared by surgery and safe    Will challenge with heparin vte 7/29 and monitor closely for any bleeding or shock       VTE:  Heparin  Ulcer: Not Indicated  Lines: Central Line  Ongoing indication addressed    I have performed a physical exam and reviewed and updated ROS and Plan today (7/30/2023). In review of yesterday's note (7/29/2023), there are no changes except as documented above.     Discussed patient condition and  risk of morbidity and/or mortality with RN, RT, Pharmacy, Code status disscussed, Charge nurse / hot rounds, and Patient    The patient remains critically ill with shock and titration of norepinephrine gtt.  Critical care time = 45 minutes in directly providing and coordinating critical care and extensive data review.  No time overlap and excludes procedures.

## 2023-07-30 NOTE — CARE PLAN
Problem: Hyperinflation  Goal: Prevent or improve atelectasis  Description: Target End Date:  3 to 4 days    1. Instruct incentive spirometry usage  2.  Perform hyperinflation therapy as indicated  Outcome: Not Met     Problem: Bronchoconstriction  Goal: Improve in air movement and diminished wheezing  Description: Target End Date:  2 to 3 days    1.  Implement inhaled treatments  2.  Evaluate and manage medication effects  Outcome: Not Met     Problem: Bronchopulmonary Hygiene  Goal: Increase mobilization of retained secretions  Description: Target End Date:  2 to 3 days    1.  Perform bronchopulmonary therapy as indicated by assessment  2.  Perform airway suctioning  3.  Perform actions to maintain patient airway  Outcome: Not Met   Duo 7% Q8   PEP

## 2023-07-30 NOTE — PROGRESS NOTES
Nephrology Daily Progress Note    Date of Service  7/30/2023    Chief Complaint  63 y.o. female admitted 7/12/2023 with DVT, PE, perforated viscus, s/p ex-lap, IVC filter placement. Left colectomy, cholecystectomy, hemorrhagic shock, multiorgan failure  DUNG/ATN/anuric -on RRT  Initially on CRRT -switched to IHD    Interval Problem Update  7/30 -extubated  Somnolent this morning  No improvement in UOP  HD on MWF schedule  Complete PUF this morning -tolerated well  Still very edematous     Review of Systems  Review of Systems   Unable to perform ROS: Medical condition        Physical Exam  Temp:  [36.3 °C (97.4 °F)-36.8 °C (98.3 °F)] 36.8 °C (98.2 °F)  Pulse:  [] 89  Resp:  [17-53] 25  BP: ()/(50-67) 92/52  SpO2:  [90 %-99 %] 93 %    Physical Exam  Vitals reviewed.   Constitutional:       General: She is not in acute distress.     Appearance: She is well-developed. She is obese. She is ill-appearing. She is not diaphoretic.   HENT:      Head: Normocephalic and atraumatic.      Nose: Nose normal.      Mouth/Throat:      Mouth: Mucous membranes are moist.      Pharynx: Oropharynx is clear.   Eyes:      General: Scleral icterus present.      Extraocular Movements: Extraocular movements intact.      Conjunctiva/sclera: Conjunctivae normal.      Pupils: Pupils are equal, round, and reactive to light.   Cardiovascular:      Rate and Rhythm: Normal rate and regular rhythm.      Pulses: Normal pulses.      Heart sounds: Normal heart sounds.   Pulmonary:      Effort: Pulmonary effort is normal. No respiratory distress.      Breath sounds: Normal breath sounds. No wheezing or rales.   Abdominal:      General: Bowel sounds are normal. There is no distension.      Palpations: Abdomen is soft. There is no mass.      Tenderness: There is no abdominal tenderness. There is no right CVA tenderness or left CVA tenderness.   Musculoskeletal:      Cervical back: Normal range of motion and neck supple.      Right lower leg:  Edema present.      Left lower leg: Edema present.      Comments: anasarca   Skin:     General: Skin is warm and dry.      Coloration: Skin is jaundiced and pale.      Findings: No erythema or rash.   Neurological:      General: No focal deficit present.      Mental Status: She is alert and oriented to person, place, and time.      Cranial Nerves: No cranial nerve deficit.      Coordination: Coordination normal.   Psychiatric:         Mood and Affect: Mood normal.         Behavior: Behavior normal.         Thought Content: Thought content normal.         Judgment: Judgment normal.         Fluids    Intake/Output Summary (Last 24 hours) at 7/30/2023 1408  Last data filed at 7/30/2023 1200  Gross per 24 hour   Intake 1492.72 ml   Output 5042 ml   Net -3549.28 ml       Laboratory  Recent Labs     07/28/23  0446 07/29/23  0440 07/30/23  0515   WBC 12.5* 11.3* 9.6   RBC 3.14* 3.10* 2.79*   HEMOGLOBIN 9.7* 9.3* 8.8*   HEMATOCRIT 29.5* 29.3* 26.6*   MCV 93.9 94.5 95.3   MCH 30.9 30.0 31.5   MCHC 32.9 31.7* 33.1   RDW 59.0* 59.9* 73.4*   PLATELETCT 60* 68* 77*   MPV 12.9 11.8 11.6     Recent Labs     07/28/23  0446 07/29/23  0440 07/30/23  0515   SODIUM 137 138 135   POTASSIUM 4.2 4.4 3.8   CHLORIDE 102 102 101   CO2 22 19* 21   GLUCOSE 98 79 158*   BUN 40* 54* 45*   CREATININE 2.75* 3.51* 3.43*   CALCIUM 8.0* 8.0* 7.6*     Recent Labs     07/29/23  1154 07/30/23  0740   INR 2.50* 2.47*     No results for input(s): NTPROBNP in the last 72 hours.        Imaging  DX-CHEST-PORTABLE (1 VIEW)   Final Result      Stable low lung volumes and bibasilar parenchymal opacification. Overall, no change.      CT-ABDOMEN-PELVIS WITH   Final Result      1.  New inflammatory stranding involving the pancreatic bed with pancreatic enlargement and small amount of fluid within the peripancreatic region suggesting presence of pancreatitis. This is new from comparison.      2.  Interval decrease in amount of ascites.      3.  Worsening bibasilar  atelectasis.      4.  Fatty liver.      5.  Again seen mild diffuse bowel wall thickening. No evidence of bowel obstruction.      US-RUQ   Final Result      1.  Limited exam due to open abdominal wound.   2.  Heterogeneous echogenic liver most consistent with fatty infiltration.   3.  No biliary dilatation.      DX-ABDOMEN FOR TUBE PLACEMENT   Final Result      DHT tip is over the distal stomach or first portion of the duodenum.      DX-CHEST-PORTABLE (1 VIEW)   Final Result         1.  Pulmonary edema and/or infiltrates are identified, which are stable since the prior exam.      DX-CHEST-PORTABLE (1 VIEW)   Final Result         1.  Pulmonary edema and/or infiltrates are identified, which are stable since the prior exam.   2.  Trace bilateral pleural effusions      DX-CHEST-PORTABLE (1 VIEW)   Final Result         1.  Pulmonary edema and/or infiltrates are identified, which are stable since the prior exam.      DX-CHEST-PORTABLE (1 VIEW)   Final Result         1.  Mild edema and/or infiltrates.      DX-CHEST-PORTABLE (1 VIEW)   Final Result         1.  Pulmonary edema and/or infiltrates are identified, which are stable since the prior exam.   2.  Trace bilateral pleural effusions, stable      DX-CHEST-PORTABLE (1 VIEW)   Final Result      Stable examination.      DX-CHEST-PORTABLE (1 VIEW)   Final Result      No significant change      DX-ABDOMEN FOR TUBE PLACEMENT   Final Result      Enteric tube has been placed and the tip projects over the stomach.      DX-CHEST-LIMITED (1 VIEW)   Final Result      1.  Interval placement of a left-sided temporary dialysis catheter which terminates with the tip projecting over the expected location of the distal SVC.   2.  Stable mild interstitial pulmonary edema and bilateral basilar atelectasis and/or consolidation.      DX-CHEST-PORTABLE (1 VIEW)   Final Result         1.  Pulmonary edema and/or infiltrates are identified, which are stable since the prior exam.   2.  Trace  bilateral pleural effusions      [id number] VIEW   Final Result      Severely suboptimal exam related to patient body habitus. No unexpected radiopaque foreign body seen.      DX-CHEST-PORTABLE (1 VIEW)   Final Result         1.  Pulmonary edema and/or infiltrates are identified, which are stable since the prior exam.   2.  Trace bilateral pleural effusions      EC-ECHOCARDIOGRAM COMPLETE W/ CONT   Final Result      DX-CHEST-PORTABLE (1 VIEW)   Final Result      1.  Pulmonary edema, worse than prior exam   2.  Slight increased inflation from prior.   3.  Supportive tubing as described above.      DX-CHEST-PORTABLE (1 VIEW)   Final Result         1.  Bibasilar atelectasis versus infiltrates.      CT-ABDOMEN-PELVIS WITH   Final Result         1.  Scattered moderate abdominal ascites.   2.  Trace bilateral pleural effusions, left greater than right   3.  Linear densities the bilateral lung bases, greater on the left, appearance suggests atelectasis, component of infiltrate not excluded.   4.  DVT in the right external iliac vein extending into the common femoral vein, compatible with reported history of DVT.   5.  Diverticulosis   6.  Hepatomegaly   7.  Diffuse hepatic steatosis      DX-CHEST-PORTABLE (1 VIEW)   Final Result         1.  Bibasilar atelectasis or evolving infiltrates.      IR-MIDLINE CATHETER INSERTION WO GUIDANCE > AGE 3   Final Result                  Ultrasound-guided midline placement performed by qualified nursing staff    as above.          IR-US GUIDED PIV   Final Result    Ultrasound-guided PERIPHERAL IV INSERTION performed by    qualified nursing staff as above.      DX-CHEST-PORTABLE (1 VIEW)   Final Result      Stable thoracic underinflation. Right IJ line adequate. Stable bibasilar pleural-parenchymal opacification      DX-CHEST-PORTABLE (1 VIEW)   Final Result      Stable thoracic underinflation. Right IJ line and ETT appear adequate. Slightly improved bibasilar pleural-parenchymal  opacification      DX-CHEST-PORTABLE (1 VIEW)   Final Result      Stable bibasilar pleural-parenchymal opacification and low lung volumes. Lines/tubes adequate.      DX-CHEST-LIMITED (1 VIEW)   Final Result         Endotracheal tube with tip projecting over the mid thoracic trachea.   Gastric drainage tube courses below diaphragm, tip is not seen.   Right central venous catheter with tip projecting over the expected area of the lower SVC.            EC-NAEEM W/O CONT   Final Result      DX-CHEST-LIMITED (1 VIEW)   Final Result      Bibasilar underinflation atelectasis which could obscure an additional process. This is similar to the prior study. Lung volumes are very low.      IR-INSERT IVC FILTER WITH IG & SI   Final Result      1.  Ultrasound guided access LEFT common femoral vein.   2.  BILATERAL selective pulmonary arteriogram demonstrating extensive bilateral pulmonary emboli.   3.  Successful BILATERAL pulmonary thrombectomy   4.  Large IVC at the level of the renal veins   5.  Successful placement of infrarenal IVC filter   6.  Pursestring suture should be removed in 6-12 hours.         Please note that this study required greater than 50% more than the usual procedure time due to the patient's anatomy and large habitus.            IR-THROMBO MECHANICAL ARTERY,INIT   Final Result      1.  Ultrasound guided access LEFT common femoral vein.   2.  BILATERAL selective pulmonary arteriogram demonstrating extensive bilateral pulmonary emboli.   3.  Successful BILATERAL pulmonary thrombectomy   4.  Large IVC at the level of the renal veins   5.  Successful placement of infrarenal IVC filter   6.  Pursestring suture should be removed in 6-12 hours.         Please note that this study required greater than 50% more than the usual procedure time due to the patient's anatomy and large habitus.            EC-ECHOCARDIOGRAM COMPLETE W/ CONT   Final Result      CT-ABDOMEN-PELVIS WITH   Final Result      1.  Pneumoperitoneum  secondary to perforated viscus. Splenic flexure colonic diverticulum may be the perforation site. Gastroduodenal ulcer ulcer is considered less likely.   2.  Hepatic steatosis.   3.  Moderate gallbladder distention and pericholecystic fat stranding.   4.  Small volume hyperdense ascites.   5.  Partially visualized right lower extremity DVT.      Findings were communicated to SALINA STOVER via [employer] messaging system on 7/12/2023 11:03 PM.      CT-CTA CHEST PULMONARY ARTERY W/ RECONS   Final Result      1.  Saddle pulmonary embolus with associated right heart strain.   2.  Posterior pneumomediastinum secondary to pneumoperitoneum. Abdomen will be dictated separately.      Findings were communicated to SALINA STOVER via [employer] messaging system on 7/12/2023 10:57 PM.      DX-CHEST-PORTABLE (1 VIEW)   Final Result      Mild atelectasis in the right lower lung. No focal consolidation. No effusions.              Assessment/Plan  1.DUNG/ATN/anuric-on RRT     On MWF schedule  2.HTN: low BP - on levophed, Midodrine  3.Electrolytes: well controlled.  4.Anemia: low Hb level -to monitor -transfuse prn with dialysis  5.Volume:overloaded/anasarca -UF with HD as tolerates with additional PUF alternating with HD  6.Metabolic acidosis: correcting with HD    Recs:  Continue current treatment  HD on MWF schedule  PUF on TTS  Daily CBC, BMP  Monitor BP keep MAP> 65-70  Avoid nephrotoxins, iv contrast  Prognosis guarded  Will follow

## 2023-07-30 NOTE — CARE PLAN
The patient is Watcher - Medium risk of patient condition declining or worsening    Shift Goals  Clinical Goals: wean levophed, mobilize, pulmonary hygiene  Patient Goals: sleep  Family Goals: no family present      Problem: Pain - Standard  Goal: Alleviation of pain or a reduction in pain to the patient’s comfort goal  Outcome: Progressing     Problem: Knowledge Deficit - Standard  Goal: Patient and family/care givers will demonstrate understanding of plan of care, disease process/condition, diagnostic tests and medications  Outcome: Progressing     Problem: Psychosocial  Goal: Patient's level of anxiety will decrease  Outcome: Progressing  Goal: Patient's ability to verbalize feelings about condition will improve  Outcome: Progressing  Goal: Patient's ability to re-evaluate and adapt role responsibilities will improve  Outcome: Progressing  Goal: Patient and family will demonstrate ability to cope with life altering diagnosis and/or procedure  Outcome: Progressing  Goal: Spiritual and cultural needs incorporated into hospitalization  Outcome: Progressing     Problem: Respiratory  Goal: Patient will achieve/maintain optimum respiratory ventilation and gas exchange  Outcome: Progressing     Problem: Venous Thromboembolism (VTE) Prevention  Goal: The patient will remain free from venous thromboembolism (VTE)  Outcome: Progressing     Problem: Nutrition  Goal: Patient's nutritional and fluid intake will be adequate or improve  Outcome: Progressing  Goal: Enteral nutrition will be maintained or improve  Outcome: Progressing  Goal: Enteral nutrition will be maintained or improve  Outcome: Progressing     Problem: Urinary Elimination  Goal: Establish and maintain regular urinary output  Outcome: Progressing     Problem: Skin Integrity  Goal: Skin integrity is maintained or improved  Outcome: Progressing     Problem: Skin Care - Ostomy  Goal: Skin remains free from irritation  Outcome: Progressing     Problem: Knowledge  Deficit - Ostomy  Goal: Patient will demonstrate ability to manage and maintain ostomy  Outcome: Progressing     Problem: Discharge Barriers/Self-Care - Ostomy  Goal: Ostomy patient's continuum of care needs will be met  Outcome: Progressing     Problem: Fall Risk  Goal: Patient will remain free from falls  Outcome: Progressing     Problem: Optimal Care of the Patient with VTE  Goal: Peripheral tissue perfusion will improve  Outcome: Progressing  Goal: Complications related to the disease process, condition or treatment will be avoided or minimized  Outcome: Progressing  Goal: Symptoms of redness, swelling, and pain at the site of impaired tissue perfusion will improve  Outcome: Progressing     Problem: Knowledge Deficit - VTE  Goal: Patient and family/care givers will demonstrate understanding of plan of care, disease process/condition, diagnostic tests and medications  Outcome: Progressing     Problem: Discharge Barriers/Planning  Goal: Patient's continuum of care needs are met  Outcome: Progressing

## 2023-07-30 NOTE — PROGRESS NOTES
PUF treatment today for extra fluid removal ordered by Dr Kinney.Treatment tolerated well.VSS throughout.BP supported with low dose of levo.Net UF removed 3000 ml.CVC locked with heparin.Report given to primary Rn.

## 2023-07-31 PROBLEM — R57.9 SHOCK (HCC): Status: ACTIVE | Noted: 2023-01-01

## 2023-07-31 NOTE — PROGRESS NOTES
"Critical Care Progress Note    Date of admission  7/12/2023    Chief Complaint  \"63 y.o. female who presented 7/12/2023 with acute DVT, acute saddle PE and new pneumoperitoneum due to perforated viscus for what she was admitted and treated with thrombectomy, heparin ggt and IVC filter placement. She also underwent exploratory laparatomy (today day 5 post op) with left colectomy with end colostomy and s/p cholecystectomy with a wound vac in place.   I was called at the bedside due to hypotension and tachycardia.\" H+P 7/12    Hospital Course  7/13 s/p bilateral thrombectomy for saddle PE and IVC filter placement. Successful.   7/13 s/p left colectomy and end-colostomy, and cholecystectomy. Intraop found perforated colon at splenic flexure with associated mass. Concerning of malignancy. Remains intubated post op.   7/19: Admitted to ICU is hemorrhagic shock, MTP, OR for intrabdominal bleed, liver lac, returns to ICU in shock and intubated  7/20: VD 2-Hb stable, anuric, back to OR for removal of packing, colon resection, revision colostomy (to RUQ), vasoplegic with increasing pressors overnight  7/21: CRRT started  7/22: VD 4-weaned of pressors, following, increase UF to 100-->150 overnight  7/23: 2.5 L off yesterday, off pressors, very weak today, hypophos needs aggressive replacement, switch CRRT to iHD, mobilize, SBT/SAT tomorrow  7/24: HD/UF today, tolerating SBT, high risk for failing extubation.  Discussed with family today regarding GOC as previously documented  7/25: PUF again today; VD#7, awake and follows very weakly t/o.  7/26: plan for HD/UF today, awake and follows, moving arms off bed not; palliative consulted to assist with GOC d/w pt/family, plan  7/27: Extubated today; further discussion regarding goals of care  7/28 on low levels oxygen, low dose norepinephrine  7/29 continue on low levels of oxygen, low levels norepi, anuric getting iHD, increase mobility, CT scan of abdomen  7/31 low dose " levophed      Interval Problem Update  Reviewed last 24 hour events:  Neuro: neuro intact moves all ext, no pain  Tmax: afebrile  GI: TF at goal, ostomy 500ml  UOP: anuric bladder scan 10-20ml  Lines: left IJ dialysis, picc, wound vac  Resp: NC/-500, pep    Vte: heparin vte  Antibx: none  - 26L for hospital stay  Midodrine 10 mg Q8  Levo for MAP > 65      Review of Systems  Review of Systems   Unable to perform ROS: Critical illness        Vital Signs for last 24 hours   Temp:  [36.4 °C (97.6 °F)-37.2 °C (99 °F)] 36.4 °C (97.6 °F)  Pulse:  [] 91  Resp:  [15-53] 24  BP: ()/(48-59) 107/59  SpO2:  [91 %-98 %] 94 %    Hemodynamic parameters for last 24 hours       Respiratory Information for the last 24 hours       Physical Exam   Physical Exam  Vitals and nursing note reviewed.   Constitutional:       General: She is not in acute distress.     Appearance: She is obese. She is ill-appearing.      Comments: Ill appearing sitting upright in bed, jaundice noticed   HENT:      Head: Normocephalic.      Nose: Nose normal.      Mouth/Throat:      Mouth: Mucous membranes are dry.   Eyes:      Extraocular Movements: Extraocular movements intact.      Pupils: Pupils are equal, round, and reactive to light.   Neck:      Comments: Left IJ dialysis cath  Cardiovascular:      Rate and Rhythm: Normal rate and regular rhythm.   Pulmonary:      Effort: Pulmonary effort is normal. No respiratory distress.      Breath sounds: No rhonchi or rales.   Abdominal:      General: There is no distension.      Palpations: Abdomen is soft.      Tenderness: There is no abdominal tenderness. There is no guarding.      Comments: Stoma that is dark/dusky and discolored, midline wound vac device, no rebound or guarding   Musculoskeletal:      Cervical back: Neck supple.      Right lower leg: Edema present.      Left lower leg: Edema present.      Comments: Improving edema   Skin:     General: Skin is warm.      Capillary Refill:  Capillary refill takes 2 to 3 seconds.      Coloration: Skin is jaundiced.   Neurological:      Mental Status: She is alert.      Comments: Awake and alert, follows, moves all 4 extremities, weak voice and cough   Psychiatric:         Mood and Affect: Mood normal.         Medications  Current Facility-Administered Medications   Medication Dose Route Frequency Provider Last Rate Last Admin    midodrine (Proamatine) tablet 10 mg  10 mg Enteral Tube Q8HRS Garrison Canas M.D.        acetaminophen (Tylenol) tablet 650 mg  650 mg Enteral Tube Q4HRS PRN Garrison Canas M.D.        phytonadione (Mephyton) tablet 10 mg  10 mg Enteral Tube DAILY Kojo Wilder M.D.   10 mg at 07/31/23 0729    sodium chloride (Hyper-Sal) 7 % nebulizer solution 4 mL  4 mL Nebulization Q8HRS Kojo Wilder M.D.   4 mL at 07/30/23 2321    ipratropium-albuterol (DUONEB) nebulizer solution  3 mL Nebulization Q8HRS Kojo Wilder M.D.   3 mL at 07/30/23 2321    heparin injection 5,000 Units  5,000 Units Subcutaneous Q8HRS Kojo Wilder M.D.   5,000 Units at 07/31/23 0545    lidocaine (Xylocaine) 1 % injection 1 mL  1 mL Intradermal PRN Marita Buenrostro M.D.        oxyCODONE immediate-release (Roxicodone) tablet 5 mg  5 mg Enteral Tube Q4HRS PRN Cristian Peña M.D.   5 mg at 07/26/23 0838    promethazine (Phenergan) tablet 12.5-25 mg  12.5-25 mg Enteral Tube Q4HRS PRN Cristian Peña M.D.        levothyroxine (Synthroid) tablet 225 mcg  225 mcg Enteral Tube AM ES Cristian Peña M.D.   225 mcg at 07/31/23 0546    heparin injection 2,800 Units  2,800 Units Intracatheter PRN Marita Buenrostro M.D.   2,800 Units at 07/30/23 0830    insulin regular (HumuLIN R,NovoLIN R) injection  2-9 Units Subcutaneous Q6HRS Kerrie L. Latona   2 Units at 07/31/23 0552    And    dextrose 50% (D50W) injection 25 g  25 g Intravenous Q15 MIN PRN Kerrie L. Latona   25 g at 07/21/23 0225    norepinephrine (Levophed) 8 mg in 250 mL NS infusion (premix)   0-1 mcg/kg/min (Ideal) Intravenous Continuous Lam Louis M.D. 2.6 mL/hr at 07/31/23 0600 0.02 mcg/kg/min at 07/31/23 0600    Respiratory Therapy Consult   Nebulization Continuous RT Cristian Peña M.D.        senna-docusate (Pericolace Or Senokot S) 8.6-50 MG per tablet 2 Tablet  2 Tablet Enteral Tube BID Cristian Peña M.D.   2 Tablet at 07/31/23 0546    And    polyethylene glycol/lytes (Miralax) PACKET 1 Packet  1 Packet Enteral Tube QDAY PRN Cristian Peña M.D.        And    magnesium hydroxide (Milk Of Magnesia) suspension 30 mL  30 mL Enteral Tube QDAY PRN Cristian Peña M.D.        And    bisacodyl (Dulcolax) suppository 10 mg  10 mg Rectal QDAY PRN Cristian Peña M.D.        lidocaine (Xylocaine) 1 % injection 2 mL  2 mL Tracheal Tube Q30 MIN PRN Cristian Peña M.D.        ondansetron (Zofran) syringe/vial injection 4 mg  4 mg Intravenous Q4HRS PRN Ana Lilia Joy M.D.        promethazine (Phenergan) suppository 12.5-25 mg  12.5-25 mg Rectal Q4HRS PRN Ana Lilia Joy M.D.        prochlorperazine (Compazine) injection 5-10 mg  5-10 mg Intravenous Q4HRS PRN Ana Lilia Joy M.D.        Nozin nasal  swab  1 Applicator Each Nostril BID Dagoberto Ohara D.O.   1 Applicator at 07/31/23 0600       Fluids    Intake/Output Summary (Last 24 hours) at 7/31/2023 0838  Last data filed at 7/31/2023 0621  Gross per 24 hour   Intake 1481.38 ml   Output 1100 ml   Net 381.38 ml       Laboratory            Recent Labs     07/29/23  0440 07/30/23  0515 07/31/23  0350   SODIUM 138 135 136   POTASSIUM 4.4 3.8 3.8   CHLORIDE 102 101 102   CO2 19* 21 18*   BUN 54* 45* 69*   CREATININE 3.51* 3.43* 4.89*   MAGNESIUM 2.2 2.1 2.2   PHOSPHORUS 4.7* 3.8 3.9   CALCIUM 8.0* 7.6* 7.7*     Recent Labs     07/29/23  0440 07/30/23  0515 07/30/23  0740 07/31/23  0350   ALTSGPT 127* 104*  --  97*   ASTSGOT 97* 104*  --  105*   ALKPHOSPHAT 241* 241*  --  225*   TBILIRUBIN 11.5* 11.0*  --  11.1*    DBILIRUBIN 10.0*  --   --   --    LIPASE  --   --  33  --    GLUCOSE 79 158*  --  144*     Recent Labs     07/29/23  0440 07/30/23  0515 07/31/23  0350   WBC 11.3* 9.6 10.0   NEUTSPOLYS 86.90* 83.30* 88.10*   LYMPHOCYTES 8.70* 8.80* 8.70*   MONOCYTES 0.90 2.60 0.00   EOSINOPHILS 1.70 0.90 0.80   BASOPHILS 0.90 0.00 0.80   ASTSGOT 97* 104* 105*   ALTSGPT 127* 104* 97*   ALKPHOSPHAT 241* 241* 225*   TBILIRUBIN 11.5* 11.0* 11.1*     Recent Labs     07/29/23  0440 07/29/23  1154 07/30/23  0515 07/30/23  0740 07/31/23  0350   RBC 3.10*  --  2.79*  --  2.82*   HEMOGLOBIN 9.3*  --  8.8*  --  8.8*   HEMATOCRIT 29.3*  --  26.6*  --  26.8*   PLATELETCT 68*  --  77*  --  93*   PROTHROMBTM  --  26.2*  --  26.0*  --    INR  --  2.50*  --  2.47*  --        Imaging  X-Ray:  I have personally reviewed the images and compared with prior images.      Assessment/Plan  * Acute hypoxemic respiratory failure (HCC)- (present on admission)  Assessment & Plan  Initially 7/13 Intubated for OR and 7/15 extubated, re-intubated 7/19 extubated 7/27  High risk of worsening respiratory status but is DNR/DNI    iHDS to achieve euvolemia   IS poor 250ml continue with PEP therapy and mobilization  Aspiration precautions    Shock (HCC)  Assessment & Plan  Completed antibiotics 7/30  Liver failure + Renal failure  Midodrine 10 mg TID  Levophed for MAP > 65    Pancreatitis  Assessment & Plan  Seen on most recent CT scan no pain or food intolerance  Supportive care    Goals of care, counseling/discussion  Assessment & Plan  Extensive discussion with patient's very caring and involved Sister Rani again on 7/24  Again confirmed, DNR status, on was a very vibrant and would be much more concerned with quality than quantity of life as previously documented.  Planning on repeat P UF today and likely extubate tomorrow.  DNR/DNI status.  If she does not do well with extubation, transition to comfort focused care for significant dyspnea or discomfort consistent  with patient's prior stated wishes.  I have spoken with Silva extensively but there appears to be some differing opinion with other family members.  Palliative care consulted and will attempt to arrange family conference for further clarification of goals of care and discussed with patient as able.    Thrombocytopenia (HCC)  Assessment & Plan  Improving platelet count no recent transfusion  Conservative transfusion protocol  Hold VTE ppx for platelets < 50  Trend      Acute hepatic necrosis  Assessment & Plan  Liver laceration and Ischemic hepatopathy due to shock, evidenced by markedly elevated transaminases, elevated Tbili, hypoalbunemia  Avoid hepatotoxins  Hepatocellular/cholestatic pattern in etiology      Hemorrhagic shock (HCC)- (present on admission)  Assessment & Plan  7/19: Overnight with worsening hypotension, obtundation and vasopressor requirement.  Bedside POCUS with underfilled hyperdynamic LV and FF in RUQ.  MTP intiated and patient taken to OR with ACGS. Found to have copious intrabdominal hemmorhage with a liver laceration near falciform ligament.  Packed left open  7/20: OR re opened, packing removed, fascia closed  No recurrent bleeding, HH stable      Acute blood loss anemia  Assessment & Plan  Intraabdominal bleed  S/P MTP and operative control  Conservative transfusion protocol  Trend    Perforated viscus- (present on admission)  Assessment & Plan  7/13 - ex lap with left hemicolectomy and end colostomy by Dr. Boateng  Found perforated colon at splenic fixture, associated with mass. Path neg for malignancy  Fecal peritonitis  7/30 zosyn stopped    Acute kidney injury (HCC)  Assessment & Plan  Ischemic ATN  7/21: Initiated CRRT for volume removal/ ultrafiltration after failed diuretic challenge  7/23: Switch CRRT to iHD to facilitate mobility and PT, still anuric  7/24 iHD  Renal dose meds, avoid nephrotoxins  Q shift bladder scan    Morbid obesity with BMI of 50.0-59.9, adult (HCC)- (present on  admission)  Assessment & Plan  BMI 50, place her in high risk for increase morbidity during this admission    Acute saddle pulmonary embolism (HCC)- (present on admission)  Assessment & Plan  Originating from a right leg DVT. Pt is not a candidate for thrombolytics given her pneumoperitoneum  7/13: S/p emergent thrombectomy of saddle PE with IVC placement.   7/19: Repeat TTE with normal LV function, poor RV visualization but likely dilated with reduced function, RAP low      Heparin vte started 7/29    Acute deep venous thrombosis (DVT) determined by ultrasound (HCC)- (present on admission)  Assessment & Plan  Holding heparin in the setting of massive intraabdominal hemorrhage, liver laceration and thrombocytopenia  S/p IVC filter placed this admission  Follow-up with vascular clinic for IVC filter retrieval when appropriate  Resume anticoagulation when appropriate       VTE:  Heparin  Ulcer: Not indicated  Lines: Central Line  Ongoing indication addressed    I have performed a physical exam and reviewed and updated ROS and Plan today (7/31/2023). In review of yesterday's note (7/30/2023), there are no changes except as documented above.     Discussed patient condition and risk of morbidity and/or mortality with RN, RT, Pharmacy, Code status disscussed, Charge nurse / hot rounds, and Patient    The patient remains critically ill.  Critical care time = 46 minutes in directly providing and coordinating critical care and extensive data review.  No time overlap and excludes procedures.

## 2023-07-31 NOTE — ASSESSMENT & PLAN NOTE
Completed antibiotics 7/30  Liver failure + Renal failure  Midodrine 10 mg TID  Levophed for MAP > 65

## 2023-07-31 NOTE — DISCHARGE PLANNING
Case Management Discharge Planning    Admission Date: 7/12/2023  GMLOS: 9.6  ALOS: 18    6-Clicks ADL Score: 6  6-Clicks Mobility Score: 6  PT and/or OT Eval ordered: Yes  Post-acute Referrals Ordered: Yes  Post-acute Choice Obtained: Yes  Has referral(s) been sent to post-acute provider:  Yes      Anticipated Discharge Dispo: Discharge Disposition: D/T to SNF with Medicare cert in anticipation of skilled care (03)  Discharge Address: [address] [place] [id number]    DME Needed: No    Action(s) Taken: Chart review completed and attended IDT rounds. Pt remains critically ill in the ICU. Anticipate pt will need SNF, however SNFs are declining due to not contracted with her insurance. Currently still pending a referral with [facility].    Addendum @0812  LSW spoke with pt's sister, Rani, to answer questions and provide update on DC planning. Rani voiced understanding that insurance may be a barrier and is agreeable with expanding referrals to other facilities within [place]. Choice forms faxed to [facility].    Escalations Completed: None    Medically Clear: No    Next Steps: Care coordination     Barriers to Discharge: Medical clearance, Pending Placement, and Dialysis    Is the patient up for discharge tomorrow: No

## 2023-07-31 NOTE — CARE PLAN
The patient is Watcher - Medium risk of patient condition declining or worsening    Shift Goals  Clinical Goals: wean levophed, mobilize, pulmonary toilet  Patient Goals: watch Tv  Family Goals: enrico      Problem: Pain - Standard  Goal: Alleviation of pain or a reduction in pain to the patient’s comfort goal  Outcome: Progressing     Problem: Knowledge Deficit - Standard  Goal: Patient and family/care givers will demonstrate understanding of plan of care, disease process/condition, diagnostic tests and medications  Outcome: Progressing     Problem: Psychosocial  Goal: Patient's level of anxiety will decrease  Outcome: Progressing  Goal: Patient's ability to verbalize feelings about condition will improve  Outcome: Progressing  Goal: Patient's ability to re-evaluate and adapt role responsibilities will improve  Outcome: Progressing  Goal: Patient and family will demonstrate ability to cope with life altering diagnosis and/or procedure  Outcome: Progressing  Goal: Spiritual and cultural needs incorporated into hospitalization  Outcome: Progressing     Problem: Respiratory  Goal: Patient will achieve/maintain optimum respiratory ventilation and gas exchange  Outcome: Progressing     Problem: Venous Thromboembolism (VTE) Prevention  Goal: The patient will remain free from venous thromboembolism (VTE)  Outcome: Progressing     Problem: Nutrition  Goal: Patient's nutritional and fluid intake will be adequate or improve  Outcome: Progressing  Goal: Enteral nutrition will be maintained or improve  Outcome: Progressing  Goal: Enteral nutrition will be maintained or improve  Outcome: Progressing     Problem: Urinary Elimination  Goal: Establish and maintain regular urinary output  Outcome: Progressing     Problem: Skin Integrity  Goal: Skin integrity is maintained or improved  Outcome: Progressing     Problem: Skin Care - Ostomy  Goal: Skin remains free from irritation  Outcome: Progressing     Problem: Knowledge Deficit -  Ostomy  Goal: Patient will demonstrate ability to manage and maintain ostomy  Outcome: Progressing     Problem: Discharge Barriers/Self-Care - Ostomy  Goal: Ostomy patient's continuum of care needs will be met  Outcome: Progressing     Problem: Fall Risk  Goal: Patient will remain free from falls  Outcome: Progressing     Problem: Optimal Care of the Patient with VTE  Goal: Peripheral tissue perfusion will improve  Outcome: Progressing  Goal: Complications related to the disease process, condition or treatment will be avoided or minimized  Outcome: Progressing  Goal: Symptoms of redness, swelling, and pain at the site of impaired tissue perfusion will improve  Outcome: Progressing     Problem: Knowledge Deficit - VTE  Goal: Patient and family/care givers will demonstrate understanding of plan of care, disease process/condition, diagnostic tests and medications  Outcome: Progressing     Problem: Discharge Barriers/Planning  Goal: Patient's continuum of care needs are met  Outcome: Progressing

## 2023-07-31 NOTE — CONSULTS
Palliative Care   Patient established IP palliative patient, re consult noted. IP PC team to  see patient asap.     Thank you for allowing Palliative Care in support of this patient and family. Please contact  with any changes in status, questions, or concerns.     Bhavna Serna, MSN, APRN, ACN-AG.  Palliative Care Nurse Practitioner  [phone]

## 2023-07-31 NOTE — THERAPY
Physical Therapy   Daily Treatment     Patient Name: Ashley English  Age:  63 y.o., Sex:  female  Medical Record #: [id number]  Today's Date: 7/31/2023     Precautions  Precautions: Fall Risk;Nasogastric Tube;Swallow Precautions  Comments: abdominal wound vac    Assessment    Patient seen for follow up PT treatment session. Since last session she has been intubated and then extubated and started on HD.  She is not verbalizing this session but tracks with cueing. She was motivated to participate and follows commands but is limited by poor overall endurance and strength.  Recommend transfers to cardiac chair with nursing staff. At this time she will need placement for further therapy.  Will continue to follow.     Plan    Treatment Plan Status: Continue Current Treatment Plan  Type of Treatment: Bed Mobility, Debridement, Equipment, Group Therapy, Gait Training, Manual Therapy, Neuro Re-Education / Balance, Orthotics Training , Self Care / Home Evaluation, Stair Training, Therapeutic Exercise, Therapeutic Activities  Treatment Frequency: 3 Times per Week  Treatment Duration: Until Therapy Goals Met    DC Equipment Recommendations: Unable to determine at this time  Discharge Recommendations: Recommend post-acute placement for additional physical therapy services prior to discharge home      Subjective    Patient nods in agreement with sitting EOB      Objective       07/31/23 1600   Precautions   Precautions Fall Risk;Nasogastric Tube;Swallow Precautions   Comments abdominal wound vac   Vitals   Pulse (!) 101   Blood Pressure 101/53   Respiration (!) 32   Pulse Oximetry 88 %   O2 (LPM) 3   O2 Delivery Device Silicone Nasal Cannula   Pain 0 - 10 Group   Pain Rating Scale (NPRS) 0   Cognition    Cognition / Consciousness X   Speech/ Communication Nods Appropriately   Level of Consciousness Alert  (Simultaneous filing. User may not have seen previous data.)   Sequencing Impaired   Initiation Impaired   Comments flat  but cooperative. Unable to vocalize despite cueing   Strength Lower Body   Lower Body Strength  X   Gross Strength Generalized Weakness, Equal Bilaterally   Comments grossly 2+/5   Sensation Lower Body   Lower Extremity Sensation   WDL   Sitting Lower Body Exercises   Sitting Lower Body Exercises Yes   Comments toe taps and high fives at EOB, limited reps 2/2 fatigue and delay   Neuro-Muscular Treatments   Neuro-Muscular Treatments Anterior weight shift;Verbal Cuing;Weight Shift Right;Weight Shift Left;Postural Facilitation;Tactile Cuing;Tapping   Vision   Vision Comments wears glasses   Other Treatments   Other Treatments Provided washed hair at EOB and brushed. Patient unable to do so independently   Balance   Sitting Balance (Static) Poor   Sitting Balance (Dynamic) Trace +   Weight Shift Sitting Poor   Skilled Intervention Tactile Cuing;Verbal Cuing;Compensatory Strategies   Bed Mobility    Supine to Sit Total Assist   Sit to Supine Total Assist   Scooting Total Assist   Rolling Total Assist to Rt.;Total Assist to Lt.   Skilled Intervention Verbal Cuing;Tactile Cuing;Sequencing;Postural Facilitation   Gait Analysis   Gait Level Of Assist Unable to Participate   Functional Mobility   Sit to Stand Unable to Participate   Bed, Chair, Wheelchair Transfer Unable to Participate   Mobility tolerated 12 min sitting at EOB   ICU Target Mobility Level   ICU Mobility - Targeted Level Level 2  (Simultaneous filing. User may not have seen previous data.)   How much difficulty does the patient currently have...   Turning over in bed (including adjusting bedclothes, sheets and blankets)? 1   Sitting down on and standing up from a chair with arms (e.g., wheelchair, bedside commode, etc.) 1   Moving from lying on back to sitting on the side of the bed? 1   How much help from another person does the patient currently need...   Moving to and from a bed to a chair (including a wheelchair)? 1   Need to walk in a hospital room? 1    Climbing 3-5 steps with a railing? 1   6 clicks Mobility Score 6   Activity Tolerance   Sitting in Chair unable   Sitting Edge of Bed 12 min   Standing unable   Short Term Goals    Short Term Goal # 1 Pt will perform supine<>sit with MIN A   Goal Outcome # 1 goal not met   Short Term Goal # 2 Pt will perform sit<>stand and stand pivot transfers with MOD A and FWW.   Goal Outcome # 2 Goal not met   Short Term Goal # 3 Pt will ambulate 10' with FWW and MOD A.   Goal Outcome # 3 Goal not met   Physical Therapy Treatment Plan   Physical Therapy Treatment Plan Continue Current Treatment Plan   Anticipated Discharge Equipment and Recommendations   DC Equipment Recommendations Unable to determine at this time   Discharge Recommendations Recommend post-acute placement for additional physical therapy services prior to discharge home     Dee Purcell, PT, DPT, GCS

## 2023-07-31 NOTE — DISCHARGE PLANNING
Received choice form @: 1511  Agency/Facility name: [facility]  Sent referral per choice form @: 1528    1530  Received choice form @: 0708  Agency/Facility name: [facility]  Sent referral per choice form @: 0120    1533  Received choice form @: 2029  Agency/Facility name: [facility]  Sent referral per choice form @: 3068

## 2023-07-31 NOTE — WOUND TEAM
[facility]  Inpatient Services  New Ostomy Follow-up Management & Teaching    HPI:  Reviewed  PMH: Reviewed   SH: Reviewed    Past Surgical History:   Procedure Laterality Date    AK EXPLORATORY OF ABDOMEN N/A 7/20/2023    Procedure: REOPENING OF RECENT LAPAROTOMY, REMOVAL OF LIVER PACKING, COLON RESECTION, PARTIAL COLECTOMY, ABDOMINAL WALL CLOSURE, REVISION OF COLOSTOMY;  Surgeon: Taylor Bocanegra M.D.;  Location: [facility];  Service: General    WOUND CLOSURE NEURO N/A 7/20/2023    Procedure: CLOSURE, WOUND;  Surgeon: Taylor Bocanegra M.D.;  Location: [facility];  Service: General    AK EXPLORATORY OF ABDOMEN N/A 7/19/2023    Procedure: LAPAROTOMY, EXPLORATORY FOR CONTROL OF BLEEDING, LIVER PACKING, PLACEMENT OF ABTHERA, TEMPORARY ABDOMINAL CLOSURE;  Surgeon: Taylor Bocanegra M.D.;  Location: [facility];  Service: General    AK EXPLORATORY OF ABDOMEN N/A 7/13/2023    Procedure: LAPAROTOMY, EXPLORATORY;  Surgeon: Angel Luis Boateng M.D.;  Location: [facility];  Service: General    AK COLOSTOMY  7/13/2023    Procedure: CREATION, COLOSTOMY;  Surgeon: Angel Luis Boateng M.D.;  Location: [facility];  Service: General    CHOLECYSTECTOMY N/A 7/13/2023    Procedure: CHOLECYSTECTOMY;  Surgeon: Angel Luis Boateng M.D.;  Location: [facility];  Service: General    LOW ANTERIOR RESECTION N/A 7/13/2023    Procedure: RESECTION, RECTUM, LOW ANTERIOR;  Surgeon: Angel Luis Boateng M.D.;  Location: [facility];  Service: General    KNEE ARTHROPLASTY TOTAL Left 9/12/2017    Procedure: KNEE ARTHROPLASTY TOTAL;  Surgeon: Bull James M.D.;  Location: [facility];  Service: Orthopedics       Surgery Date: 07/13/23    Surgeon(s):  ROM Mosley M.D. Alvaro H Devia, M.D.    Procedure(s):  LAPAROTOMY, EXPLORATORY - REMOVAL OF PACKING, POSSIBLE WOUND CLOSURE  CLOSURE, WOUND     Permanence: To be  "determined    Pertinent History:   \"63-year-old woman who presented with a perforated viscus.  She was also noted to have a saddle embolus in her pulmonary arteries and underwent an emergent thrombectomy.  Based on the fact that she had a perforated viscus an emergent laparotomy was indicated subsequent to the thrombectomy\"      - Left hemicolectomy with end colostomy/Delvis's procedure, cholecystectomy   - Repoening of previous Exploratory laparotomy.  Abdominal washout, hepatorrhaphy, packing of the abdomen, and temporary abdominal closure with negative pressure wound therapy device greater than 50 cm²   - Reopening of recent laparotomy with removal of liver packs, colon resection, and revision of colostomy                       Colostomy 23 RUQ (Active)   Wound Image   23 1600   Stomal Appliance Assessment Changed    Stoma Assessment Brown;Black;Slough    Stoma Shape Budded Greater Than One Inch;Round    Stoma Size (in) 2.25    Peristomal Assessment Clean;Dry;Intact    Mucocutaneous Junction Intact    Treatment Appliance Changed;Cleansed with water/washcloth;Site care    Peristomal Protectant No Sting Skin Prep;Paste Ring    Stomal Appliance Paste Ring, 2\";2 3/4\" (70mm) CTF    Output (mL) 550 mL    Output Color Brown    WOUND RN ONLY - Stomal Appliance  2 Piece;2 3/4\" (70mm) CTF;Paste Ring, 2\"    Appliance (Pouch) # Pouch: [id number], Barrier: [id number], Paste Rin    Appliance Brand Braxton    Secure Start completed Not Medically Stable    WOUND NURSE ONLY - Time Spent with Patient (mins) 60                   Ileostomy Interventions:  Removed appliance (using push pull method) - By Ostomy RN  Cleansed sara-stomal skin with moist warm washcloth - By Ostomy RN  Created/Checked template fit - By Ostomy RN  Traced Shape to back of barrier - By Ostomy RN  Confirmed fit - By Ostomy RN  Removed plastic backing and \"Dog Eared\" paper edges - By Ostomy RN  Stretched paste ring to fit barrier " opening - By Ostomy RN  Applied paste ring to back of barrier - By Ostomy RN  Applied barrier to skin and adhered with friction - By Ostomy RN  Attached pouch - By Ostomy RN  Closed Pouch end - By Ostomy RN    Patient Education: Ostomy RN to follow-up daily for education     Date:  07/31/23    Patient not medically stable, no education provided    Needs for next visit:     TBA    Evaluation:      Date:  07/31/23    Pending care conference tomorrow for further POC.  Patient critically ill and stoma necrotic, still with output.          Flatus: Present  Stool Output:    Urine Output:    Mobility: Not Mobilizing     DIET ORDERS (From admission to next 24h)       Start     Ordered    07/28/23 1544  Diet: Diet Tube Feed; Formula: Impact Peptide 1.5; Goal Rate (mL/Hour): 60; Duration: 24 HR; Tube Feed Time Unit: Hours/Day  ALL MEALS        Comments: Advance to goal per protocol   Question Answer Comment   Diet Diet Tube Feed    Formula: Impact Peptide 1.5    Goal Rate (mL/Hour) 60    Duration 24 HR    Tube Feed Time Unit Hours/Day        07/28/23 1544    07/19/23 1009  Diet NPO Restrict to: Strict (okay to receive meds through the NG/OG tube)  ALL MEALS        Question Answer Comment   Type: Now    Diet NPO Restrict to: Strict okay to receive meds through the NG/OG tube       07/19/23 1008                    Plan: Ostomy nurses to continue to follow for ostomy needs and teaching until patient independent with care or discharge.  Ostomy Nurse follow-up frequency:   with VAC changes     Secure Start Signed:  Not Medically Stable  Outpatient Referral Placed:  Not Medically Stable  5 Sets of appliances in Ostomy bag for discharge:  Ordered    INSURANCE OPTIONS:     Med[employer]/MEDICAID & All other Private Insurance companies (Prism Form)     Anticipated Discharge Plans:  TBD    Ostomy Supplies for DC:  N/A

## 2023-07-31 NOTE — THERAPY
Speech Language Therapy Contact Note    Patient Name: Ashley English  Age:  63 y.o., Sex:  female  Medical Record #: [id number]  Today's Date: 7/31/2023    Discussed missed therapy with MD Canas.        07/31/23 0851   Treatment Variance   Reason For Missed Therapy Medical - Other (Please Comment)   Interdisciplinary Plan of Care Collaboration   IDT Collaboration with  Nursing;Physician   Collaboration Comments Weekend SLP recommended FEES - orders not obtained. Requested orders from MD Canas who requests FEES be held until patient is more awake for improved participation. SLP to follow up for FEES orders once appropriate.

## 2023-07-31 NOTE — CARE PLAN
Problem: Hyperinflation  Goal: Prevent or improve atelectasis  Description: Target End Date:  3 to 4 days    1. Instruct incentive spirometry usage  2.  Perform hyperinflation therapy as indicated  Outcome: Not Met     Problem: Bronchoconstriction  Goal: Improve in air movement and diminished wheezing  Description: Target End Date:  2 to 3 days    1.  Implement inhaled treatments  2.  Evaluate and manage medication effects  Outcome: Not Met     PEP   Poor effort

## 2023-07-31 NOTE — PROGRESS NOTES
Monitor Summary: Normal Sinus Rhythm; agree with measurements below  12 hr chart check complete

## 2023-07-31 NOTE — CARE PLAN
Problem: Pain - Standard  Goal: Alleviation of pain or a reduction in pain to the patient’s comfort goal  Outcome: Progressing     Problem: Respiratory  Goal: Patient will achieve/maintain optimum respiratory ventilation and gas exchange  Outcome: Progressing     Problem: Venous Thromboembolism (VTE) Prevention  Goal: The patient will remain free from venous thromboembolism (VTE)  Outcome: Progressing     Problem: Nutrition  Goal: Patient's nutritional and fluid intake will be adequate or improve  Outcome: Progressing     Problem: Fall Risk  Goal: Patient will remain free from falls  Outcome: Progressing     The patient is Watcher - Medium risk of patient condition declining or worsening    Shift Goals  Clinical Goals: wean levophed, mobilize, pulmonary hygiene  Patient Goals: rest  Family Goals: no family present    Progress made toward(s) clinical / shift goals:  weaning levophed slowly, q4 pulmonary hygiene

## 2023-07-31 NOTE — CARE PLAN
Problem: Hyperinflation  Goal: Prevent or improve atelectasis  Description: Target End Date:  3 to 4 days    1. Instruct incentive spirometry usage  2.  Perform hyperinflation therapy as indicated  Outcome: Not Met     Problem: Bronchoconstriction  Goal: Improve in air movement and diminished wheezing  Description: Target End Date:  2 to 3 days    1.  Implement inhaled treatments  2.  Evaluate and manage medication effects  Outcome: Not Met     Problem: Bronchopulmonary Hygiene  Goal: Increase mobilization of retained secretions  Description: Target End Date:  2 to 3 days    1.  Perform bronchopulmonary therapy as indicated by assessment  2.  Perform airway suctioning  3.  Perform actions to maintain patient airway  Outcome: Not Met   PEP   DUO  7%

## 2023-07-31 NOTE — WOUND TEAM
[facility]  Inpatient Services  Wound and Skin Care Follow-up    Admission Date: 7/12/2023     Last order of IP CONSULT TO WOUND CARE was found on 7/24/2023 from Hospital Encounter on 7/12/2023     HPI, PMH, SH: Reviewed    Past Surgical History:   Procedure Laterality Date    NE EXPLORATORY OF ABDOMEN N/A 7/20/2023    Procedure: REOPENING OF RECENT LAPAROTOMY, REMOVAL OF LIVER PACKING, COLON RESECTION, PARTIAL COLECTOMY, ABDOMINAL WALL CLOSURE, REVISION OF COLOSTOMY;  Surgeon: Taylor Bocanegra M.D.;  Location: [facility];  Service: General    WOUND CLOSURE NEURO N/A 7/20/2023    Procedure: CLOSURE, WOUND;  Surgeon: Taylor Bocanegra M.D.;  Location: [facility];  Service: General    NE EXPLORATORY OF ABDOMEN N/A 7/19/2023    Procedure: LAPAROTOMY, EXPLORATORY FOR CONTROL OF BLEEDING, LIVER PACKING, PLACEMENT OF ABTHERA, TEMPORARY ABDOMINAL CLOSURE;  Surgeon: Taylor Bocanegra M.D.;  Location: [facility];  Service: General    NE EXPLORATORY OF ABDOMEN N/A 7/13/2023    Procedure: LAPAROTOMY, EXPLORATORY;  Surgeon: Angel Luis Boateng M.D.;  Location: [facility];  Service: General    NE COLOSTOMY  7/13/2023    Procedure: CREATION, COLOSTOMY;  Surgeon: Angel Luis Boateng M.D.;  Location: [facility];  Service: General    CHOLECYSTECTOMY N/A 7/13/2023    Procedure: CHOLECYSTECTOMY;  Surgeon: Angel Luis Boateng M.D.;  Location: [facility];  Service: General    LOW ANTERIOR RESECTION N/A 7/13/2023    Procedure: RESECTION, RECTUM, LOW ANTERIOR;  Surgeon: Angel Luis Boateng M.D.;  Location: [facility];  Service: General    KNEE ARTHROPLASTY TOTAL Left 9/12/2017    Procedure: KNEE ARTHROPLASTY TOTAL;  Surgeon: Bull James M.D.;  Location: [facility];  Service: Orthopedics     Social History     Tobacco Use    Smoking status: Never    Smokeless tobacco: Never   Substance Use Topics    Alcohol use: No     Chief Complaint   Patient  "presents with    Abdominal Pain     Patient to triage via wheelchair for abdominal pain, scheduled for gallbladder surgery but received outpatient US today and was instructed to come to ED for blood clot to right leg. Patient denies use of blood thinners or history of blood clot     Leg Pain     Diagnosis: Pneumoperitoneum [K66.8]  Perforated abdominal viscus [R19.8]    Unit where seen by Wound Team: T908/[id number]     WOUND FOLLOW UP RELATED TO:  Mid Abdomen       WOUND HISTORY:     \"63-year-old woman who presented with a perforated viscus.  She was also noted to have a saddle embolus in her pulmonary arteries and underwent an emergent thrombectomy.  Based on the fact that she had a perforated viscus an emergent laparotomy was indicated subsequent to the thrombectomy\"     7/13 - Left hemicolectomy with end colostomy/Delvis's procedure, cholecystectomy  7/19 - Repoening of previous Exploratory laparotomy.  Abdominal washout, hepatorrhaphy, packing of the abdomen, and temporary abdominal closure with negative pressure wound therapy device greater than 50 cm²  7/20 - Reopening of recent laparotomy with removal of liver packs, colon resection, and revision of colostomy        WOUND ASSESSMENT/LDA  Wound 07/13/23 Full Thickness Wound Open Incision Abdomen (Active)   Date First Assessed/Time First Assessed: 07/13/23 1429   Primary Wound Type: Full Thickness Wound  Surgical Wound Type: Open Incision  Location: Abdomen      Assessments 7/31/2023  4:00 PM   Wound Image     Site Assessment Yellow;Brown;Black   Periwound Assessment Clean;Dry;Intact   Margins Attached edges;Defined edges   Closure Secondary intention   Drainage Amount Moderate   Drainage Description Jacobsen;Brown;Serosanguineous   Wound Cleansing Approved Wound Cleanser   Periwound Protectant Skin Protectant Wipes to Periwound;Paste Ring;Drape   Dressing Status Clean;Dry;Intact   Dressing Changed Changed   Dressing Cleansing/Solutions Normal Saline   Dressing Options " Wound Vac   Dressing Change/Treatment Frequency Monday, Wednesday, Friday, and As Needed   NEXT Dressing Change/Treatment Date 08/02/23   NEXT Weekly Photo (Inpatient Only) 08/02/23   Wound Team Following 3x Weekly   Non-staged Wound Description Full thickness   Shape Large, Oval   Wound Odor Foul;Strong   Exposed Structures SAMIA   WOUND NURSE ONLY - Time Spent with Patient (mins) 60       Wound 07/22/23 Full Thickness Wound Abdomen Lateral Left previous ostomy site (Active)   Date First Assessed/Time First Assessed: 07/22/23 1330   Hand Hygiene Completed: Yes  Primary Wound Type: Full Thickness Wound  Location: Abdomen  Wound Orientation: Lateral  Laterality: Left  Wound Description (Comments): previous ostomy site      Assessments 7/31/2023  4:00 PM   Wound Image     Site Assessment Yellow;Black;Brown   Periwound Assessment Intact;Dry;Clean   Margins Attached edges;Defined edges   Closure Secondary intention   Drainage Amount Scant   Drainage Description Jacobsen;Serosanguineous   Treatments Cleansed;Site care   Wound Cleansing Approved Wound Cleanser   Periwound Protectant Skin Protectant Wipes to Periwound;Paste Ring;Drape   Dressing Status Clean;Dry;Intact   Dressing Changed Changed   Dressing Cleansing/Solutions Normal Saline   Dressing Options Wound Vac   Dressing Change/Treatment Frequency Monday, Wednesday, Friday, and As Needed   NEXT Dressing Change/Treatment Date 08/02/23   NEXT Weekly Photo (Inpatient Only) 08/02/23   Wound Team Following 3x Weekly   Non-staged Wound Description Full thickness   Shape Oval   Wound Odor Foul;Strong   Exposed Structures SAMIA       Negative Pressure Wound Therapy 07/22/23 Surgical Abdomen Midline Left (Active)   Placement Date/Time: 07/22/23 1300   Present on Original Admission: No  Hand Hygiene Completed: Yes  Wound Type: Surgical  Location: Abdomen  Wound Orientation: Midline  Laterality: Left      Assessments 7/31/2023  4:00 PM   NPWT Pump Mode / Pressure Setting  "Ulta;Continuous;125 mmHg   Dressing Type Medium;Black Foam (Veraflo)   Number of Foam Pieces Used 2   Canister Changed No   NEXT Dressing Change/Treatment Date 08/02/23   VAC VeraFlo Irrigant Normal Saline   VAC VeraFlo Soak Time (mins) 5   VAC VeraFlo Instill Volume (ml) 34   VAC VeraFlo - Therapy Time (hrs) 2   VAC VeraFlo Pressure (mm/Hg) Intermittent;125 mmHg        Vascular:    AIDA:   No results found.    Lab Values:    Lab Results   Component Value Date/Time    WBC 10.0 07/31/2023 03:50 AM    RBC 2.82 (L) 07/31/2023 03:50 AM    HEMOGLOBIN 8.8 (L) 07/31/2023 03:50 AM    HEMATOCRIT 26.8 (L) 07/31/2023 03:50 AM    CREACTPROT 12.29 (H) 07/31/2023 01:30 PM    HBA1C 5.7 (H) 08/12/2017 07:17 AM         Culture Results show:  No results found for this or any previous visit (from the past 720 hour(s)).    Pain Level/Medicated:  None, Tolerated without pain medication       INTERVENTIONS BY WOUND TEAM:  Chart and images reviewed. Discussed with bedside RN. All areas of concern (based on picture review, LDA review and discussion with bedside RN) have been thoroughly assessed. Documentation of areas based on significant findings. This RN in to assess patient. Performed standard wound care which includes appropriate positioning, dressing removal and non-selective debridement. Pictures and measurements obtained weekly if/when required.    Wound:  Mid Abdomen & L Abdomen  Preparation for Dressing removal: Removed without difficulty  Cleansed/Non-selectively Debrided with:  Wound cleanser and Gauze  Natacha wound: Cleansed with Wound cleanser and Gauze, Prepped with No Sting, Paste Rings, and Drape  Primary Dressing:  One \"Cinnamon roll\" of medium veraflo foam was packed into mid abdominal wound. 2nd \"Cinnamon roll\" of medium veraflo foam was packed into the left abdominal wound and then out along drape bridge to the mid abdominal wound to fill remaining depth within the wound bed.   Secondary (Outer) Dressing: Foam secured with " drape. A hole was cut in drape and veraflo trac pad applied. Suction resumed no leaks noted.    Advanced Wound Care Discharge Planning  Number of Clinicians necessary to complete wound care: 1  Is patient requiring IV pain medications for dressing changes:  No   Length of time for dressing change 20 min. (This does not include chart review, pre-medication time, set up, clean up or time spent charting.)    Interdisciplinary consultation: Patient, Bedside RN (Florentin), Elsi MENDOSA (Wound RN)    EVALUATION / RATIONALE FOR TREATMENT:     Date:  07/31/23  Wound Status:  Wound deteriorating    Wound continues to deteriorate despite veraflo the wound has large amount of slough with appears to be drying into an eschar. Foul odor noted to the wound bed as well. Per bedside RN pt to have care conference regarding goals of care Tuesday.          Goals: Steady decrease in wound area and depth weekly.    WOUND TEAM PLAN OF CARE - Frequency of Follow-up:   Nursing to follow dressing orders written for wound care. Contact wound team if area fails to progress, deteriorates or with any questions/concerns if something comes up before next scheduled follow up (See below as to whether wound is following and frequency of wound follow up)  Dressing changes by wound team:                   NPWT change 3 times weekly - Abdomen    NURSING PLAN OF CARE ORDERS:  No new orders this visit    NUTRITION RECOMMENDATIONS   Wound Team Recommendations:  N/A     DIET ORDERS (From admission to next 24h)       Start     Ordered    07/28/23 1544  Diet: Diet Tube Feed; Formula: Impact Peptide 1.5; Goal Rate (mL/Hour): 60; Duration: 24 HR; Tube Feed Time Unit: Hours/Day  ALL MEALS        Comments: Advance to goal per protocol   Question Answer Comment   Diet Diet Tube Feed    Formula: Impact Peptide 1.5    Goal Rate (mL/Hour) 60    Duration 24 HR    Tube Feed Time Unit Hours/Day        07/28/23 1544    07/19/23 1009  Diet NPO Restrict to: Strict (okay to  receive meds through the NG/OG tube)  ALL MEALS        Question Answer Comment   Type: Now    Diet NPO Restrict to: Strict okay to receive meds through the NG/OG tube       07/19/23 1008                    PREVENTATIVE INTERVENTIONS:   Q shift Alessandro - performed per nursing policy  Q shift pressure point assessments - performed per nursing policy    Surface/Positioning  ICU Low Airloss - Currently in Place  Reposition q 2 hours - Currently in Place    Respiratory  Silicone O2 tubing - Currently in Place  Gray Foam Ear protectors - Currently in Place    Containment/Moisture Prevention    Dri-wander pad - Currently in Place  Fecal ostomy - Currently in Place  Elizalde Catheter - Currently in Place    Anticipated discharge plans:  TBD        Vac Discharge Needs:  Vac Discharge plan is purely a recommendation from wound team and not a requirement for discharge unless otherwise stated by physician.  Veraflo Vac while inpatient, ok to transition to Regular Vac on discharge

## 2023-07-31 NOTE — PROGRESS NOTES
Nephrology Daily Progress Note    Date of Service  7/31/2023    Chief Complaint  63 y.o. female admitted 7/12/2023 with DVT, PE, perforated viscus, s/p ex-lap, IVC filter placement. Left colectomy, cholecystectomy, hemorrhagic shock, multiorgan failure  DUNG/ATN/anuric -on RRT  Initially on CRRT -switched to IHD    Interval Problem Update  7/30 -extubated  Somnolent this morning  No improvement in UOP  HD on MWF schedule  Complete PUF this morning -tolerated well  Still very edematous   7/31 patient opens eyes, she seems alert however not very communicative  Started on IV pressor earlier today  Seen and examined while getting HD.    Review of Systems  Review of Systems   Unable to perform ROS: Medical condition        Physical Exam  Temp:  [36.4 °C (97.6 °F)-37.2 °C (99 °F)] 36.7 °C (98 °F)  Pulse:  [76-99] 76  Resp:  [15-31] 21  BP: ()/(47-59) 95/53  SpO2:  [91 %-98 %] 98 %    Physical Exam  Vitals and nursing note reviewed.   Constitutional:       General: She is not in acute distress.     Appearance: She is well-developed. She is ill-appearing. She is not diaphoretic.   HENT:      Head: Normocephalic and atraumatic.      Right Ear: External ear normal.      Left Ear: External ear normal.      Nose: Nose normal. No rhinorrhea.      Mouth/Throat:      Mouth: Mucous membranes are dry.      Pharynx: No oropharyngeal exudate or posterior oropharyngeal erythema.   Eyes:      General: Scleral icterus present.         Right eye: No discharge.         Left eye: No discharge.      Conjunctiva/sclera: Conjunctivae normal.   Neck:      Vascular: No carotid bruit.   Cardiovascular:      Rate and Rhythm: Normal rate and regular rhythm.      Heart sounds: No murmur heard.  Pulmonary:      Effort: Respiratory distress present.   Abdominal:      General: Abdomen is flat. There is no distension.      Palpations: Abdomen is soft. There is no mass.   Musculoskeletal:      Cervical back: No rigidity. No muscular tenderness.       Right lower leg: Edema present.      Left lower leg: Edema present.   Skin:     General: Skin is warm and dry.      Coloration: Skin is jaundiced.   Neurological:      Mental Status: She is lethargic.         Fluids    Intake/Output Summary (Last 24 hours) at 7/31/2023 1227  Last data filed at 7/31/2023 1000  Gross per 24 hour   Intake 1449.67 ml   Output 950 ml   Net 499.67 ml         Laboratory  Recent Labs     07/29/23  0440 07/30/23  0515 07/31/23  0350   WBC 11.3* 9.6 10.0   RBC 3.10* 2.79* 2.82*   HEMOGLOBIN 9.3* 8.8* 8.8*   HEMATOCRIT 29.3* 26.6* 26.8*   MCV 94.5 95.3 95.0   MCH 30.0 31.5 31.2   MCHC 31.7* 33.1 32.8   RDW 59.9* 73.4* 82.0*   PLATELETCT 68* 77* 93*   MPV 11.8 11.6 10.9       Recent Labs     07/29/23  0440 07/30/23  0515 07/31/23  0350   SODIUM 138 135 136   POTASSIUM 4.4 3.8 3.8   CHLORIDE 102 101 102   CO2 19* 21 18*   GLUCOSE 79 158* 144*   BUN 54* 45* 69*   CREATININE 3.51* 3.43* 4.89*   CALCIUM 8.0* 7.6* 7.7*       Recent Labs     07/29/23  1154 07/30/23  0740   INR 2.50* 2.47*       No results for input(s): NTPROBNP in the last 72 hours.        Imaging  DX-CHEST-PORTABLE (1 VIEW)   Final Result      Stable low lung volumes and bibasilar parenchymal opacification. Overall, no change.      CT-ABDOMEN-PELVIS WITH   Final Result      1.  New inflammatory stranding involving the pancreatic bed with pancreatic enlargement and small amount of fluid within the peripancreatic region suggesting presence of pancreatitis. This is new from comparison.      2.  Interval decrease in amount of ascites.      3.  Worsening bibasilar atelectasis.      4.  Fatty liver.      5.  Again seen mild diffuse bowel wall thickening. No evidence of bowel obstruction.      US-RUQ   Final Result      1.  Limited exam due to open abdominal wound.   2.  Heterogeneous echogenic liver most consistent with fatty infiltration.   3.  No biliary dilatation.      DX-ABDOMEN FOR TUBE PLACEMENT   Final Result      DHT tip is over  the distal stomach or first portion of the duodenum.      DX-CHEST-PORTABLE (1 VIEW)   Final Result         1.  Pulmonary edema and/or infiltrates are identified, which are stable since the prior exam.      DX-CHEST-PORTABLE (1 VIEW)   Final Result         1.  Pulmonary edema and/or infiltrates are identified, which are stable since the prior exam.   2.  Trace bilateral pleural effusions      DX-CHEST-PORTABLE (1 VIEW)   Final Result         1.  Pulmonary edema and/or infiltrates are identified, which are stable since the prior exam.      DX-CHEST-PORTABLE (1 VIEW)   Final Result         1.  Mild edema and/or infiltrates.      DX-CHEST-PORTABLE (1 VIEW)   Final Result         1.  Pulmonary edema and/or infiltrates are identified, which are stable since the prior exam.   2.  Trace bilateral pleural effusions, stable      DX-CHEST-PORTABLE (1 VIEW)   Final Result      Stable examination.      DX-CHEST-PORTABLE (1 VIEW)   Final Result      No significant change      DX-ABDOMEN FOR TUBE PLACEMENT   Final Result      Enteric tube has been placed and the tip projects over the stomach.      DX-CHEST-LIMITED (1 VIEW)   Final Result      1.  Interval placement of a left-sided temporary dialysis catheter which terminates with the tip projecting over the expected location of the distal SVC.   2.  Stable mild interstitial pulmonary edema and bilateral basilar atelectasis and/or consolidation.      DX-CHEST-PORTABLE (1 VIEW)   Final Result         1.  Pulmonary edema and/or infiltrates are identified, which are stable since the prior exam.   2.  Trace bilateral pleural effusions      [id number] VIEW   Final Result      Severely suboptimal exam related to patient body habitus. No unexpected radiopaque foreign body seen.      DX-CHEST-PORTABLE (1 VIEW)   Final Result         1.  Pulmonary edema and/or infiltrates are identified, which are stable since the prior exam.   2.  Trace bilateral pleural effusions      EC-ECHOCARDIOGRAM  COMPLETE W/ CONT   Final Result      DX-CHEST-PORTABLE (1 VIEW)   Final Result      1.  Pulmonary edema, worse than prior exam   2.  Slight increased inflation from prior.   3.  Supportive tubing as described above.      DX-CHEST-PORTABLE (1 VIEW)   Final Result         1.  Bibasilar atelectasis versus infiltrates.      CT-ABDOMEN-PELVIS WITH   Final Result         1.  Scattered moderate abdominal ascites.   2.  Trace bilateral pleural effusions, left greater than right   3.  Linear densities the bilateral lung bases, greater on the left, appearance suggests atelectasis, component of infiltrate not excluded.   4.  DVT in the right external iliac vein extending into the common femoral vein, compatible with reported history of DVT.   5.  Diverticulosis   6.  Hepatomegaly   7.  Diffuse hepatic steatosis      DX-CHEST-PORTABLE (1 VIEW)   Final Result         1.  Bibasilar atelectasis or evolving infiltrates.      IR-MIDLINE CATHETER INSERTION WO GUIDANCE > AGE 3   Final Result                  Ultrasound-guided midline placement performed by qualified nursing staff    as above.          IR-US GUIDED PIV   Final Result    Ultrasound-guided PERIPHERAL IV INSERTION performed by    qualified nursing staff as above.      DX-CHEST-PORTABLE (1 VIEW)   Final Result      Stable thoracic underinflation. Right IJ line adequate. Stable bibasilar pleural-parenchymal opacification      DX-CHEST-PORTABLE (1 VIEW)   Final Result      Stable thoracic underinflation. Right IJ line and ETT appear adequate. Slightly improved bibasilar pleural-parenchymal opacification      DX-CHEST-PORTABLE (1 VIEW)   Final Result      Stable bibasilar pleural-parenchymal opacification and low lung volumes. Lines/tubes adequate.      DX-CHEST-LIMITED (1 VIEW)   Final Result         Endotracheal tube with tip projecting over the mid thoracic trachea.   Gastric drainage tube courses below diaphragm, tip is not seen.   Right central venous catheter with tip  projecting over the expected area of the lower SVC.            EC-NAEEM W/O CONT   Final Result      DX-CHEST-LIMITED (1 VIEW)   Final Result      Bibasilar underinflation atelectasis which could obscure an additional process. This is similar to the prior study. Lung volumes are very low.      IR-INSERT IVC FILTER WITH IG & SI   Final Result      1.  Ultrasound guided access LEFT common femoral vein.   2.  BILATERAL selective pulmonary arteriogram demonstrating extensive bilateral pulmonary emboli.   3.  Successful BILATERAL pulmonary thrombectomy   4.  Large IVC at the level of the renal veins   5.  Successful placement of infrarenal IVC filter   6.  Pursestring suture should be removed in 6-12 hours.         Please note that this study required greater than 50% more than the usual procedure time due to the patient's anatomy and large habitus.            IR-THROMBO MECHANICAL ARTERY,INIT   Final Result      1.  Ultrasound guided access LEFT common femoral vein.   2.  BILATERAL selective pulmonary arteriogram demonstrating extensive bilateral pulmonary emboli.   3.  Successful BILATERAL pulmonary thrombectomy   4.  Large IVC at the level of the renal veins   5.  Successful placement of infrarenal IVC filter   6.  Pursestring suture should be removed in 6-12 hours.         Please note that this study required greater than 50% more than the usual procedure time due to the patient's anatomy and large habitus.            EC-ECHOCARDIOGRAM COMPLETE W/ CONT   Final Result      CT-ABDOMEN-PELVIS WITH   Final Result      1.  Pneumoperitoneum secondary to perforated viscus. Splenic flexure colonic diverticulum may be the perforation site. Gastroduodenal ulcer ulcer is considered less likely.   2.  Hepatic steatosis.   3.  Moderate gallbladder distention and pericholecystic fat stranding.   4.  Small volume hyperdense ascites.   5.  Partially visualized right lower extremity DVT.      Findings were communicated to SALINA STOVER  via Vo[employer] messaging system on 7/12/2023 11:03 PM.      CT-CTA CHEST PULMONARY ARTERY W/ RECONS   Final Result      1.  Saddle pulmonary embolus with associated right heart strain.   2.  Posterior pneumomediastinum secondary to pneumoperitoneum. Abdomen will be dictated separately.      Findings were communicated to SALINA STOVER via Vo[employer] messaging system on 7/12/2023 10:57 PM.      DX-CHEST-PORTABLE (1 VIEW)   Final Result      Mild atelectasis in the right lower lung. No focal consolidation. No effusions.              Assessment/Plan  1.DUNG/ATN/anuric: No sign of renal recovery at  2.Hypotension: on levophed, Midodrine  3.Electrolytes: well controlled.  4.Anemia.  5.Volume:overloaded/anasarca .  6.Metabolic acidosis.  7.  Hypoalbuminemia  Recs:  Continue to evaluate daily for the need of HD  Ultrafiltration as much as blood pressure tolerates   renal diet  Daily BMP, CBC.  Renal dose all meds  Avoid nephrotoxins like NSAIDs.  Multisystem organ failure  Prognosis guarded.  Plan discussed with Dr. Canas

## 2023-07-31 NOTE — PROGRESS NOTES
Progress Note               Author: Chris Lee M.D. Date & Time created: 2023  3:17 PM     Interval History:  Complicated medical issues   Extubated   On dialysis  Denies abdominal pain   Colostomy functional   Maybe viable   Not  and new mucosa emerging from slough   Poor surgical candidate   Remains on pressors   Liver dysfunction   Weak  Extubated   Now DNR DNI    Review of Systems:  ROS    Physical Exam:  Physical Exam    Labs:          Recent Labs     23  0515 23  0350   SODIUM 138 135 136   POTASSIUM 4.4 3.8 3.8   CHLORIDE 102 101 102   CO2 19* 21 18*   BUN 54* 45* 69*   CREATININE 3.51* 3.43* 4.89*   MAGNESIUM 2.2 2.1 2.2   PHOSPHORUS 4.7* 3.8 3.9   CALCIUM 8.0* 7.6* 7.7*     Recent Labs     230 23  0740 23  0350   ALTSGPT 127* 104*  --  97*   ASTSGOT 97* 104*  --  105*   ALKPHOSPHAT 241* 241*  --  225*   TBILIRUBIN 11.5* 11.0*  --  11.1*   DBILIRUBIN 10.0*  --   --   --    LIPASE  --   --  33  --    GLUCOSE 79 158*  --  144*     Recent Labs     230 23  1154 23  0740 23  0350   RBC 3.10*  --  2.79*  --  2.82*   HEMOGLOBIN 9.3*  --  8.8*  --  8.8*   HEMATOCRIT 29.3*  --  26.6*  --  26.8*   PLATELETCT 68*  --  77*  --  93*   PROTHROMBTM  --  26.2*  --  26.0*  --    INR  --  2.50*  --  2.47*  --      Recent Labs     23  0515 23  0350   WBC 11.3* 9.6 10.0   NEUTSPOLYS 86.90* 83.30* 88.10*   LYMPHOCYTES 8.70* 8.80* 8.70*   MONOCYTES 0.90 2.60 0.00   EOSINOPHILS 1.70 0.90 0.80   BASOPHILS 0.90 0.00 0.80   ASTSGOT 97* 104* 105*   ALTSGPT 127* 104* 97*   ALKPHOSPHAT 241* 241* 225*   TBILIRUBIN 11.5* 11.0* 11.1*     Hemodynamics:  Temp (24hrs), Av.7 °C (98.1 °F), Min:36.4 °C (97.6 °F), Max:37.2 °C (99 °F)  Temperature: 36.6 °C (97.8 °F)  Pulse  Av.4  Min: 32  Max: 138   Blood Pressure: 93/54    Respiratory:    Respiration: (!) 21, Pulse Oximetry: 98 %      Given By:: Mask, Work Of Breathing / Effort: Mild  RUL Breath Sounds: Clear, RML Breath Sounds: Diminished, RLL Breath Sounds: Diminished, MARGARET Breath Sounds: Clear, LLL Breath Sounds: Diminished  Fluids:    Intake/Output Summary (Last 24 hours) at 7/31/2023 1517  Last data filed at 7/31/2023 1200  Gross per 24 hour   Intake 1525.05 ml   Output 950 ml   Net 575.05 ml       GI/Nutrition:  Orders Placed This Encounter   Procedures    Diet NPO Restrict to: Strict (okay to receive meds through the NG/OG tube)     Standing Status:   Standing     Number of Occurrences:   1     Order Specific Question:   Type:     Answer:   Now [1]     Order Specific Question:   Diet NPO Restrict to:     Answer:   Strict [1]     Comments:   okay to receive meds through the NG/OG tube    Diet: Diet Tube Feed; Formula: Impact Peptide 1.5; Goal Rate (mL/Hour): 60; Duration: 24 HR; Tube Feed Time Unit: Hours/Day     Advance to goal per protocol     Standing Status:   Standing     Number of Occurrences:   1     Order Specific Question:   Diet     Answer:   Diet Tube Feed [35]     Order Specific Question:   Formula:     Answer:   Impact Peptide 1.5     Order Specific Question:   Goal Rate (mL/Hour)     Answer:   60     Order Specific Question:   Duration     Answer:   24 HR     Order Specific Question:   Tube Feed Time Unit     Answer:   Hours/Day     Medical Decision Making, by Problem:  Active Hospital Problems    Diagnosis     *Acute hypoxemic respiratory failure (HCC) [J96.01]     Hemorrhagic shock (HCC) [R57.8]     Liver laceration, closed, initial encounter [S36.113A]     Perforated viscus [R19.8]     Acute deep venous thrombosis (DVT) determined by ultrasound (HCC) [I82.409]     Acute saddle pulmonary embolism (HCC) [I26.92]     Morbid obesity with BMI of 50.0-59.9, adult (HCC) [E66.01, Z68.43]     Shock (HCC) [R57.9]     Pancreatitis [K85.90]     Goals of care, counseling/discussion [Z71.89]     Thrombocytopenia (HCC) [D69.6]     Acute  hepatic necrosis [K72.00]     Acute blood loss anemia [D62]     Metabolic acidosis [E87.20]     Acute kidney injury (HCC) [N17.9]        Plan:  Follow       Quality-Core Measures

## 2023-08-01 NOTE — CARE PLAN
The patient is Watcher - Medium risk of patient condition declining or worsening    Shift Goals  Clinical Goals: wean levophed, mobilize, pulmonary toilet, q2 turns  Patient Goals: rest  Family Goals: goc discussion tomorrow      Problem: Pain - Standard  Goal: Alleviation of pain or a reduction in pain to the patient’s comfort goal  Outcome: Progressing     Problem: Knowledge Deficit - Standard  Goal: Patient and family/care givers will demonstrate understanding of plan of care, disease process/condition, diagnostic tests and medications  Outcome: Progressing     Problem: Nutrition  Goal: Patient's nutritional and fluid intake will be adequate or improve  Outcome: Progressing  Goal: Enteral nutrition will be maintained or improve  Outcome: Progressing  Goal: Enteral nutrition will be maintained or improve  Outcome: Progressing     Problem: Skin Integrity  Goal: Skin integrity is maintained or improved  Outcome: Progressing     Problem: Skin Care - Ostomy  Goal: Skin remains free from irritation  Outcome: Progressing     Problem: Fall Risk  Goal: Patient will remain free from falls  Outcome: Progressing     Problem: Optimal Care of the Patient with VTE  Goal: Peripheral tissue perfusion will improve  Outcome: Progressing  Goal: Complications related to the disease process, condition or treatment will be avoided or minimized  Outcome: Progressing  Goal: Symptoms of redness, swelling, and pain at the site of impaired tissue perfusion will improve  Outcome: Progressing     Problem: Knowledge Deficit - VTE  Goal: Patient and family/care givers will demonstrate understanding of plan of care, disease process/condition, diagnostic tests and medications  Outcome: Progressing         Problem: Respiratory  Goal: Patient will achieve/maintain optimum respiratory ventilation and gas exchange  Outcome: Not Progressing     Problem: Urinary Elimination  Goal: Establish and maintain regular urinary output  Outcome: Not  Progressing     Problem: Discharge Barriers/Self-Care - Ostomy  Goal: Ostomy patient's continuum of care needs will be met  Outcome: Not Progressing

## 2023-08-01 NOTE — PROGRESS NOTES
"Critical Care Progress Note    Date of admission  7/12/2023    Chief Complaint  \"63 y.o. female who presented 7/12/2023 with acute DVT, acute saddle PE and new pneumoperitoneum due to perforated viscus for what she was admitted and treated with thrombectomy, heparin ggt and IVC filter placement. She also underwent exploratory laparatomy (today day 5 post op) with left colectomy with end colostomy and s/p cholecystectomy with a wound vac in place.   I was called at the bedside due to hypotension and tachycardia.\" H+P 7/12    Hospital Course  7/13 s/p bilateral thrombectomy for saddle PE and IVC filter placement. Successful.   7/13 s/p left colectomy and end-colostomy, and cholecystectomy. Intraop found perforated colon at splenic flexure with associated mass. Concerning of malignancy. Remains intubated post op.   7/19: Admitted to ICU is hemorrhagic shock, MTP, OR for intrabdominal bleed, liver lac, returns to ICU in shock and intubated  7/20: VD 2-Hb stable, anuric, back to OR for removal of packing, colon resection, revision colostomy (to RUQ), vasoplegic with increasing pressors overnight  7/21: CRRT started  7/22: VD 4-weaned of pressors, following, increase UF to 100-->150 overnight  7/23: 2.5 L off yesterday, off pressors, very weak today, hypophos needs aggressive replacement, switch CRRT to iHD, mobilize, SBT/SAT tomorrow  7/24: HD/UF today, tolerating SBT, high risk for failing extubation.  Discussed with family today regarding GOC as previously documented  7/25: PUF again today; VD#7, awake and follows very weakly t/o.  7/26: plan for HD/UF today, awake and follows, moving arms off bed not; palliative consulted to assist with GOC d/w pt/family, plan  7/27: Extubated today; further discussion regarding goals of care  7/28 on low levels oxygen, low dose norepinephrine  7/29 continue on low levels of oxygen, low levels norepi, anuric getting iHD, increase mobility, CT scan of abdomen  7/31 low dose " levophed      Interval Problem Update  Reviewed last 24 hour events:  Awake and alert, intermittently regards  Tmax: afebrile  GI: TF at goal  Lines: left IJ dialysis, picc, wound vac  Resp: NC/-500, pep    Vte: heparin vte  Antibx: none  - 25L for hospital stay  Midodrine 10 mg Q8  Levo for MAP > 65      Review of Systems  Review of Systems   Unable to perform ROS: Critical illness        Vital Signs for last 24 hours   Temp:  [36.5 °C (97.7 °F)-36.8 °C (98.2 °F)] 36.6 °C (97.9 °F)  Pulse:  [] 100  Resp:  [15-61] 29  BP: ()/(46-64) 102/54  SpO2:  [88 %-99 %] 94 %    Hemodynamic parameters for last 24 hours       Respiratory Information for the last 24 hours       Physical Exam   Physical Exam  Vitals and nursing note reviewed.   Constitutional:       General: She is not in acute distress.     Appearance: She is obese. She is ill-appearing.   HENT:      Head: Normocephalic.      Nose: Nose normal.      Mouth/Throat:      Mouth: Mucous membranes are dry.   Eyes:      Extraocular Movements: Extraocular movements intact.      Pupils: Pupils are equal, round, and reactive to light.   Neck:      Comments: Left IJ dialysis cath  Cardiovascular:      Rate and Rhythm: Normal rate and regular rhythm.   Pulmonary:      Effort: Pulmonary effort is normal. No respiratory distress.      Breath sounds: No rhonchi or rales.   Abdominal:      General: There is no distension.      Palpations: Abdomen is soft.      Tenderness: There is no abdominal tenderness. There is no guarding.      Comments: Stoma that is dark/dusky and discolored, midline wound vac device, no rebound or guarding   Musculoskeletal:      Cervical back: Neck supple.      Right lower leg: Edema present.      Left lower leg: Edema present.      Comments: Improving edema   Skin:     General: Skin is warm.      Capillary Refill: Capillary refill takes 2 to 3 seconds.      Coloration: Skin is jaundiced.   Neurological:      Mental Status: She is  alert.      Comments: Awake and alert, follows, moves all 4 extremities, weak voice and cough   Psychiatric:         Mood and Affect: Mood normal.         Medications  Current Facility-Administered Medications   Medication Dose Route Frequency Provider Last Rate Last Admin    midodrine (Proamatine) tablet 10 mg  10 mg Enteral Tube Q8HRS Garrison Canas M.D.   10 mg at 08/01/23 0535    acetaminophen (Tylenol) tablet 650 mg  650 mg Enteral Tube Q4HRS PRN Garrison Canas M.D.        sodium chloride (Hyper-Sal) 7 % nebulizer solution 4 mL  4 mL Nebulization Q8HRS Kojo Wilder M.D.   4 mL at 08/01/23 0049    ipratropium-albuterol (DUONEB) nebulizer solution  3 mL Nebulization Q8HRS Kojo Wilder M.D.   3 mL at 08/01/23 0707    heparin injection 5,000 Units  5,000 Units Subcutaneous Q8HRS Kojo Wilder M.D.   5,000 Units at 08/01/23 0535    lidocaine (Xylocaine) 1 % injection 1 mL  1 mL Intradermal PRN Marita Buenrostro M.D.        oxyCODONE immediate-release (Roxicodone) tablet 5 mg  5 mg Enteral Tube Q4HRS PRN Cristian Peña M.D.   5 mg at 07/26/23 0838    promethazine (Phenergan) tablet 12.5-25 mg  12.5-25 mg Enteral Tube Q4HRS PRN Cristian Peña M.D.        levothyroxine (Synthroid) tablet 225 mcg  225 mcg Enteral Tube AM ES Cristian Peña M.D.   225 mcg at 08/01/23 0535    heparin injection 2,800 Units  2,800 Units Intracatheter PRN Marita Buenrostro M.D.   2,800 Units at 07/31/23 1240    insulin regular (HumuLIN R,NovoLIN R) injection  2-9 Units Subcutaneous Q6HRS Kerrie LEE Latona   2 Units at 08/01/23 0545    And    dextrose 50% (D50W) injection 25 g  25 g Intravenous Q15 MIN PRN Kerrie LEE Latona   25 g at 07/21/23 0225    norepinephrine (Levophed) 8 mg in 250 mL NS infusion (premix)  0-1 mcg/kg/min (Ideal) Intravenous Continuous Lam Louis M.D. 5.1 mL/hr at 08/01/23 0800 0.04 mcg/kg/min at 08/01/23 0800    Respiratory Therapy Consult   Nebulization Continuous RT Cristian Peña,  M.D.        senna-docusate (Pericolace Or Senokot S) 8.6-50 MG per tablet 2 Tablet  2 Tablet Enteral Tube BID Cristian Peña M.D.   2 Tablet at 08/01/23 0535    And    polyethylene glycol/lytes (Miralax) PACKET 1 Packet  1 Packet Enteral Tube QDAY PRN Cristian Peña M.D.        And    magnesium hydroxide (Milk Of Magnesia) suspension 30 mL  30 mL Enteral Tube QDAY PRN Cristian Peña M.D.        And    bisacodyl (Dulcolax) suppository 10 mg  10 mg Rectal QDAY PRN Cristian Peña M.D.        lidocaine (Xylocaine) 1 % injection 2 mL  2 mL Tracheal Tube Q30 MIN PRN Cristian Peña M.D.        ondansetron (Zofran) syringe/vial injection 4 mg  4 mg Intravenous Q4HRS PRN Ana Lilia Joy M.D.        promethazine (Phenergan) suppository 12.5-25 mg  12.5-25 mg Rectal Q4HRS PRN Ana Lilia Joy M.D.        prochlorperazine (Compazine) injection 5-10 mg  5-10 mg Intravenous Q4HRS PRN Ana Lilia Joy M.D.        Nozin nasal  swab  1 Applicator Each Nostril BID Dagoberto Ohara D.O.   1 Applicator at 08/01/23 0536       Fluids    Intake/Output Summary (Last 24 hours) at 8/1/2023 1045  Last data filed at 8/1/2023 0800  Gross per 24 hour   Intake 2155.68 ml   Output 4800 ml   Net -2644.32 ml       Laboratory            Recent Labs     07/30/23  0515 07/31/23  0350 08/01/23  0318   SODIUM 135 136 136   POTASSIUM 3.8 3.8 4.2   CHLORIDE 101 102 102   CO2 21 18* 20   BUN 45* 69* 60*   CREATININE 3.43* 4.89* 3.96*   MAGNESIUM 2.1 2.2 2.1   PHOSPHORUS 3.8 3.9 3.1   CALCIUM 7.6* 7.7* 7.7*     Recent Labs     07/30/23  0515 07/30/23  0740 07/31/23  0350 08/01/23  0318   ALTSGPT 104*  --  97* 107*   ASTSGOT 104*  --  105* 125*   ALKPHOSPHAT 241*  --  225* 232*   TBILIRUBIN 11.0*  --  11.1* 10.1*   LIPASE  --  33  --   --    GLUCOSE 158*  --  144* 123*     Recent Labs     07/30/23  0515 07/31/23  0350 08/01/23 0318   WBC 9.6 10.0 10.9*   NEUTSPOLYS 83.30* 88.10* 78.20*   LYMPHOCYTES 8.80* 8.70* 18.30*   MONOCYTES  2.60 0.00 0.90   EOSINOPHILS 0.90 0.80 1.70   BASOPHILS 0.00 0.80 0.00   ASTSGOT 104* 105* 125*   ALTSGPT 104* 97* 107*   ALKPHOSPHAT 241* 225* 232*   TBILIRUBIN 11.0* 11.1* 10.1*     Recent Labs     07/29/23  1154 07/30/23  0515 07/30/23  0740 07/31/23  0350 08/01/23  0318   RBC  --  2.79*  --  2.82* 3.04*   HEMOGLOBIN  --  8.8*  --  8.8* 9.4*   HEMATOCRIT  --  26.6*  --  26.8* 29.2*   PLATELETCT  --  77*  --  93* 119*   PROTHROMBTM 26.2*  --  26.0*  --   --    INR 2.50*  --  2.47*  --   --        Imaging  X-Ray:  I have personally reviewed the images and compared with prior images.      Assessment/Plan  * Acute hypoxemic respiratory failure (HCC)- (present on admission)  Assessment & Plan  Initially 7/13 Intubated for OR and 7/15 extubated, re-intubated 7/19 extubated 7/27  High risk of worsening respiratory status but is DNR/DNI    iHDS to achieve euvolemia   IS poor 250ml continue with PEP therapy and mobilization  Aspiration precautions    Shock (HCC)  Assessment & Plan  Completed antibiotics 7/30  Liver failure + Renal failure  Midodrine 10 mg TID  Levophed for MAP > 65    Pancreatitis  Assessment & Plan  Seen on most recent CT scan no pain or food intolerance  Supportive care    Goals of care, counseling/discussion  Assessment & Plan  Extensive discussion with patient's very caring and involved Sister Rani again on 7/24  Again confirmed, DNR status, on was a very vibrant and would be much more concerned with quality than quantity of life as previously documented.  Planning on repeat P UF today and likely extubate tomorrow.  DNR/DNI status.  If she does not do well with extubation, transition to comfort focused care for significant dyspnea or discomfort consistent with patient's prior stated wishes.  I have spoken with Silva extensively but there appears to be some differing opinion with other family members.  Palliative care consulted and will attempt to arrange family conference for further clarification of  goals of care and discussed with patient as able.    Thrombocytopenia (HCC)  Assessment & Plan  Improving platelet count no recent transfusion  Conservative transfusion protocol  Hold VTE ppx for platelets < 50  Trend      Acute hepatic necrosis  Assessment & Plan  Liver laceration and Ischemic hepatopathy due to shock, evidenced by markedly elevated transaminases, elevated Tbili, hypoalbunemia  Avoid hepatotoxins  Hepatocellular/cholestatic pattern in etiology      Hemorrhagic shock (HCC)- (present on admission)  Assessment & Plan  7/19: Overnight with worsening hypotension, obtundation and vasopressor requirement.  Bedside POCUS with underfilled hyperdynamic LV and FF in RUQ.  MTP intiated and patient taken to OR with ACGS. Found to have copious intrabdominal hemmorhage with a liver laceration near falciform ligament.  Packed left open  7/20: OR re opened, packing removed, fascia closed  No recurrent bleeding, HH stable      Acute blood loss anemia  Assessment & Plan  Intraabdominal bleed  S/P MTP and operative control  Conservative transfusion protocol  Trend    Perforated viscus- (present on admission)  Assessment & Plan  7/13 - ex lap with left hemicolectomy and end colostomy by Dr. Boateng  Found perforated colon at splenic fixture, associated with mass. Path neg for malignancy  Fecal peritonitis  7/30 zosyn stopped    Acute kidney injury (HCC)  Assessment & Plan  Ischemic ATN  7/21: Initiated CRRT for volume removal/ ultrafiltration after failed diuretic challenge  7/23: Switch CRRT to iHD to facilitate mobility and PT, still anuric  7/24 iHD  Renal dose meds, avoid nephrotoxins  Q shift bladder scan    Morbid obesity with BMI of 50.0-59.9, adult (HCC)- (present on admission)  Assessment & Plan  BMI 50, place her in high risk for increase morbidity during this admission    Acute saddle pulmonary embolism (HCC)- (present on admission)  Assessment & Plan  Originating from a right leg DVT. Pt is not a candidate  for thrombolytics given her pneumoperitoneum  7/13: S/p emergent thrombectomy of saddle PE with IVC placement.   7/19: Repeat TTE with normal LV function, poor RV visualization but likely dilated with reduced function, RAP low      Heparin vte started 7/29    Acute deep venous thrombosis (DVT) determined by ultrasound (HCC)- (present on admission)  Assessment & Plan  Holding heparin in the setting of massive intraabdominal hemorrhage, liver laceration and thrombocytopenia  S/p IVC filter placed this admission  Follow-up with vascular clinic for IVC filter retrieval when appropriate  Resume anticoagulation when appropriate       VTE:  Heparin  Ulcer: Not indicated  Lines: Central Line  Ongoing indication addressed    I have performed a physical exam and reviewed and updated ROS and Plan today (8/1/2023). In review of yesterday's note (7/31/2023), there are no changes except as documented above.     Discussed patient condition and risk of morbidity and/or mortality with RN, RT, Pharmacy, Code status disscussed, Charge nurse / hot rounds, and Patient    The patient remains critically ill.  Critical care time = 61 minutes in directly providing and coordinating critical care and extensive data review.  No time overlap and excludes procedures.

## 2023-08-01 NOTE — PROGRESS NOTES
"Inpatient Palliative Care     Location: TICU     HPI:     Ashley English is a 63 y.o. female who presented to the ED on 7/12/2023 with complaints of right leg pain x3 days, previously seen at urgent care for same complaint and was noted to have DVT on RLQ ultrasound.  CTA of the chest revealed acute saddle pulmonary embolus as well as pneumoperitoneum.  She is status post exploratory laparotomy with left hemicolectomy and end colostomy as well as cholecystectomy on 7/13/2023.  Intraoperatively, a perforated colon at splenic flexure with associated mass was identified, concerning for malignancy; however, pathology was negative.  Patient returned to ICU intubated postoperatively and was able to be extubated and transferred to the floor. Unfortunately,  7/19/2023 patient was found to be in hemorrhagic shock from intra-abdominal bleed and liver laceration, patient was reintubated and returned to ICU.  7/20/2023 patient returned to the OR for revision of colostomy and colon resection.  Started on CRRT 7/21/2023.  Patient now tolerating HD/UF. Patient was extubated on 7/27/2023 with decision to to reintubated.    Summary:     0815: Discussed case with Dr. Canas, updates appreciated. Voalte to ICU RN. Plan to meet with pt's family at 1000.    1000: PC APRN met with pt's sister, Rani, in conference room along with Dr. Canas and ICU RN, Florentin. Rani agreeable to [facility] discussion at this time. Dr. Canas provided clinical update and hospital review. Rani states \"I do not think she would want to live that way.\"  Rani states that she has not been able to have in-depth goals of care discussion with the patient.  However, she was able to confirm with Ashley during a moment of clarity, that she would not want to be reintubated.    Discussed the option of transitioning to comfort focused care in detail. Discussed no escalation of care, discontinuing diagnostics/labwork/monitoring/antibiotics, adding symptom management " medications, strong nursing care like positioning/oral care/suctioning, and possibility of taking appropriate PO for pleasure. Ashley verbalized understanding. She feels that this is what Ashley would want in this situation. She states that her entire family is in agreement with her on this. She would like to discuss this further with them and to determine at time to transition to comfort care.    Active listening, reflection, reminiscing, validation & normalization, and empathic support utilized throughout this encounter.  All questions answered and contact information provided, encouraged to call with any questions or concerns.     Plan:     1) Continue with DNAR/DNI status, anticipate transition to CC once Rani is able to speak with the remainder of family.    Thank you for allowing me the opportunity to participate in the care of Ms. English.     20 minutes spent discussing advanced care planning, this time excludes any other billed services.    I spent a total of 25 minutes reviewing medical records, direct face-to-face time with the patient and/or family, coordination of care, and documentation. This is separate from the time spent on advance care planning, which is documented above.     JADON Sotelo  Palliative Care Nurse Practitioner   [phone]

## 2023-08-01 NOTE — PROGRESS NOTES
Monitor Summary:  Normal Sinus Rhythm; agree with measurements below  12 hr chart check complete

## 2023-08-01 NOTE — PROGRESS NOTES
[facility] Progress Note         HD today x 3 hours per Dr. Najjar. Tx initiated at 0938 and ended at 1238      NET UF: 2800 ml    Tx tolerated but patient had an episode of hypotension. PCN started levophed and BP improved. Pt was S/E by Dr. Najjar at bedside. Blood returned, ports flushed with NS. Heparin 1000 units/ml used to lock catheter per designated amount. CVC ports clamped and capped. Aspirate heparin prior to next CVC use. See eflow sheets for further details.    Report given to CARISSA Lopez RN

## 2023-08-01 NOTE — PROGRESS NOTES
Nephrology Daily Progress Note    Date of Service  8/1/2023    Chief Complaint  63 y.o. female admitted 7/12/2023 with DVT, PE, perforated viscus, s/p ex-lap, IVC filter placement. Left colectomy, cholecystectomy, hemorrhagic shock, multiorgan failure  DUNG/ATN/anuric -on RRT  Initially on CRRT -switched to IHD    Interval Problem Update  7/30 -extubated  Somnolent this morning  No improvement in UOP  HD on MWF schedule  Complete PUF this morning -tolerated well  Still very edematous   7/31 patient opens eyes, she seems alert however not very communicative  Started on IV pressor earlier today  Seen and examined while getting HD.  8/1 patient still on IV pressors  Seen and examined while getting HD.    Review of Systems  Review of Systems   Unable to perform ROS: Medical condition        Physical Exam  Temp:  [36.5 °C (97.7 °F)-36.8 °C (98.2 °F)] 36.6 °C (97.9 °F)  Pulse:  [] 101  Resp:  [15-61] 25  BP: ()/(46-64) 104/58  SpO2:  [88 %-98 %] 97 %    Physical Exam  Vitals and nursing note reviewed.   Constitutional:       General: She is awake. She is not in acute distress.     Appearance: She is ill-appearing. She is not diaphoretic.   HENT:      Head: Normocephalic and atraumatic.      Right Ear: External ear normal.      Left Ear: External ear normal.      Nose: Nose normal. No rhinorrhea.      Mouth/Throat:      Pharynx: No oropharyngeal exudate or posterior oropharyngeal erythema.   Eyes:      General: No scleral icterus.        Right eye: No discharge.         Left eye: No discharge.      Conjunctiva/sclera: Conjunctivae normal.   Neck:      Vascular: No carotid bruit.   Cardiovascular:      Rate and Rhythm: Normal rate and regular rhythm.      Heart sounds: No murmur heard.  Pulmonary:      Effort: Pulmonary effort is normal. No respiratory distress.      Breath sounds: Normal breath sounds.   Abdominal:      General: Abdomen is flat. There is no distension.      Palpations: Abdomen is soft. There is  no mass.   Musculoskeletal:         General: No tenderness.      Cervical back: No rigidity. No muscular tenderness.      Right lower leg: No edema.      Left lower leg: No edema.   Skin:     General: Skin is warm and dry.      Coloration: Skin is not jaundiced.   Neurological:      General: No focal deficit present.      Mental Status: She is oriented to person, place, and time. Mental status is at baseline. She is lethargic.   Psychiatric:         Mood and Affect: Mood normal.         Behavior: Behavior normal.         Thought Content: Thought content normal.         Fluids    Intake/Output Summary (Last 24 hours) at 8/1/2023 1330  Last data filed at 8/1/2023 1200  Gross per 24 hour   Intake 2222.54 ml   Output 3500 ml   Net -1277.46 ml         Laboratory  Recent Labs     07/30/23  0515 07/31/23  0350 08/01/23  0318   WBC 9.6 10.0 10.9*   RBC 2.79* 2.82* 3.04*   HEMOGLOBIN 8.8* 8.8* 9.4*   HEMATOCRIT 26.6* 26.8* 29.2*   MCV 95.3 95.0 96.1   MCH 31.5 31.2 30.9   MCHC 33.1 32.8 32.2   RDW 73.4* 82.0* 83.6*   PLATELETCT 77* 93* 119*   MPV 11.6 10.9 11.5       Recent Labs     07/30/23  0515 07/31/23  0350 08/01/23  0318   SODIUM 135 136 136   POTASSIUM 3.8 3.8 4.2   CHLORIDE 101 102 102   CO2 21 18* 20   GLUCOSE 158* 144* 123*   BUN 45* 69* 60*   CREATININE 3.43* 4.89* 3.96*   CALCIUM 7.6* 7.7* 7.7*       Recent Labs     07/30/23  0740   INR 2.47*       No results for input(s): NTPROBNP in the last 72 hours.        Imaging  DX-CHEST-PORTABLE (1 VIEW)   Final Result      Stable low lung volumes and bibasilar parenchymal opacification. Overall, no change.      CT-ABDOMEN-PELVIS WITH   Final Result      1.  New inflammatory stranding involving the pancreatic bed with pancreatic enlargement and small amount of fluid within the peripancreatic region suggesting presence of pancreatitis. This is new from comparison.      2.  Interval decrease in amount of ascites.      3.  Worsening bibasilar atelectasis.      4.  Fatty  liver.      5.  Again seen mild diffuse bowel wall thickening. No evidence of bowel obstruction.      US-RUQ   Final Result      1.  Limited exam due to open abdominal wound.   2.  Heterogeneous echogenic liver most consistent with fatty infiltration.   3.  No biliary dilatation.      DX-ABDOMEN FOR TUBE PLACEMENT   Final Result      DHT tip is over the distal stomach or first portion of the duodenum.      DX-CHEST-PORTABLE (1 VIEW)   Final Result         1.  Pulmonary edema and/or infiltrates are identified, which are stable since the prior exam.      DX-CHEST-PORTABLE (1 VIEW)   Final Result         1.  Pulmonary edema and/or infiltrates are identified, which are stable since the prior exam.   2.  Trace bilateral pleural effusions      DX-CHEST-PORTABLE (1 VIEW)   Final Result         1.  Pulmonary edema and/or infiltrates are identified, which are stable since the prior exam.      DX-CHEST-PORTABLE (1 VIEW)   Final Result         1.  Mild edema and/or infiltrates.      DX-CHEST-PORTABLE (1 VIEW)   Final Result         1.  Pulmonary edema and/or infiltrates are identified, which are stable since the prior exam.   2.  Trace bilateral pleural effusions, stable      DX-CHEST-PORTABLE (1 VIEW)   Final Result      Stable examination.      DX-CHEST-PORTABLE (1 VIEW)   Final Result      No significant change      DX-ABDOMEN FOR TUBE PLACEMENT   Final Result      Enteric tube has been placed and the tip projects over the stomach.      DX-CHEST-LIMITED (1 VIEW)   Final Result      1.  Interval placement of a left-sided temporary dialysis catheter which terminates with the tip projecting over the expected location of the distal SVC.   2.  Stable mild interstitial pulmonary edema and bilateral basilar atelectasis and/or consolidation.      DX-CHEST-PORTABLE (1 VIEW)   Final Result         1.  Pulmonary edema and/or infiltrates are identified, which are stable since the prior exam.   2.  Trace bilateral pleural effusions       [id number] VIEW   Final Result      Severely suboptimal exam related to patient body habitus. No unexpected radiopaque foreign body seen.      DX-CHEST-PORTABLE (1 VIEW)   Final Result         1.  Pulmonary edema and/or infiltrates are identified, which are stable since the prior exam.   2.  Trace bilateral pleural effusions      EC-ECHOCARDIOGRAM COMPLETE W/ CONT   Final Result      DX-CHEST-PORTABLE (1 VIEW)   Final Result      1.  Pulmonary edema, worse than prior exam   2.  Slight increased inflation from prior.   3.  Supportive tubing as described above.      DX-CHEST-PORTABLE (1 VIEW)   Final Result         1.  Bibasilar atelectasis versus infiltrates.      CT-ABDOMEN-PELVIS WITH   Final Result         1.  Scattered moderate abdominal ascites.   2.  Trace bilateral pleural effusions, left greater than right   3.  Linear densities the bilateral lung bases, greater on the left, appearance suggests atelectasis, component of infiltrate not excluded.   4.  DVT in the right external iliac vein extending into the common femoral vein, compatible with reported history of DVT.   5.  Diverticulosis   6.  Hepatomegaly   7.  Diffuse hepatic steatosis      DX-CHEST-PORTABLE (1 VIEW)   Final Result         1.  Bibasilar atelectasis or evolving infiltrates.      IR-MIDLINE CATHETER INSERTION WO GUIDANCE > AGE 3   Final Result                  Ultrasound-guided midline placement performed by qualified nursing staff    as above.          IR-US GUIDED PIV   Final Result    Ultrasound-guided PERIPHERAL IV INSERTION performed by    qualified nursing staff as above.      DX-CHEST-PORTABLE (1 VIEW)   Final Result      Stable thoracic underinflation. Right IJ line adequate. Stable bibasilar pleural-parenchymal opacification      DX-CHEST-PORTABLE (1 VIEW)   Final Result      Stable thoracic underinflation. Right IJ line and ETT appear adequate. Slightly improved bibasilar pleural-parenchymal opacification      DX-CHEST-PORTABLE  (1 VIEW)   Final Result      Stable bibasilar pleural-parenchymal opacification and low lung volumes. Lines/tubes adequate.      DX-CHEST-LIMITED (1 VIEW)   Final Result         Endotracheal tube with tip projecting over the mid thoracic trachea.   Gastric drainage tube courses below diaphragm, tip is not seen.   Right central venous catheter with tip projecting over the expected area of the lower SVC.            EC-NAEEM W/O CONT   Final Result      DX-CHEST-LIMITED (1 VIEW)   Final Result      Bibasilar underinflation atelectasis which could obscure an additional process. This is similar to the prior study. Lung volumes are very low.      IR-INSERT IVC FILTER WITH IG & SI   Final Result      1.  Ultrasound guided access LEFT common femoral vein.   2.  BILATERAL selective pulmonary arteriogram demonstrating extensive bilateral pulmonary emboli.   3.  Successful BILATERAL pulmonary thrombectomy   4.  Large IVC at the level of the renal veins   5.  Successful placement of infrarenal IVC filter   6.  Pursestring suture should be removed in 6-12 hours.         Please note that this study required greater than 50% more than the usual procedure time due to the patient's anatomy and large habitus.            IR-THROMBO MECHANICAL ARTERY,INIT   Final Result      1.  Ultrasound guided access LEFT common femoral vein.   2.  BILATERAL selective pulmonary arteriogram demonstrating extensive bilateral pulmonary emboli.   3.  Successful BILATERAL pulmonary thrombectomy   4.  Large IVC at the level of the renal veins   5.  Successful placement of infrarenal IVC filter   6.  Pursestring suture should be removed in 6-12 hours.         Please note that this study required greater than 50% more than the usual procedure time due to the patient's anatomy and large habitus.            EC-ECHOCARDIOGRAM COMPLETE W/ CONT   Final Result      CT-ABDOMEN-PELVIS WITH   Final Result      1.  Pneumoperitoneum secondary to perforated viscus.  Splenic flexure colonic diverticulum may be the perforation site. Gastroduodenal ulcer ulcer is considered less likely.   2.  Hepatic steatosis.   3.  Moderate gallbladder distention and pericholecystic fat stranding.   4.  Small volume hyperdense ascites.   5.  Partially visualized right lower extremity DVT.      Findings were communicated to SALINA STOVER via Vo[employer] messaging system on 7/12/2023 11:03 PM.      CT-CTA CHEST PULMONARY ARTERY W/ RECONS   Final Result      1.  Saddle pulmonary embolus with associated right heart strain.   2.  Posterior pneumomediastinum secondary to pneumoperitoneum. Abdomen will be dictated separately.      Findings were communicated to SALINA STOVER via Vo[employer] messaging system on 7/12/2023 10:57 PM.      DX-CHEST-PORTABLE (1 VIEW)   Final Result      Mild atelectasis in the right lower lung. No focal consolidation. No effusions.              Assessment/Plan  1.DUNG/ATN/anuric: No sign of renal recovery at, requiring dialysis  2.Hypotension: Still on IV pressor   3.Electrolytes: well controlled.  4.Anemia.  5.Volume:overloaded/anasarca .  6.Metabolic acidosis.  7.  Hypoalbuminemia  Recs:  Continue to evaluate daily for the need of HD  Ultrafiltration as much as blood pressure tolerates  Renal diet  Daily BMP, CBC.  Renal dose all meds  Avoid nephrotoxins like NSAIDs.  Multisystem organ failure  Prognosis guarded.

## 2023-08-01 NOTE — PROGRESS NOTES
Hemodialysis done today, started @ 0925 and ended @ 1226 with net UF= 1500 ml. UF decreased due to dropping lower BP, Levophed adjusted with good result, Dr Najjar notified. VSS post HD. Report given to GALI Lopez. See flow sheet for details

## 2023-08-01 NOTE — PROGRESS NOTES
Surgical Progress Note    Author: Chris Lee M.D. Date & Time created: 2023   12:35 PM     Interval Events:  Dialysis today   Evolving dynamics of goal of care coy-reciated   Denies abdominal pain   Colostomy I think is functional and may be recovering   No immmediate surgery required   ROS  Hemodynamics:  Temp (24hrs), Av.6 °C (97.9 °F), Min:36.5 °C (97.7 °F), Max:36.8 °C (98.2 °F)  Temperature: 36.6 °C (97.9 °F)  Pulse  Av  Min: 32  Max: 138   Blood Pressure: 104/58    Respiratory:    Respiration: (!) 25, Pulse Oximetry: 97 %     Given By:: Mask, Work Of Breathing / Effort: Mild  RUL Breath Sounds: Clear, RML Breath Sounds: Diminished, RLL Breath Sounds: Diminished, MARGARET Breath Sounds: Clear, LLL Breath Sounds: Diminished  Neuro:  GCS       Fluids:    Intake/Output Summary (Last 24 hours) at 2023 1235  Last data filed at 2023 1200  Gross per 24 hour   Intake 2222.54 ml   Output 4800 ml   Net -2577.46 ml       Current Diet Order   Procedures    Diet NPO Restrict to: Strict (okay to receive meds through the NG/OG tube)    Diet: Diet Tube Feed; Formula: Impact Peptide 1.5; Goal Rate (mL/Hour): 60; Duration: 24 HR; Tube Feed Time Unit: Hours/Day     Physical Exam  Labs:  Recent Results (from the past 24 hour(s))   CRP Quantitive (Non-Cardiac)    Collection Time: 23  1:30 PM   Result Value Ref Range    Stat C-Reactive Protein 12.29 (H) 0.00 - 0.75 mg/dL   POCT glucose device results    Collection Time: 23  5:18 PM   Result Value Ref Range    POC Glucose, Blood 121 (H) 65 - 99 mg/dL   POCT glucose device results    Collection Time: 23 12:18 AM   Result Value Ref Range    POC Glucose, Blood 150 (H) 65 - 99 mg/dL   Comp Metabolic Panel    Collection Time: 23  3:18 AM   Result Value Ref Range    Sodium 136 135 - 145 mmol/L    Potassium 4.2 3.6 - 5.5 mmol/L    Chloride 102 96 - 112 mmol/L    Co2 20 20 - 33 mmol/L    Anion Gap 14.0 7.0 - 16.0    Glucose 123 (H) 65 - 99 mg/dL     Bun 60 (H) 8 - 22 mg/dL    Creatinine 3.96 (H) 0.50 - 1.40 mg/dL    Calcium 7.7 (L) 8.5 - 10.5 mg/dL    Correct Calcium 9.5 8.5 - 10.5 mg/dL    AST(SGOT) 125 (H) 12 - 45 U/L    ALT(SGPT) 107 (H) 2 - 50 U/L    Alkaline Phosphatase 232 (H) 30 - 99 U/L    Total Bilirubin 10.1 (H) 0.1 - 1.5 mg/dL    Albumin 1.8 (L) 3.2 - 4.9 g/dL    Total Protein 5.9 (L) 6.0 - 8.2 g/dL    Globulin 4.1 (H) 1.9 - 3.5 g/dL    A-G Ratio 0.4 g/dL   CBC WITH DIFFERENTIAL    Collection Time: 08/01/23  3:18 AM   Result Value Ref Range    WBC 10.9 (H) 4.8 - 10.8 K/uL    RBC 3.04 (L) 4.20 - 5.40 M/uL    Hemoglobin 9.4 (L) 12.0 - 16.0 g/dL    Hematocrit 29.2 (L) 37.0 - 47.0 %    MCV 96.1 81.4 - 97.8 fL    MCH 30.9 27.0 - 33.0 pg    MCHC 32.2 32.2 - 35.5 g/dL    RDW 83.6 (H) 35.9 - 50.0 fL    Platelet Count 119 (L) 164 - 446 K/uL    MPV 11.5 9.0 - 12.9 fL    Neutrophils-Polys 78.20 (H) 44.00 - 72.00 %    Lymphocytes 18.30 (L) 22.00 - 41.00 %    Monocytes 0.90 0.00 - 13.40 %    Eosinophils 1.70 0.00 - 6.90 %    Basophils 0.00 0.00 - 1.80 %    Nucleated RBC 0.30 (H) 0.00 - 0.20 /100 WBC    Neutrophils (Absolute) 8.52 (H) 1.82 - 7.42 K/uL    Lymphs (Absolute) 1.99 1.00 - 4.80 K/uL    Monos (Absolute) 0.10 0.00 - 0.85 K/uL    Eos (Absolute) 0.19 0.00 - 0.51 K/uL    Baso (Absolute) 0.00 0.00 - 0.12 K/uL    NRBC (Absolute) 0.03 K/uL    Anisocytosis 1+     Macrocytosis 1+    MAGNESIUM    Collection Time: 08/01/23  3:18 AM   Result Value Ref Range    Magnesium 2.1 1.5 - 2.5 mg/dL   PHOSPHORUS    Collection Time: 08/01/23  3:18 AM   Result Value Ref Range    Phosphorus 3.1 2.5 - 4.5 mg/dL   ESTIMATED GFR    Collection Time: 08/01/23  3:18 AM   Result Value Ref Range    GFR (CKD-EPI) 12 (A) >60 mL/min/1.73 m 2   DIFFERENTIAL MANUAL    Collection Time: 08/01/23  3:18 AM   Result Value Ref Range    Myelocytes 0.90 %    Manual Diff Status PERFORMED    PERIPHERAL SMEAR REVIEW    Collection Time: 08/01/23  3:18 AM   Result Value Ref Range    Peripheral Smear  Review see below    PLATELET ESTIMATE    Collection Time: 08/01/23  3:18 AM   Result Value Ref Range    Plt Estimation Decreased    MORPHOLOGY    Collection Time: 08/01/23  3:18 AM   Result Value Ref Range    RBC Morphology Present     Polychromia 1+     Poikilocytosis 1+     Target Cells 1+    POCT glucose device results    Collection Time: 08/01/23  5:44 AM   Result Value Ref Range    POC Glucose, Blood 156 (H) 65 - 99 mg/dL   POCT glucose device results    Collection Time: 08/01/23 11:46 AM   Result Value Ref Range    POC Glucose, Blood 125 (H) 65 - 99 mg/dL     Medical Decision Making, by Problem:  Active Hospital Problems    Diagnosis     Hemorrhagic shock (HCC) [R57.8]      Priority: High    Liver laceration, closed, initial encounter [S36.113A]      Priority: High    Perforated viscus [R19.8]      Priority: High    Acute deep venous thrombosis (DVT) determined by ultrasound (HCC) [I82.409]      Priority: High    Acute saddle pulmonary embolism (HCC) [I26.92]      Priority: High    Morbid obesity with BMI of 50.0-59.9, adult (HCC) [E66.01, Z68.43]      Priority: Low    Shock (HCC) [R57.9]     Pancreatitis [K85.90]     Goals of care, counseling/discussion [Z71.89]     Thrombocytopenia (HCC) [D69.6]     Acute hepatic necrosis [K72.00]     Acute blood loss anemia [D62]     Metabolic acidosis [E87.20]     Acute kidney injury (HCC) [N17.9]     Acute hypoxemic respiratory failure (HCC) [J96.01]      Plan:  follow    Quality Measures:  Quality-Core Measures

## 2023-08-01 NOTE — CARE PLAN
Problem: Pain - Standard  Goal: Alleviation of pain or a reduction in pain to the patient’s comfort goal  Outcome: Progressing     Problem: Respiratory  Goal: Patient will achieve/maintain optimum respiratory ventilation and gas exchange  Outcome: Progressing     Problem: Venous Thromboembolism (VTE) Prevention  Goal: The patient will remain free from venous thromboembolism (VTE)  Outcome: Progressing     Problem: Skin Integrity  Goal: Skin integrity is maintained or improved  Outcome: Progressing     Problem: Skin Care - Ostomy  Goal: Skin remains free from irritation  Outcome: Progressing     The patient is Watcher - Medium risk of patient condition declining or worsening    Shift Goals  Clinical Goals: wean levophed, mobilize, pulmonary toilet, q2 turns  Patient Goals: rest  Family Goals: goc discussion tomorrow    Progress made toward(s) clinical / shift goals:  mobilizing

## 2023-08-01 NOTE — CARE PLAN
Problem: Hyperinflation  Goal: Prevent or improve atelectasis  Description: Target End Date:  3 to 4 days    1. Instruct incentive spirometry usage  2.  Perform hyperinflation therapy as indicated  Outcome: Not Progressing     Problem: Bronchoconstriction  Goal: Improve in air movement and diminished wheezing  Description: Target End Date:  2 to 3 days    1.  Implement inhaled treatments  2.  Evaluate and manage medication effects  Outcome: Progressing     Problem: Bronchopulmonary Hygiene  Goal: Increase mobilization of retained secretions  Description: Target End Date:  2 to 3 days    1.  Perform bronchopulmonary therapy as indicated by assessment  2.  Perform airway suctioning  3.  Perform actions to maintain patient airway  Outcome: Not Progressing   Pt unable to perform PEP no wheezing noted

## 2023-08-01 NOTE — PROGRESS NOTES
I held a goals of care conference with palliative medicine, the bedside nurse, and the patient's sister, Rani.     After outlying Ashley's current state, her sister is confident she would not want to be kept alive with a such a low quality of life, dependent upon life support, and requiring extensive care with underlying organ dysfunction. She explained her sister's prior wishes which would be intolerant of her current debility, describing her as very social and active.     Rani also believes the rest of the family does not want to see Ashley suffer any longer - she plans to speak with them and finalize a timeline for comfort oriented care.     Garrison Canas M.D.

## 2023-08-01 NOTE — THERAPY
Speech Language Therapy Contact Note    Patient Name: Ashley English  Age:  63 y.o., Sex:  female  Medical Record #: [id number]  Today's Date: 8/1/2023    Discussed missed therapy with GALI Lerma.        08/01/23 1106   Treatment Variance   Reason For Missed Therapy Medical - Patient Is Not Medically Stable   Interdisciplinary Plan of Care Collaboration   IDT Collaboration with  Nursing   Collaboration Comments Per chart review, family considering comfort oriented measures and discussing timeline of such. RN reports patient is likely transitioning to comfort care this afternoon. Will continue to hold dysphagia management. Should patient transition to comfort care, please liberalize diet in alignment with patient wishes.

## 2023-08-02 NOTE — PROGRESS NOTES
After speaking with Ashley's sister, Rani at the bedside. The family would like to proceed with comfort care measures on Thursday 8/3/2023. aRni is to call Elsi DIOP with palliative care to discuss this tomorrow.

## 2023-08-02 NOTE — CARE PLAN
The patient is Watcher - Medium risk of patient condition declining or worsening    Shift Goals  Clinical Goals: wean levophed, mobility, rest  Patient Goals: SAMIA  Family Goals: pt's rest and comfort    Progress made toward(s) clinical / shift goals:    Problem: Pain - Standard  Goal: Alleviation of pain or a reduction in pain to the patient’s comfort goal  Outcome: Progressing     Problem: Nutrition  Goal: Enteral nutrition will be maintained or improve  Outcome: Progressing     Problem: Skin Care - Ostomy  Goal: Skin remains free from irritation  Outcome: Progressing       Patient is not progressing towards the following goals: N/A

## 2023-08-02 NOTE — CARE PLAN
The patient is Watcher - Medium risk of patient condition declining or worsening    Shift Goals  Clinical Goals: decrease vasoactive medications; mobility; pulmonary hygiene  Patient Goals: SAMIA (Pt unable to verbalize goals)  Family Goals: SAMIA (No family at bedside.)    Progress made toward(s) clinical / shift goals:      Problem: Pain - Standard  Goal: Alleviation of pain or a reduction in pain to the patient’s comfort goal  Outcome: Progressing     Problem: Knowledge Deficit - Standard  Goal: Patient and family/care givers will demonstrate understanding of plan of care, disease process/condition, diagnostic tests and medications  Outcome: Progressing     Problem: Respiratory  Goal: Patient will achieve/maintain optimum respiratory ventilation and gas exchange  Outcome: Progressing     Problem: Venous Thromboembolism (VTE) Prevention  Goal: The patient will remain free from venous thromboembolism (VTE)  Outcome: Progressing     Problem: Skin Care - Ostomy  Goal: Skin remains free from irritation  Outcome: Progressing     Problem: Fall Risk  Goal: Patient will remain free from falls  Outcome: Progressing       Patient is not progressing towards the following goals:

## 2023-08-02 NOTE — CARE PLAN
Problem: Hyperinflation  Goal: Prevent or improve atelectasis  Description: Target End Date:  3 to 4 days    1. Instruct incentive spirometry usage  2.  Perform hyperinflation therapy as indicated  Outcome: Not Progressing     Problem: Bronchoconstriction  Goal: Improve in air movement and diminished wheezing  Description: Target End Date:  2 to 3 days    1.  Implement inhaled treatments  2.  Evaluate and manage medication effects  Outcome: Not Progressing     Problem: Bronchopulmonary Hygiene  Goal: Increase mobilization of retained secretions  Description: Target End Date:  2 to 3 days    1.  Perform bronchopulmonary therapy as indicated by assessment  2.  Perform airway suctioning  3.  Perform actions to maintain patient airway  Outcome: Not Progressing   Pt unable to mobilize secretions. Frequent NT suctioning required.

## 2023-08-02 NOTE — PROGRESS NOTES
"Critical Care Progress Note    Date of admission  7/12/2023    Chief Complaint  \"63 y.o. female who presented 7/12/2023 with acute DVT, acute saddle PE and new pneumoperitoneum due to perforated viscus for what she was admitted and treated with thrombectomy, heparin ggt and IVC filter placement. She also underwent exploratory laparatomy (today day 5 post op) with left colectomy with end colostomy and s/p cholecystectomy with a wound vac in place.   I was called at the bedside due to hypotension and tachycardia.\" H+P 7/12    Hospital Course  7/13 s/p bilateral thrombectomy for saddle PE and IVC filter placement. Successful.   7/13 s/p left colectomy and end-colostomy, and cholecystectomy. Intraop found perforated colon at splenic flexure with associated mass. Concerning of malignancy. Remains intubated post op.   7/19: Admitted to ICU is hemorrhagic shock, MTP, OR for intrabdominal bleed, liver lac, returns to ICU in shock and intubated  7/20: VD 2-Hb stable, anuric, back to OR for removal of packing, colon resection, revision colostomy (to RUQ), vasoplegic with increasing pressors overnight  7/21: CRRT started  7/22: VD 4-weaned of pressors, following, increase UF to 100-->150 overnight  7/23: 2.5 L off yesterday, off pressors, very weak today, hypophos needs aggressive replacement, switch CRRT to iHD, mobilize, SBT/SAT tomorrow  7/24: HD/UF today, tolerating SBT, high risk for failing extubation.  Discussed with family today regarding GOC as previously documented  7/25: PUF again today; VD#7, awake and follows very weakly t/o.  7/26: plan for HD/UF today, awake and follows, moving arms off bed not; palliative consulted to assist with GOC d/w pt/family, plan  7/27: Extubated today; further discussion regarding goals of care  7/28 on low levels oxygen, low dose norepinephrine  7/29 continue on low levels of oxygen, low levels norepi, anuric getting iHD, increase mobility, CT scan of abdomen  7/31 low dose " levophed  8/1 low dose levo; iHDS; goals of care meeting  8/2 remains on low dose levo      Interval Problem Update  Reviewed last 24 hour events:  Awake and alert, intermittently regards  Tmax: afebrile  Bladder scan 40 cc this am  GI: TF at goal; 300 from ostomy  Lines: left IJ dialysis, picc   Resp: NC/-500, pep    Vte: heparin vte  Antibx: none  - 27.5L for hospital stay  Midodrine 10 mg Q8  Levo for MAP > 65      Review of Systems  Review of Systems   Unable to perform ROS: Critical illness        Vital Signs for last 24 hours   Temp:  [36.3 °C (97.4 °F)-36.8 °C (98.2 °F)] 36.3 °C (97.4 °F)  Pulse:  [] 100  Resp:  [20-38] 28  BP: ()/(50-59) 103/57  SpO2:  [91 %-98 %] 95 %    Hemodynamic parameters for last 24 hours       Respiratory Information for the last 24 hours       Physical Exam   Physical Exam  Vitals and nursing note reviewed.   Constitutional:       General: She is not in acute distress.     Appearance: She is obese. She is ill-appearing.   HENT:      Head: Normocephalic.      Nose: Nose normal.      Mouth/Throat:      Mouth: Mucous membranes are moist.   Eyes:      Extraocular Movements: Extraocular movements intact.      Pupils: Pupils are equal, round, and reactive to light.   Neck:      Comments: Left IJ dialysis cath  Cardiovascular:      Rate and Rhythm: Normal rate and regular rhythm.   Pulmonary:      Effort: Pulmonary effort is normal. No respiratory distress.      Breath sounds: No rhonchi or rales.   Abdominal:      General: There is no distension.      Palpations: Abdomen is soft.      Tenderness: There is no abdominal tenderness. There is no guarding.      Comments: Stoma that is dark/dusky and discolored    Musculoskeletal:      Cervical back: Neck supple.      Right lower leg: Edema present.      Left lower leg: Edema present.      Comments: Improving edema   Skin:     General: Skin is warm.      Capillary Refill: Capillary refill takes less than 2 seconds.       Coloration: Skin is jaundiced.   Neurological:      Mental Status: She is alert.      Comments: Awake and alert, follows, moves all 4 extremities, weak voice and cough   Psychiatric:         Mood and Affect: Mood normal.         Medications  Current Facility-Administered Medications   Medication Dose Route Frequency Provider Last Rate Last Admin    midodrine (Proamatine) tablet 10 mg  10 mg Enteral Tube Q8HRS Garrison Canas M.D.   10 mg at 08/02/23 0550    acetaminophen (Tylenol) tablet 650 mg  650 mg Enteral Tube Q4HRS PRN Garrison Canas M.D.        sodium chloride (Hyper-Sal) 7 % nebulizer solution 4 mL  4 mL Nebulization Q8HRS Kojo Wilder M.D.   4 mL at 08/01/23 2204    ipratropium-albuterol (DUONEB) nebulizer solution  3 mL Nebulization Q8HRS Kojo Wilder M.D.   3 mL at 08/02/23 0624    heparin injection 5,000 Units  5,000 Units Subcutaneous Q8HRS Kojo Wilder M.D.   5,000 Units at 08/02/23 0550    lidocaine (Xylocaine) 1 % injection 1 mL  1 mL Intradermal PRN Marita Buenrostro M.D.        oxyCODONE immediate-release (Roxicodone) tablet 5 mg  5 mg Enteral Tube Q4HRS PRN Cristian Peña M.D.   5 mg at 07/26/23 0838    promethazine (Phenergan) tablet 12.5-25 mg  12.5-25 mg Enteral Tube Q4HRS PRN Cristian Peña M.D.        levothyroxine (Synthroid) tablet 225 mcg  225 mcg Enteral Tube AM ES Cristian Peña M.D.   225 mcg at 08/02/23 0554    heparin injection 2,800 Units  2,800 Units Intracatheter PRN Marita Buenrostro M.D.   2,800 Units at 08/01/23 1200    insulin regular (HumuLIN R,NovoLIN R) injection  2-9 Units Subcutaneous Q6HRS Kerrie LEE Latona   2 Units at 08/01/23 1741    And    dextrose 50% (D50W) injection 25 g  25 g Intravenous Q15 MIN PRN Kerrie LEE Latona   25 g at 07/21/23 0225    norepinephrine (Levophed) 8 mg in 250 mL NS infusion (premix)  0-1 mcg/kg/min (Ideal) Intravenous Continuous Lam Louis M.D. 3.9 mL/hr at 08/02/23 0600 0.03 mcg/kg/min at 08/02/23 0600     Respiratory Therapy Consult   Nebulization Continuous RT Cristian Peña M.D.        senna-docusate (Pericolace Or Senokot S) 8.6-50 MG per tablet 2 Tablet  2 Tablet Enteral Tube BID Cristian Peña M.D.   2 Tablet at 08/02/23 0551    And    polyethylene glycol/lytes (Miralax) PACKET 1 Packet  1 Packet Enteral Tube QDAY PRN Cristian Peña M.D.        And    magnesium hydroxide (Milk Of Magnesia) suspension 30 mL  30 mL Enteral Tube QDAY PRN Cristian Peña M.D.        And    bisacodyl (Dulcolax) suppository 10 mg  10 mg Rectal QDAY PRN Cristian Peña M.D.        lidocaine (Xylocaine) 1 % injection 2 mL  2 mL Tracheal Tube Q30 MIN PRN Cristian Peña M.D.        ondansetron (Zofran) syringe/vial injection 4 mg  4 mg Intravenous Q4HRS PRN Ana Lilia Joy M.D.        promethazine (Phenergan) suppository 12.5-25 mg  12.5-25 mg Rectal Q4HRS PRN Ana Lilia Joy M.D.        prochlorperazine (Compazine) injection 5-10 mg  5-10 mg Intravenous Q4HRS PRN Ana Lilia Joy M.D.        Nozin nasal  swab  1 Applicator Each Nostril BID Dagoberto Ohara D.O.   1 Applicator at 08/02/23 0600       Fluids    Intake/Output Summary (Last 24 hours) at 8/2/2023 0902  Last data filed at 8/2/2023 0600  Gross per 24 hour   Intake 1913.16 ml   Output 3325 ml   Net -1411.84 ml       Laboratory            Recent Labs     07/31/23  0350 08/01/23  0318 08/02/23  0409   SODIUM 136 136 137   POTASSIUM 3.8 4.2 3.9   CHLORIDE 102 102 101   CO2 18* 20 24   BUN 69* 60* 60*   CREATININE 4.89* 3.96* 3.82*   MAGNESIUM 2.2 2.1 2.1   PHOSPHORUS 3.9 3.1 3.0   CALCIUM 7.7* 7.7* 8.1*     Recent Labs     07/31/23  0350 08/01/23 0318 08/02/23  0409   ALTSGPT 97* 107* 102*   ASTSGOT 105* 125* 136*   ALKPHOSPHAT 225* 232* 230*   TBILIRUBIN 11.1* 10.1* 8.9*   GLUCOSE 144* 123* 141*     Recent Labs     07/31/23  0350 08/01/23 0318 08/02/23  0409   WBC 10.0 10.9* 10.3   NEUTSPOLYS 88.10* 78.20* 81.20*   LYMPHOCYTES 8.70* 18.30* 11.10*    MONOCYTES 0.00 0.90 2.60   EOSINOPHILS 0.80 1.70 0.80   BASOPHILS 0.80 0.00 0.80   ASTSGOT 105* 125* 136*   ALTSGPT 97* 107* 102*   ALKPHOSPHAT 225* 232* 230*   TBILIRUBIN 11.1* 10.1* 8.9*     Recent Labs     07/31/23  0350 08/01/23  0318 08/02/23  0409   RBC 2.82* 3.04* 2.97*   HEMOGLOBIN 8.8* 9.4* 9.3*   HEMATOCRIT 26.8* 29.2* 29.0*   PLATELETCT 93* 119* 137*       Imaging  X-Ray:  I have personally reviewed the images and compared with prior images.      Assessment/Plan  * Acute hypoxemic respiratory failure (HCC)- (present on admission)  Assessment & Plan  Initially 7/13 Intubated for OR and 7/15 extubated, re-intubated 7/19 extubated 7/27  High risk of worsening respiratory status but is DNR/DNI    iHDS to achieve euvolemia   IS poor; PEP therapy and mobilization  Aspiration precautions    Shock (HCC)  Assessment & Plan  Completed antibiotics 7/30  Liver failure + Renal failure  Midodrine 10 mg TID  Levophed for MAP > 65    Pancreatitis  Assessment & Plan  Seen on most recent CT scan no pain or food intolerance  Supportive care    Goals of care, counseling/discussion  Assessment & Plan  Ongoing discussions, last of which 8/1 with palliative medicine, bedside nurse, and Rani. Ultimately Rani feels her sister would be unaccepting of this quality of life and there are discussions to move toward comfort care.     Thrombocytopenia (HCC)  Assessment & Plan  Improving platelet count no recent transfusion  Conservative transfusion protocol  Hold VTE ppx for platelets < 50  Trend      Acute hepatic necrosis  Assessment & Plan  Liver laceration and Ischemic hepatopathy due to shock, evidenced by markedly elevated transaminases, elevated Tbili, hypoalbunemia  Avoid hepatotoxins  Hepatocellular/cholestatic pattern in etiology      Hemorrhagic shock (HCC)- (present on admission)  Assessment & Plan  7/19: Overnight with worsening hypotension, obtundation and vasopressor requirement.  Bedside POCUS with underfilled  hyperdynamic LV and FF in RUQ.  MTP intiated and patient taken to OR with ACGS. Found to have copious intrabdominal hemmorhage with a liver laceration near falciform ligament.  Packed left open  7/20: OR re opened, packing removed, fascia closed  No recurrent bleeding, HH stable      Acute blood loss anemia  Assessment & Plan  Intraabdominal bleed  S/P MTP and operative control  Conservative transfusion protocol  Trend    Perforated viscus- (present on admission)  Assessment & Plan  7/13 - ex lap with left hemicolectomy and end colostomy by Dr. Boateng  Found perforated colon at splenic fixture, associated with mass. Path neg for malignancy  Fecal peritonitis  7/30 zosyn stopped    Acute kidney injury (HCC)  Assessment & Plan  Ischemic ATN  7/21: Initiated CRRT for volume removal/ ultrafiltration after failed diuretic challenge  7/23: Switch CRRT to iHD to facilitate mobility and PT, still anuric  7/24 iHD  Renal dose meds, avoid nephrotoxins  Q shift bladder scan    Morbid obesity with BMI of 50.0-59.9, adult (HCC)- (present on admission)  Assessment & Plan  BMI 50, place her in high risk for increase morbidity during this admission    Acute saddle pulmonary embolism (HCC)- (present on admission)  Assessment & Plan  Originating from a right leg DVT. Pt is not a candidate for thrombolytics given her pneumoperitoneum  7/13: S/p emergent thrombectomy of saddle PE with IVC placement.   7/19: Repeat TTE with normal LV function, poor RV visualization but likely dilated with reduced function, RAP low      Heparin vte started 7/29    Acute deep venous thrombosis (DVT) determined by ultrasound (HCC)- (present on admission)  Assessment & Plan  Holding heparin in the setting of massive intraabdominal hemorrhage, liver laceration and thrombocytopenia  S/p IVC filter placed this admission  Follow-up with vascular clinic for IVC filter retrieval when appropriate  Resume anticoagulation when appropriate       VTE:  Heparin  Ulcer:  Not indicated  Lines: Central Line  Ongoing indication addressed    I have performed a physical exam and reviewed and updated ROS and Plan today (8/2/2023). In review of yesterday's note (8/1/2023), there are no changes except as documented above.     Discussed patient condition and risk of morbidity and/or mortality with RN, RT, Pharmacy, Code status disscussed, Charge nurse / hot rounds, and Patient    The patient remains critically ill.  Critical care time = 57 minutes in directly providing and coordinating critical care and extensive data review.  No time overlap and excludes procedures.

## 2023-08-02 NOTE — PROGRESS NOTES
[facility] Progress Note       HD ordered by Dr. Najjar. Treatment started at 1105 and ended at 1405.    Net UF removed: 2300mL.     Pt tolerated treatment well. Had one episode of hypotension during last 30 minutes of HD, primary RN titrated Levo and UFG decreased. See flow sheet for details. CVC patent, locked with Heparin 1:1000 units; 1.4 mL to RED port and 1.4 mL to BLUE port post dialysis. No s/s of infection present. Dressing in place, CDI. Report given to MARY Clements RN.

## 2023-08-02 NOTE — PROGRESS NOTES
Trauma / Surgical Daily Progress Note    Date of Service  8/2/2023    Chief Complaint  63 y.o. female admitted 7/12/2023 with trauma and renal failure    Interval Events  Remains on vasopressors.   Medical intensive care managing at this point  Guarded prognosis    Review of Systems  Review of Systems     Vital Signs for last 24 hours  Temp:  [36.3 °C (97.4 °F)-36.8 °C (98.2 °F)] 36.3 °C (97.4 °F)  Pulse:  [] 100  Resp:  [20-38] 28  BP: ()/(50-59) 103/57  SpO2:  [91 %-98 %] 95 %    Hemodynamic parameters for last 24 hours       Respiratory Data     Respiration: (!) 28, Pulse Oximetry: 95 %     Given By:: Mask, Work Of Breathing / Effort: Mild  RUL Breath Sounds: Clear, RML Breath Sounds: Diminished, RLL Breath Sounds: Rhonchi, MARGARET Breath Sounds: Diminished, LLL Breath Sounds: Rhonchi    Physical Exam  Physical Exam    Laboratory  Recent Results (from the past 24 hour(s))   POCT glucose device results    Collection Time: 08/01/23 11:46 AM   Result Value Ref Range    POC Glucose, Blood 125 (H) 65 - 99 mg/dL   POCT glucose device results    Collection Time: 08/01/23  5:38 PM   Result Value Ref Range    POC Glucose, Blood 152 (H) 65 - 99 mg/dL   POCT glucose device results    Collection Time: 08/01/23 11:13 PM   Result Value Ref Range    POC Glucose, Blood 144 (H) 65 - 99 mg/dL   Comp Metabolic Panel    Collection Time: 08/02/23  4:09 AM   Result Value Ref Range    Sodium 137 135 - 145 mmol/L    Potassium 3.9 3.6 - 5.5 mmol/L    Chloride 101 96 - 112 mmol/L    Co2 24 20 - 33 mmol/L    Anion Gap 12.0 7.0 - 16.0    Glucose 141 (H) 65 - 99 mg/dL    Bun 60 (H) 8 - 22 mg/dL    Creatinine 3.82 (H) 0.50 - 1.40 mg/dL    Calcium 8.1 (L) 8.5 - 10.5 mg/dL    Correct Calcium 9.8 8.5 - 10.5 mg/dL    AST(SGOT) 136 (H) 12 - 45 U/L    ALT(SGPT) 102 (H) 2 - 50 U/L    Alkaline Phosphatase 230 (H) 30 - 99 U/L    Total Bilirubin 8.9 (H) 0.1 - 1.5 mg/dL    Albumin 1.9 (L) 3.2 - 4.9 g/dL    Total Protein 6.3 6.0 - 8.2 g/dL     Globulin 4.4 (H) 1.9 - 3.5 g/dL    A-G Ratio 0.4 g/dL   CBC WITH DIFFERENTIAL    Collection Time: 08/02/23  4:09 AM   Result Value Ref Range    WBC 10.3 4.8 - 10.8 K/uL    RBC 2.97 (L) 4.20 - 5.40 M/uL    Hemoglobin 9.3 (L) 12.0 - 16.0 g/dL    Hematocrit 29.0 (L) 37.0 - 47.0 %    MCV 97.6 81.4 - 97.8 fL    MCH 31.3 27.0 - 33.0 pg    MCHC 32.1 (L) 32.2 - 35.5 g/dL    RDW 87.6 (H) 35.9 - 50.0 fL    Platelet Count 137 (L) 164 - 446 K/uL    MPV 11.0 9.0 - 12.9 fL    Neutrophils-Polys 81.20 (H) 44.00 - 72.00 %    Lymphocytes 11.10 (L) 22.00 - 41.00 %    Monocytes 2.60 0.00 - 13.40 %    Eosinophils 0.80 0.00 - 6.90 %    Basophils 0.80 0.00 - 1.80 %    Nucleated RBC 0.60 (H) 0.00 - 0.20 /100 WBC    Neutrophils (Absolute) 8.46 (H) 1.82 - 7.42 K/uL    Lymphs (Absolute) 1.14 1.00 - 4.80 K/uL    Monos (Absolute) 0.27 0.00 - 0.85 K/uL    Eos (Absolute) 0.08 0.00 - 0.51 K/uL    Baso (Absolute) 0.08 0.00 - 0.12 K/uL    NRBC (Absolute) 0.06 K/uL    Anisocytosis 2+ (A)     Macrocytosis 2+ (A)    MAGNESIUM    Collection Time: 08/02/23  4:09 AM   Result Value Ref Range    Magnesium 2.1 1.5 - 2.5 mg/dL   PHOSPHORUS    Collection Time: 08/02/23  4:09 AM   Result Value Ref Range    Phosphorus 3.0 2.5 - 4.5 mg/dL   ESTIMATED GFR    Collection Time: 08/02/23  4:09 AM   Result Value Ref Range    GFR (CKD-EPI) 13 (A) >60 mL/min/1.73 m 2   DIFFERENTIAL MANUAL    Collection Time: 08/02/23  4:09 AM   Result Value Ref Range    Bands-Stabs 0.90 0.00 - 10.00 %    Metamyelocytes 0.90 %    Myelocytes 1.70 %    Manual Diff Status PERFORMED    PERIPHERAL SMEAR REVIEW    Collection Time: 08/02/23  4:09 AM   Result Value Ref Range    Peripheral Smear Review see below    PLATELET ESTIMATE    Collection Time: 08/02/23  4:09 AM   Result Value Ref Range    Plt Estimation Decreased    MORPHOLOGY    Collection Time: 08/02/23  4:09 AM   Result Value Ref Range    RBC Morphology Present     Poikilocytosis 1+     Target Cells 1+        Fluids    Intake/Output  Summary (Last 24 hours) at 8/2/2023 0859  Last data filed at 8/2/2023 0600  Gross per 24 hour   Intake 1913.16 ml   Output 3325 ml   Net -1411.84 ml       Core Measures & Quality Metrics  Core Measures & Quality Metrics  RAP Score Total: 0    ETOH Screening    Assessment/Plan  Liver laceration, closed, initial encounter- (present on admission)  Assessment & Plan  Repoening of previous exploratory laparotomy.  Abdominal washout, hepatorrhaphy, packing of the abdomen, and temporary abdominal closure with negative pressure wound therapy device greater than 50 cm²    Hemorrhagic shock (HCC)- (present on admission)  Assessment & Plan  7/19  Received 15 units of PRBC, 12 units of FFP and 3 units of platelets.    Perforated viscus- (present on admission)  Assessment & Plan  7/13 Left hemicolectomy with end colostomy/Delvis's procedure, cholecystectomy.    Acute saddle pulmonary embolism (HCC)- (present on admission)  Assessment & Plan  Extensive clot burden within the right and left pulmonary arteries and their segmental and subsegmental branches seen on the admit CTA with radiographic suggestion of right heart strain  7/13 Intraoperative NAEEM.  Grossly normal biventricular function.  Heparin weight based bolus and infusion commenced at admit    Acute deep venous thrombosis (DVT) determined by ultrasound ([facility])- (present on admission)  Assessment & Plan  Clot present from the right common femoral vein down the entire leg  Heparin weight based bolus and infusion commenced on admit.  7/19 Heparin drip stopped secondary to bleeding.

## 2023-08-02 NOTE — PROGRESS NOTES
Nephrology Daily Progress Note    Date of Service  8/2/2023    Chief Complaint  63 y.o. female admitted 7/12/2023 with DVT, PE, perforated viscus, s/p ex-lap, IVC filter placement. Left colectomy, cholecystectomy, hemorrhagic shock, multiorgan failure  DUNG/ATN/anuric -on RRT  Initially on CRRT -switched to IHD    Interval Problem Update  7/30 -extubated  Somnolent this morning  No improvement in UOP  HD on MWF schedule  Complete PUF this morning -tolerated well  Still very edematous   7/31 patient opens eyes, she seems alert however not very communicative  Started on IV pressor earlier today  Seen and examined while getting HD.  8/1 patient still on IV pressors  Seen and examined while getting HD.  8/2 patient is doing about the same, still on IV pressors  Seen and examined while getting HD.    Review of Systems  Review of Systems   Unable to perform ROS: Medical condition        Physical Exam  Temp:  [36.3 °C (97.4 °F)-36.6 °C (97.8 °F)] 36.6 °C (97.8 °F)  Pulse:  [] 109  Resp:  [20-49] 49  BP: ()/(50-65) 94/51  SpO2:  [89 %-96 %] 94 %    Physical Exam  Vitals and nursing note reviewed.   Constitutional:       General: She is awake. She is not in acute distress.     Appearance: She is well-developed. She is ill-appearing. She is not diaphoretic.   HENT:      Head: Normocephalic and atraumatic.      Right Ear: External ear normal.      Left Ear: External ear normal.      Nose: Nose normal. No rhinorrhea.      Mouth/Throat:      Pharynx: No oropharyngeal exudate or posterior oropharyngeal erythema.   Eyes:      General: No scleral icterus.        Right eye: No discharge.         Left eye: No discharge.      Conjunctiva/sclera: Conjunctivae normal.   Neck:      Vascular: No carotid bruit.   Cardiovascular:      Rate and Rhythm: Normal rate and regular rhythm.      Heart sounds: No murmur heard.  Pulmonary:      Effort: Pulmonary effort is normal. No respiratory distress.      Breath sounds: Normal breath  sounds.   Abdominal:      General: Abdomen is flat. There is no distension.      Palpations: Abdomen is soft. There is no mass.   Musculoskeletal:         General: No tenderness.      Cervical back: No rigidity. No muscular tenderness.      Right lower leg: Edema present.      Left lower leg: Edema present.   Skin:     General: Skin is warm and dry.      Coloration: Skin is not jaundiced.   Neurological:      General: No focal deficit present.      Mental Status: She is alert and oriented to person, place, and time. Mental status is at baseline.   Psychiatric:         Mood and Affect: Mood normal.         Behavior: Behavior normal.         Thought Content: Thought content normal.         Fluids    Intake/Output Summary (Last 24 hours) at 8/2/2023 1253  Last data filed at 8/2/2023 1000  Gross per 24 hour   Intake 1286.39 ml   Output 1350 ml   Net -63.61 ml         Laboratory  Recent Labs     07/31/23  0350 08/01/23  0318 08/02/23  0409   WBC 10.0 10.9* 10.3   RBC 2.82* 3.04* 2.97*   HEMOGLOBIN 8.8* 9.4* 9.3*   HEMATOCRIT 26.8* 29.2* 29.0*   MCV 95.0 96.1 97.6   MCH 31.2 30.9 31.3   MCHC 32.8 32.2 32.1*   RDW 82.0* 83.6* 87.6*   PLATELETCT 93* 119* 137*   MPV 10.9 11.5 11.0       Recent Labs     07/31/23  0350 08/01/23  0318 08/02/23  0409   SODIUM 136 136 137   POTASSIUM 3.8 4.2 3.9   CHLORIDE 102 102 101   CO2 18* 20 24   GLUCOSE 144* 123* 141*   BUN 69* 60* 60*   CREATININE 4.89* 3.96* 3.82*   CALCIUM 7.7* 7.7* 8.1*             No results for input(s): NTPROBNP in the last 72 hours.        Imaging  DX-CHEST-PORTABLE (1 VIEW)   Final Result      Stable low lung volumes and bibasilar parenchymal opacification. Overall, no change.      CT-ABDOMEN-PELVIS WITH   Final Result      1.  New inflammatory stranding involving the pancreatic bed with pancreatic enlargement and small amount of fluid within the peripancreatic region suggesting presence of pancreatitis. This is new from comparison.      2.  Interval decrease in  amount of ascites.      3.  Worsening bibasilar atelectasis.      4.  Fatty liver.      5.  Again seen mild diffuse bowel wall thickening. No evidence of bowel obstruction.      US-RUQ   Final Result      1.  Limited exam due to open abdominal wound.   2.  Heterogeneous echogenic liver most consistent with fatty infiltration.   3.  No biliary dilatation.      DX-ABDOMEN FOR TUBE PLACEMENT   Final Result      DHT tip is over the distal stomach or first portion of the duodenum.      DX-CHEST-PORTABLE (1 VIEW)   Final Result         1.  Pulmonary edema and/or infiltrates are identified, which are stable since the prior exam.      DX-CHEST-PORTABLE (1 VIEW)   Final Result         1.  Pulmonary edema and/or infiltrates are identified, which are stable since the prior exam.   2.  Trace bilateral pleural effusions      DX-CHEST-PORTABLE (1 VIEW)   Final Result         1.  Pulmonary edema and/or infiltrates are identified, which are stable since the prior exam.      DX-CHEST-PORTABLE (1 VIEW)   Final Result         1.  Mild edema and/or infiltrates.      DX-CHEST-PORTABLE (1 VIEW)   Final Result         1.  Pulmonary edema and/or infiltrates are identified, which are stable since the prior exam.   2.  Trace bilateral pleural effusions, stable      DX-CHEST-PORTABLE (1 VIEW)   Final Result      Stable examination.      DX-CHEST-PORTABLE (1 VIEW)   Final Result      No significant change      DX-ABDOMEN FOR TUBE PLACEMENT   Final Result      Enteric tube has been placed and the tip projects over the stomach.      DX-CHEST-LIMITED (1 VIEW)   Final Result      1.  Interval placement of a left-sided temporary dialysis catheter which terminates with the tip projecting over the expected location of the distal SVC.   2.  Stable mild interstitial pulmonary edema and bilateral basilar atelectasis and/or consolidation.      DX-CHEST-PORTABLE (1 VIEW)   Final Result         1.  Pulmonary edema and/or infiltrates are identified, which  are stable since the prior exam.   2.  Trace bilateral pleural effusions      [id number] VIEW   Final Result      Severely suboptimal exam related to patient body habitus. No unexpected radiopaque foreign body seen.      DX-CHEST-PORTABLE (1 VIEW)   Final Result         1.  Pulmonary edema and/or infiltrates are identified, which are stable since the prior exam.   2.  Trace bilateral pleural effusions      EC-ECHOCARDIOGRAM COMPLETE W/ CONT   Final Result      DX-CHEST-PORTABLE (1 VIEW)   Final Result      1.  Pulmonary edema, worse than prior exam   2.  Slight increased inflation from prior.   3.  Supportive tubing as described above.      DX-CHEST-PORTABLE (1 VIEW)   Final Result         1.  Bibasilar atelectasis versus infiltrates.      CT-ABDOMEN-PELVIS WITH   Final Result         1.  Scattered moderate abdominal ascites.   2.  Trace bilateral pleural effusions, left greater than right   3.  Linear densities the bilateral lung bases, greater on the left, appearance suggests atelectasis, component of infiltrate not excluded.   4.  DVT in the right external iliac vein extending into the common femoral vein, compatible with reported history of DVT.   5.  Diverticulosis   6.  Hepatomegaly   7.  Diffuse hepatic steatosis      DX-CHEST-PORTABLE (1 VIEW)   Final Result         1.  Bibasilar atelectasis or evolving infiltrates.      IR-MIDLINE CATHETER INSERTION WO GUIDANCE > AGE 3   Final Result                  Ultrasound-guided midline placement performed by qualified nursing staff    as above.          IR-US GUIDED PIV   Final Result    Ultrasound-guided PERIPHERAL IV INSERTION performed by    qualified nursing staff as above.      DX-CHEST-PORTABLE (1 VIEW)   Final Result      Stable thoracic underinflation. Right IJ line adequate. Stable bibasilar pleural-parenchymal opacification      DX-CHEST-PORTABLE (1 VIEW)   Final Result      Stable thoracic underinflation. Right IJ line and ETT appear adequate. Slightly  improved bibasilar pleural-parenchymal opacification      DX-CHEST-PORTABLE (1 VIEW)   Final Result      Stable bibasilar pleural-parenchymal opacification and low lung volumes. Lines/tubes adequate.      DX-CHEST-LIMITED (1 VIEW)   Final Result         Endotracheal tube with tip projecting over the mid thoracic trachea.   Gastric drainage tube courses below diaphragm, tip is not seen.   Right central venous catheter with tip projecting over the expected area of the lower SVC.            EC-NAEEM W/O CONT   Final Result      DX-CHEST-LIMITED (1 VIEW)   Final Result      Bibasilar underinflation atelectasis which could obscure an additional process. This is similar to the prior study. Lung volumes are very low.      IR-INSERT IVC FILTER WITH IG & SI   Final Result      1.  Ultrasound guided access LEFT common femoral vein.   2.  BILATERAL selective pulmonary arteriogram demonstrating extensive bilateral pulmonary emboli.   3.  Successful BILATERAL pulmonary thrombectomy   4.  Large IVC at the level of the renal veins   5.  Successful placement of infrarenal IVC filter   6.  Pursestring suture should be removed in 6-12 hours.         Please note that this study required greater than 50% more than the usual procedure time due to the patient's anatomy and large habitus.            IR-THROMBO MECHANICAL ARTERY,INIT   Final Result      1.  Ultrasound guided access LEFT common femoral vein.   2.  BILATERAL selective pulmonary arteriogram demonstrating extensive bilateral pulmonary emboli.   3.  Successful BILATERAL pulmonary thrombectomy   4.  Large IVC at the level of the renal veins   5.  Successful placement of infrarenal IVC filter   6.  Pursestring suture should be removed in 6-12 hours.         Please note that this study required greater than 50% more than the usual procedure time due to the patient's anatomy and large habitus.            EC-ECHOCARDIOGRAM COMPLETE W/ CONT   Final Result      CT-ABDOMEN-PELVIS WITH    Final Result      1.  Pneumoperitoneum secondary to perforated viscus. Splenic flexure colonic diverticulum may be the perforation site. Gastroduodenal ulcer ulcer is considered less likely.   2.  Hepatic steatosis.   3.  Moderate gallbladder distention and pericholecystic fat stranding.   4.  Small volume hyperdense ascites.   5.  Partially visualized right lower extremity DVT.      Findings were communicated to SALINA STOVER via Vo[employer] messaging system on 7/12/2023 11:03 PM.      CT-CTA CHEST PULMONARY ARTERY W/ RECONS   Final Result      1.  Saddle pulmonary embolus with associated right heart strain.   2.  Posterior pneumomediastinum secondary to pneumoperitoneum. Abdomen will be dictated separately.      Findings were communicated to SALINA STOVER via Vo[employer] messaging system on 7/12/2023 10:57 PM.      DX-CHEST-PORTABLE (1 VIEW)   Final Result      Mild atelectasis in the right lower lung. No focal consolidation. No effusions.              Assessment/Plan  1.DUNG/ATN/anuric: Sign of renal recovery yet   2.Hypotension: Still on IV pressor   3.Electrolytes: well controlled.  4.Anemia.  5.Volume:overloaded/anasarca .  6.Metabolic acidosis.  7.  Hypoalbuminemia  Recs:  Continue to evaluate daily for HD  Renal diet  Daily BMP, CBC.  Renal dose all meds  Avoid nephrotoxins like NSAIDs.  Multisystem organ failure  Prognosis guarded.

## 2023-08-03 NOTE — CARE PLAN
The patient is Watcher - Medium risk of patient condition declining or worsening    Shift Goals  Clinical Goals: pulmonary hygiene, wean levophed, mobility  Patient Goals: SAMIA  Family Goals: bed bath and rest    Progress made toward(s) clinical / shift goals:    Problem: Pain - Standard  Goal: Alleviation of pain or a reduction in pain to the patient’s comfort goal  Outcome: Progressing     Problem: Skin Care - Ostomy  Goal: Skin remains free from irritation  Outcome: Progressing       Patient is not progressing towards the following goals: N/A

## 2023-08-03 NOTE — DISCHARGE SUMMARY
Death Summary    Cause of Death  Multiorgan failure due to septic shock due to perforated viscous    Comorbid Conditions at the Time of Death  Principal Problem:    Acute hypoxemic respiratory failure (HCC) (POA: Yes)  Active Problems:    Acute deep venous thrombosis (DVT) determined by ultrasound (HCC) (POA: Yes)    Acute saddle pulmonary embolism (HCC) (POA: Yes)    Morbid obesity with BMI of 50.0-59.9, adult (HCC) (POA: Yes)    Acute kidney injury (HCC) (POA: Unknown)    Perforated viscus (POA: Yes)    Acute blood loss anemia (POA: No)    Hemorrhagic shock (HCC) (POA: Yes)    Metabolic acidosis (POA: Unknown)    Liver laceration, closed, initial encounter (POA: Yes)    Acute hepatic necrosis (POA: Unknown)    Thrombocytopenia (HCC) (POA: Unknown)    Goals of care, counseling/discussion (POA: Unknown)    Pancreatitis (POA: Unknown)    Shock (HCC) (POA: Unknown)  Resolved Problems:    Pneumoperitoneum of unknown etiology (POA: Yes)    Pneumoperitoneum (POA: Yes)    Hypokalemia (POA: Yes)    Septic shock (HCC) (POA: Unknown)    Hypotension (POA: No)      History of Presenting Illness and Hospital Course  This is a 63 y.o. female admitted 7/12/2023 with acute DVT, acute saddle PE and new pneumoperitoneum due to perforated viscus. She was admitted and treated with thrombectomy, heparin ggt and IVC filter placement. She also underwent exploratory laparatomy with left colectomy with end colostomy and s/p cholecystectomy with a wound vac in place. Her hospitalization was quite complex and characterized by respiratory failure, shock, renal failure, liver failure, and debility. After failing to improve with aggressive therapies the family no longer felt this level of care was in line with her wishes given her lower quality of life. On 8/3 with family at bedside she was made comfort care and passed away without pain or anxiety.      Death Date: 08/03/23   Death Time: 1245         Pronounced By (RN1): Florentin  John  Pronounced By (RN2): Jose Gonzalez

## 2023-08-03 NOTE — THERAPY
Physical Therapy Contact Note    EMR indicates patient transitioned to comfort care. Will DC from acute PT.    Marga Faulkner, PT, DPT  [phone]

## 2023-08-03 NOTE — PROGRESS NOTES
Dr. Canas & Palliative Care JADON Calzada at bedside to speak with family. Family has decided to proceed with comfort care measures.

## 2023-08-03 NOTE — WOUND TEAM
Patient transitioned to comfort care.  Likely decline today or tomorrow.  Family at bedside.  Orders placed for nursing to DC wound VAC if prolonged comfort care.  Orders updated to aquacel AG ABD pad and hypafix tape.

## 2023-08-03 NOTE — PROGRESS NOTES
Inpatient Palliative Care     Location: [facility]     HPI:     Ashley English is a 63 y.o. female who presented to the ED on 7/12/2023 with complaints of right leg pain x3 days, previously seen at urgent care for same complaint and was noted to have DVT on RLQ ultrasound.  CTA of the chest revealed acute saddle pulmonary embolus as well as pneumoperitoneum.  She is status post exploratory laparotomy with left hemicolectomy and end colostomy as well as cholecystectomy on 7/13/2023.  Intraoperatively, a perforated colon at splenic flexure with associated mass was identified, concerning for malignancy; however, pathology was negative.  Patient returned to ICU intubated postoperatively and was able to be extubated and transferred to the floor. Unfortunately,  7/19/2023 patient was found to be in hemorrhagic shock from intra-abdominal bleed and liver laceration, patient was reintubated and returned to ICU.  7/20/2023 patient returned to the OR for revision of colostomy and colon resection.  Started on CRRT 7/21/2023.  Patient now tolerating HD/UF. Patient was extubated on 7/27/2023 with decision not to reintubate.     Summary:     IVETTE DIOP met with patient's family at bedside.  In attendance were patient's sister, Rani, brother, Surendra, and niece, Leni.  Two friends also at bedside with Ashley.  All family are in agreement with transition to comfort care at this time.  They are spending some time visiting would like to initiate comfort care this morning.  Again discussed transition to comfort care including medications utilized for symptom management.  Family grateful for the education.  Offered empathetic support.  Bereavement tray ordered.  Family declines a visit from spiritual care. GOC discussed with ICU RN and MD.      Plan:     1) Transition to CC     Thank you for allowing me the opportunity to participate in the care of Ms. English     I spent a total of 25 minutes reviewing medical records, direct  face-to-face time with the patient and/or family, coordination of care, and documentation. This is separate from the time spent on advance care planning, which is documented above.     JADON Sotelo  Palliative Care Nurse Practitioner   [phone]

## 2023-08-03 NOTE — PROGRESS NOTES
"Critical Care Progress Note    Date of admission  7/12/2023    Chief Complaint  \"63 y.o. female who presented 7/12/2023 with acute DVT, acute saddle PE and new pneumoperitoneum due to perforated viscus for what she was admitted and treated with thrombectomy, heparin ggt and IVC filter placement. She also underwent exploratory laparatomy (today day 5 post op) with left colectomy with end colostomy and s/p cholecystectomy with a wound vac in place.   I was called at the bedside due to hypotension and tachycardia.\" H+P 7/12    Hospital Course  7/13 s/p bilateral thrombectomy for saddle PE and IVC filter placement. Successful.   7/13 s/p left colectomy and end-colostomy, and cholecystectomy. Intraop found perforated colon at splenic flexure with associated mass. Concerning of malignancy. Remains intubated post op.   7/19: Admitted to ICU is hemorrhagic shock, MTP, OR for intrabdominal bleed, liver lac, returns to ICU in shock and intubated  7/20: VD 2-Hb stable, anuric, back to OR for removal of packing, colon resection, revision colostomy (to RUQ), vasoplegic with increasing pressors overnight  7/21: CRRT started  7/22: VD 4-weaned of pressors, following, increase UF to 100-->150 overnight  7/23: 2.5 L off yesterday, off pressors, very weak today, hypophos needs aggressive replacement, switch CRRT to iHD, mobilize, SBT/SAT tomorrow  7/24: HD/UF today, tolerating SBT, high risk for failing extubation.  Discussed with family today regarding GOC as previously documented  7/25: PUF again today; VD#7, awake and follows very weakly t/o.  7/26: plan for HD/UF today, awake and follows, moving arms off bed not; palliative consulted to assist with GOC d/w pt/family, plan  7/27: Extubated today; further discussion regarding goals of care  7/28 on low levels oxygen, low dose norepinephrine  7/29 continue on low levels of oxygen, low levels norepi, anuric getting iHD, increase mobility, CT scan of abdomen  7/31 low dose " levophed  8/1 low dose levo; iHDS; goals of care meeting  8/2 remains on low dose levo  8/3 low dose levo; anuria      Interval Problem Update  Reviewed last 24 hour events:  Awake and alert, intermittently regards  Tmax: afebrile  GI: TF at goal; ostomy productive  Lines: left IJ dialysis, picc   Resp: NC/-500, pep    Vte: heparin vte  Antibx: none  - 27.1L for hospital stay  Midodrine 10 mg Q8  Levo for MAP > 65      Review of Systems  Review of Systems   Unable to perform ROS: Critical illness        Vital Signs for last 24 hours   Temp:  [36 °C (96.8 °F)-36.6 °C (97.8 °F)] 36 °C (96.8 °F)  Pulse:  [] 97  Resp:  [13-49] 19  BP: ()/(49-68) 107/53  SpO2:  [89 %-98 %] 94 %    Hemodynamic parameters for last 24 hours       Respiratory Information for the last 24 hours       Physical Exam   Physical Exam  Vitals and nursing note reviewed.   Constitutional:       General: She is not in acute distress.     Appearance: She is obese. She is ill-appearing.   HENT:      Head: Normocephalic.      Nose: Nose normal.      Mouth/Throat:      Mouth: Mucous membranes are moist.   Eyes:      Extraocular Movements: Extraocular movements intact.      Pupils: Pupils are equal, round, and reactive to light.   Neck:      Comments: Left IJ dialysis cath  Cardiovascular:      Rate and Rhythm: Normal rate and regular rhythm.   Pulmonary:      Effort: Pulmonary effort is normal. No respiratory distress.      Breath sounds: No rhonchi or rales.   Abdominal:      General: There is no distension.      Palpations: Abdomen is soft.      Tenderness: There is no abdominal tenderness. There is no guarding.      Comments: Stoma that is dark/dusky and discolored    Musculoskeletal:      Cervical back: Neck supple.      Right lower leg: Edema present.      Left lower leg: Edema present.      Comments: Improving edema   Skin:     General: Skin is warm.      Capillary Refill: Capillary refill takes less than 2 seconds.       Coloration: Skin is jaundiced.   Neurological:      Mental Status: She is alert.      Comments: Awake and alert, follows, moves all 4 extremities, weak voice and cough   Psychiatric:         Mood and Affect: Mood normal.         Medications  Current Facility-Administered Medications   Medication Dose Route Frequency Provider Last Rate Last Admin    MD ALERT...adult comfort care   Other PRN Garrison Canas M.D.        atropine 1 % ophthalmic solution 2 Drop  2 Drop Sublingual Q4HRS PRN Garrison Canas M.D.        HYDROmorphone (Dilaudid) injection 2 mg  2 mg Intravenous Q30 MIN PRN Garrison Canas M.D.        HYDROmorphone (Dilaudid) injection 4 mg  4 mg Intravenous Q30 MIN PRN Garrison Canas M.D.        LORazepam (Ativan) 2 MG/ML oral conc 1 mg  1 mg Sublingual Q HOUR PRN Garrison Canas M.D.        Or    LORazepam (Ativan) injection 2 mg  2 mg Intravenous Q HOUR PRN Garrison Canas M.D.        glycopyrrolate (Robinul) tablet 1 mg  1 mg Oral TID PRN Garrison Canas M.D.        Or    glycopyrrolate (Robinul) injection 0.2 mg  0.2 mg Intravenous TID PRN Garrison Canas M.D.        acetaminophen (Tylenol) tablet 650 mg  650 mg Enteral Tube Q4HRS PRN Garrison Canas M.D.        lidocaine (Xylocaine) 1 % injection 1 mL  1 mL Intradermal PRN Marita Buenrostro M.D.        oxyCODONE immediate-release (Roxicodone) tablet 5 mg  5 mg Enteral Tube Q4HRS PRN Cristian Peña M.D.   5 mg at 07/26/23 0838    promethazine (Phenergan) tablet 12.5-25 mg  12.5-25 mg Enteral Tube Q4HRS PRN Cristian Peña M.D.        Respiratory Therapy Consult   Nebulization Continuous RT Cristian Peña M.D.        lidocaine (Xylocaine) 1 % injection 2 mL  2 mL Tracheal Tube Q30 MIN PRN Cristian Peña M.D.        ondansetron (Zofran) syringe/vial injection 4 mg  4 mg Intravenous Q4HRS PRN Ana Lilia Joy M.D.        promethazine (Phenergan) suppository 12.5-25 mg  12.5-25 mg Rectal Q4HRS PRN Ana Lilia Joy M.D.         prochlorperazine (Compazine) injection 5-10 mg  5-10 mg Intravenous Q4HRS CHANNINGN Ana Lilia Joy M.D.        Nozin nasal  swab  1 Applicator Each Nostril BID Dagoberto Ohara D.O.   1 Applicator at 08/03/23 0507       Fluids    Intake/Output Summary (Last 24 hours) at 8/3/2023 0913  Last data filed at 8/3/2023 0626  Gross per 24 hour   Intake 1713.64 ml   Output 4100 ml   Net -2386.36 ml       Laboratory            Recent Labs     08/01/23 0318 08/02/23 0409 08/03/23  0410   SODIUM 136 137 138   POTASSIUM 4.2 3.9 4.0   CHLORIDE 102 101 100   CO2 20 24 24   BUN 60* 60* 59*   CREATININE 3.96* 3.82* 3.35*   MAGNESIUM 2.1 2.1 1.9   PHOSPHORUS 3.1 3.0 2.8   CALCIUM 7.7* 8.1* 8.1*     Recent Labs     08/01/23 0318 08/02/23 0409 08/03/23  0410   ALTSGPT 107* 102* 114*   ASTSGOT 125* 136* 165*   ALKPHOSPHAT 232* 230* 230*   TBILIRUBIN 10.1* 8.9* 7.7*   GLUCOSE 123* 141* 157*     Recent Labs     08/01/23 0318 08/02/23 0409 08/03/23  0410   WBC 10.9* 10.3 11.0*   NEUTSPOLYS 78.20* 81.20* 75.60*   LYMPHOCYTES 18.30* 11.10* 14.30*   MONOCYTES 0.90 2.60 5.10   EOSINOPHILS 1.70 0.80 1.70   BASOPHILS 0.00 0.80 0.90   ASTSGOT 125* 136* 165*   ALTSGPT 107* 102* 114*   ALKPHOSPHAT 232* 230* 230*   TBILIRUBIN 10.1* 8.9* 7.7*     Recent Labs     08/01/23 0318 08/02/23 0409 08/03/23  0410   RBC 3.04* 2.97* 2.86*   HEMOGLOBIN 9.4* 9.3* 9.2*   HEMATOCRIT 29.2* 29.0* 28.6*   PLATELETCT 119* 137* 150*       Imaging  X-Ray:  I have personally reviewed the images and compared with prior images.      Assessment/Plan  * Acute hypoxemic respiratory failure (HCC)- (present on admission)  Assessment & Plan  Initially 7/13 Intubated for OR and 7/15 extubated, re-intubated 7/19 extubated 7/27  High risk of worsening respiratory status but is DNR/DNI    iHDS to achieve euvolemia   IS poor; PEP therapy and mobilization  Aspiration precautions    Shock (HCC)  Assessment & Plan  Completed antibiotics 7/30  Liver failure + Renal  failure  Midodrine 10 mg TID  Levophed for MAP > 65    Pancreatitis  Assessment & Plan  Seen on most recent CT scan no pain or food intolerance  Supportive care    Goals of care, counseling/discussion  Assessment & Plan  Ongoing discussions, last of which 8/1 with palliative medicine, bedside nurse, and Rani. Ultimately Rani feels her sister would be unaccepting of this quality of life and there are discussions to move toward comfort care.     Thrombocytopenia (HCC)  Assessment & Plan  Improving platelet count no recent transfusion  Conservative transfusion protocol  Hold VTE ppx for platelets < 50  Trend      Acute hepatic necrosis  Assessment & Plan  Liver laceration and Ischemic hepatopathy due to shock, evidenced by markedly elevated transaminases, elevated Tbili, hypoalbunemia  Avoid hepatotoxins  Hepatocellular/cholestatic pattern in etiology      Hemorrhagic shock (HCC)- (present on admission)  Assessment & Plan  7/19: Overnight with worsening hypotension, obtundation and vasopressor requirement.  Bedside POCUS with underfilled hyperdynamic LV and FF in RUQ.  MTP intiated and patient taken to OR with ACGS. Found to have copious intrabdominal hemmorhage with a liver laceration near falciform ligament.  Packed left open  7/20: OR re opened, packing removed, fascia closed  No recurrent bleeding, HH stable      Acute blood loss anemia  Assessment & Plan  Intraabdominal bleed  S/P MTP and operative control  Conservative transfusion protocol  Trend    Perforated viscus- (present on admission)  Assessment & Plan  7/13 - ex lap with left hemicolectomy and end colostomy by Dr. Boateng  Found perforated colon at splenic fixture, associated with mass. Path neg for malignancy  Fecal peritonitis  7/30 zosyn stopped    Acute kidney injury (HCC)  Assessment & Plan  Ischemic ATN  7/21: Initiated CRRT for volume removal/ ultrafiltration after failed diuretic challenge  7/23: Switch CRRT to iHD to facilitate mobility and PT,  still anuric  7/24 iHD  Renal dose meds, avoid nephrotoxins  Q shift bladder scan    Morbid obesity with BMI of 50.0-59.9, adult (HCC)- (present on admission)  Assessment & Plan  BMI 50, place her in high risk for increase morbidity during this admission    Acute saddle pulmonary embolism (HCC)- (present on admission)  Assessment & Plan  Originating from a right leg DVT. Pt is not a candidate for thrombolytics given her pneumoperitoneum  7/13: S/p emergent thrombectomy of saddle PE with IVC placement.   7/19: Repeat TTE with normal LV function, poor RV visualization but likely dilated with reduced function, RAP low      Heparin vte started 7/29    Acute deep venous thrombosis (DVT) determined by ultrasound (HCC)- (present on admission)  Assessment & Plan  Holding heparin in the setting of massive intraabdominal hemorrhage, liver laceration and thrombocytopenia  S/p IVC filter placed this admission  Follow-up with vascular clinic for IVC filter retrieval when appropriate  Resume anticoagulation when appropriate       VTE:  Heparin  Ulcer: Not indicated  Lines: Central Line  Ongoing indication addressed    I have performed a physical exam and reviewed and updated ROS and Plan today (8/3/2023). In review of yesterday's note (8/2/2023), there are no changes except as documented above.     Discussed patient condition and risk of morbidity and/or mortality with RN, RT, Pharmacy, Code status disscussed, Charge nurse / hot rounds, and Patient    The patient remains critically ill.  Critical care time = 31 minutes in directly providing and coordinating critical care and extensive data review.  No time overlap and excludes procedures.

## 2023-08-03 NOTE — PROGRESS NOTES
Patient being transitioned to comfort care per chart review.   ACS bartholomew will sign off at this time  Please re-consult if future plans change.

## 2023-08-17 ENCOUNTER — TELEPHONE (OUTPATIENT)
Dept: VASCULAR LAB | Facility: MEDICAL CENTER | Age: 63
End: 2023-08-17
Payer: OTHER MISCELLANEOUS

## 2023-09-12 NOTE — DOCUMENTATION QUERY
"                                                                         Formerly [facility]                                                                       Query Response Note      PATIENT:               KENY HUGHES  ACCT #:                  [id number]  MRN:                     [id number]  :                      1960  ADMIT DATE:       2023 7:42 PM  DISCH DATE:        8/3/2023 12:45 PM  RESPONDING  PROVIDER #:        [id number]           QUERY TEXT:    Documentation in the medical record indicates patient has been diagnosed with septic shock.     Can you please further specify the Present on Admission (POA) status of the septic shock based on the above clinical indicators and documentation?       Note: If you agree with a diagnosis listed above, please remember to include it in your concurrent daily documentation and onto the Discharge Summary.        The patient's Clinical Indicators include:  63 year old female admitted with a PE and perforated viscus.    8/3 Canas \"Multiorgan failure due to septic shock due to perforated viscous\"  \"Septic shock (HCC) (POA: Unknown)\"    @ 710 Post L Hemicolectomy procedure Dr. Ohara notes the fever, increase in WBC?s and documents septic shock. Multiorgan failure (ie: DUNG, Resp Failure) occurred post 2nd surgical episode       Mayda \"This is Septic shock Present on admission\"  \"Febrile 39C, WBC 18K, vasopressin, Vent\"     Dr. Moctezuma ?the untreated free air with peritonitis ?could lead to a profound septic state and death?     Kyper \" sepsis\"     ED note Shamar \" nontoxic appearance \"  120/76, P 100, 97.0, R 23, 90%    Labs   WBC 7.9 lactic acid 1.6   WBC 6.0, Lactic acid 2.2, procalcitonin 7.77   WBC 18.0    Risk factors: perforated viscus, pancreatitis, PE, DUNG, acidosis  Treatment: labs, ABX, CXR, CT, ECHO, US, thrombectomy, IVC filter, t hemicolectomy with end colostomy, O2, laparotomy with washout, vasopressin, vent, IVF    If you have " any questions, please contact:  Melani Rodriguez RN CDI [facility]  [email or web address]  [email or web address] Rodriguez [address] [employer]  Options provided:   -- Septic shock POA   -- Septic shock evolving on admission   -- Septic shock Not POA   -- Other explanation, Please specify      Query created by: Melani Rodriguez on 9/12/2023 6:37 AM    RESPONSE TEXT:    Septic shock POA          Electronically signed by:  GILDARDO HANNON MD 9/12/2023 7:44 AM

## 2023-09-13 NOTE — DOCUMENTATION QUERY
"                                                                         [facility]                                                                       Query Response Note      PATIENT:               KENY HUGHES  ACCT #:                  [id number]  MRN:                     [id number]  :                      1960  ADMIT DATE:       2023 7:42 PM  DISCH DATE:        8/3/2023 12:45 PM  RESPONDING  PROVIDER #:        [id number]           QUERY TEXT:    There is conflicting documentation in the medical record.      Intrabdominal hemorrhage with a liver laceration vs ischemic liver injury.     Based on treatment, clinical findings and risk factors, can this documentation be further clarified?    Note: If you agree with a diagnosis listed above, please remember to include it in your concurrent daily documentation and onto the Discharge Summary.      The patient's Clinical Indicators include:  63 year old female admitted with a PE and perforated viscus.    8/3 Canas \"Acute hepatic necrosis. Liver laceration and Ischemic hepatopathy due to shock, evidenced by markedly elevated transaminases, elevated Tbili, hypoalbunemia\"     Josephsaimr \"Patient  with ischemic liver injury likely accounting in part for elevated INR.\"      Josephy \"OR for intrabdominal bleed, liver lac\"  \"Found to have copious intrabdominal hemorrhage with a liver laceration near falciform ligament.\"  \" ex lap intra-abdominal hemorrhage washout liver packing open abdomen.\"      Op Report \" Hemorrhagic shock. Liver laceration.\"  \"At least 3 L of blood in the abdomen.  5 cm liver laceration deep into the parenchyma but did not appear to involve any central vessels.\"    Risk factors: perforated viscus, pancreatitis, PE, DUNG, acidosis  Treatment: labs, ABX, transfusion, reopening of previous Exploratory laparotomy,  abdominal washout,  packing of the abdomen, and temporary abdominal closure with negative pressure wound therapy device " greater than 50 cm²    If you have any questions, please contact:  Melani Rodriguez RN CDI [facility]  [email or web address]  Melani Rodriguez [address] [employer]  Options provided:   -- Post operative complication of a traumatic liver laceration, not present on admission   -- Ischemic/non-traumatic liver laceration, not present on admission   -- Other explanation, Please specify   -- Unable to determine      Query created by: Melani Rodriguez on 9/13/2023 6:39 AM    RESPONSE TEXT:    Unable to determine          Electronically signed by:  GILDARDO HANNON MD 9/13/2023 8:08 AM

## (undated) DEVICE — GLOVE BIOGEL SZ 6.5 SURGICAL PF LTX (50PR/BX 4BX/CA)

## (undated) DEVICE — SPONGE RADIOPAQUE CTN X-LG - STERILE (50PK/CA) MADE TO ORDER ITEM AND HAS A 4-6 WEEK LEAD TIME

## (undated) DEVICE — FIBRILLAR SURGICEL 4X4 - 10/CA

## (undated) DEVICE — KIT ROOM DECONTAMINATION

## (undated) DEVICE — PACK MAJOR BASIN - (2EA/CA)

## (undated) DEVICE — SUTURE 2-0 COATED VICRYL PLUS - 12 X 18 INCH (12/BX)

## (undated) DEVICE — CHLORAPREP 26 ML APPLICATOR - ORANGE TINT(25/CA)

## (undated) DEVICE — HEMOSTAT ABSORBABLE POWDER SURGICEL 3G (5EA/BX)

## (undated) DEVICE — TUBE CONNECTING SUCTION - CLEAR PLASTIC STERILE 72 IN (50EA/CA)

## (undated) DEVICE — HEAD HOLDER JUNIOR/ADULT

## (undated) DEVICE — MIXER BONE CEMENT REVOLUTION - W/FEMORAL PRESSURIZER (6/CA)

## (undated) DEVICE — SET LEADWIRE 5 LEAD BEDSIDE DISPOSABLE ECG (1SET OF 5/EA)

## (undated) DEVICE — SUTURE 1 VICRYL PLUS CTX - 8 X 18 INCH (12/BX)

## (undated) DEVICE — SUTURE 3-0 SILK 12 X 18 IN - (36/BX)

## (undated) DEVICE — SODIUM CHL. IRRIGATION 0.9% 3000ML (4EA/CA 65CA/PF)

## (undated) DEVICE — LACTATED RINGERS INJ 1000 ML - (14EA/CA 60CA/PF)

## (undated) DEVICE — GLOVE BIOGEL INDICATOR SZ 8.5 SURGICAL PF LTX - (50/BX 4BX/CA)

## (undated) DEVICE — SODIUM CHL IRRIGATION 0.9% 1000ML (12EA/CA)

## (undated) DEVICE — DRESSING ABDOMINAL PAD STERILE 8 X 10" (360EA/CA)"

## (undated) DEVICE — HUMID-VENT HEAT AND MOISTURE EXCHANGE- (50/BX)

## (undated) DEVICE — SUTURE 3-0 VICRYL PLUS - 12 X 18 INCH (12/BX)

## (undated) DEVICE — SENSOR SPO2 NEO LNCS ADHESIVE (20/BX) SEE USER NOTES

## (undated) DEVICE — DRAPE SURG STERI-DRAPE 7X11OD - (40EA/CA)

## (undated) DEVICE — TRAY SKIN SCRUB PVP WET (20EA/CA) PART #DYND70356 DISCONTINUED

## (undated) DEVICE — TRAY SURESTEP FOLEY TEMP SENSING 16FR SNAP SECURE(10EA/CA) ORDER  #18764 FOR TEMP FOLEY ONLY

## (undated) DEVICE — DRESSING 3X8 ADAPTIC GAUZE - NON-ADHERING (36/PK 6PK/BX)

## (undated) DEVICE — CATHETER REDDICK ----MUST ORDER IN MULITPLES OF 10----

## (undated) DEVICE — GLOVE SIZE 7.0 SURGEON ACCELERATOR FREE GREEN (50PR/BX 4BX/CA)

## (undated) DEVICE — ELECTRODE 850 FOAM ADHESIVE - HYDROGEL RADIOTRNSPRNT (50/PK)

## (undated) DEVICE — SUCTION INSTRUMENT YANKAUER BULBOUS TIP W/O VENT (50EA/CA)

## (undated) DEVICE — SPONGE PEANUT - (5/PK 50PK/CA)

## (undated) DEVICE — TUBE E-T HI-LO CUFF 7.0MM (10EA/PK)

## (undated) DEVICE — GLOVE BIOGEL INDICATOR SZ 6.5 SURGICAL PF LTX - (50PR/BX 4BX/CA)

## (undated) DEVICE — CLIP MED INTNL HRZN TI ESCP - (25/BX)

## (undated) DEVICE — KIT ANESTHESIA W/CIRCUIT & 3/LT BAG W/FILTER (20EA/CA)

## (undated) DEVICE — SUTURE 0 SILK TIES (36PK/BX)

## (undated) DEVICE — GOWN WARMING STANDARD FLEX - (30/CA)

## (undated) DEVICE — Device

## (undated) DEVICE — BAG, SPONGE COUNT 50600

## (undated) DEVICE — TRAY MULTI-LUMEN 7FR PRESSURE W/MAX BARRIER AND BIOPATCH - (5/CA)

## (undated) DEVICE — COVER LIGHT HANDLE ALC PLUS DISP (18EA/BX)

## (undated) DEVICE — GLOVE SURGICAL PROTEXIS PI 8.0 LF - (50PR/BX)

## (undated) DEVICE — BOVIE  BLADE 6 EXTENDED - (50/PK)

## (undated) DEVICE — DRAPE LAPAROTOMY T SHEET - (12EA/CA)

## (undated) DEVICE — STOPCOCK MALE 4-WAY - (50/CA)

## (undated) DEVICE — BLADE 90X18X1.27MM SAW SAGITTAL

## (undated) DEVICE — GLOVE BIOGEL PI INDICATOR SZ 7.5 SURGICAL PF LF -(50/BX 4BX/CA)

## (undated) DEVICE — CONTAINER, SPECIMEN, STERILE

## (undated) DEVICE — PROTECTOR ULNA NERVE - (36PR/CA)

## (undated) DEVICE — PAD LAP STERILE 18 X 18 - (5/PK 40PK/CA)

## (undated) DEVICE — PADDING CAST 6 IN STERILE - 6 X 4 YDS (24/CA)

## (undated) DEVICE — SYRINGE ASEPTO - (50EA/CA

## (undated) DEVICE — GLOVE BIOGEL PI INDICATOR SZ 8.0 SURGICAL PF LF -(50/BX 4BX/CA)

## (undated) DEVICE — SUTURE 3-0 SILK SH C/R 18 IN - (12/BX)

## (undated) DEVICE — SUTURE 3-0 VICRYL PLUS SH - 8X 18 INCH (12/BX)

## (undated) DEVICE — SYRINGE 20 ML LS (48/BX 4BX/CA)

## (undated) DEVICE — SUTURE 2-0 SILK 12 X 18" (36PK/BX)"

## (undated) DEVICE — TOWELS CLOTH SURGICAL - (4/PK 20PK/CA)

## (undated) DEVICE — LENS/HOOD FOR SPACESUIT - (32/PK) PEEL AWAY FACE

## (undated) DEVICE — GOWN SURGEONS X-LARGE - DISP. (30/CA)

## (undated) DEVICE — GLOVE BIOGEL SZ 7.5 SURGICAL PF LTX - (50PR/BX 4BX/CA)

## (undated) DEVICE — STAPLER 75MM LINEAR OPEN (3EA/BX)

## (undated) DEVICE — GLOVE SZ 7 BIOGEL PI MICRO - PF LF (50PR/BX 4BX/CA)

## (undated) DEVICE — BLOCK

## (undated) DEVICE — BLADE RECIPROCATING 12.7 X 78.7 X 1.0MM (1/EA)

## (undated) DEVICE — SYRINGE 12 CC LUER TIP - (80/BX) OBSOLETE ITEM

## (undated) DEVICE — ELECTRODE DUAL RETURN W/ CORD - (50/PK)

## (undated) DEVICE — CANISTER SUCTION 3000ML MECHANICAL FILTER AUTO SHUTOFF MEDI-VAC NONSTERILE LF DISP  (40EA/CA)

## (undated) DEVICE — PINNING SYSTEM

## (undated) DEVICE — GLOVE SZ 7.5 LF PROTEXIS (50PR/BX)

## (undated) DEVICE — STAPLER SKIN DISP - (6/BX 10BX/CA) VISISTAT

## (undated) DEVICE — SUTURE 3-0 VICRYL PLUS CT-1 - 8 X 18 INCH (12/BX)

## (undated) DEVICE — TIP INTPLS HFLO ML ORFC BTRY - (12/CS)  FOR SURGILAV

## (undated) DEVICE — CLIP MED LG INTNL HRZN TI ESCP - (20/BX)

## (undated) DEVICE — BANDAGE ROLL STERILE BULKEE 4.5 IN X 4 YD (100EA/CA)

## (undated) DEVICE — WATER IRRIGATION STERILE 1000ML (12EA/CA)

## (undated) DEVICE — PACK TOTAL KNEE  (1/CA)

## (undated) DEVICE — SUTURE 1 PDS II PLUS TP-1 - (12PK/BX)

## (undated) DEVICE — GLOVE BIOGEL INDICATOR SZ 8 SURGICAL PF LTX - (50/BX 4BX/CA)

## (undated) DEVICE — TUBING CLEARLINK DUO-VENT - C-FLO (48EA/CA)

## (undated) DEVICE — DRAPE LARGE 3 QUARTER - (20/CA)

## (undated) DEVICE — SUTURE 2-0 VICRYL PLUS CT-1 36 (36PK/BX)"

## (undated) DEVICE — SUTURE GENERAL

## (undated) DEVICE — KIT 2.75IN COL ILSTM 2 PC DRN - 70MM 2 3/4 INCH THIS IS FOR THE OR ONLY (5/BX)

## (undated) DEVICE — HANDPIECE 10FT INTPLS SCT PLS IRRIGATION HAND CONTROL SET (6/PK)

## (undated) DEVICE — DRAPE MAYO STAND - (30/CA)

## (undated) DEVICE — GLOVE BIOGEL INDICATOR SZ 7SURGICAL PF LTX - (50/BX 4BX/CA)

## (undated) DEVICE — GLOVE BIOGEL PI INDICATOR SZ 8.5 SURGICAL PF LF - (50PR/BX 4BX/CA)

## (undated) DEVICE — GLOVE, LITE (PAIR)

## (undated) DEVICE — SUTURE 1 VICRYL PLUSCT-1 27IN - (36/BX)

## (undated) DEVICE — STAPLER CONTOUR CURVED GREEN 4.8MM W/STAPLE (3EA/BX)

## (undated) DEVICE — GLOVE BIOGEL SZ 8.5 SURGICAL PF LTX - (50PR/BX 4BX/CA)

## (undated) DEVICE — SUTURE 2-0 SILK SH C/R ETHICON (12PK/BX)

## (undated) DEVICE — CANISTER SUCTION RIGID RED 1500CC (40EA/CA)

## (undated) DEVICE — CORDS BIPOLAR COAGULATION - 12FT STERILE DISP. (10EA/BX)

## (undated) DEVICE — MASK ANESTHESIA ADULT  - (100/CA)

## (undated) DEVICE — GLOVE BIOGEL SZ 8 SURGICAL PF LTX - (50PR/BX 4BX/CA)

## (undated) DEVICE — SLEEVE VASO CALF MED - (10PR/CA)

## (undated) DEVICE — NEPTUNE 4 PORT MANIFOLD - (20/PK)

## (undated) DEVICE — SUTURE 3-0 VICRYL PLUS SH - 27 INCH (36/BX)

## (undated) DEVICE — GLOVE BIOGEL SZ 7 SURGICAL PF LTX - (50PR/BX 4BX/CA)

## (undated) DEVICE — SET EXTENSION WITH 2 PORTS (48EA/CA) ***PART #2C8610 IS A SUBSTITUTE*****

## (undated) DEVICE — SLEEVE, VASO, THIGH, MED

## (undated) DEVICE — BAG SPONGE COUNT 10.25 X 32 - BLUE (250/CA)

## (undated) DEVICE — TOWEL STOP TIMEOUT SAFETY FLAG (40EA/CA)

## (undated) DEVICE — CLIP LG INTNL HRZN TI ESCP LGT - (20/BX)

## (undated) DEVICE — SENSOR OXIMETER ADULT SPO2 RD SET (20EA/BX)

## (undated) DEVICE — SPONGE GAUZESTER 4 X 4 4PLY - (128PK/CA)

## (undated) DEVICE — SUTURE 0 COATED VICRYL 6-18IN - (12PK/BX)

## (undated) DEVICE — GLOVE BIOGEL PI INDICATOR SZ 7.0 SURGICAL PF LF - (50/BX 4BX/CA)

## (undated) DEVICE — GLOVE SURGICAL PROTEXIS 9

## (undated) DEVICE — BLADE SURGICAL #10 - (50/BX)

## (undated) DEVICE — STOCKINET TUBULAR 6IN STERILE - 6 X 48YDS (25/CA)